# Patient Record
Sex: FEMALE | Race: WHITE | Employment: OTHER | URBAN - METROPOLITAN AREA
[De-identification: names, ages, dates, MRNs, and addresses within clinical notes are randomized per-mention and may not be internally consistent; named-entity substitution may affect disease eponyms.]

---

## 2017-01-03 ENCOUNTER — TELEPHONE (OUTPATIENT)
Dept: INTERNAL MEDICINE CLINIC | Facility: CLINIC | Age: 72
End: 2017-01-03

## 2017-01-03 RX ORDER — FENOFIBRATE 145 MG/1
145 TABLET, COATED ORAL
Qty: 90 TABLET | Refills: 0 | Status: SHIPPED | OUTPATIENT
Start: 2017-01-03 | End: 2017-01-09

## 2017-01-03 NOTE — TELEPHONE ENCOUNTER
Current outpatient prescriptions:   • •  Fenofibrate 145 MG Oral Tab, Take 1 tablet (145 mg total) by mouth once daily. , Disp: 90 tablet, Rfl: 0       Need rx  She never received it from mail order   •

## 2017-01-04 ENCOUNTER — TELEPHONE (OUTPATIENT)
Dept: INTERNAL MEDICINE CLINIC | Facility: CLINIC | Age: 72
End: 2017-01-04

## 2017-01-09 ENCOUNTER — TELEPHONE (OUTPATIENT)
Dept: INTERNAL MEDICINE CLINIC | Facility: CLINIC | Age: 72
End: 2017-01-09

## 2017-01-09 RX ORDER — CELECOXIB 100 MG/1
CAPSULE ORAL
Qty: 180 CAPSULE | Refills: 1 | Status: SHIPPED | OUTPATIENT
Start: 2017-01-09 | End: 2017-06-09

## 2017-01-09 RX ORDER — FENOFIBRATE 145 MG/1
145 TABLET, COATED ORAL
Qty: 90 TABLET | Refills: 0 | Status: SHIPPED | OUTPATIENT
Start: 2017-01-09 | End: 2017-05-01

## 2017-01-09 NOTE — TELEPHONE ENCOUNTER
Current outpatient prescriptions:   •  Fenofibrate 145 MG Oral Tab, Take 1 tablet (145 mg total) by mouth once daily. , Disp: 90 tablet, Rfl: 0   Needs 30 days to local  Mail order has not mailed  To her until Friday   •

## 2017-01-09 NOTE — TELEPHONE ENCOUNTER
Current outpatient prescriptions:   •  NEEDS 90 DAY TO MAIL ORDER    •      celecoxib 100 MG Oral Cap, TAKE 1 CAPSULE TWICE DAILY, Disp: 180 capsule, Rfl: 1  •

## 2017-01-18 ENCOUNTER — TELEPHONE (OUTPATIENT)
Dept: INTERNAL MEDICINE CLINIC | Facility: CLINIC | Age: 72
End: 2017-01-18

## 2017-01-19 ENCOUNTER — TELEPHONE (OUTPATIENT)
Dept: INTERNAL MEDICINE CLINIC | Facility: CLINIC | Age: 72
End: 2017-01-19

## 2017-01-19 ENCOUNTER — OFFICE VISIT (OUTPATIENT)
Dept: INTERNAL MEDICINE CLINIC | Facility: CLINIC | Age: 72
End: 2017-01-19

## 2017-01-19 VITALS
BODY MASS INDEX: 20.45 KG/M2 | OXYGEN SATURATION: 98 % | HEART RATE: 52 BPM | DIASTOLIC BLOOD PRESSURE: 60 MMHG | HEIGHT: 62.5 IN | TEMPERATURE: 100 F | SYSTOLIC BLOOD PRESSURE: 170 MMHG | WEIGHT: 114 LBS

## 2017-01-19 DIAGNOSIS — E03.9 ACQUIRED HYPOTHYROIDISM: Primary | ICD-10-CM

## 2017-01-19 DIAGNOSIS — E53.8 VITAMIN B 12 DEFICIENCY: ICD-10-CM

## 2017-01-19 DIAGNOSIS — IMO0001 UNCONTROLLED TYPE 2 DIABETES MELLITUS WITHOUT COMPLICATION, WITHOUT LONG-TERM CURRENT USE OF INSULIN: ICD-10-CM

## 2017-01-19 DIAGNOSIS — M81.0 OSTEOPOROSIS: ICD-10-CM

## 2017-01-19 DIAGNOSIS — E03.9 ACQUIRED HYPOTHYROIDISM: ICD-10-CM

## 2017-01-19 DIAGNOSIS — E55.9 VITAMIN D DEFICIENCY: ICD-10-CM

## 2017-01-19 DIAGNOSIS — E78.49 OTHER HYPERLIPIDEMIA: ICD-10-CM

## 2017-01-19 DIAGNOSIS — R05.9 COUGH: ICD-10-CM

## 2017-01-19 DIAGNOSIS — I10 ESSENTIAL HYPERTENSION WITH GOAL BLOOD PRESSURE LESS THAN 130/80: Primary | ICD-10-CM

## 2017-01-19 DIAGNOSIS — Z00.00 ADULT GENERAL MEDICAL EXAMINATION: ICD-10-CM

## 2017-01-19 DIAGNOSIS — H00.012 HORDEOLUM EXTERNUM OF RIGHT LOWER EYELID: ICD-10-CM

## 2017-01-19 DIAGNOSIS — F32.A DEPRESSION, UNSPECIFIED DEPRESSION TYPE: ICD-10-CM

## 2017-01-19 PROCEDURE — G0463 HOSPITAL OUTPT CLINIC VISIT: HCPCS | Performed by: INTERNAL MEDICINE

## 2017-01-19 PROCEDURE — 99214 OFFICE O/P EST MOD 30 MIN: CPT | Performed by: INTERNAL MEDICINE

## 2017-01-19 RX ORDER — LISINOPRIL 10 MG/1
10 TABLET ORAL NIGHTLY
Qty: 90 TABLET | Refills: 1 | Status: SHIPPED | OUTPATIENT
Start: 2017-01-19 | End: 2017-06-21

## 2017-01-19 RX ORDER — EPINEPHRINE 0.3 MG/.3ML
INJECTION SUBCUTANEOUS
Qty: 1 EACH | Refills: 1 | Status: SHIPPED | OUTPATIENT
Start: 2017-01-19 | End: 2018-01-30

## 2017-01-19 NOTE — TELEPHONE ENCOUNTER
Patient states if it ok with you can you order labs for her to go to the hospital and when she come back in April she will set up a follow up appointment

## 2017-01-19 NOTE — PATIENT INSTRUCTIONS
ASSESSMENT/PLAN:   Essential hypertension with goal blood pressure less than 130/80  (primary encounter diagnosis)Not well controlled. Add lisnopril 10 mg at night. Call in 2 weeks. Careful with diet and excercise at least 30 minutes 3-4 times a week.  Chec

## 2017-01-19 NOTE — TELEPHONE ENCOUNTER
You requested a appointment to see you in the am before Feb 5 th  She leaves to [place] you want to see her fasting in any location is fine .  Please advice

## 2017-01-19 NOTE — PROGRESS NOTES
HPI:    Patient ID: Epifanio Alas is a 74year old female. Cough  This is a new problem. The current episode started more than 1 month ago. The problem has been gradually improving. The cough is non-productive.  Associated symptoms include postnasal dri ALT 26 08/12/2016 11:09 AM   TSH 0.78 12/26/2013 10:36 AM   T4F 1.29 03/16/2015 02:54 PM          Lab Results  Component Value Date/Time   CHOLEST 152 08/12/2016 11:09 AM   HDL 56 08/12/2016 11:09 AM   TRIG 70 08/12/2016 11:09 AM   LDL 82 08/12/2016 11:09 CO2 27 08/12/2016 11:09 AM   CREATSERUM 0.94 08/12/2016 11:09 AM   CA 10.3 08/12/2016 11:09 AM   ALB 4.5 08/12/2016 11:09 AM   TP 6.7 08/12/2016 11:09 AM   ALKPHO 30 10/25/2010 09:56 AM   AST 30 08/12/2016 11:09 AM   ALT 26 08/12/2016 11:09 AM   BILT 0.7 0 Cholecalciferol (D-2000 MAXIMUM STRENGTH) 2000 UNITS Oral Tab Take  by mouth.  Disp:  Rfl:    VITAMIN E 400 UNIT OR TABS 1 TABLET DAILY Disp:  Rfl:    VITAMIN C 500 MG OR TABS 1 TABLET DAILY Disp:  Rfl:    TYLENOL ARTHRITIS PAIN OR DAILY Disp:  Rfl:    ZINC Smokeless Status: Never Used                        Alcohol Use: Yes           7.0 oz/week       14 Glasses of wine per week       Comment: Wine, 2 drinks daily     Drug Use: No            Other Topics            Concern  Caffeine Concern        Yes    C Psychiatric/Behavioral: Positive for sleep disturbance, dysphoric mood and agitation. Negative for suicidal ideas, hallucinations, behavioral problems, confusion, self-injury and decreased concentration. The patient is nervous/anxious.  The patient is not h VITAMIN B-12 500 MCG OR TABS 1 cap daily Disp:  Rfl:    VITAMIN D3 2000 UNIT OR CAPS 1 CAPSULE DAILY Disp:  Rfl:    CALCIUM 600 OR 1 cap daily Disp:  Rfl:    OMEGA 3 1200 MG OR CAPS 1 cap daily Disp:  Rfl:    COD LIVER OIL OR 1 DAILY Disp:  Rfl:    FLORAST Head: Normocephalic and atraumatic. Right Ear: Tympanic membrane, external ear and ear canal normal. No lacerations. No drainage, swelling or tenderness. No foreign bodies. No mastoid tenderness.  Tympanic membrane is not injected, not scarred, not perfor Eyes: Conjunctivae and EOM are normal. Pupils are equal, round, and reactive to light. Right eye exhibits hordeolum. Right eye exhibits no chemosis, no discharge and no exudate. No foreign body present in the right eye.  Left eye exhibits no chemosis, no di Head (right side): No submental, no submandibular, no tonsillar, no preauricular, no posterior auricular and no occipital adenopathy present.         Head (left side): No submental, no submandibular, no tonsillar, no preauricular, no posterior auricula Uncontrolled type 2 diabetes mellitus without complication, without long-term current use of insulin (hcc) Better. Careful with diet and excercise at least 30 minutes 3-4 times a week. Check sugars at different times on different dates.  Careful with low snow

## 2017-01-20 ENCOUNTER — TELEPHONE (OUTPATIENT)
Dept: INTERNAL MEDICINE CLINIC | Facility: CLINIC | Age: 72
End: 2017-01-20

## 2017-01-20 NOTE — TELEPHONE ENCOUNTER
Pt. Called from. [phone]: Blood pressures are 123/ 40. No Sx. Began lisinopril 10 mg at night. Decreased lisinopril. Check blood pressures and call on Tues.

## 2017-01-24 ENCOUNTER — HOSPITAL ENCOUNTER (OUTPATIENT)
Dept: GENERAL RADIOLOGY | Age: 72
Discharge: HOME | End: 2017-01-24
Attending: INTERNAL MEDICINE
Payer: MEDICARE

## 2017-01-24 ENCOUNTER — APPOINTMENT (OUTPATIENT)
Dept: LAB | Age: 72
End: 2017-01-24
Attending: INTERNAL MEDICINE
Payer: MEDICARE

## 2017-01-24 DIAGNOSIS — R05.9 COUGH: ICD-10-CM

## 2017-01-24 DIAGNOSIS — M81.0 OSTEOPOROSIS: ICD-10-CM

## 2017-01-24 DIAGNOSIS — E78.49 OTHER HYPERLIPIDEMIA: ICD-10-CM

## 2017-01-24 DIAGNOSIS — IMO0001 UNCONTROLLED TYPE 2 DIABETES MELLITUS WITHOUT COMPLICATION, WITHOUT LONG-TERM CURRENT USE OF INSULIN: ICD-10-CM

## 2017-01-24 DIAGNOSIS — E03.9 ACQUIRED HYPOTHYROIDISM: ICD-10-CM

## 2017-01-24 LAB
ALBUMIN SERPL BCP-MCNC: 4.3 G/DL (ref 3.5–4.8)
ALBUMIN/GLOB SERPL: 1.8 {RATIO} (ref 1–2)
ALP SERPL-CCNC: 31 U/L (ref 32–100)
ALT SERPL-CCNC: 24 U/L (ref 14–54)
ANION GAP SERPL CALC-SCNC: 8 MMOL/L (ref 0–18)
AST SERPL-CCNC: 27 U/L (ref 15–41)
BILIRUB SERPL-MCNC: 0.9 MG/DL (ref 0.3–1.2)
BUN SERPL-MCNC: 16 MG/DL (ref 8–20)
BUN/CREAT SERPL: 18.2 (ref 10–20)
CALCIUM SERPL-MCNC: 10.3 MG/DL (ref 8.5–10.5)
CHLORIDE SERPL-SCNC: 104 MMOL/L (ref 95–110)
CHOLEST SERPL-MCNC: 131 MG/DL (ref 110–200)
CO2 SERPL-SCNC: 28 MMOL/L (ref 22–32)
CREAT SERPL-MCNC: 0.88 MG/DL (ref 0.5–1.5)
GLOBULIN PLAS-MCNC: 2.4 G/DL (ref 2.5–3.7)
GLUCOSE SERPL-MCNC: 94 MG/DL (ref 70–99)
HBA1C MFR BLD: 5.5 % (ref 4–6)
HDLC SERPL-MCNC: 54 MG/DL
LDLC SERPL CALC-MCNC: 68 MG/DL (ref 0–99)
NONHDLC SERPL-MCNC: 77 MG/DL
OSMOLALITY UR CALC.SUM OF ELEC: 291 MOSM/KG (ref 275–295)
POTASSIUM SERPL-SCNC: 4.3 MMOL/L (ref 3.3–5.1)
PROT SERPL-MCNC: 6.7 G/DL (ref 5.9–8.4)
SODIUM SERPL-SCNC: 140 MMOL/L (ref 136–144)
TRIGL SERPL-MCNC: 43 MG/DL (ref 1–149)
TSH SERPL-ACNC: 0.65 UIU/ML (ref 0.34–5.6)

## 2017-01-24 PROCEDURE — 36415 COLL VENOUS BLD VENIPUNCTURE: CPT

## 2017-01-24 PROCEDURE — 80061 LIPID PANEL: CPT

## 2017-01-24 PROCEDURE — 84443 ASSAY THYROID STIM HORMONE: CPT

## 2017-01-24 PROCEDURE — 82306 VITAMIN D 25 HYDROXY: CPT

## 2017-01-24 PROCEDURE — 83036 HEMOGLOBIN GLYCOSYLATED A1C: CPT

## 2017-01-24 PROCEDURE — 80053 COMPREHEN METABOLIC PANEL: CPT

## 2017-01-24 PROCEDURE — 71020 XR CHEST PA + LAT CHEST (CPT=71020): CPT

## 2017-01-25 LAB — 25(OH)D3 SERPL-MCNC: 47.5 NG/ML

## 2017-01-26 ENCOUNTER — TELEPHONE (OUTPATIENT)
Dept: INTERNAL MEDICINE CLINIC | Facility: CLINIC | Age: 72
End: 2017-01-26

## 2017-01-26 NOTE — PROGRESS NOTES
Quick Note:    A1c is very good. CMP Normal (electrolytes, sugar, kidney and liver functions),   Lipid (choilesterol) is good,   Thyroid is good.    D level is good  ______

## 2017-02-10 ENCOUNTER — HOSPITAL ENCOUNTER (OUTPATIENT)
Dept: GENERAL RADIOLOGY | Age: 72
Discharge: HOME | End: 2017-02-10
Attending: INTERNAL MEDICINE | Admitting: INTERNAL MEDICINE
Payer: MEDICARE

## 2017-02-10 ENCOUNTER — TELEPHONE (OUTPATIENT)
Dept: INTERNAL MEDICINE CLINIC | Facility: CLINIC | Age: 72
End: 2017-02-10

## 2017-02-10 ENCOUNTER — OFFICE VISIT (OUTPATIENT)
Dept: INTERNAL MEDICINE CLINIC | Facility: CLINIC | Age: 72
End: 2017-02-10

## 2017-02-10 VITALS
OXYGEN SATURATION: 99 % | HEIGHT: 62.5 IN | DIASTOLIC BLOOD PRESSURE: 48 MMHG | WEIGHT: 114 LBS | BODY MASS INDEX: 20.45 KG/M2 | TEMPERATURE: 100 F | SYSTOLIC BLOOD PRESSURE: 116 MMHG | HEART RATE: 68 BPM

## 2017-02-10 DIAGNOSIS — E55.9 VITAMIN D DEFICIENCY: ICD-10-CM

## 2017-02-10 DIAGNOSIS — E53.8 VITAMIN B 12 DEFICIENCY: ICD-10-CM

## 2017-02-10 DIAGNOSIS — IMO0001 UNCONTROLLED TYPE 2 DIABETES MELLITUS WITHOUT COMPLICATION, WITHOUT LONG-TERM CURRENT USE OF INSULIN: ICD-10-CM

## 2017-02-10 DIAGNOSIS — E78.49 OTHER HYPERLIPIDEMIA: ICD-10-CM

## 2017-02-10 DIAGNOSIS — R05.9 COUGH: ICD-10-CM

## 2017-02-10 DIAGNOSIS — E03.9 ACQUIRED HYPOTHYROIDISM: ICD-10-CM

## 2017-02-10 DIAGNOSIS — M81.0 OSTEOPOROSIS: ICD-10-CM

## 2017-02-10 DIAGNOSIS — I10 ESSENTIAL HYPERTENSION WITH GOAL BLOOD PRESSURE LESS THAN 130/80: ICD-10-CM

## 2017-02-10 DIAGNOSIS — R05.9 COUGH: Primary | ICD-10-CM

## 2017-02-10 PROCEDURE — G0463 HOSPITAL OUTPT CLINIC VISIT: HCPCS | Performed by: INTERNAL MEDICINE

## 2017-02-10 PROCEDURE — 71020 XR CHEST PA + LAT CHEST (CPT=71020): CPT

## 2017-02-10 PROCEDURE — 99214 OFFICE O/P EST MOD 30 MIN: CPT | Performed by: INTERNAL MEDICINE

## 2017-02-10 RX ORDER — LEVOFLOXACIN 750 MG/1
750 TABLET ORAL DAILY
Qty: 10 TABLET | Refills: 0 | Status: SHIPPED | OUTPATIENT
Start: 2017-02-10 | End: 2017-02-20

## 2017-02-10 NOTE — PROGRESS NOTES
HPI:    Patient ID: Omar Valentino is a 74year old female. Cough  This is a new problem. The current episode started in the past 7 days. The problem has been gradually worsening. The cough is non-productive.  Associated symptoms include postnasal drip a kg)  01/19/17 : 114 lb (51.71 kg)  08/12/16 : 112 lb (50.803 kg)    BP Readings from Last 3 Encounters:  02/10/17 : 116/48  01/19/17 : 170/60  08/12/16 : 156/62    Labs:     Lab Results  Component Value Date/Time   GLU 94 01/24/2017 09:24 AM    01/24 stiffness. Skin: Negative for rash. Allergic/Immunologic: Negative for environmental allergies. Neurological: Negative for dizziness, tremors, seizures, syncope, weakness, light-headedness, numbness and headaches.    All other systems reviewed and are 500 MCG OR TABS 1 cap daily Disp:  Rfl:    VITAMIN D3 2000 UNIT OR CAPS 1 CAPSULE DAILY Disp:  Rfl:    CALCIUM 600 OR 1 cap daily Disp:  Rfl:    OMEGA 3 1200 MG OR CAPS 1 cap daily Disp:  Rfl:    COD LIVER OIL OR 1 DAILY Disp:  Rfl:    FLORASTOR 250 MG OR dry and not cyanotic. She does not have dentures. No oral lesions. No trismus in the jaw. No dental abscesses, uvula swelling or lacerations. No oropharyngeal exudate, posterior oropharyngeal edema, posterior oropharyngeal erythema or tonsillar abscesses. behavior is normal. Thought content normal.   Nursing note and vitals reviewed.   Bilateral barefoot skin diabetic exam is normal, visualized feet and the appearance is normal.  Bilateral sensation of both feet is normal.  Pulsation pedal pulse exam of both

## 2017-02-10 NOTE — PATIENT INSTRUCTIONS
ASSESSMENT/PLAN:   HTN. Good control. Careful with diet and excercise at least 30 minutes 3-4 times a week. Check blood pressures at different times on different days. Can purchase own blood pressure monitor. If not, check at local pharmacy.  Bake Lumafit m

## 2017-02-10 NOTE — TELEPHONE ENCOUNTER
Called and spoke to patient. Relayed to patient that Dr. Andrea Lynch is unable to see her, but she could come in today to see Dr. Florentin Ramirez.   Per patient, she does not want to come in and see anyone other than Dr. Andrea Lynch, and she wants to see her before she leaves

## 2017-02-10 NOTE — TELEPHONE ENCOUNTER
Patient calling states had flu and bronchitis on December, and now has tightness on chest and little cough with phlegm. Per patient is leaving on Sunday to [place] for two months, and will like you to advise her on what she should do regarding symptoms.  Pa

## 2017-02-11 ENCOUNTER — TELEPHONE (OUTPATIENT)
Dept: INTERNAL MEDICINE CLINIC | Facility: CLINIC | Age: 72
End: 2017-02-11

## 2017-02-12 NOTE — TELEPHONE ENCOUNTER
Spoke with pt. Feels like cat on hot tin roof. Denies palp., CP, SOB.N,V. Relayed CXR report to pt. Try 1/2 in Am when awakens and finish 10 days. Pt. To call if any adverse Sx.  Orno better or new Sx.

## 2017-02-16 ENCOUNTER — TELEPHONE (OUTPATIENT)
Dept: INTERNAL MEDICINE CLINIC | Facility: CLINIC | Age: 72
End: 2017-02-16

## 2017-02-16 NOTE — TELEPHONE ENCOUNTER
Patient is still sick since taking the medication Does not feel any better also now started with diarrhea.  Please advice

## 2017-02-17 NOTE — TELEPHONE ENCOUNTER
Diarrhea every time takes levaquin X 3 days. Watery. No F nor chills. Took levaquin X 6 month. Urinating Nl. ER if worse or not helping. Increase fluids. Tested for C. Diff. Stop. Call.

## 2017-02-20 ENCOUNTER — TELEPHONE (OUTPATIENT)
Dept: INTERNAL MEDICINE CLINIC | Facility: CLINIC | Age: 72
End: 2017-02-20

## 2017-02-20 NOTE — TELEPHONE ENCOUNTER
Patient just wants to make you aware she in now in [place] and feeling better  just has diarrhea but not to bad

## 2017-03-13 ENCOUNTER — TELEPHONE (OUTPATIENT)
Dept: INTERNAL MEDICINE CLINIC | Facility: CLINIC | Age: 72
End: 2017-03-13

## 2017-03-13 RX ORDER — LIOTHYRONINE SODIUM 5 UG/1
5 TABLET ORAL DAILY
Qty: 90 TABLET | Refills: 0 | Status: SHIPPED | OUTPATIENT
Start: 2017-03-13 | End: 2017-06-12

## 2017-05-01 RX ORDER — FENOFIBRATE 145 MG/1
145 TABLET, COATED ORAL
Qty: 90 TABLET | Refills: 0 | Status: SHIPPED | OUTPATIENT
Start: 2017-05-01 | End: 2017-06-09

## 2017-05-08 ENCOUNTER — TELEPHONE (OUTPATIENT)
Dept: INTERNAL MEDICINE CLINIC | Facility: CLINIC | Age: 72
End: 2017-05-08

## 2017-05-08 RX ORDER — LEVOTHYROXINE SODIUM 88 UG/1
88 TABLET ORAL
Qty: 90 TABLET | Refills: 1 | Status: SHIPPED | OUTPATIENT
Start: 2017-05-08 | End: 2017-06-09

## 2017-05-08 RX ORDER — LISINOPRIL AND HYDROCHLOROTHIAZIDE 25; 20 MG/1; MG/1
1 TABLET ORAL
Qty: 90 TABLET | Refills: 1 | Status: SHIPPED | OUTPATIENT
Start: 2017-05-08 | End: 2017-09-29

## 2017-06-06 ENCOUNTER — TELEPHONE (OUTPATIENT)
Dept: INTERNAL MEDICINE CLINIC | Facility: CLINIC | Age: 72
End: 2017-06-06

## 2017-06-06 RX ORDER — ATORVASTATIN CALCIUM 40 MG/1
40 TABLET, FILM COATED ORAL NIGHTLY
Qty: 90 TABLET | Refills: 1 | Status: SHIPPED | OUTPATIENT
Start: 2017-06-06 | End: 2017-07-22

## 2017-06-09 RX ORDER — LEVOTHYROXINE SODIUM 88 UG/1
88 TABLET ORAL
Qty: 90 TABLET | Refills: 1 | Status: SHIPPED | OUTPATIENT
Start: 2017-06-09 | End: 2017-12-19

## 2017-06-09 RX ORDER — CELECOXIB 100 MG/1
CAPSULE ORAL
Qty: 180 CAPSULE | Refills: 1 | Status: SHIPPED | OUTPATIENT
Start: 2017-06-09 | End: 2017-08-10

## 2017-06-09 RX ORDER — CELECOXIB 100 MG/1
CAPSULE ORAL
Qty: 180 CAPSULE | Refills: 1 | Status: SHIPPED | OUTPATIENT
Start: 2017-06-09 | End: 2017-09-22

## 2017-06-09 RX ORDER — FENOFIBRATE 145 MG/1
145 TABLET, COATED ORAL
Qty: 90 TABLET | Refills: 0 | Status: SHIPPED | OUTPATIENT
Start: 2017-06-09 | End: 2017-11-27

## 2017-06-12 RX ORDER — CITALOPRAM 20 MG/1
20 TABLET ORAL
Qty: 90 TABLET | Refills: 1 | Status: SHIPPED | OUTPATIENT
Start: 2017-06-12 | End: 2017-12-07

## 2017-06-12 RX ORDER — LIOTHYRONINE SODIUM 5 UG/1
5 TABLET ORAL DAILY
Qty: 90 TABLET | Refills: 1 | Status: SHIPPED | OUTPATIENT
Start: 2017-06-12 | End: 2018-01-02

## 2017-06-21 ENCOUNTER — TELEPHONE (OUTPATIENT)
Dept: INTERNAL MEDICINE CLINIC | Facility: CLINIC | Age: 72
End: 2017-06-21

## 2017-06-21 RX ORDER — LISINOPRIL 10 MG/1
10 TABLET ORAL NIGHTLY
Qty: 90 TABLET | Refills: 0 | Status: SHIPPED | OUTPATIENT
Start: 2017-06-21 | End: 2017-08-10

## 2017-06-27 ENCOUNTER — TELEPHONE (OUTPATIENT)
Dept: INTERNAL MEDICINE CLINIC | Facility: CLINIC | Age: 72
End: 2017-06-27

## 2017-06-27 DIAGNOSIS — F32.A DEPRESSION, UNSPECIFIED DEPRESSION TYPE: Primary | ICD-10-CM

## 2017-06-27 NOTE — TELEPHONE ENCOUNTER
Note to [facility] they will call and find it was covered with her insurance so she should expect a call from them.

## 2017-06-28 ENCOUNTER — TELEPHONE (OUTPATIENT)
Dept: INTERNAL MEDICINE CLINIC | Facility: CLINIC | Age: 72
End: 2017-06-28

## 2017-06-28 NOTE — TELEPHONE ENCOUNTER
MD Dr. Andrea Taylor,     I have begun following up with Carlos Huber & will let you know when I reach her to help her link with behavioral health services.      Thank you,   [address]

## 2017-07-06 ENCOUNTER — TELEPHONE (OUTPATIENT)
Dept: INTERNAL MEDICINE CLINIC | Facility: CLINIC | Age: 72
End: 2017-07-06

## 2017-07-06 NOTE — TELEPHONE ENCOUNTER
MD Dr. Daryl Mooney,     Unfortunately, [facility] has not returned my phone calls related to the order you placed for [facility] health [facility].  I am going to close the order at this time, but please feel free

## 2017-07-07 NOTE — TELEPHONE ENCOUNTER
Spoke with pt and gave her information for [facility], pt states that she had called there before but did not really get anywhere with them but she is willing to try again.

## 2017-07-22 ENCOUNTER — TELEPHONE (OUTPATIENT)
Dept: INTERNAL MEDICINE CLINIC | Facility: CLINIC | Age: 72
End: 2017-07-22

## 2017-07-22 RX ORDER — ATORVASTATIN CALCIUM 40 MG/1
40 TABLET, FILM COATED ORAL NIGHTLY
Qty: 90 TABLET | Refills: 1 | Status: SHIPPED | OUTPATIENT
Start: 2017-07-22 | End: 2017-12-20

## 2017-08-10 ENCOUNTER — OFFICE VISIT (OUTPATIENT)
Dept: INTERNAL MEDICINE CLINIC | Facility: CLINIC | Age: 72
End: 2017-08-10

## 2017-08-10 VITALS
HEART RATE: 51 BPM | OXYGEN SATURATION: 100 % | SYSTOLIC BLOOD PRESSURE: 138 MMHG | DIASTOLIC BLOOD PRESSURE: 64 MMHG | WEIGHT: 113 LBS | TEMPERATURE: 88 F | BODY MASS INDEX: 20 KG/M2

## 2017-08-10 DIAGNOSIS — E53.8 VITAMIN B 12 DEFICIENCY: ICD-10-CM

## 2017-08-10 DIAGNOSIS — R20.2 NUMBNESS AND TINGLING IN LEFT ARM: ICD-10-CM

## 2017-08-10 DIAGNOSIS — M81.0 AGE RELATED OSTEOPOROSIS, UNSPECIFIED PATHOLOGICAL FRACTURE PRESENCE: ICD-10-CM

## 2017-08-10 DIAGNOSIS — N28.9 RENAL INSUFFICIENCY: ICD-10-CM

## 2017-08-10 DIAGNOSIS — R20.0 NUMBNESS AND TINGLING IN LEFT ARM: ICD-10-CM

## 2017-08-10 DIAGNOSIS — IMO0001 UNCONTROLLED TYPE 2 DIABETES MELLITUS WITHOUT COMPLICATION, WITHOUT LONG-TERM CURRENT USE OF INSULIN: ICD-10-CM

## 2017-08-10 DIAGNOSIS — I10 ESSENTIAL HYPERTENSION WITH GOAL BLOOD PRESSURE LESS THAN 130/80: ICD-10-CM

## 2017-08-10 DIAGNOSIS — E55.9 VITAMIN D DEFICIENCY: ICD-10-CM

## 2017-08-10 DIAGNOSIS — F32.A DEPRESSION, UNSPECIFIED DEPRESSION TYPE: Primary | ICD-10-CM

## 2017-08-10 DIAGNOSIS — E03.9 ACQUIRED HYPOTHYROIDISM: ICD-10-CM

## 2017-08-10 DIAGNOSIS — E78.49 OTHER HYPERLIPIDEMIA: ICD-10-CM

## 2017-08-10 LAB
ALBUMIN SERPL BCP-MCNC: 4.2 G/DL (ref 3.5–4.8)
ALBUMIN/GLOB SERPL: 1.8 {RATIO} (ref 1–2)
ALP SERPL-CCNC: 34 U/L (ref 32–100)
ALT SERPL-CCNC: 28 U/L (ref 14–54)
ANION GAP SERPL CALC-SCNC: 9 MMOL/L (ref 0–18)
AST SERPL-CCNC: 29 U/L (ref 15–41)
BILIRUB SERPL-MCNC: 0.6 MG/DL (ref 0.3–1.2)
BUN SERPL-MCNC: 27 MG/DL (ref 8–20)
BUN/CREAT SERPL: 25.7 (ref 10–20)
CALCIUM SERPL-MCNC: 9.9 MG/DL (ref 8.5–10.5)
CHLORIDE SERPL-SCNC: 102 MMOL/L (ref 95–110)
CHOLEST SERPL-MCNC: 136 MG/DL (ref 110–200)
CO2 SERPL-SCNC: 25 MMOL/L (ref 22–32)
CREAT SERPL-MCNC: 1.05 MG/DL (ref 0.5–1.5)
CREAT UR-MCNC: 61.2 MG/DL
GLOBULIN PLAS-MCNC: 2.4 G/DL (ref 2.5–3.7)
GLUCOSE SERPL-MCNC: 100 MG/DL (ref 70–99)
HBA1C MFR BLD: 5.6 % (ref 4–6)
HDLC SERPL-MCNC: 54 MG/DL
LDLC SERPL CALC-MCNC: 63 MG/DL (ref 0–99)
MICROALBUMIN UR-MCNC: 0.2 MG/DL (ref 0–1.8)
MICROALBUMIN/CREAT UR: 3.3 MG/G{CREAT} (ref 0–20)
NONHDLC SERPL-MCNC: 82 MG/DL
OSMOLALITY UR CALC.SUM OF ELEC: 287 MOSM/KG (ref 275–295)
POTASSIUM SERPL-SCNC: 4 MMOL/L (ref 3.3–5.1)
PROT SERPL-MCNC: 6.6 G/DL (ref 5.9–8.4)
SODIUM SERPL-SCNC: 136 MMOL/L (ref 136–144)
TRIGL SERPL-MCNC: 93 MG/DL (ref 1–149)
TSH SERPL-ACNC: 1.06 UIU/ML (ref 0.45–5.33)
VIT B12 SERPL-MCNC: 629 PG/ML (ref 181–914)

## 2017-08-10 PROCEDURE — 36415 COLL VENOUS BLD VENIPUNCTURE: CPT | Performed by: INTERNAL MEDICINE

## 2017-08-10 PROCEDURE — 99214 OFFICE O/P EST MOD 30 MIN: CPT | Performed by: INTERNAL MEDICINE

## 2017-08-10 PROCEDURE — G0463 HOSPITAL OUTPT CLINIC VISIT: HCPCS | Performed by: INTERNAL MEDICINE

## 2017-08-10 RX ORDER — LISINOPRIL 10 MG/1
10 TABLET ORAL NIGHTLY
Qty: 90 TABLET | Refills: 1 | Status: SHIPPED | OUTPATIENT
Start: 2017-08-10 | End: 2018-01-02

## 2017-08-10 NOTE — PROGRESS NOTES
HPI:    Patient ID: Wil Sanchez is a 74year old female. HPI   Diabetes  Patient here for follow up of Diabetes. Has been taking medications regularly. Checks sugars 1 times daily. Fasting sugars average 100-120's. Higher due to at cousins.    mo CO2 28 01/24/2017 09:24 AM   CREATSERUM 0.88 01/24/2017 09:24 AM   CA 10.3 01/24/2017 09:24 AM   AST 27 01/24/2017 09:24 AM   ALT 24 01/24/2017 09:24 AM   TSH 0.65 01/24/2017 09:24 AM   T4F 1.29 03/16/2015 02:54 PM          Lab Results  Component Value D Oral Tab Take 1 tablet (145 mg total) by mouth once daily. Disp: 90 tablet Rfl: 0   celecoxib 100 MG Oral Cap TAKE 1 CAPSULE TWICE DAILY Disp: 180 capsule Rfl: 1   Lisinopril-Hydrochlorothiazide 20-25 MG Oral Tab Take 1 tablet by mouth once daily.  Disp: 90 Comment: SCANNED TO MEDIA TAB - 02/07/2014   Family History   Problem Relation Age of Onset   • Stroke Father    • Heart Disorder Father    • Cancer Mother      ? source- widespread at diagnosis   • goiter[other] [OTHER] Daughter    • Celiac[other] Tika Claire medial epicondyle, no lateral epicondyle and no olecranon process tenderness noted. Left wrist: Normal. She exhibits normal range of motion, no tenderness, no bony tenderness, no swelling, no effusion, no crepitus, no deformity and no laceration. presence Dexa due 2-2018. Uncontrolled type 2 diabetes mellitus without complication, without long-term current use of insulin (hcc) Generally, good control. Check blood. Careful with diet and excercise at least 30 minutes 3-4 times a week.  Check sugar

## 2017-08-11 LAB — 25(OH)D3 SERPL-MCNC: 54.8 NG/ML

## 2017-08-11 NOTE — PROGRESS NOTES
A1C and b12 is good. CMP Normal (electrolytes, sugar, and liver functions), slight decline in kidney function. Increase fluids. Check BMP in 2 weeks. Orders written. Lipid (choilesterol) is good,   Thyroid is good.

## 2017-09-01 ENCOUNTER — APPOINTMENT (OUTPATIENT)
Dept: LAB | Age: 72
End: 2017-09-01
Attending: INTERNAL MEDICINE
Payer: MEDICARE

## 2017-09-01 DIAGNOSIS — N28.9 RENAL INSUFFICIENCY: ICD-10-CM

## 2017-09-01 LAB
ANION GAP SERPL CALC-SCNC: 9 MMOL/L (ref 0–18)
BUN SERPL-MCNC: 18 MG/DL (ref 8–20)
BUN/CREAT SERPL: 18 (ref 10–20)
CALCIUM SERPL-MCNC: 9.9 MG/DL (ref 8.5–10.5)
CHLORIDE SERPL-SCNC: 96 MMOL/L (ref 95–110)
CO2 SERPL-SCNC: 26 MMOL/L (ref 22–32)
CREAT SERPL-MCNC: 1 MG/DL (ref 0.5–1.5)
GLUCOSE SERPL-MCNC: 103 MG/DL (ref 70–99)
OSMOLALITY UR CALC.SUM OF ELEC: 274 MOSM/KG (ref 275–295)
POTASSIUM SERPL-SCNC: 4.7 MMOL/L (ref 3.3–5.1)
SODIUM SERPL-SCNC: 131 MMOL/L (ref 136–144)

## 2017-09-01 PROCEDURE — 36415 COLL VENOUS BLD VENIPUNCTURE: CPT

## 2017-09-01 PROCEDURE — 80048 BASIC METABOLIC PNL TOTAL CA: CPT

## 2017-09-04 PROBLEM — E87.1 HYPONATREMIA: Status: ACTIVE | Noted: 2017-09-04

## 2017-09-22 ENCOUNTER — TELEPHONE (OUTPATIENT)
Dept: INTERNAL MEDICINE CLINIC | Facility: CLINIC | Age: 72
End: 2017-09-22

## 2017-09-22 RX ORDER — CELECOXIB 100 MG/1
CAPSULE ORAL
Qty: 180 CAPSULE | Refills: 1 | Status: SHIPPED | OUTPATIENT
Start: 2017-09-22 | End: 2017-10-18

## 2017-09-22 NOTE — TELEPHONE ENCOUNTER
Current Outpatient Prescriptions:     •  celecoxib 100 MG Oral Cap, TAKE 1 CAPSULE TWICE DAILY, Disp: 180 capsule, Rfl: 1

## 2017-09-29 ENCOUNTER — TELEPHONE (OUTPATIENT)
Dept: INTERNAL MEDICINE CLINIC | Facility: CLINIC | Age: 72
End: 2017-09-29

## 2017-09-29 RX ORDER — LISINOPRIL AND HYDROCHLOROTHIAZIDE 25; 20 MG/1; MG/1
1 TABLET ORAL
Qty: 90 TABLET | Refills: 1 | Status: SHIPPED | OUTPATIENT
Start: 2017-09-29 | End: 2018-01-30

## 2017-10-18 ENCOUNTER — TELEPHONE (OUTPATIENT)
Dept: INTERNAL MEDICINE CLINIC | Facility: CLINIC | Age: 72
End: 2017-10-18

## 2017-10-18 RX ORDER — CELECOXIB 100 MG/1
CAPSULE ORAL
Qty: 180 CAPSULE | Refills: 0 | Status: SHIPPED | OUTPATIENT
Start: 2017-10-18 | End: 2018-01-29

## 2017-10-18 RX ORDER — CELECOXIB 100 MG/1
CAPSULE ORAL
Qty: 180 CAPSULE | Refills: 0 | OUTPATIENT
Start: 2017-10-18

## 2017-10-18 NOTE — TELEPHONE ENCOUNTER
Follow up in 1 month, refilled x 1 per protocol.     Refill Protocol Appointment Criteria  · Appointment scheduled in the past 6 months or in the next 3 months  Recent Outpatient Visits            2 months ago Depression, unspecified depression type    [facility]

## 2017-10-18 NOTE — TELEPHONE ENCOUNTER
Current Outpatient Prescriptions:     • •  celecoxib 100 MG Oral Cap, TAKE 1 CAPSULE TWICE DAILY, Disp: 180 capsule, Rfl: 1

## 2017-10-23 ENCOUNTER — TELEPHONE (OUTPATIENT)
Dept: INTERNAL MEDICINE CLINIC | Facility: CLINIC | Age: 72
End: 2017-10-23

## 2017-10-24 NOTE — TELEPHONE ENCOUNTER
Informed pt Dr. Montague Gathers message below. Pt verbalized will wait for the PA.  Letter that was dated 10/20/17

## 2017-10-24 NOTE — TELEPHONE ENCOUNTER
Nothing that won't cause any other problems and/or interact with her meds. excpet codeine or those type. Just did PA on celebrex.

## 2017-10-30 ENCOUNTER — TELEPHONE (OUTPATIENT)
Dept: INTERNAL MEDICINE CLINIC | Facility: CLINIC | Age: 72
End: 2017-10-30

## 2017-10-30 NOTE — TELEPHONE ENCOUNTER
Patient is not unable to get authorization  for generic Celebrex does not know what else to take over the counter or what else you recommend for her to do about this situation . she continues to be in a lot of pain

## 2017-10-31 NOTE — TELEPHONE ENCOUNTER
Asking if you can squeeze her in somewhere Thursday patient is leaving for [place] on Friday and only 3 celebrex left.   Is in a lot of pain \"feels like she will loose her mind\"  Please can you see her and squeeze her in?

## 2017-10-31 NOTE — TELEPHONE ENCOUNTER
Not sure what I can give her for pain outside of narcotics. ? Better to see othopedics and maybe injection, etc. For pain. Can try 1145 on Thurs. 11-2-17.  Maybe wait

## 2017-10-31 NOTE — TELEPHONE ENCOUNTER
Per fermín this PA has been denied, form on  [employer]. Pt states that she will be out of pills soon, pt is asking to speak wth Dr. Mendez Valencia.

## 2017-11-01 NOTE — TELEPHONE ENCOUNTER
Chikis can you squeeze patient in for appt 11:45 tomorrow Thursday 11/2 and I will call patient thank you

## 2017-11-01 NOTE — TELEPHONE ENCOUNTER
Left message on voice mail for patient may be seen 11/2/17 11:45 am Dr. Jacques Mccloud will be a wait. Would be better to see orthopedic doctor may be able to do injection. If unable to make appt tomorrow with Dr. Jacques Mccloud please call.

## 2017-11-02 ENCOUNTER — OFFICE VISIT (OUTPATIENT)
Dept: INTERNAL MEDICINE CLINIC | Facility: CLINIC | Age: 72
End: 2017-11-02

## 2017-11-02 VITALS
DIASTOLIC BLOOD PRESSURE: 78 MMHG | BODY MASS INDEX: 21.03 KG/M2 | HEIGHT: 62.5 IN | WEIGHT: 117.19 LBS | SYSTOLIC BLOOD PRESSURE: 188 MMHG | TEMPERATURE: 98 F | HEART RATE: 54 BPM | OXYGEN SATURATION: 99 %

## 2017-11-02 DIAGNOSIS — I10 ESSENTIAL HYPERTENSION WITH GOAL BLOOD PRESSURE LESS THAN 130/80: ICD-10-CM

## 2017-11-02 DIAGNOSIS — E03.9 ACQUIRED HYPOTHYROIDISM: ICD-10-CM

## 2017-11-02 DIAGNOSIS — E55.9 VITAMIN D DEFICIENCY: ICD-10-CM

## 2017-11-02 DIAGNOSIS — E78.49 OTHER HYPERLIPIDEMIA: ICD-10-CM

## 2017-11-02 DIAGNOSIS — E87.1 HYPONATREMIA: ICD-10-CM

## 2017-11-02 DIAGNOSIS — E53.8 VITAMIN B 12 DEFICIENCY: ICD-10-CM

## 2017-11-02 DIAGNOSIS — IMO0001 UNCONTROLLED TYPE 2 DIABETES MELLITUS WITHOUT COMPLICATION, WITHOUT LONG-TERM CURRENT USE OF INSULIN: ICD-10-CM

## 2017-11-02 DIAGNOSIS — M54.32 BILATERAL SCIATICA: ICD-10-CM

## 2017-11-02 DIAGNOSIS — M54.31 BILATERAL SCIATICA: ICD-10-CM

## 2017-11-02 DIAGNOSIS — F32.A DEPRESSION, UNSPECIFIED DEPRESSION TYPE: Primary | ICD-10-CM

## 2017-11-02 DIAGNOSIS — M81.0 AGE RELATED OSTEOPOROSIS, UNSPECIFIED PATHOLOGICAL FRACTURE PRESENCE: ICD-10-CM

## 2017-11-02 DIAGNOSIS — M17.0 PRIMARY OSTEOARTHRITIS OF BOTH KNEES: ICD-10-CM

## 2017-11-02 PROCEDURE — G0463 HOSPITAL OUTPT CLINIC VISIT: HCPCS | Performed by: INTERNAL MEDICINE

## 2017-11-02 PROCEDURE — 99214 OFFICE O/P EST MOD 30 MIN: CPT | Performed by: INTERNAL MEDICINE

## 2017-11-02 PROCEDURE — 36415 COLL VENOUS BLD VENIPUNCTURE: CPT | Performed by: INTERNAL MEDICINE

## 2017-11-02 NOTE — PATIENT INSTRUCTIONS
ASSESSMENT/PLAN:   Depression, unspecified depression type  (primary encounter diagnosis)    Acquired hypothyroidism Stable clinically and biochemically.      Uncontrolled type 2 diabetes mellitus without complication, without long-term current use of insul Referrals:  None

## 2017-11-02 NOTE — PROGRESS NOTES
HPI:    Patient ID: Epifanio Alas is a 74year old female. Trying to cortisone R knee X 3. More weather. Frances Consuelo. Better when in [place]. Leaving to [place] tomorrow. Denies trauma. More pain in last 2-3  Months. Back pain worse LBP.      Back Pain   This i Lab Results  Component Value Date/Time   CHOLEST 136 08/10/2017 10:59 AM   HDL 54 08/10/2017 10:59 AM   TRIG 93 08/10/2017 10:59 AM   LDL 63 08/10/2017 10:59 AM   NONHDLC 82 08/10/2017 10:59 AM         Lab Results  Component Value Date/Time   A1C 5.6 08/ Endocrine: Negative for cold intolerance, heat intolerance, polydipsia, polyphagia and polyuria. Genitourinary: Negative for bladder incontinence, dysuria and pelvic pain. Musculoskeletal: Positive for back pain.    Neurological: Negative for dizziness, VITAMIN D3 2000 UNIT OR CAPS 1 CAPSULE DAILY Disp:  Rfl:    CALCIUM 600 OR 1 cap daily Disp:  Rfl:    OMEGA 3 1200 MG OR CAPS 1 cap daily Disp:  Rfl:    COD LIVER OIL OR 1 DAILY Disp:  Rfl:    FLORASTOR 250 MG OR CAPS 1 CAPSULE TWICE DAILY Disp:  Rfl:    C Neck: Trachea normal and phonation normal. No thyroid mass and no thyromegaly present. Cardiovascular: Normal rate, regular rhythm, S1 normal, S2 normal, normal heart sounds, intact distal pulses and normal pulses.     Pulses:       Carotid pulses are 2+ Right ankle: She exhibits normal range of motion, no swelling, no ecchymosis, no deformity, no laceration and normal pulse. No tenderness. Achilles tendon exhibits no pain, no defect and normal Larkin's test results.         Left ankle: She exhibits Essential hypertension with goal blood pressure less than 130/80 ? Control. Careful with diet and excercise at least 30 minutes 3-4 times a week. Check blood pressures at different times on different days. Can purchase own blood pressure monitor.  If not, c

## 2017-11-07 ENCOUNTER — TELEPHONE (OUTPATIENT)
Dept: INTERNAL MEDICINE CLINIC | Facility: CLINIC | Age: 72
End: 2017-11-07

## 2017-11-20 ENCOUNTER — PATIENT OUTREACH (OUTPATIENT)
Dept: CASE MANAGEMENT | Age: 72
End: 2017-11-20

## 2017-11-20 NOTE — PROGRESS NOTES
Spoke to patient and patient requested if I can call her back tomorrow. I will call her back tomorrow 11/21/17.   Thank you

## 2017-11-21 PROBLEM — M23.91 INTERNAL DERANGEMENT OF BOTH KNEES: Status: ACTIVE | Noted: 2017-11-21

## 2017-11-21 PROBLEM — M23.92 INTERNAL DERANGEMENT OF BOTH KNEES: Status: ACTIVE | Noted: 2017-11-21

## 2017-11-21 PROBLEM — M17.11 PRIMARY OSTEOARTHRITIS OF RIGHT KNEE: Status: ACTIVE | Noted: 2017-11-21

## 2017-11-27 ENCOUNTER — PATIENT OUTREACH (OUTPATIENT)
Dept: CASE MANAGEMENT | Age: 72
End: 2017-11-27

## 2017-11-27 RX ORDER — FENOFIBRATE 145 MG/1
145 TABLET, COATED ORAL
Qty: 90 TABLET | Refills: 1 | Status: SHIPPED | OUTPATIENT
Start: 2017-11-27 | End: 2017-12-20

## 2017-11-27 NOTE — PROGRESS NOTES
Outreached to patient in regards to enrollment to Chronic Care Management program. Left message for patient to return my call at ext. [phone]. Thank you.

## 2017-12-06 PROBLEM — M23.92 INTERNAL DERANGEMENT OF BOTH KNEES: Status: RESOLVED | Noted: 2017-11-21 | Resolved: 2017-12-06

## 2017-12-06 PROBLEM — E87.1 HYPONATREMIA: Status: RESOLVED | Noted: 2017-09-04 | Resolved: 2017-12-06

## 2017-12-06 PROBLEM — M23.91 INTERNAL DERANGEMENT OF BOTH KNEES: Status: RESOLVED | Noted: 2017-11-21 | Resolved: 2017-12-06

## 2017-12-06 NOTE — PROGRESS NOTES
HPI:    Patient ID: Jong Turner is a 74year old female. HPI   Has scheduled  For pap and dexa 1-29-18 with gyne. Eye exam to be done tomorrow at [facility]. Diabetes  Patient here for follow up of Diabetes. Has been taking medications regularly. kg)    BP Readings from Last 3 Encounters:  12/07/17 : 138/58  11/02/17 : (!) 188/78  08/10/17 : 138/64    Labs:     Lab Results  Component Value Date/Time   GLU 81 11/02/2017 01:24 PM    11/02/2017 01:24 PM   K 4.3 11/02/2017 01:24 PM    11/02 Take 1 tablet (145 mg total) by mouth once daily. Disp: 90 tablet Rfl: 1   Lisinopril-Hydrochlorothiazide 20-25 MG Oral Tab Take 1 tablet by mouth once daily.  Disp: 90 tablet Rfl: 1   atorvastatin 40 MG Oral Tab Take 1 tablet (40 mg total) by mouth nightly Sulfa Antibiotics         Sulfanilamide           Unknown    HISTORY:  Past Medical History:   Diagnosis Date   • ANXIETY    • DEPRESSION    • Generalized OA    • HYPERLIPIDEMIA    • HYPERTENSION    • HYPERTRIGLYCERIDEMIA    • HYPOTHYROIDISM    • Multi tachypnea and no bradypnea. No respiratory distress. She has no decreased breath sounds. She has no wheezes. She has no rhonchi. She has no rales. She exhibits no tenderness. Musculoskeletal: She exhibits no edema.    Neurological: She is alert and orient etc. Decrease carbohydrates. But also, careful with fruits and natural sugars. One serving a day and no more than 1 handful every day. Check feet  every AM and careful with sores and ulcers on feet bilaterally.  Check eyes every year with dilated eye exam.

## 2017-12-07 ENCOUNTER — OFFICE VISIT (OUTPATIENT)
Dept: INTERNAL MEDICINE CLINIC | Facility: CLINIC | Age: 72
End: 2017-12-07

## 2017-12-07 VITALS
TEMPERATURE: 98 F | BODY MASS INDEX: 20.28 KG/M2 | HEIGHT: 62.5 IN | SYSTOLIC BLOOD PRESSURE: 138 MMHG | HEART RATE: 51 BPM | OXYGEN SATURATION: 99 % | WEIGHT: 113 LBS | DIASTOLIC BLOOD PRESSURE: 58 MMHG

## 2017-12-07 DIAGNOSIS — I10 ESSENTIAL HYPERTENSION WITH GOAL BLOOD PRESSURE LESS THAN 130/80: ICD-10-CM

## 2017-12-07 DIAGNOSIS — E78.49 OTHER HYPERLIPIDEMIA: ICD-10-CM

## 2017-12-07 DIAGNOSIS — F32.A DEPRESSION, UNSPECIFIED DEPRESSION TYPE: Primary | ICD-10-CM

## 2017-12-07 DIAGNOSIS — M81.0 AGE RELATED OSTEOPOROSIS, UNSPECIFIED PATHOLOGICAL FRACTURE PRESENCE: ICD-10-CM

## 2017-12-07 DIAGNOSIS — E53.8 VITAMIN B 12 DEFICIENCY: ICD-10-CM

## 2017-12-07 DIAGNOSIS — IMO0001 UNCONTROLLED TYPE 2 DIABETES MELLITUS WITHOUT COMPLICATION, WITHOUT LONG-TERM CURRENT USE OF INSULIN: ICD-10-CM

## 2017-12-07 DIAGNOSIS — E83.52 HYPERCALCEMIA: ICD-10-CM

## 2017-12-07 DIAGNOSIS — E03.9 ACQUIRED HYPOTHYROIDISM: ICD-10-CM

## 2017-12-07 DIAGNOSIS — N28.9 RENAL INSUFFICIENCY: ICD-10-CM

## 2017-12-07 DIAGNOSIS — E55.9 VITAMIN D DEFICIENCY: ICD-10-CM

## 2017-12-07 PROCEDURE — 36415 COLL VENOUS BLD VENIPUNCTURE: CPT | Performed by: INTERNAL MEDICINE

## 2017-12-07 PROCEDURE — G0463 HOSPITAL OUTPT CLINIC VISIT: HCPCS | Performed by: INTERNAL MEDICINE

## 2017-12-07 PROCEDURE — 99214 OFFICE O/P EST MOD 30 MIN: CPT | Performed by: INTERNAL MEDICINE

## 2017-12-07 RX ORDER — INFLUENZA A VIRUSA/MICHIGAN/45/2015 X-275 (H1N1) ANTIGEN (FORMALDEHYDE INACTIVATED), INFLUENZA A VIRUS A/HONG KONG/4801/2014 X-263B (H3N2) ANTIGEN (FORMALDEHYDE INACTIVATED), AND INFLUENZA B VIRUS B/BRISBANE/60/2008 ANTIGEN (FORMALDEHYDE INACTIVATED) 60; 60; 60 UG/.5ML; UG/.5ML; UG/.5ML
INJECTION, SUSPENSION INTRAMUSCULAR
COMMUNITY
Start: 2017-10-17 | End: 2018-04-28 | Stop reason: ALTCHOICE

## 2017-12-07 RX ORDER — CITALOPRAM 20 MG/1
30 TABLET ORAL
Qty: 90 TABLET | Refills: 1 | Status: SHIPPED | OUTPATIENT
Start: 2017-12-07 | End: 2018-01-30

## 2017-12-07 NOTE — PATIENT INSTRUCTIONS
ASSESSMENT/PLAN:   Depression, unspecified depression type  (primary encounter diagnosis) Stable. Call in 1-18 for new therapist with insurance. Try celexa 1.5 a day. Essential hypertension with goal blood pressure less than 130/80 ? Control.  Careful wi mouth once daily.            Imaging & Referrals:  None

## 2017-12-20 RX ORDER — ATORVASTATIN CALCIUM 40 MG/1
40 TABLET, FILM COATED ORAL NIGHTLY
Qty: 90 TABLET | Refills: 0 | Status: SHIPPED | OUTPATIENT
Start: 2017-12-20 | End: 2018-04-11

## 2017-12-20 RX ORDER — LEVOTHYROXINE SODIUM 88 UG/1
88 TABLET ORAL
Qty: 90 TABLET | Refills: 0 | Status: SHIPPED | OUTPATIENT
Start: 2017-12-20 | End: 2018-01-30

## 2017-12-20 RX ORDER — FENOFIBRATE 145 MG/1
145 TABLET, COATED ORAL
Qty: 90 TABLET | Refills: 0 | Status: SHIPPED | OUTPATIENT
Start: 2017-12-20 | End: 2018-01-30

## 2017-12-29 ENCOUNTER — APPOINTMENT (OUTPATIENT)
Dept: LAB | Age: 72
End: 2017-12-29
Attending: INTERNAL MEDICINE
Payer: MEDICARE

## 2017-12-29 DIAGNOSIS — N28.9 RENAL INSUFFICIENCY: ICD-10-CM

## 2017-12-29 LAB
ANION GAP SERPL CALC-SCNC: 9 MMOL/L (ref 0–18)
BUN SERPL-MCNC: 25 MG/DL (ref 8–20)
BUN/CREAT SERPL: 24.3 (ref 10–20)
CALCIUM SERPL-MCNC: 9.6 MG/DL (ref 8.5–10.5)
CHLORIDE SERPL-SCNC: 102 MMOL/L (ref 95–110)
CO2 SERPL-SCNC: 24 MMOL/L (ref 22–32)
CREAT SERPL-MCNC: 1.03 MG/DL (ref 0.5–1.5)
GLUCOSE SERPL-MCNC: 107 MG/DL (ref 70–99)
OSMOLALITY UR CALC.SUM OF ELEC: 285 MOSM/KG (ref 275–295)
POTASSIUM SERPL-SCNC: 4 MMOL/L (ref 3.3–5.1)
SODIUM SERPL-SCNC: 135 MMOL/L (ref 136–144)

## 2017-12-29 PROCEDURE — 36415 COLL VENOUS BLD VENIPUNCTURE: CPT

## 2017-12-29 PROCEDURE — 80048 BASIC METABOLIC PNL TOTAL CA: CPT

## 2018-01-02 ENCOUNTER — TELEPHONE (OUTPATIENT)
Dept: INTERNAL MEDICINE CLINIC | Facility: CLINIC | Age: 73
End: 2018-01-02

## 2018-01-02 RX ORDER — LISINOPRIL 10 MG/1
10 TABLET ORAL NIGHTLY
Qty: 90 TABLET | Refills: 0 | Status: SHIPPED | OUTPATIENT
Start: 2018-01-02 | End: 2018-01-09

## 2018-01-02 RX ORDER — LIOTHYRONINE SODIUM 5 UG/1
5 TABLET ORAL DAILY
Qty: 90 TABLET | Refills: 1 | Status: SHIPPED | OUTPATIENT
Start: 2018-01-02 | End: 2018-01-15

## 2018-01-03 NOTE — TELEPHONE ENCOUNTER
Current Outpatient Prescriptions:   • •  Liothyronine Sodium (CYTOMEL) 5 MCG Oral Tab, Take 1 tablet (5 mcg total) by mouth daily. , Disp: 90 tablet, Rfl: 1  •

## 2018-01-14 PROBLEM — G89.29 CHRONIC PAIN OF LEFT KNEE: Status: ACTIVE | Noted: 2018-01-14

## 2018-01-14 PROBLEM — M25.562 CHRONIC PAIN OF LEFT KNEE: Status: ACTIVE | Noted: 2018-01-14

## 2018-01-15 ENCOUNTER — TELEPHONE (OUTPATIENT)
Dept: INTERNAL MEDICINE CLINIC | Facility: CLINIC | Age: 73
End: 2018-01-15

## 2018-01-15 RX ORDER — LIOTHYRONINE SODIUM 5 UG/1
5 TABLET ORAL DAILY
Qty: 30 TABLET | Refills: 0 | Status: SHIPPED | OUTPATIENT
Start: 2018-01-15 | End: 2018-01-19

## 2018-01-15 RX ORDER — LIOTHYRONINE SODIUM 5 UG/1
5 TABLET ORAL DAILY
Qty: 90 TABLET | Refills: 1 | Status: SHIPPED | OUTPATIENT
Start: 2018-01-15 | End: 2018-01-15

## 2018-01-15 NOTE — TELEPHONE ENCOUNTER
Rx sent to local pharmacy. Per pt states was informed from mail order pharamacy rx would be processed in 5-7 days, requesting 30 day supply to be sent to pharmacy while she await mail order to arrive. rx sent per protocol. No further action at this time.

## 2018-01-15 NOTE — TELEPHONE ENCOUNTER
Needs a partial refill until mail order come in to [facility] in [facility]   Current Outpatient Prescriptions:   •  Liothyronine Sodium (CYTOMEL) 5 MCG Oral Tab, Take 1 tablet (5 mcg total) by mouth daily. , Disp: 90 tablet, Rfl: 1  •

## 2018-01-15 NOTE — TELEPHONE ENCOUNTER
Current Outpatient Prescriptions:   •  Liothyronine Sodium (CYTOMEL) 5 MCG Oral Tab, Take 1 tablet (5 mcg total) by mouth daily. , Disp: 90 tablet, Rfl: 1 REFILL

## 2018-01-15 NOTE — TELEPHONE ENCOUNTER
[facility] and wants to know how she should proceed with treatment.

## 2018-01-15 NOTE — TELEPHONE ENCOUNTER
Pt states pharmacy did not receive rx. Rx resent to correct pharmacy as originally sent by Dr. Maria Esther Merino. No further action required at this time.

## 2018-01-16 NOTE — TELEPHONE ENCOUNTER
Pt was referring to cmp-declined in kidney function on lab result note 12/07/17. During phone call she was informed that needs to increase fluids and go to the lab and recheck her bmp levels, order placed on 12/29. Stated that she read the result in [employer] but no one informed her of those instructions, was told that instructions were stated with in the lab result message that was sent via [employer]. Will go to outpatient lab for bmp levels. Verbalized understanding.

## 2018-01-19 ENCOUNTER — TELEPHONE (OUTPATIENT)
Dept: INTERNAL MEDICINE CLINIC | Facility: CLINIC | Age: 73
End: 2018-01-19

## 2018-01-19 RX ORDER — DOXYCYCLINE HYCLATE 100 MG
200 TABLET ORAL ONCE
Qty: 2 TABLET | Refills: 0 | Status: SHIPPED | OUTPATIENT
Start: 2018-01-19 | End: 2018-01-19

## 2018-01-19 RX ORDER — LIOTHYRONINE SODIUM 5 UG/1
5 TABLET ORAL DAILY
Qty: 30 TABLET | Refills: 0 | Status: SHIPPED | OUTPATIENT
Start: 2018-01-19 | End: 2018-01-30

## 2018-01-19 NOTE — TELEPHONE ENCOUNTER
Human infection usually results from exposure to environmental sources, such as animal urine, contaminated water or soil, or infected animal tissue. Portals of entry include cuts or abraded skin, mucous membranes, or conjunctivae.  The infection may rarely

## 2018-01-19 NOTE — TELEPHONE ENCOUNTER
LMTCB. Wear gloves. Bleach everything in contact with dog. Dogs can shed for up to 3 months! 1 week ago, dog threw up. Vet done blood tests. Not eating Very lethargic. Take to new vet and found this. They are treating.  Discussed with patient of side effec

## 2018-01-19 NOTE — TELEPHONE ENCOUNTER
Pt states her dogs vet suggested that she put on antibiotics bc the dog was diagnosed with leptospirosis which is a bacteria that can be caught by humans. States vet informed her to be placed on antibiotics. Dogs vet number is [phone] Dr. Jordyn Mcneill.

## 2018-01-19 NOTE — TELEPHONE ENCOUNTER
NEEDS 30 DAYS UNTIL HER MAIL ORDER COME IN  [facility] DOES NOT HAVE IN STOCK  PLEASE LAURA IN TO [facility] IN [place]       Current Outpatient Prescriptions:   •  Liothyronine Sodium (CYTOMEL) 5 MCG Oral Tab, Take 1 tablet (5 mcg total) by mouth daily. , Disp:

## 2018-01-19 NOTE — TELEPHONE ENCOUNTER
Patient  Dog was diagnosed with letcostrirosis  Virus which is very contagious .  She does not know what precaution she should take it attacks liver and kidneys

## 2018-01-29 PROCEDURE — 88175 CYTOPATH C/V AUTO FLUID REDO: CPT | Performed by: OBSTETRICS & GYNECOLOGY

## 2018-01-29 PROCEDURE — 87624 HPV HI-RISK TYP POOLED RSLT: CPT | Performed by: OBSTETRICS & GYNECOLOGY

## 2018-01-30 ENCOUNTER — TELEPHONE (OUTPATIENT)
Dept: INTERNAL MEDICINE CLINIC | Facility: CLINIC | Age: 73
End: 2018-01-30

## 2018-01-30 RX ORDER — FENOFIBRATE 145 MG/1
145 TABLET, COATED ORAL
Qty: 90 TABLET | Refills: 0 | Status: SHIPPED | OUTPATIENT
Start: 2018-01-30 | End: 2018-12-17

## 2018-01-30 RX ORDER — CITALOPRAM 20 MG/1
30 TABLET ORAL
Qty: 135 TABLET | Refills: 0 | Status: ON HOLD | OUTPATIENT
Start: 2018-01-30 | End: 2018-09-04

## 2018-01-30 RX ORDER — LEVOTHYROXINE SODIUM 88 UG/1
88 TABLET ORAL
Qty: 90 TABLET | Refills: 0 | Status: SHIPPED | OUTPATIENT
Start: 2018-01-30 | End: 2018-08-16

## 2018-01-30 RX ORDER — LISINOPRIL AND HYDROCHLOROTHIAZIDE 25; 20 MG/1; MG/1
1 TABLET ORAL
Qty: 90 TABLET | Refills: 0 | Status: SHIPPED | OUTPATIENT
Start: 2018-01-30 | End: 2018-03-30

## 2018-01-30 RX ORDER — LIOTHYRONINE SODIUM 5 UG/1
5 TABLET ORAL DAILY
Qty: 90 TABLET | Refills: 0 | Status: SHIPPED | OUTPATIENT
Start: 2018-01-30 | End: 2018-04-12

## 2018-01-30 RX ORDER — EPINEPHRINE 0.3 MG/.3ML
INJECTION SUBCUTANEOUS
Qty: 1 EACH | Refills: 1 | Status: SHIPPED | OUTPATIENT
Start: 2018-01-30 | End: 2021-03-18

## 2018-01-30 NOTE — TELEPHONE ENCOUNTER
Current Outpatient Prescriptions:   •  Liothyronine Sodium (CYTOMEL) 5 MCG Oral Tab, Take 1 tablet (5 mcg total) by mouth daily. , Disp: 30 tablet, Rfl: 0  •  Levothyroxine Sodium (SYNTHROID) 88 MCG Oral Tab, Take 1 tablet (88 mcg total) by mouth once [facility] called requesting all medication be called into [facility] #[phone], I cannot find the pharmacy to enter this in her chart.  Patient will be leaving for [place] soon

## 2018-02-13 ENCOUNTER — OFFICE VISIT (OUTPATIENT)
Dept: INTERNAL MEDICINE CLINIC | Facility: CLINIC | Age: 73
End: 2018-02-13

## 2018-02-13 VITALS
HEIGHT: 63 IN | OXYGEN SATURATION: 97 % | SYSTOLIC BLOOD PRESSURE: 132 MMHG | WEIGHT: 117 LBS | TEMPERATURE: 99 F | DIASTOLIC BLOOD PRESSURE: 42 MMHG | BODY MASS INDEX: 20.73 KG/M2 | HEART RATE: 68 BPM

## 2018-02-13 DIAGNOSIS — F32.A DEPRESSION, UNSPECIFIED DEPRESSION TYPE: ICD-10-CM

## 2018-02-13 DIAGNOSIS — M81.0 AGE RELATED OSTEOPOROSIS, UNSPECIFIED PATHOLOGICAL FRACTURE PRESENCE: ICD-10-CM

## 2018-02-13 DIAGNOSIS — Z23 NEED FOR VACCINATION: Primary | ICD-10-CM

## 2018-02-13 DIAGNOSIS — I10 ESSENTIAL HYPERTENSION WITH GOAL BLOOD PRESSURE LESS THAN 130/80: ICD-10-CM

## 2018-02-13 DIAGNOSIS — E03.9 ACQUIRED HYPOTHYROIDISM: ICD-10-CM

## 2018-02-13 DIAGNOSIS — E55.9 VITAMIN D DEFICIENCY: ICD-10-CM

## 2018-02-13 DIAGNOSIS — E53.8 VITAMIN B 12 DEFICIENCY: ICD-10-CM

## 2018-02-13 DIAGNOSIS — E78.49 OTHER HYPERLIPIDEMIA: ICD-10-CM

## 2018-02-13 DIAGNOSIS — IMO0001 UNCONTROLLED TYPE 2 DIABETES MELLITUS WITHOUT COMPLICATION, WITHOUT LONG-TERM CURRENT USE OF INSULIN: ICD-10-CM

## 2018-02-13 LAB
ANION GAP SERPL CALC-SCNC: 9 MMOL/L (ref 0–18)
BUN SERPL-MCNC: 22 MG/DL (ref 8–20)
BUN/CREAT SERPL: 23.2 (ref 10–20)
CALCIUM SERPL-MCNC: 10.3 MG/DL (ref 8.5–10.5)
CHLORIDE SERPL-SCNC: 93 MMOL/L (ref 95–110)
CO2 SERPL-SCNC: 28 MMOL/L (ref 22–32)
CREAT SERPL-MCNC: 0.95 MG/DL (ref 0.5–1.5)
GLUCOSE SERPL-MCNC: 84 MG/DL (ref 70–99)
OSMOLALITY UR CALC.SUM OF ELEC: 273 MOSM/KG (ref 275–295)
POTASSIUM SERPL-SCNC: 4.2 MMOL/L (ref 3.3–5.1)
SODIUM SERPL-SCNC: 130 MMOL/L (ref 136–144)

## 2018-02-13 PROCEDURE — 90732 PPSV23 VACC 2 YRS+ SUBQ/IM: CPT | Performed by: INTERNAL MEDICINE

## 2018-02-13 PROCEDURE — G0463 HOSPITAL OUTPT CLINIC VISIT: HCPCS | Performed by: INTERNAL MEDICINE

## 2018-02-13 PROCEDURE — G0009 ADMIN PNEUMOCOCCAL VACCINE: HCPCS | Performed by: INTERNAL MEDICINE

## 2018-02-13 PROCEDURE — 99214 OFFICE O/P EST MOD 30 MIN: CPT | Performed by: INTERNAL MEDICINE

## 2018-02-13 RX ORDER — PREDNISOLONE ACETATE 10 MG/ML
1 SUSPENSION/ DROPS OPHTHALMIC
COMMUNITY
Start: 2017-12-08 | End: 2019-06-24 | Stop reason: ALTCHOICE

## 2018-02-13 RX ORDER — LIOTHYRONINE SODIUM 5 UG/1
5 TABLET ORAL DAILY
Qty: 90 TABLET | Refills: 0 | Status: CANCELLED | OUTPATIENT
Start: 2018-02-13

## 2018-02-13 NOTE — PROGRESS NOTES
HPI:    Patient ID: Elisabeth Barragan is a 74year old female. Dog with leptospirosis. Passed. Crying in room. Will get thru it. Sleeping a lot. Diabetes  Patient here for follow up of Diabetes. Has been taking medications regularly.     Checks анна 12/29/2017 11:07 AM    12/29/2017 11:07 AM   CO2 24 12/29/2017 11:07 AM   CREATSERUM 1.03 12/29/2017 11:07 AM   CA 9.6 12/29/2017 11:07 AM   AST 31 12/07/2017 11:00 AM   ALT 30 12/07/2017 11:00 AM   TSH 0.96 12/07/2017 11:00 AM   T4F 1.11 12/07/2017 Lisinopril-Hydrochlorothiazide 20-25 MG Oral Tab Take 1 tablet by mouth once daily. Disp: 90 tablet Rfl: 0   Citalopram Hydrobromide 20 MG Oral Tab Take 1.5 tablets (30 mg total) by mouth once daily.  Disp: 135 tablet Rfl: 0   Glucose Blood In Vitro Strip HYPOTHYROIDISM    • Multiple allergies    • OSTEOPENIA    • Other and unspecified hyperlipidemia    • Ovarian cyst    • Type II or unspecified type diabetes mellitus without mention of complication, not stated as uncontrolled    • Unspecified essential hyp She is alert and oriented to person, place, and time. Skin: Skin is warm and dry. She is not diaphoretic. Psychiatric: Her speech is normal and behavior is normal. Thought content normal. She exhibits a depressed mood.    Nursing note and vitals reviewe in this encounter       Imaging & Referrals:  PNEUMOCOCCAL IMM (PNEUMOVAX)        MR#[id number]

## 2018-02-13 NOTE — PATIENT INSTRUCTIONS
ASSESSMENT/PLAN:   Need for vaccination  (primary encounter diagnosis) pCV 23. Depression, unspecified depression type Grieving. Hold counseling. Vitamin d deficiency Stable Check blood 6-18.      Essential hypertension with goal blood pressure less

## 2018-02-15 ENCOUNTER — TELEPHONE (OUTPATIENT)
Dept: INTERNAL MEDICINE CLINIC | Facility: CLINIC | Age: 73
End: 2018-02-15

## 2018-02-15 NOTE — TELEPHONE ENCOUNTER
Pt states that she had a pneumonia vaccine on 2/13 and is now having sx, injection site is red and swollen, redness is about 2 1/2 inches long, warm to the touch and painful, please advise.

## 2018-02-16 NOTE — TELEPHONE ENCOUNTER
Pt informed of Dr. Radha Mcnally orders. Verbalized understanding. No questions or concerns at the time of call.

## 2018-02-20 ENCOUNTER — TELEPHONE (OUTPATIENT)
Dept: INTERNAL MEDICINE CLINIC | Facility: CLINIC | Age: 73
End: 2018-02-20

## 2018-02-21 NOTE — TELEPHONE ENCOUNTER
Pt states she is feeling better, swelling wnt down just a little sore. Pt wanted you to know that they had to put her cat to sleep, and last week it was the dog.  TRACEY

## 2018-03-07 ENCOUNTER — PATIENT OUTREACH (OUTPATIENT)
Dept: INTERNAL MEDICINE CLINIC | Facility: CLINIC | Age: 73
End: 2018-03-07

## 2018-03-07 NOTE — PROGRESS NOTES
Patient is eligible for a 2018 Annual Medicare Health Assessment. Discussed in detail w/patient. AHA scheduled for 6/14/18.

## 2018-03-30 ENCOUNTER — TELEPHONE (OUTPATIENT)
Dept: INTERNAL MEDICINE CLINIC | Facility: CLINIC | Age: 73
End: 2018-03-30

## 2018-03-30 RX ORDER — LISINOPRIL AND HYDROCHLOROTHIAZIDE 25; 20 MG/1; MG/1
1 TABLET ORAL
Qty: 90 TABLET | Refills: 0 | Status: ON HOLD | OUTPATIENT
Start: 2018-03-30 | End: 2018-06-02

## 2018-04-11 RX ORDER — ATORVASTATIN CALCIUM 40 MG/1
40 TABLET, FILM COATED ORAL NIGHTLY
Qty: 90 TABLET | Refills: 1 | Status: SHIPPED | OUTPATIENT
Start: 2018-04-11 | End: 2018-09-27

## 2018-04-12 ENCOUNTER — TELEPHONE (OUTPATIENT)
Dept: INTERNAL MEDICINE CLINIC | Facility: CLINIC | Age: 73
End: 2018-04-12

## 2018-04-12 RX ORDER — LIOTHYRONINE SODIUM 5 UG/1
5 TABLET ORAL DAILY
Qty: 30 TABLET | Refills: 0 | Status: SHIPPED | OUTPATIENT
Start: 2018-04-12 | End: 2018-05-30

## 2018-04-12 RX ORDER — MONTELUKAST SODIUM 10 MG/1
10 TABLET ORAL NIGHTLY
Qty: 30 TABLET | Refills: 3 | Status: SHIPPED | OUTPATIENT
Start: 2018-04-12 | End: 2018-07-20

## 2018-04-12 NOTE — TELEPHONE ENCOUNTER
Patient called c/o weakness and fatigue, she doesn't feel good, head congestion and is taking Flonase x 1 month, laying on the couch all the time everything makes her tired when she tries to do anything.   Patient is in [place] and wants to speak to you, sh

## 2018-04-12 NOTE — TELEPHONE ENCOUNTER
Spoke with pt. Congestion in lungs. No temp. B/P's are higher at 150/80's. No wheezing nor SOB. Cough mild phlegm. Does not flee like typical sinus infection. No abd. Pain. No urinary Sx. Low energy. Nerves. Sleep is OK. Sugars are Ok.    Try singuilar 10 m

## 2018-04-16 ENCOUNTER — TELEPHONE (OUTPATIENT)
Dept: INTERNAL MEDICINE CLINIC | Facility: CLINIC | Age: 73
End: 2018-04-16

## 2018-04-24 ENCOUNTER — TELEPHONE (OUTPATIENT)
Dept: INTERNAL MEDICINE CLINIC | Facility: CLINIC | Age: 73
End: 2018-04-24

## 2018-04-24 NOTE — TELEPHONE ENCOUNTER
Patient is coming back from [facility] on Thursday and is not feeling well can you see her on Friday ?

## 2018-04-25 NOTE — TELEPHONE ENCOUNTER
Left detailed message relaying Dr. Randall Galindo message below. Please help set up appt with any provider if pt agrees. Thank you.

## 2018-04-28 ENCOUNTER — APPOINTMENT (OUTPATIENT)
Dept: ULTRASOUND IMAGING | Facility: HOSPITAL | Age: 73
End: 2018-04-28
Attending: INTERNAL MEDICINE
Payer: MEDICARE

## 2018-04-28 ENCOUNTER — HOSPITAL ENCOUNTER (OUTPATIENT)
Dept: ULTRASOUND IMAGING | Facility: HOSPITAL | Age: 73
Discharge: HOME | End: 2018-04-28
Attending: INTERNAL MEDICINE
Payer: MEDICARE

## 2018-04-28 ENCOUNTER — OFFICE VISIT (OUTPATIENT)
Dept: INTERNAL MEDICINE CLINIC | Facility: CLINIC | Age: 73
End: 2018-04-28

## 2018-04-28 ENCOUNTER — HOSPITAL ENCOUNTER (OUTPATIENT)
Dept: CT IMAGING | Facility: HOSPITAL | Age: 73
Discharge: HOME | End: 2018-04-28
Attending: INTERNAL MEDICINE
Payer: MEDICARE

## 2018-04-28 VITALS
OXYGEN SATURATION: 98 % | BODY MASS INDEX: 20.2 KG/M2 | TEMPERATURE: 98 F | DIASTOLIC BLOOD PRESSURE: 66 MMHG | HEIGHT: 63 IN | WEIGHT: 114 LBS | HEART RATE: 50 BPM | SYSTOLIC BLOOD PRESSURE: 115 MMHG

## 2018-04-28 DIAGNOSIS — M79.604 PAIN IN BOTH LOWER EXTREMITIES: ICD-10-CM

## 2018-04-28 DIAGNOSIS — IMO0001 UNCONTROLLED TYPE 2 DIABETES MELLITUS WITHOUT COMPLICATION, WITHOUT LONG-TERM CURRENT USE OF INSULIN: ICD-10-CM

## 2018-04-28 DIAGNOSIS — I10 ESSENTIAL HYPERTENSION WITH GOAL BLOOD PRESSURE LESS THAN 130/80: Primary | ICD-10-CM

## 2018-04-28 DIAGNOSIS — E78.49 OTHER HYPERLIPIDEMIA: ICD-10-CM

## 2018-04-28 DIAGNOSIS — R60.0 EDEMA LEG: ICD-10-CM

## 2018-04-28 DIAGNOSIS — E53.8 VITAMIN B 12 DEFICIENCY: ICD-10-CM

## 2018-04-28 DIAGNOSIS — M79.605 PAIN IN BOTH LOWER EXTREMITIES: ICD-10-CM

## 2018-04-28 DIAGNOSIS — E03.9 ACQUIRED HYPOTHYROIDISM: ICD-10-CM

## 2018-04-28 DIAGNOSIS — R07.89 CHEST TIGHTNESS OR PRESSURE: ICD-10-CM

## 2018-04-28 DIAGNOSIS — E55.9 VITAMIN D DEFICIENCY: ICD-10-CM

## 2018-04-28 PROCEDURE — 82565 ASSAY OF CREATININE: CPT

## 2018-04-28 PROCEDURE — 99215 OFFICE O/P EST HI 40 MIN: CPT | Performed by: INTERNAL MEDICINE

## 2018-04-28 PROCEDURE — 93970 EXTREMITY STUDY: CPT | Performed by: INTERNAL MEDICINE

## 2018-04-28 PROCEDURE — 71260 CT THORAX DX C+: CPT | Performed by: INTERNAL MEDICINE

## 2018-04-28 PROCEDURE — 93010 ELECTROCARDIOGRAM REPORT: CPT | Performed by: INTERNAL MEDICINE

## 2018-04-28 PROCEDURE — G0463 HOSPITAL OUTPT CLINIC VISIT: HCPCS | Performed by: INTERNAL MEDICINE

## 2018-04-28 PROCEDURE — 93005 ELECTROCARDIOGRAM TRACING: CPT | Performed by: INTERNAL MEDICINE

## 2018-04-28 NOTE — PROGRESS NOTES
HPI:    Patient ID: Jerald Arriaga is a 74year old female. V after EtOH. Lost dog and cat 1 month apart. Was In [place]. Nothing right in [place]. [place] like could not breathe. Back pressure. Decrease with siting against hard chair.  Decrease with yen no open sores. Hypertension  Patient is here for follow up of hypertension. BP at home: same. Has been compliant with medications. Exercise level: moderately active and has been following low salt diet. Weight has been down.   Wt Readings from Last 3 Endocrine: Negative for cold intolerance, heat intolerance, polydipsia, polyphagia and polyuria. Genitourinary: Negative for decreased urine volume, difficulty urinating, dysuria, flank pain, frequency, hematuria and urgency.    Musculoskeletal: Wayne Olmos TWICE A DAY  WITH MORNING AND EVENING   MEALS Disp: 180 tablet Rfl: 0   Citalopram Hydrobromide 20 MG Oral Tab Take 1.5 tablets (30 mg total) by mouth once daily.  Disp: 135 tablet Rfl: 0   EPINEPHrine 0.3 MG/0.3ML Injection Solution Auto-injector Use as di • goiter [OTHER] Daughter    • Celiac [OTHER] Daughter    • Breast Cancer Paternal Aunt      age at dx 66-71      Social History: Smoking status: Never Smoker                                                              Smokeless tobacco: Never Used posterior oropharyngeal erythema or tonsillar abscesses. Eyes: Conjunctivae are normal. No scleral icterus. Neck: Trachea normal and phonation normal. Neck supple. No thyroid mass and no thyromegaly present.    Cardiovascular: Normal rate, regular rhyth adenopathy present. Left: No supraclavicular adenopathy present. Neurological: She is alert and oriented to person, place, and time. Skin: Skin is warm and dry. She is not diaphoretic. Psychiatric: She has a normal mood and affect.  Her behavio were placed in this encounter.       Meds This Visit:  No prescriptions requested or ordered in this encounter    Imaging & Referrals:  ELECTROCARDIOGRAM, COMPLETE  CT CHEST (W+WO) (CPT=71270)  US VENOUS DOPPLER LEG LEFT - DIAG IMG (CPT=93971)  US VENOUS DO

## 2018-04-28 NOTE — PATIENT INSTRUCTIONS
ASSESSMENT/PLAN:   Essential hypertension with goal blood pressure less than 130/80  (primary encounter diagnosis) Good control. Careful with diet and excercise at least 30 minutes 3-4 times a week.  Check blood pressures at different times on different day ECHO/REST AND STRESS(CPT=93350/43023 [facility] [address])

## 2018-04-29 PROBLEM — R91.1 PULMONARY NODULE: Status: ACTIVE | Noted: 2018-04-29

## 2018-04-30 ENCOUNTER — TELEPHONE (OUTPATIENT)
Dept: INTERNAL MEDICINE CLINIC | Facility: CLINIC | Age: 73
End: 2018-04-30

## 2018-05-01 ENCOUNTER — TELEPHONE (OUTPATIENT)
Dept: INTERNAL MEDICINE CLINIC | Facility: CLINIC | Age: 73
End: 2018-05-01

## 2018-05-01 NOTE — TELEPHONE ENCOUNTER
Pt calling for the second time wondering if she can have a minute to speak to Dr Ashkan Barrera regarding her tests results.

## 2018-05-02 ENCOUNTER — TELEPHONE (OUTPATIENT)
Dept: INTERNAL MEDICINE CLINIC | Facility: CLINIC | Age: 73
End: 2018-05-02

## 2018-05-02 DIAGNOSIS — R07.9 CHEST PAIN, UNSPECIFIED TYPE: Primary | ICD-10-CM

## 2018-05-02 NOTE — TELEPHONE ENCOUNTER
[address] [facility] scheduling called requesting a new order for a Nuclear Medicine Stress Echo, patient states that she cannot walk on a treadmill due to having bad knees.   Scheduling will contact patient to schedule test when the new order has been placed

## 2018-05-03 ENCOUNTER — TELEPHONE (OUTPATIENT)
Dept: INTERNAL MEDICINE CLINIC | Facility: CLINIC | Age: 73
End: 2018-05-03

## 2018-05-03 NOTE — TELEPHONE ENCOUNTER
Pt stated that ECHO had to be authorize in order first. [facility] scheduling and spoke to Will, stated that procedure was authorized 20 mins ago. Patient was informed. Verbalized understanding.

## 2018-05-03 NOTE — TELEPHONE ENCOUNTER
Pt is having problems scheduling Nuclear Medicine Stress Echo  She is requesting to the clinical staff to help her

## 2018-05-04 ENCOUNTER — HOSPITAL ENCOUNTER (OUTPATIENT)
Dept: CV DIAGNOSTICS | Facility: HOSPITAL | Age: 73
Discharge: HOME | End: 2018-05-04
Attending: INTERNAL MEDICINE
Payer: MEDICARE

## 2018-05-04 ENCOUNTER — TELEPHONE (OUTPATIENT)
Dept: INTERNAL MEDICINE CLINIC | Facility: CLINIC | Age: 73
End: 2018-05-04

## 2018-05-04 DIAGNOSIS — R94.39 EQUIVOCAL STRESS TEST: ICD-10-CM

## 2018-05-04 DIAGNOSIS — R94.39 ABNORMAL STRESS TEST: Primary | ICD-10-CM

## 2018-05-04 DIAGNOSIS — R07.9 CHEST PAIN, UNSPECIFIED TYPE: ICD-10-CM

## 2018-05-04 PROCEDURE — 93350 STRESS TTE ONLY: CPT | Performed by: INTERNAL MEDICINE

## 2018-05-04 PROCEDURE — 93017 CV STRESS TEST TRACING ONLY: CPT | Performed by: INTERNAL MEDICINE

## 2018-05-04 PROCEDURE — 93018 CV STRESS TEST I&R ONLY: CPT | Performed by: INTERNAL MEDICINE

## 2018-05-04 PROCEDURE — 93016 CV STRESS TEST SUPVJ ONLY: CPT | Performed by: INTERNAL MEDICINE

## 2018-05-04 RX ORDER — SODIUM CHLORIDE 9 MG/ML
INJECTION, SOLUTION INTRAVENOUS
Status: COMPLETED
Start: 2018-05-04 | End: 2018-05-04

## 2018-05-04 RX ORDER — DOBUTAMINE HYDROCHLORIDE 100 MG/100ML
INJECTION INTRAVENOUS
Status: COMPLETED
Start: 2018-05-04 | End: 2018-05-04

## 2018-05-04 RX ADMIN — SODIUM CHLORIDE: 9 INJECTION, SOLUTION INTRAVENOUS at 09:10:00

## 2018-05-04 RX ADMIN — DOBUTAMINE HYDROCHLORIDE: 100 INJECTION INTRAVENOUS at 09:15:00

## 2018-05-04 NOTE — TELEPHONE ENCOUNTER
S/w Dr Rigoberto Bernardo,    Pt should proceed with CTA, should check if insurance will cover for it. If not, call Dr. Rigoberto Bernardo. Per MB, to take 1 tablet of metoprolol tartrate 25 mg the night before CTA and also the morning of CTA.  If pulse < 50, do not take metop

## 2018-05-04 NOTE — TELEPHONE ENCOUNTER
S/w pt- notified her with below msg. Pt v/u and will call to schedule. Instructions of CTA went over with pt. V/u. [facility] contacted and receive auto voice msg that no prior Jarrod Boles is needed.

## 2018-05-04 NOTE — CONSULTS
Norbert Ortez is a 74year old female. HPI:   This is a pleasant 49-year-old diabetic with hypertension elevated cholesterol who presented to cardiac  diagnostics today to assess vague chest and back pain and fatigue with risk factors.   We are asked by JUDIE Ophthalmic Suspension Apply 1 drop to eye. Disp:  Rfl:    Levothyroxine Sodium (SYNTHROID) 88 MCG Oral Tab Take 1 tablet (88 mcg total) by mouth once daily.  Disp: 90 tablet Rfl: 0   Fenofibrate 145 MG Oral Tab Take 1 tablet (145 mg total) by mouth once jose miguel complication, not stated as uncontrolled    • Unspecified essential hypertension       Social History:  Smoking status: Never Smoker                                                              Smokeless tobacco: Never Used                      Alcohol use Electrolytes were okay in February    #4 hyperlipidemia  Last LDL was controlled at 65 on meds which she will continue     The patient indicates understanding of these issues and agrees to the plan.   The patient is asked to return as needed after stress te

## 2018-05-04 NOTE — TELEPHONE ENCOUNTER
Dr. Star Conklin,  Received call from Stress lab, regarding pt. States pt had stress test today and Dr. Saran Hernandez reviewed Echo pictures and stated \"not normal\" and is requesting pt to have a cardiac consult.  Dr. Saran Hernandez offered appt today but pt unable to

## 2018-05-07 ENCOUNTER — TELEPHONE (OUTPATIENT)
Dept: INTERNAL MEDICINE CLINIC | Facility: CLINIC | Age: 73
End: 2018-05-07

## 2018-05-07 RX ORDER — GLIMEPIRIDE 2 MG/1
2 TABLET ORAL 2 TIMES DAILY
Qty: 60 TABLET | Refills: 0 | Status: SHIPPED | OUTPATIENT
Start: 2018-05-07 | End: 2018-05-09

## 2018-05-07 NOTE — TELEPHONE ENCOUNTER
Patient calling requesting to speak to Dr Manjeet Alcaraz directly regarding Stress test that was done Saturday at [address] [place] [address].

## 2018-05-07 NOTE — TELEPHONE ENCOUNTER
Spoke with pt. Set up Friday for cath. 8 AM. Hold metformin on AM prior and day of and day after procedure. Take amaryl day before and day afterwards in place of metfmormin. BUT not on AM of procedure. No lisinopril on AM of procedure. Can we mail pt.  Co

## 2018-05-08 NOTE — TELEPHONE ENCOUNTER
MD spoke directly with pt as shown below, Copy of stress test mailed to pt as MD requested. No further action required at this time.

## 2018-05-09 ENCOUNTER — TELEPHONE (OUTPATIENT)
Dept: INTERNAL MEDICINE CLINIC | Facility: CLINIC | Age: 73
End: 2018-05-09

## 2018-05-09 RX ORDER — GLIMEPIRIDE 2 MG/1
2 TABLET ORAL 2 TIMES DAILY
Qty: 60 TABLET | Refills: 1 | Status: ON HOLD | OUTPATIENT
Start: 2018-05-09 | End: 2018-05-17

## 2018-05-10 ENCOUNTER — TELEPHONE (OUTPATIENT)
Dept: CARDIOLOGY CLINIC | Facility: CLINIC | Age: 73
End: 2018-05-10

## 2018-05-10 NOTE — TELEPHONE ENCOUNTER
Pt is calling because she is scheduled for a CT angiogram and misplaced her instructions.  Please call thank you

## 2018-05-10 NOTE — IMAGING NOTE
Phoned pt pre CTA Exam scheduled for 05/11/18. Reviewed pre CTA instructions, allergies and meds. Pt has rx from ordering MD for Metoprolol 25 mg pm before and morning of CTA.  Pt verbalized understanding of instructions and all questions answered to her sa

## 2018-05-11 ENCOUNTER — TELEPHONE (OUTPATIENT)
Dept: INTERNAL MEDICINE CLINIC | Facility: CLINIC | Age: 73
End: 2018-05-11

## 2018-05-11 ENCOUNTER — TELEPHONE (OUTPATIENT)
Dept: CARDIOLOGY CLINIC | Facility: CLINIC | Age: 73
End: 2018-05-11

## 2018-05-11 ENCOUNTER — HOSPITAL ENCOUNTER (OUTPATIENT)
Dept: CT IMAGING | Facility: HOSPITAL | Age: 73
Discharge: HOME | End: 2018-05-11
Attending: INTERNAL MEDICINE
Payer: MEDICARE

## 2018-05-11 ENCOUNTER — APPOINTMENT (OUTPATIENT)
Dept: LAB | Facility: HOSPITAL | Age: 73
End: 2018-05-11
Attending: INTERNAL MEDICINE
Payer: MEDICARE

## 2018-05-11 VITALS
DIASTOLIC BLOOD PRESSURE: 50 MMHG | OXYGEN SATURATION: 100 % | WEIGHT: 113 LBS | RESPIRATION RATE: 16 BRPM | HEIGHT: 62 IN | SYSTOLIC BLOOD PRESSURE: 176 MMHG | HEART RATE: 49 BPM | BODY MASS INDEX: 20.8 KG/M2

## 2018-05-11 DIAGNOSIS — R94.39 EQUIVOCAL STRESS TEST: ICD-10-CM

## 2018-05-11 DIAGNOSIS — R07.9 CHEST PAIN, UNSPECIFIED TYPE: ICD-10-CM

## 2018-05-11 DIAGNOSIS — R07.9 CHEST PAIN, UNSPECIFIED TYPE: Primary | ICD-10-CM

## 2018-05-11 DIAGNOSIS — R94.39 ABNORMAL STRESS TEST: ICD-10-CM

## 2018-05-11 PROCEDURE — 83735 ASSAY OF MAGNESIUM: CPT

## 2018-05-11 PROCEDURE — 85027 COMPLETE CBC AUTOMATED: CPT

## 2018-05-11 PROCEDURE — 93010 ELECTROCARDIOGRAM REPORT: CPT | Performed by: NURSE PRACTITIONER

## 2018-05-11 PROCEDURE — 36415 COLL VENOUS BLD VENIPUNCTURE: CPT

## 2018-05-11 PROCEDURE — 82565 ASSAY OF CREATININE: CPT

## 2018-05-11 PROCEDURE — 80048 BASIC METABOLIC PNL TOTAL CA: CPT

## 2018-05-11 PROCEDURE — 93005 ELECTROCARDIOGRAM TRACING: CPT

## 2018-05-11 RX ORDER — NITROGLYCERIN 0.4 MG/1
TABLET SUBLINGUAL
Status: DISPENSED
Start: 2018-05-11 | End: 2018-05-11

## 2018-05-11 RX ORDER — METOPROLOL TARTRATE 5 MG/5ML
5 INJECTION INTRAVENOUS SEE ADMIN INSTRUCTIONS
Status: ACTIVE | OUTPATIENT
Start: 2018-05-11 | End: 2018-05-11

## 2018-05-11 RX ORDER — DILTIAZEM HYDROCHLORIDE 5 MG/ML
10 INJECTION INTRAVENOUS SEE ADMIN INSTRUCTIONS
Status: ACTIVE | OUTPATIENT
Start: 2018-05-11 | End: 2018-05-11

## 2018-05-11 RX ORDER — NITROGLYCERIN 0.4 MG/1
0.4 TABLET SUBLINGUAL ONCE
Status: DISCONTINUED | OUTPATIENT
Start: 2018-05-11 | End: 2018-05-13

## 2018-05-11 NOTE — IMAGING NOTE
940 bp up 180/56  Pt requesting Dr Mona Yan be informed of procedure cancelled . Contacted Dr Mona Yan office talked with Dr Carmelo Jara to send message to Dr Mona Yan regarding cta being cancelled due to high calcium score and bp being up.  Pt took bp  He

## 2018-05-11 NOTE — TELEPHONE ENCOUNTER
Received call from [address], Radiology RN, regarding patient's CT angiogram. Stated it was cancelled due to calcium score of 1500. BP was 180/56 but patient did not take BP medication today.

## 2018-05-11 NOTE — IMAGING NOTE
TO RAD HOLDING AT 0800  HX TAKEN PT CONSENTED AT 0806 VS /50, HR 49, RR 16, O2 %    18 GAUGE IV STARTED AT 0810 POC TESTING COMPLETED CREAT 0.9    HELD AM DOSE METOPROLOL,  HX ABNORMAL STRESS TEST, BBB.  DIABETIC, WHILE IN [place] FELT VERY CON

## 2018-05-11 NOTE — TELEPHONE ENCOUNTER
LMTCB Calling to inform patient that she is scheduled for left heart cath Dr Bubba Vincent 5-18-18 at 8:45am at [facility] Patient should hold her metformin 24hrs prior to procedure, use betasept 2 days prior & day of procedure.  PAT done today

## 2018-05-11 NOTE — TELEPHONE ENCOUNTER
Patient called, she was not able to have the angiogram today, because she wasn't supposed to take the Metformin the day of the procedure and two days after procedure.   Patient is now rescheduled for Tuesday, and she wants to wants to review which medicatio

## 2018-05-11 NOTE — TELEPHONE ENCOUNTER
Spoke to Wolfgang at [facility] and she informed me that no Prior Auth is required for left heart cath ref# [id number]

## 2018-05-11 NOTE — TELEPHONE ENCOUNTER
She was told not to take the metformin the day of the procedure and 2 days after the procedure that is why we gave her the Amaryl. She was also supposed to take all of her regular medicines like she normally takes on the day of the test itself.   If she to

## 2018-05-11 NOTE — TELEPHONE ENCOUNTER
Spoke with patient, informed that she is not to take metformin the day of procedure and 2 days after the procedure. Patient to take regular medication as prescribed. Patient verbalized understanding, denies questions.

## 2018-05-14 ENCOUNTER — TELEPHONE (OUTPATIENT)
Dept: INTERNAL MEDICINE CLINIC | Facility: CLINIC | Age: 73
End: 2018-05-14

## 2018-05-14 RX ORDER — LISINOPRIL 20 MG/1
20 TABLET ORAL DAILY
Qty: 3 TABLET | Refills: 0 | Status: ON HOLD | OUTPATIENT
Start: 2018-05-14 | End: 2018-05-14

## 2018-05-14 NOTE — TELEPHONE ENCOUNTER
Patient calling her diabetic medication Glimpiride 2 mg patient states her blood sugar is dropping patient is cutting pill in half. Please advise. Patient is having surgery tomorrow.

## 2018-05-14 NOTE — TELEPHONE ENCOUNTER
Patient calling not happy with no one has called back since this morning very un happy with the whole [facility] system.

## 2018-05-15 ENCOUNTER — HOSPITAL ENCOUNTER (INPATIENT)
Dept: INTERVENTIONAL RADIOLOGY/VASCULAR | Facility: HOSPITAL | Age: 73
LOS: 2 days | Discharge: HOME | DRG: 247 | End: 2018-05-17
Attending: INTERNAL MEDICINE | Admitting: INTERNAL MEDICINE
Payer: MEDICARE

## 2018-05-15 DIAGNOSIS — R94.30 ABNORMAL CARDIOVASCULAR FUNCTION STUDY: ICD-10-CM

## 2018-05-15 DIAGNOSIS — E78.00 PURE HYPERCHOLESTEROLEMIA: ICD-10-CM

## 2018-05-15 DIAGNOSIS — IMO0001 UNCONTROLLED TYPE 2 DIABETES MELLITUS WITHOUT COMPLICATION, WITHOUT LONG-TERM CURRENT USE OF INSULIN: Primary | ICD-10-CM

## 2018-05-15 DIAGNOSIS — I10 ESSENTIAL HYPERTENSION: ICD-10-CM

## 2018-05-15 PROCEDURE — 027135Z DILATION OF CORONARY ARTERY, TWO ARTERIES WITH TWO DRUG-ELUTING INTRALUMINAL DEVICES, PERCUTANEOUS APPROACH: ICD-10-PCS | Performed by: INTERNAL MEDICINE

## 2018-05-15 PROCEDURE — B41F1ZZ FLUOROSCOPY OF RIGHT LOWER EXTREMITY ARTERIES USING LOW OSMOLAR CONTRAST: ICD-10-PCS | Performed by: INTERNAL MEDICINE

## 2018-05-15 PROCEDURE — B2111ZZ FLUOROSCOPY OF MULTIPLE CORONARY ARTERIES USING LOW OSMOLAR CONTRAST: ICD-10-PCS | Performed by: INTERNAL MEDICINE

## 2018-05-15 PROCEDURE — 4A023N7 MEASUREMENT OF CARDIAC SAMPLING AND PRESSURE, LEFT HEART, PERCUTANEOUS APPROACH: ICD-10-PCS | Performed by: INTERNAL MEDICINE

## 2018-05-15 PROCEDURE — 99223 1ST HOSP IP/OBS HIGH 75: CPT | Performed by: INTERNAL MEDICINE

## 2018-05-15 RX ORDER — MONTELUKAST SODIUM 10 MG/1
10 TABLET ORAL NIGHTLY
Status: DISCONTINUED | OUTPATIENT
Start: 2018-05-15 | End: 2018-05-17

## 2018-05-15 RX ORDER — NITROGLYCERIN 0.4 MG/1
0.4 TABLET SUBLINGUAL EVERY 5 MIN PRN
Status: DISCONTINUED | OUTPATIENT
Start: 2018-05-15 | End: 2018-05-17

## 2018-05-15 RX ORDER — LORAZEPAM 2 MG/ML
INJECTION INTRAMUSCULAR
Status: DISPENSED
Start: 2018-05-15 | End: 2018-05-16

## 2018-05-15 RX ORDER — SODIUM CHLORIDE 0.9 % (FLUSH) 0.9 %
3 SYRINGE (ML) INJECTION AS NEEDED
Status: DISCONTINUED | OUTPATIENT
Start: 2018-05-15 | End: 2018-05-17

## 2018-05-15 RX ORDER — SODIUM CHLORIDE 9 MG/ML
INJECTION, SOLUTION INTRAVENOUS
Status: COMPLETED
Start: 2018-05-15 | End: 2018-05-15

## 2018-05-15 RX ORDER — POLYETHYLENE GLYCOL 3350 17 G/17G
17 POWDER, FOR SOLUTION ORAL DAILY PRN
Status: DISCONTINUED | OUTPATIENT
Start: 2018-05-15 | End: 2018-05-17

## 2018-05-15 RX ORDER — LIDOCAINE HYDROCHLORIDE 20 MG/ML
INJECTION, SOLUTION EPIDURAL; INFILTRATION; INTRACAUDAL; PERINEURAL
Status: COMPLETED
Start: 2018-05-15 | End: 2018-05-15

## 2018-05-15 RX ORDER — LEVOTHYROXINE SODIUM 88 UG/1
88 TABLET ORAL
Status: DISCONTINUED | OUTPATIENT
Start: 2018-05-16 | End: 2018-05-17

## 2018-05-15 RX ORDER — SODIUM CHLORIDE 9 MG/ML
INJECTION, SOLUTION INTRAVENOUS
Status: DISPENSED
Start: 2018-05-15 | End: 2018-05-16

## 2018-05-15 RX ORDER — METOPROLOL TARTRATE 5 MG/5ML
5 INJECTION INTRAVENOUS
Status: DISCONTINUED | OUTPATIENT
Start: 2018-05-15 | End: 2018-05-16

## 2018-05-15 RX ORDER — HYDROMORPHONE HYDROCHLORIDE 1 MG/ML
0.4 INJECTION, SOLUTION INTRAMUSCULAR; INTRAVENOUS; SUBCUTANEOUS EVERY 2 HOUR PRN
Status: DISCONTINUED | OUTPATIENT
Start: 2018-05-15 | End: 2018-05-17

## 2018-05-15 RX ORDER — EPTIFIBATIDE 0.75 MG/ML
INJECTION, SOLUTION INTRAVENOUS
Status: COMPLETED
Start: 2018-05-15 | End: 2018-05-15

## 2018-05-15 RX ORDER — ONDANSETRON 2 MG/ML
4 INJECTION INTRAMUSCULAR; INTRAVENOUS EVERY 6 HOURS PRN
Status: DISCONTINUED | OUTPATIENT
Start: 2018-05-15 | End: 2018-05-17

## 2018-05-15 RX ORDER — SODIUM CHLORIDE 9 MG/ML
INJECTION, SOLUTION INTRAVENOUS ONCE
Status: COMPLETED | OUTPATIENT
Start: 2018-05-15 | End: 2018-05-17

## 2018-05-15 RX ORDER — METOPROLOL TARTRATE 5 MG/5ML
5 INJECTION INTRAVENOUS AS NEEDED
Status: DISCONTINUED | OUTPATIENT
Start: 2018-05-15 | End: 2018-05-16

## 2018-05-15 RX ORDER — HYDRALAZINE HYDROCHLORIDE 20 MG/ML
INJECTION INTRAMUSCULAR; INTRAVENOUS
Status: COMPLETED
Start: 2018-05-15 | End: 2018-05-15

## 2018-05-15 RX ORDER — ADENOSINE 3 MG/ML
INJECTION, SOLUTION INTRAVENOUS
Status: COMPLETED
Start: 2018-05-15 | End: 2018-05-15

## 2018-05-15 RX ORDER — CLOPIDOGREL BISULFATE 75 MG/1
TABLET ORAL
Status: COMPLETED
Start: 2018-05-15 | End: 2018-05-15

## 2018-05-15 RX ORDER — CITALOPRAM 20 MG/1
20 TABLET ORAL DAILY
Status: DISCONTINUED | OUTPATIENT
Start: 2018-05-15 | End: 2018-05-17

## 2018-05-15 RX ORDER — METOCLOPRAMIDE HYDROCHLORIDE 5 MG/ML
10 INJECTION INTRAMUSCULAR; INTRAVENOUS EVERY 8 HOURS PRN
Status: DISCONTINUED | OUTPATIENT
Start: 2018-05-15 | End: 2018-05-17

## 2018-05-15 RX ORDER — DEXTROSE MONOHYDRATE 25 G/50ML
50 INJECTION, SOLUTION INTRAVENOUS AS NEEDED
Status: DISCONTINUED | OUTPATIENT
Start: 2018-05-15 | End: 2018-05-17

## 2018-05-15 RX ORDER — DIPHENHYDRAMINE HYDROCHLORIDE 50 MG/ML
INJECTION INTRAMUSCULAR; INTRAVENOUS
Status: COMPLETED
Start: 2018-05-15 | End: 2018-05-15

## 2018-05-15 RX ORDER — NITROGLYCERIN 20 MG/100ML
INJECTION INTRAVENOUS
Status: COMPLETED
Start: 2018-05-15 | End: 2018-05-15

## 2018-05-15 RX ORDER — METOPROLOL TARTRATE 50 MG/1
50 TABLET, FILM COATED ORAL
Status: DISCONTINUED | OUTPATIENT
Start: 2018-05-15 | End: 2018-05-16

## 2018-05-15 RX ORDER — METOPROLOL TARTRATE 5 MG/5ML
2.5 INJECTION INTRAVENOUS AS NEEDED
Status: DISCONTINUED | OUTPATIENT
Start: 2018-05-15 | End: 2018-05-16

## 2018-05-15 RX ORDER — FENOFIBRATE 134 MG/1
134 CAPSULE ORAL
Status: DISCONTINUED | OUTPATIENT
Start: 2018-05-15 | End: 2018-05-17

## 2018-05-15 RX ORDER — NALOXONE HYDROCHLORIDE 0.4 MG/ML
0.08 INJECTION, SOLUTION INTRAMUSCULAR; INTRAVENOUS; SUBCUTANEOUS
Status: DISCONTINUED | OUTPATIENT
Start: 2018-05-15 | End: 2018-05-17

## 2018-05-15 RX ORDER — EPTIFIBATIDE 0.75 MG/ML
1 INJECTION, SOLUTION INTRAVENOUS CONTINUOUS
Status: ACTIVE | OUTPATIENT
Start: 2018-05-15 | End: 2018-05-15

## 2018-05-15 RX ORDER — HEPARIN SODIUM 1000 [USP'U]/ML
INJECTION, SOLUTION INTRAVENOUS; SUBCUTANEOUS
Status: COMPLETED
Start: 2018-05-15 | End: 2018-05-15

## 2018-05-15 RX ORDER — LISINOPRIL 20 MG/1
TABLET ORAL
Qty: 90 TABLET | Refills: 0 | Status: SHIPPED | OUTPATIENT
Start: 2018-05-15 | End: 2018-05-17

## 2018-05-15 RX ORDER — LIOTHYRONINE SODIUM 5 UG/1
5 TABLET ORAL NIGHTLY
Status: DISCONTINUED | OUTPATIENT
Start: 2018-05-15 | End: 2018-05-17

## 2018-05-15 RX ORDER — CHLORHEXIDINE GLUCONATE 4 G/100ML
30 SOLUTION TOPICAL
Status: ACTIVE | OUTPATIENT
Start: 2018-05-15 | End: 2018-05-15

## 2018-05-15 RX ORDER — DOPAMINE HYDROCHLORIDE 320 MG/100ML
INJECTION, SOLUTION INTRAVENOUS
Status: DISCONTINUED
Start: 2018-05-15 | End: 2018-05-15 | Stop reason: WASHOUT

## 2018-05-15 RX ORDER — SODIUM PHOSPHATE, DIBASIC AND SODIUM PHOSPHATE, MONOBASIC 7; 19 G/133ML; G/133ML
1 ENEMA RECTAL ONCE AS NEEDED
Status: DISCONTINUED | OUTPATIENT
Start: 2018-05-15 | End: 2018-05-17

## 2018-05-15 RX ORDER — ACETAMINOPHEN 325 MG/1
650 TABLET ORAL EVERY 6 HOURS PRN
Status: DISCONTINUED | OUTPATIENT
Start: 2018-05-15 | End: 2018-05-17

## 2018-05-15 RX ORDER — MIDAZOLAM HYDROCHLORIDE 1 MG/ML
INJECTION INTRAMUSCULAR; INTRAVENOUS
Status: COMPLETED
Start: 2018-05-15 | End: 2018-05-15

## 2018-05-15 RX ORDER — HYDROMORPHONE HYDROCHLORIDE 1 MG/ML
0.2 INJECTION, SOLUTION INTRAMUSCULAR; INTRAVENOUS; SUBCUTANEOUS EVERY 2 HOUR PRN
Status: DISCONTINUED | OUTPATIENT
Start: 2018-05-15 | End: 2018-05-17

## 2018-05-15 RX ORDER — ATORVASTATIN CALCIUM 40 MG/1
40 TABLET, FILM COATED ORAL NIGHTLY
Status: DISCONTINUED | OUTPATIENT
Start: 2018-05-15 | End: 2018-05-17

## 2018-05-15 RX ORDER — SODIUM CHLORIDE 9 MG/ML
INJECTION, SOLUTION INTRAVENOUS
Status: DISPENSED
Start: 2018-05-15 | End: 2018-05-15

## 2018-05-15 RX ORDER — METOPROLOL TARTRATE 5 MG/5ML
2.5 INJECTION INTRAVENOUS
Status: DISCONTINUED | OUTPATIENT
Start: 2018-05-15 | End: 2018-05-16

## 2018-05-15 RX ORDER — SODIUM CHLORIDE 9 MG/ML
INJECTION, SOLUTION INTRAVENOUS CONTINUOUS
Status: DISPENSED | OUTPATIENT
Start: 2018-05-15 | End: 2018-05-15

## 2018-05-15 RX ORDER — HYDROMORPHONE HYDROCHLORIDE 1 MG/ML
0.8 INJECTION, SOLUTION INTRAMUSCULAR; INTRAVENOUS; SUBCUTANEOUS EVERY 2 HOUR PRN
Status: DISCONTINUED | OUTPATIENT
Start: 2018-05-15 | End: 2018-05-17

## 2018-05-15 RX ORDER — BISACODYL 10 MG
10 SUPPOSITORY, RECTAL RECTAL
Status: DISCONTINUED | OUTPATIENT
Start: 2018-05-15 | End: 2018-05-17

## 2018-05-15 RX ORDER — METOPROLOL TARTRATE 5 MG/5ML
INJECTION INTRAVENOUS
Status: DISPENSED
Start: 2018-05-15 | End: 2018-05-16

## 2018-05-15 RX ORDER — ASPIRIN 81 MG/1
81 TABLET ORAL DAILY
Status: DISCONTINUED | OUTPATIENT
Start: 2018-05-16 | End: 2018-05-17

## 2018-05-15 RX ORDER — LORAZEPAM 2 MG/ML
0.5 INJECTION INTRAMUSCULAR EVERY 8 HOURS PRN
Status: DISCONTINUED | OUTPATIENT
Start: 2018-05-15 | End: 2018-05-17

## 2018-05-15 RX ADMIN — METOPROLOL TARTRATE 2.5 MG: 5 INJECTION INTRAVENOUS at 14:06:00

## 2018-05-15 RX ADMIN — EPTIFIBATIDE 1 MCG/KG/MIN: 0.75 INJECTION, SOLUTION INTRAVENOUS at 12:20:00

## 2018-05-15 RX ADMIN — LORAZEPAM 0.5 MG: 2 INJECTION INTRAMUSCULAR at 12:30:00

## 2018-05-15 RX ADMIN — ATORVASTATIN CALCIUM 40 MG: 40 TABLET, FILM COATED ORAL at 22:36:00

## 2018-05-15 RX ADMIN — LIOTHYRONINE SODIUM 5 MCG: 5 TABLET ORAL at 22:36:00

## 2018-05-15 RX ADMIN — MONTELUKAST SODIUM 10 MG: 10 TABLET ORAL at 22:36:00

## 2018-05-15 RX ADMIN — SODIUM CHLORIDE 0.9 % (FLUSH) 3 ML: 0.9 % SYRINGE (ML) INJECTION at 21:54:00

## 2018-05-15 RX ADMIN — HYDROMORPHONE HYDROCHLORIDE 0.4 MG: 1 INJECTION, SOLUTION INTRAMUSCULAR; INTRAVENOUS; SUBCUTANEOUS at 17:42:00

## 2018-05-15 RX ADMIN — HYDROMORPHONE HYDROCHLORIDE 0.4 MG: 1 INJECTION, SOLUTION INTRAMUSCULAR; INTRAVENOUS; SUBCUTANEOUS at 14:00:00

## 2018-05-15 NOTE — PROGRESS NOTES
Yonis Lindsey was previously taking fenofibrate (Tricor) 145 mg PO q24h at home prior to admission. Per therapeutic interchange, the patient will be switched to fenofibrate Ronnald [place]) 134 mg PO q24h during hospitalization.     The patient will be able t

## 2018-05-15 NOTE — PROCEDURES
LM 0%  LAD mid 60% short lesion  D1 and D2 patent  Lcx patent  OM1 and OM2 patent  RCA mid 80% calcified      IVUS of RCA   Angioscult mid RCA  Stent PCI mid RCA 3.0x32 mm FRANK  Post dilated the stent with 3.5x12 mm NC balloon    Patient developed acute thr

## 2018-05-15 NOTE — PROGRESS NOTES
Called Pat to make sure Integrilin orders and sheath removal were ordered in the computer. She stated she is aware of Integrilin 6 hour order and to pull sheath when Act <180 after 1 hour.

## 2018-05-15 NOTE — TELEPHONE ENCOUNTER
Spoke with Dr. Rafy Catalan stated Doctor wants to call patient  Patient notified by phone Dr. Rafy Catalan will call her back tonight.

## 2018-05-15 NOTE — TELEPHONE ENCOUNTER
Spoke to pt. About meds. Hold  Metformin and lisinopril-HCTZ on AM of procedure. Metformin 48 hrs prior and 48 hrs. After. No vitamins and omega 3, etc. Check B/P on Am procedure and >  140/85, take plain lisinopril. If < 130/80, not to take.  Take all meds

## 2018-05-16 PROCEDURE — 99232 SBSQ HOSP IP/OBS MODERATE 35: CPT | Performed by: INTERNAL MEDICINE

## 2018-05-16 RX ORDER — METOPROLOL SUCCINATE 25 MG/1
12.5 TABLET, EXTENDED RELEASE ORAL
Status: DISCONTINUED | OUTPATIENT
Start: 2018-05-16 | End: 2018-05-17

## 2018-05-16 RX ORDER — LISINOPRIL 20 MG/1
20 TABLET ORAL DAILY
Status: DISCONTINUED | OUTPATIENT
Start: 2018-05-16 | End: 2018-05-17

## 2018-05-16 RX ORDER — CLOPIDOGREL BISULFATE 75 MG/1
75 TABLET ORAL DAILY
Status: DISCONTINUED | OUTPATIENT
Start: 2018-05-16 | End: 2018-05-17

## 2018-05-16 RX ORDER — MAGNESIUM OXIDE 400 MG (241.3 MG MAGNESIUM) TABLET
400 TABLET ONCE
Status: COMPLETED | OUTPATIENT
Start: 2018-05-16 | End: 2018-05-16

## 2018-05-16 RX ORDER — HYDRALAZINE HYDROCHLORIDE 20 MG/ML
20 INJECTION INTRAMUSCULAR; INTRAVENOUS EVERY 4 HOURS PRN
Status: DISCONTINUED | OUTPATIENT
Start: 2018-05-16 | End: 2018-05-16

## 2018-05-16 RX ORDER — POTASSIUM CHLORIDE 20 MEQ/1
40 TABLET, EXTENDED RELEASE ORAL ONCE
Status: COMPLETED | OUTPATIENT
Start: 2018-05-16 | End: 2018-05-16

## 2018-05-16 RX ADMIN — ACETAMINOPHEN 650 MG: 325 TABLET ORAL at 18:21:00

## 2018-05-16 RX ADMIN — MONTELUKAST SODIUM 10 MG: 10 TABLET ORAL at 21:15:00

## 2018-05-16 RX ADMIN — SODIUM CHLORIDE 0.9 % (FLUSH) 3 ML: 0.9 % SYRINGE (ML) INJECTION at 09:38:00

## 2018-05-16 RX ADMIN — LEVOTHYROXINE SODIUM 88 MCG: 88 TABLET ORAL at 06:32:00

## 2018-05-16 RX ADMIN — LISINOPRIL 20 MG: 20 TABLET ORAL at 09:36:00

## 2018-05-16 RX ADMIN — HYDROMORPHONE HYDROCHLORIDE 0.4 MG: 1 INJECTION, SOLUTION INTRAMUSCULAR; INTRAVENOUS; SUBCUTANEOUS at 01:35:00

## 2018-05-16 RX ADMIN — CITALOPRAM 20 MG: 20 TABLET ORAL at 09:37:00

## 2018-05-16 RX ADMIN — POTASSIUM CHLORIDE 40 MEQ: 20 TABLET, EXTENDED RELEASE ORAL at 09:42:00

## 2018-05-16 RX ADMIN — MAGNESIUM OXIDE 400 MG (241.3 MG MAGNESIUM) TABLET 400 MG: TABLET at 04:13:00

## 2018-05-16 RX ADMIN — LIOTHYRONINE SODIUM 5 MCG: 5 TABLET ORAL at 21:53:00

## 2018-05-16 RX ADMIN — CLOPIDOGREL BISULFATE 75 MG: 75 TABLET ORAL at 09:36:00

## 2018-05-16 RX ADMIN — SODIUM CHLORIDE: 9 INJECTION, SOLUTION INTRAVENOUS at 01:36:00

## 2018-05-16 RX ADMIN — ASPIRIN 81 MG: 81 TABLET ORAL at 09:36:00

## 2018-05-16 RX ADMIN — MAGNESIUM OXIDE 400 MG (241.3 MG MAGNESIUM) TABLET 400 MG: TABLET at 09:41:00

## 2018-05-16 RX ADMIN — FENOFIBRATE 134 MG: 134 CAPSULE ORAL at 09:36:00

## 2018-05-16 RX ADMIN — Medication 25 MG: at 06:33:00

## 2018-05-16 RX ADMIN — ATORVASTATIN CALCIUM 40 MG: 40 TABLET, FILM COATED ORAL at 21:15:00

## 2018-05-16 NOTE — OCCUPATIONAL THERAPY NOTE
OCCUPATIONAL THERAPY EVALUATION - INPATIENT     Room Number: 895/367-Q  Evaluation Date: 5/16/2018  Type of Evaluation: Initial  Presenting Problem:  (s/p cardiac cath with stent)    Physician Order: IP Consult to Occupational Therapy  Reason for Therapy: ELECTROCARDIOGRAM, COMPLETE      Comment: SCANNED TO MEDIA TAB - 02/07/2014    HOME SITUATION  Type of Home: House  Home Layout: Two level  Lives With: Significant other            Drives: Yes       Stairs in Home: 4STE, 10 stairs to bedroom  Use of Assist Standing: good  Dynamic Standing: fair    FUNCTIONAL ADL ASSESSMENT  Grooming: indep  Feeding: indep  Bathing: indep  Toileting: indep  Upper Extremity Dressing: indep  Lower Extremity Dressing: indep    Education Provided: Educated patient in role of OT a

## 2018-05-16 NOTE — PROGRESS NOTES
[facility]    Progress Note    [facility] Patient Status:  Inpatient    1945 MRN [id number]   Location [facility] 2W/SW Attending Wil Beckford,  [facility] Day # 1 PCP Abhay Tabor.  Jacques Mccloud MD     Subjective:she is doing There is no tenderness. Musculoskeletal: Normal range of motion. She exhibits no tenderness or effusion. Neurological: She is alert and oriented to person, place, and time. No cranial nerve deficit, sensory deficit or motor deficit.  Coordination normal

## 2018-05-16 NOTE — PROGRESS NOTES
[facility]    Progress Note    Nell Jesusmati Patient Status:  Inpatient    1945 MRN [id number]   Location [facility] 2W/SW Attending Emma Hernandez, [facility] Day # 1 PCP Clau Vega.  Ilan Simmons MD         Assessment and Plan: Oral Before breakfast   • Liothyronine Sodium  5 mcg Oral Nightly   • Montelukast Sodium  10 mg Oral Nightly   • insulin detemir  6 Units Subcutaneous Nightly   • Insulin Aspart Pen  1-5 Units Subcutaneous TID CC             Results:     Lab Results  [place]

## 2018-05-16 NOTE — PHYSICAL THERAPY NOTE
PHYSICAL THERAPY QUICK EVALUATION - INPATIENT    Room Number: 680/309-P  Evaluation Date: 5/16/2018  Presenting Problem: s/p angiography, stent placement  Physician Order: PT Eval and Treat    Problem List  Active Problems:    Anxiety state    Type II di (e.g., wheelchair, bedside commode, etc.): A Little   -   Moving from lying on back to sitting on the side of the bed?: None   How much help from another person does the patient currently need. ..   -   Moving to and from a bed to a chair (including a wheel achieve the following goals . ..     Patient was able to transfer w/ supervision   Patient able to ambulate on level surfaces w/ supervision

## 2018-05-16 NOTE — PAYOR COMM NOTE
--------------  ADMISSION REVIEW     Payor: [address] [place], [place] [address] #:  [id number]  Authorization Number: [id number]    Admit date: 5/15/18  Admit time: 1       Admitting Physician: Benson Balbuena MD  Attending Physician:  Roshan Myles Location [facility] 2W/SW Attending Frankie Mars MD   [facility] Day # 0 PCP Lolis Berumen. Jeancarlos Lew MD     Date:  5/15/2018  Date of Admission:  5/15/2018    History provided by:patient  HPI:   No chief complaint on file.     Patient developed shortness o Smoking status: Never Smoker                                                              Smokeless tobacco: Never Used                      Alcohol use: Yes              Comment: 4-5 per week    Allergies/Medications:    Allergies:   Gluten Meal COD LIVER OIL OR 1 DAILY   COQ10 200 MG OR CAPS 1 cap daily   Glucose Blood In Vitro Strip Checks qam. Dx. Diabetes type 2 not controlled not on insulin E11.65   prednisoLONE acetate 1 % Ophthalmic Suspension Apply 1 drop to eye.    Nepafenac 0.3 % Ophthalm Blood pressure 131/46, pulse 70, resp. rate 15, weight 112 lb (50.8 kg), SpO2 99 %, not currently breastfeeding. Nursing note and vitals reviewed. Constitutional: She is oriented to person, place, and time.  She appears well-developed and well-nourishe Head (right side): No submental, no submandibular, no preauricular, no posterior auricular and no occipital adenopathy present.         Head (left side): No submental, no submandibular, no preauricular, no posterior auricular and no occipital adenopath Generally well controlled. Off metformin due to angiogram.  Off Amaryl due to hypoglycemic episodes with Amaryl. Will try little bit of Lantus and sliding scale insulin, hypoglycemia protocol, Accu-Cheks. Anxiety. Restart medications.   Ativan as n Date Action Dose Route User    5/16/2018 3278 Given 88 mcg Oral Tomer Guillermo RN      Liothyronine Sodium (CYTOMEL) tab 5 mcg     Date Action Dose Route User    5/15/2018 2236 Given 5 mcg Oral Tomer Guillermo RN      lisinopril (PRINIVIL,ZESTRIL) tab 20 1. Abnormal stress test [R94.39]   Post-procedure Diagnoses:   1. Coronary artery disease involving native coronary artery of native heart with unstable angina pectoris ([facility] [address] [I25.110]   Procedures:   1.  ANGIOPLASTY (CORONARY) []   []Manual[]Template Blood pressure (!) 162/79, pulse 68, temperature 98.7 °F (37.1 °C), temperature source Temporal, resp. rate 13, weight 112 lb (50.8 kg), SpO2 95 %, not currently breastfeeding.   Intake/Output:                     Last 24 hours:   Intake/Output Summary [facility] ALT 30 12/07/2017   T4F 1.11 12/07/2017   TSH 0.96 12/07/2017   MG 1.8 05/16/2018   B12 866 12/07/2017                       Feliciano Nash MD  5/16/2018           Progress Notes signed by Claudene Saa, MD at 5/16/2018 10:48 AM     Author: Steven Flaherty Eyes: EOM are normal. Pupils are equal, round, and reactive to light. Neck: Normal range of motion. Neck supple. Cardiovascular: Normal rate and regular rhythm. Exam reveals no gallop and no friction rub. No murmur heard.   Pulmonary/Chest: Effort n

## 2018-05-16 NOTE — PLAN OF CARE
Problem: Patient/Family Goals  Goal: Patient/Family Long Term Goal  Patient's Long Term Goal: Be able to attend wedding on May 25th of a family friend.     Interventions:  - Plans for outpatient procedure with MD, following up outpatient, managing blood pre attendance has been paid for.     Problem: PAIN - ADULT  Goal: Verbalizes/displays adequate comfort level or patient's stated pain goal  INTERVENTIONS:  - Encourage pt to monitor pain and request assistance  - Assess pain using appropriate pain scale  - Adm transportation as appropriate  - Identify discharge learning needs (meds, wound care, etc)  - Arrange for interpreters to assist at discharge as needed  - Consider post-discharge preferences of patient/family/discharge partner  - Complete POLST form as blayne non-sustained vtach (5-7 beats). BMP was drawn and MD notified of results and of these episodes. Patient is asymptomatic.      Problem: GASTROINTESTINAL - ADULT  Goal: Minimal or absence of nausea and vomiting  INTERVENTIONS:  - Maintain adequate hydration labs and rhythm and assess patient for signs and symptoms of electrolyte imbalances  - Administer electrolyte replacement as ordered  - Monitor response to electrolyte replacements, including rhythm and repeat lab results as appropriate  - Fluid restrictio

## 2018-05-16 NOTE — PLAN OF CARE
Patient was a little hypertensive this morning and  made aware and resumed home cardiac meds.  He also informed patient that she can transfer out of the PCCU to telemetry and possibly home in the am. Patient had ambulated with PT and OT and she d

## 2018-05-17 ENCOUNTER — PRIOR ORIGINAL RECORDS (OUTPATIENT)
Dept: OTHER | Age: 73
End: 2018-05-17

## 2018-05-17 VITALS
DIASTOLIC BLOOD PRESSURE: 48 MMHG | WEIGHT: 116.31 LBS | TEMPERATURE: 99 F | OXYGEN SATURATION: 98 % | RESPIRATION RATE: 18 BRPM | SYSTOLIC BLOOD PRESSURE: 118 MMHG | BODY MASS INDEX: 21 KG/M2 | HEART RATE: 71 BPM

## 2018-05-17 PROCEDURE — 99238 HOSP IP/OBS DSCHRG MGMT 30/<: CPT | Performed by: INTERNAL MEDICINE

## 2018-05-17 RX ORDER — MAGNESIUM OXIDE 400 MG (241.3 MG MAGNESIUM) TABLET
400 TABLET ONCE
Status: COMPLETED | OUTPATIENT
Start: 2018-05-17 | End: 2018-05-17

## 2018-05-17 RX ORDER — CLOPIDOGREL BISULFATE 75 MG/1
75 TABLET ORAL DAILY
Qty: 30 TABLET | Refills: 0 | Status: SHIPPED | OUTPATIENT
Start: 2018-05-17 | End: 2018-05-17

## 2018-05-17 RX ADMIN — LISINOPRIL 20 MG: 20 TABLET ORAL at 10:05:00

## 2018-05-17 RX ADMIN — LEVOTHYROXINE SODIUM 88 MCG: 88 TABLET ORAL at 04:53:00

## 2018-05-17 RX ADMIN — ACETAMINOPHEN 650 MG: 325 TABLET ORAL at 04:55:00

## 2018-05-17 RX ADMIN — CITALOPRAM 20 MG: 20 TABLET ORAL at 10:05:00

## 2018-05-17 RX ADMIN — METOPROLOL SUCCINATE 12.5 MG: 25 TABLET, EXTENDED RELEASE ORAL at 04:53:00

## 2018-05-17 RX ADMIN — MAGNESIUM OXIDE 400 MG (241.3 MG MAGNESIUM) TABLET 400 MG: TABLET at 10:05:00

## 2018-05-17 RX ADMIN — FENOFIBRATE 134 MG: 134 CAPSULE ORAL at 10:05:00

## 2018-05-17 RX ADMIN — CLOPIDOGREL BISULFATE 75 MG: 75 TABLET ORAL at 10:05:00

## 2018-05-17 RX ADMIN — ASPIRIN 81 MG: 81 TABLET ORAL at 10:05:00

## 2018-05-17 NOTE — DISCHARGE SUMMARY
[address] [place] Patient Status:  Inpatient    1945 MRN [id number]   Location [facility] 3W/SW Attending Carmencita Sepulveda MD   [facility] Day [facility] 2 PCP Zeus Marshall.  Mendez Valencia MD     Date of Admission: 5/15/2018  Date of Discharge: symmetric    Procedures during hospitalization:   •     Incidental or significant findings and recommendations (brief descriptions): •     Lab/Test results pending at Discharge:   ·     Consultants:  • Cardiology.     Discharge Medication List:     Mary Kay Barros tablet by mouth once daily.    Quantity:  90 tablet  Refills:  0     metoprolol Tartrate 25 MG Tabs  Commonly known as:  LOPRESSOR      Take 1 tablet the night before CTA, Take 1 tablet the morning of CTA   Quantity:  2 tablet  Refills:  0     Montelukast S (SINGULAIR) 10 MG Oral Tab  Take 1 tablet (10 mg total) by mouth nightly. Liothyronine Sodium (CYTOMEL) 5 MCG Oral Tab  Take 1 tablet (5 mcg total) by mouth daily. atorvastatin 40 MG Oral Tab  Take 1 tablet (40 mg total) by mouth nightly.  at bedtime 144/61    -----------------------------------------------------------------------------------------------  PATIENT DISCHARGE INSTRUCTIONS: See electronic chart    Mindy Mckinney MD 5/17/2018    Time spent:  30 to 74 minutes

## 2018-05-17 NOTE — PROGRESS NOTES
[facility]    Progress Note    Lititzoliverio Castillo Patient Status:  Inpatient    1945 MRN [id number]   Location [facility] 3W/SW Attending Kris Vela, [facility] Day # 2 PCP Shelby Peralta.  Jamey Seymour MD         Assessment and Plan: Hydrobromide  20 mg Oral Daily   • fenofibrate micronized  134 mg Oral Daily   • Levothyroxine Sodium  88 mcg Oral Before breakfast   • Liothyronine Sodium  5 mcg Oral Nightly   • Montelukast Sodium  10 mg Oral Nightly   • insulin detemir  6 Units Subcutan

## 2018-05-17 NOTE — TRANSITION NOTE
CAD - OUT PT SCHEDULED CATH   s/p PCI with stent to RCA PDA  Some disease in LAD treat medically  Now doing well home if okay with PCP on present medications  Follow-up APN within a week and then me thereafter.   Patient is interested in rehab.        Type not on insulin E11.65   Quantity:  100 strip  Refills:  6     Levothyroxine Sodium 88 MCG Tabs  Commonly known as:  SYNTHROID      Take 1 tablet (88 mcg total) by mouth once daily.    Quantity:  90 tablet  Refills:  0     Lisinopril-Hydrochlorothiazide 20-2 [phone]   · Clopidogrel Bisulfate 75 MG Tabs

## 2018-05-17 NOTE — PAYOR COMM NOTE
--------------  DISCHARGE REVIEW    Payor: [facility]te[facility] Aranda Parsons #:  [id number]  Authorization Number: [id number]    Admit date: 5/15/18  Admit time:  6226  Discharge Date: 5/17/2018 11:35 AM     Admitting Physician: Scar Caro MD This shift: No intake/output data recorded.                 Exam  Gen: No acute distress, alert and oriented x3,   Neck:supple,no JVD  Pulm: Lungs clear, normal respiratory effort  CV: Heart with regular rate and rhythm, Nl S1,S2 ,no S3 or mu ECG Report  Interpretation  -------------------------- Sinus Rhythm - frequent PAC s . -Right bundle branch block with left axis -bifascicular block. - Inferior -lateral infarct (age undetermined).  ABNORMAL When compared with ECG of 05/11/2018 11:03:18 No

## 2018-05-17 NOTE — PLAN OF CARE
Problem: Patient/Family Goals  Goal: Patient/Family Long Term Goal  Patient's Long Term Goal: Be able to attend wedding on May 25th of a family friend.     Interventions:  - Plans for outpatient procedure with MD, following up outpatient, managing blood pre Anticipate increased pain with activity and pre-medicate as appropriate   Outcome: Progressing      Problem: SAFETY ADULT - FALL  Goal: Free from fall injury  INTERVENTIONS:  - Assess pt frequently for physical needs  - Identify cognitive and physical defi venous puncture sites for bleeding and/or hematoma  - Assess quality of pulses, skin color and temperature  - Assess for signs of decreased coronary artery perfusion - ex.  Angina  - Evaluate fluid balance, assess for edema, trend weights   Outcome: Raffy nutrition consult as needed  - Instruct patient on self management of diabetes   Outcome: Progressing    Goal: Electrolytes maintained within normal limits  INTERVENTIONS:  - Monitor labs and rhythm and assess patient for signs and symptoms of electrolyte

## 2018-05-18 ENCOUNTER — PATIENT OUTREACH (OUTPATIENT)
Dept: CASE MANAGEMENT | Age: 73
End: 2018-05-18

## 2018-05-18 RX ORDER — CLOPIDOGREL BISULFATE 75 MG/1
TABLET ORAL
Qty: 90 TABLET | Refills: 0 | Status: SHIPPED | OUTPATIENT
Start: 2018-05-18 | End: 2018-08-06

## 2018-05-18 NOTE — PROGRESS NOTES
TCM OUTREACH    Book By Date 5/31/18    My chart message sent to patient requesting call back to [phone] review discharge instructions, medications and schedule TCM apt .

## 2018-05-20 ENCOUNTER — TELEPHONE (OUTPATIENT)
Dept: INTERNAL MEDICINE CLINIC | Facility: CLINIC | Age: 73
End: 2018-05-20

## 2018-05-21 ENCOUNTER — TELEPHONE (OUTPATIENT)
Dept: INTERNAL MEDICINE CLINIC | Facility: CLINIC | Age: 73
End: 2018-05-21

## 2018-05-21 NOTE — PROGRESS NOTES
Initial Post Discharge Follow Up   Discharge Date: 5/17/18  Contact Date: 5/18/2018    Consent Verification:  Assessment Completed With: Patient  HIPAA Verified?   Yes    Discharge Dx:   CAD    General:   • How have you been since your discharge from the 100 strip Rfl: 6   prednisoLONE acetate 1 % Ophthalmic Suspension Apply 1 drop to eye. Disp:  Rfl:    Nepafenac 0.3 % Ophthalmic Suspension Apply 1 drop to eye. Disp:  Rfl:    besifloxacin HCl 0.6 % Ophthalmic Suspension Apply 1 drop to eye.  Disp:  Rfl: there any reasons that keep you from taking your medication as prescribed? No    Referrals/orders at D/C:  Home Health ordered at D/C? No     DME ordered at D/C? No    Services ordered at D/C?   No   What services:   Cardiac rehab   Have you scheduled these

## 2018-05-21 NOTE — TELEPHONE ENCOUNTER
Pt dc'd 5/17 from [facility]. States that Dr. Colton Tobin wanted to see her ASAP when she got back. She declines to see any other doctor. Needs TCM appt by 5/31. She has no restrictions on when or where. Please advise.

## 2018-05-22 ENCOUNTER — TELEPHONE (OUTPATIENT)
Dept: INTERNAL MEDICINE CLINIC | Facility: CLINIC | Age: 73
End: 2018-05-22

## 2018-05-24 ENCOUNTER — MYAURORA ACCOUNT LINK (OUTPATIENT)
Dept: OTHER | Age: 73
End: 2018-05-24

## 2018-05-24 ENCOUNTER — PRIOR ORIGINAL RECORDS (OUTPATIENT)
Dept: OTHER | Age: 73
End: 2018-05-24

## 2018-05-24 LAB
BUN: 20 MG/DL
CALCIUM: 8.8 MG/DL
CHLORIDE: 107 MEQ/L
CREATININE, SERUM: 0.76 MG/DL
CREATININE, SERUM: 0.89 MG/DL
GLUCOSE: 145 MG/DL
MAGNESIUM: 1.7 MG/DL
MAGNESIUM: 1.8 MG/DL
POTASSIUM, SERUM: 3.7 MEQ/L
POTASSIUM, SERUM: 4 MEQ/L
SODIUM: 137 MEQ/L

## 2018-05-25 ENCOUNTER — TELEPHONE (OUTPATIENT)
Dept: INTERNAL MEDICINE CLINIC | Facility: CLINIC | Age: 73
End: 2018-05-25

## 2018-05-25 NOTE — TELEPHONE ENCOUNTER
Patient called just had heart surgery 2 stent placed and concerned as BP very low 98/62 this am now 90/44 P-49. Thinks has too much medication. c/o extremely fatigued. Denies, chest pain, shortness of breath or dizziness.   Taking Linsinopril/hctz 20/25

## 2018-05-25 NOTE — TELEPHONE ENCOUNTER
Should hold her lisinopril until BP over 100, then resume 1/2 pill daily.  Make f/u appt with either Dr Kriss Fong or cardiologist next week

## 2018-05-25 NOTE — TELEPHONE ENCOUNTER
Patient just had 2 stent put in her blood pressure  90/40 pulse 49 she ffesl is to low or she is taking to much medication please advice

## 2018-05-26 ENCOUNTER — TELEPHONE (OUTPATIENT)
Dept: INTERNAL MEDICINE CLINIC | Facility: CLINIC | Age: 73
End: 2018-05-26

## 2018-05-26 NOTE — TELEPHONE ENCOUNTER
Patient had paged last night at 10pm with low BP and heart rates. Recommended holding her metoprolol. This am BP is 110/54 with HR 48-62. Overall feeling better today. Recommend she hold the lisinopril and OK to take the metoprolol at night.  Has f/u appt n

## 2018-05-30 ENCOUNTER — TELEPHONE (OUTPATIENT)
Dept: INTERNAL MEDICINE CLINIC | Facility: CLINIC | Age: 73
End: 2018-05-30

## 2018-05-30 ENCOUNTER — CARDPULM VISIT (OUTPATIENT)
Dept: CARDIAC REHAB | Facility: HOSPITAL | Age: 73
End: 2018-05-30
Attending: INTERNAL MEDICINE
Payer: MEDICARE

## 2018-05-30 DIAGNOSIS — Z98.61 S/P PTCA (PERCUTANEOUS TRANSLUMINAL CORONARY ANGIOPLASTY): ICD-10-CM

## 2018-05-30 PROCEDURE — 93798 PHYS/QHP OP CAR RHAB W/ECG: CPT

## 2018-05-30 RX ORDER — LIOTHYRONINE SODIUM 5 UG/1
5 TABLET ORAL NIGHTLY
Qty: 90 TABLET | Refills: 0 | Status: SHIPPED | OUTPATIENT
Start: 2018-05-30 | End: 2018-09-06

## 2018-05-30 NOTE — TELEPHONE ENCOUNTER
Needs 90 days    •  Liothyronine Sodium (CYTOMEL) 5 MCG Oral Tab, Take 1 tablet (5 mcg total) by mouth daily.  (Patient taking differently: Take 5 mcg by mouth nightly.  ), Disp: 30 tablet, Rfl: 0

## 2018-05-31 ENCOUNTER — TELEPHONE (OUTPATIENT)
Dept: INTERNAL MEDICINE CLINIC | Facility: CLINIC | Age: 73
End: 2018-05-31

## 2018-05-31 ENCOUNTER — OFFICE VISIT (OUTPATIENT)
Dept: INTERNAL MEDICINE CLINIC | Facility: CLINIC | Age: 73
End: 2018-05-31

## 2018-05-31 ENCOUNTER — APPOINTMENT (OUTPATIENT)
Dept: GENERAL RADIOLOGY | Facility: HOSPITAL | Age: 73
DRG: 282 | End: 2018-05-31
Attending: EMERGENCY MEDICINE
Payer: MEDICARE

## 2018-05-31 ENCOUNTER — HOSPITAL ENCOUNTER (INPATIENT)
Facility: HOSPITAL | Age: 73
LOS: 1 days | Discharge: HOME | DRG: 282 | End: 2018-06-02
Attending: EMERGENCY MEDICINE | Admitting: INTERNAL MEDICINE
Payer: MEDICARE

## 2018-05-31 VITALS
OXYGEN SATURATION: 98 % | WEIGHT: 112 LBS | HEART RATE: 54 BPM | SYSTOLIC BLOOD PRESSURE: 109 MMHG | BODY MASS INDEX: 20 KG/M2 | TEMPERATURE: 98 F | DIASTOLIC BLOOD PRESSURE: 56 MMHG

## 2018-05-31 DIAGNOSIS — IMO0001 UNCONTROLLED TYPE 2 DIABETES MELLITUS WITHOUT COMPLICATION, WITHOUT LONG-TERM CURRENT USE OF INSULIN: ICD-10-CM

## 2018-05-31 DIAGNOSIS — R94.31 ABNORMAL EKG: Primary | ICD-10-CM

## 2018-05-31 DIAGNOSIS — E55.9 VITAMIN D DEFICIENCY: ICD-10-CM

## 2018-05-31 DIAGNOSIS — E53.8 VITAMIN B 12 DEFICIENCY: ICD-10-CM

## 2018-05-31 DIAGNOSIS — M25.512 ACUTE PAIN OF LEFT SHOULDER: Primary | ICD-10-CM

## 2018-05-31 DIAGNOSIS — R77.8 ELEVATED TROPONIN: ICD-10-CM

## 2018-05-31 DIAGNOSIS — E03.9 ACQUIRED HYPOTHYROIDISM: ICD-10-CM

## 2018-05-31 DIAGNOSIS — E78.49 OTHER HYPERLIPIDEMIA: ICD-10-CM

## 2018-05-31 DIAGNOSIS — I10 ESSENTIAL HYPERTENSION WITH GOAL BLOOD PRESSURE LESS THAN 130/80: ICD-10-CM

## 2018-05-31 PROCEDURE — 93010 ELECTROCARDIOGRAM REPORT: CPT | Performed by: INTERNAL MEDICINE

## 2018-05-31 PROCEDURE — 99215 OFFICE O/P EST HI 40 MIN: CPT | Performed by: INTERNAL MEDICINE

## 2018-05-31 PROCEDURE — 71045 X-RAY EXAM CHEST 1 VIEW: CPT | Performed by: EMERGENCY MEDICINE

## 2018-05-31 PROCEDURE — 93005 ELECTROCARDIOGRAM TRACING: CPT | Performed by: INTERNAL MEDICINE

## 2018-05-31 PROCEDURE — 1111F DSCHRG MED/CURRENT MED MERGE: CPT | Performed by: INTERNAL MEDICINE

## 2018-05-31 PROCEDURE — G0463 HOSPITAL OUTPT CLINIC VISIT: HCPCS | Performed by: INTERNAL MEDICINE

## 2018-05-31 RX ORDER — GLIMEPIRIDE 2 MG/1
TABLET ORAL
COMMUNITY
Start: 2018-05-09 | End: 2018-06-05 | Stop reason: ALTCHOICE

## 2018-05-31 RX ORDER — CLOPIDOGREL BISULFATE 75 MG/1
300 TABLET ORAL ONCE
Status: COMPLETED | OUTPATIENT
Start: 2018-05-31 | End: 2018-05-31

## 2018-05-31 RX ORDER — LORAZEPAM 2 MG/ML
0.5 INJECTION INTRAMUSCULAR ONCE
Status: COMPLETED | OUTPATIENT
Start: 2018-05-31 | End: 2018-05-31

## 2018-05-31 RX ORDER — HEPARIN SODIUM 1000 [USP'U]/ML
60 INJECTION, SOLUTION INTRAVENOUS; SUBCUTANEOUS ONCE
Status: COMPLETED | OUTPATIENT
Start: 2018-05-31 | End: 2018-05-31

## 2018-05-31 RX ORDER — HEPARIN SODIUM AND DEXTROSE 10000; 5 [USP'U]/100ML; G/100ML
12 INJECTION INTRAVENOUS ONCE
Status: COMPLETED | OUTPATIENT
Start: 2018-05-31 | End: 2018-06-01

## 2018-05-31 RX ORDER — LISINOPRIL 20 MG/1
TABLET ORAL
Refills: 0 | Status: ON HOLD | COMMUNITY
Start: 2018-05-14 | End: 2018-06-02

## 2018-05-31 NOTE — TELEPHONE ENCOUNTER
Carolyn Tate From [facility] would like to speak to Dr Antoinette Page Her self regarding this pt (Pt scheduled to see Dr Antoinette Page today) Carolyn Tate will be at this number from now thru 3pm

## 2018-05-31 NOTE — PROGRESS NOTES
HPI:    Patient ID: Emeli Armendariz is a 74year old female. Drove X 2 hospital. L shoulder blade. Got better with inhaler. Hurts when breathe. Denies radiation nor assoc. Sx. 2nd time drove. Has not been as active due to angio.  Sugars were well controll Readings from Last 3 Encounters:  05/31/18 : 109/56  05/17/18 : 118/48  05/11/18 : (!) 176/50    Labs:     Lab Results  Component Value Date/Time    (H) 05/16/2018 04:58 AM    05/16/2018 04:58 AM   K 4.0 05/17/2018 05:53 AM    05/16/2018 Disp: 90 tablet Rfl: 1   Glucose Blood In Vitro Strip Checks qam. Dx. Diabetes type 2 not controlled not on insulin E11.65 Disp: 100 strip Rfl: 6   Levothyroxine Sodium (SYNTHROID) 88 MCG Oral Tab Take 1 tablet (88 mcg total) by mouth once daily.  Disp: 90 baby aspirin at home with no             adverse effects noted  Sulfa Antibiotics         Sulfanilamide           UNKNOWN    HISTORY:  Past Medical History:   Diagnosis Date   • ANXIETY    • Appendicitis    • DEPRESSION    • Generalized OA    • HYPERLIPIDE Effort normal and breath sounds normal. No accessory muscle usage. No apnea, no tachypnea and no bradypnea. No respiratory distress. She has no decreased breath sounds. She has no wheezes. She has no rhonchi. She has no rales. She exhibits no tenderness. sugars. Will get back into regular activity. Hold off on adjusting medications. Careful with diet and excercise at least 30 minutes 3-4 times a week. Check sugars at different times on different dates. Careful with low sugars.  Carry something with you and

## 2018-05-31 NOTE — PATIENT INSTRUCTIONS
ASSESSMENT/PLAN:   Acute pain of left shoulder  (primary encounter diagnosis). Exam most consistent with musculoskeletal.  Probably sprain. Exercises. Hold imaging. Try Biofreeze or topical cream.  Hold medications otherwise per patient.   But will chec

## 2018-06-01 ENCOUNTER — APPOINTMENT (OUTPATIENT)
Dept: CV DIAGNOSTICS | Facility: HOSPITAL | Age: 73
DRG: 282 | End: 2018-06-01
Attending: INTERNAL MEDICINE
Payer: MEDICARE

## 2018-06-01 ENCOUNTER — PRIOR ORIGINAL RECORDS (OUTPATIENT)
Dept: OTHER | Age: 73
End: 2018-06-01

## 2018-06-01 ENCOUNTER — APPOINTMENT (OUTPATIENT)
Dept: INTERVENTIONAL RADIOLOGY/VASCULAR | Facility: HOSPITAL | Age: 73
DRG: 282 | End: 2018-06-01
Attending: INTERNAL MEDICINE
Payer: MEDICARE

## 2018-06-01 PROBLEM — R79.89 ELEVATED TROPONIN: Status: ACTIVE | Noted: 2018-06-01

## 2018-06-01 PROBLEM — R77.8 ELEVATED TROPONIN: Status: ACTIVE | Noted: 2018-06-01

## 2018-06-01 PROCEDURE — 99223 1ST HOSP IP/OBS HIGH 75: CPT | Performed by: INTERNAL MEDICINE

## 2018-06-01 PROCEDURE — 4A023N7 MEASUREMENT OF CARDIAC SAMPLING AND PRESSURE, LEFT HEART, PERCUTANEOUS APPROACH: ICD-10-PCS | Performed by: INTERNAL MEDICINE

## 2018-06-01 PROCEDURE — 4A033BC MEASUREMENT OF ARTERIAL PRESSURE, CORONARY, PERCUTANEOUS APPROACH: ICD-10-PCS | Performed by: INTERNAL MEDICINE

## 2018-06-01 PROCEDURE — B2151ZZ FLUOROSCOPY OF LEFT HEART USING LOW OSMOLAR CONTRAST: ICD-10-PCS | Performed by: INTERNAL MEDICINE

## 2018-06-01 PROCEDURE — B41F1ZZ FLUOROSCOPY OF RIGHT LOWER EXTREMITY ARTERIES USING LOW OSMOLAR CONTRAST: ICD-10-PCS | Performed by: INTERNAL MEDICINE

## 2018-06-01 PROCEDURE — B2111ZZ FLUOROSCOPY OF MULTIPLE CORONARY ARTERIES USING LOW OSMOLAR CONTRAST: ICD-10-PCS | Performed by: INTERNAL MEDICINE

## 2018-06-01 RX ORDER — BISACODYL 10 MG
10 SUPPOSITORY, RECTAL RECTAL
Status: DISCONTINUED | OUTPATIENT
Start: 2018-06-01 | End: 2018-06-02

## 2018-06-01 RX ORDER — LEVOTHYROXINE SODIUM 88 UG/1
88 TABLET ORAL
Status: DISCONTINUED | OUTPATIENT
Start: 2018-06-01 | End: 2018-06-02

## 2018-06-01 RX ORDER — ASPIRIN 81 MG/1
81 TABLET ORAL DAILY
Status: DISCONTINUED | OUTPATIENT
Start: 2018-06-02 | End: 2018-06-02

## 2018-06-01 RX ORDER — ASPIRIN 81 MG/1
324 TABLET, CHEWABLE ORAL ONCE
Status: COMPLETED | OUTPATIENT
Start: 2018-06-01 | End: 2018-06-01

## 2018-06-01 RX ORDER — SODIUM CHLORIDE 9 MG/ML
INJECTION, SOLUTION INTRAVENOUS
Status: DISPENSED
Start: 2018-06-01 | End: 2018-06-01

## 2018-06-01 RX ORDER — ACETAMINOPHEN 325 MG/1
650 TABLET ORAL EVERY 6 HOURS PRN
Status: DISCONTINUED | OUTPATIENT
Start: 2018-06-01 | End: 2018-06-02

## 2018-06-01 RX ORDER — MONTELUKAST SODIUM 10 MG/1
10 TABLET ORAL NIGHTLY
Status: DISCONTINUED | OUTPATIENT
Start: 2018-06-01 | End: 2018-06-02

## 2018-06-01 RX ORDER — SODIUM CHLORIDE 9 MG/ML
INJECTION, SOLUTION INTRAVENOUS CONTINUOUS
Status: DISCONTINUED | OUTPATIENT
Start: 2018-06-01 | End: 2018-06-02

## 2018-06-01 RX ORDER — MIDAZOLAM HYDROCHLORIDE 1 MG/ML
INJECTION INTRAMUSCULAR; INTRAVENOUS
Status: COMPLETED
Start: 2018-06-01 | End: 2018-06-01

## 2018-06-01 RX ORDER — ONDANSETRON 2 MG/ML
4 INJECTION INTRAMUSCULAR; INTRAVENOUS EVERY 6 HOURS PRN
Status: DISCONTINUED | OUTPATIENT
Start: 2018-06-01 | End: 2018-06-02

## 2018-06-01 RX ORDER — HEPARIN SODIUM 1000 [USP'U]/ML
INJECTION, SOLUTION INTRAVENOUS; SUBCUTANEOUS
Status: COMPLETED
Start: 2018-06-01 | End: 2018-06-01

## 2018-06-01 RX ORDER — LISINOPRIL 20 MG/1
20 TABLET ORAL DAILY
Status: DISCONTINUED | OUTPATIENT
Start: 2018-06-01 | End: 2018-06-02

## 2018-06-01 RX ORDER — CHLORHEXIDINE GLUCONATE 4 G/100ML
30 SOLUTION TOPICAL
Status: ACTIVE | OUTPATIENT
Start: 2018-06-02 | End: 2018-06-02

## 2018-06-01 RX ORDER — SODIUM CHLORIDE 9 MG/ML
INJECTION, SOLUTION INTRAVENOUS CONTINUOUS
Status: ACTIVE | OUTPATIENT
Start: 2018-06-01 | End: 2018-06-01

## 2018-06-01 RX ORDER — CITALOPRAM 20 MG/1
20 TABLET ORAL DAILY
Status: DISCONTINUED | OUTPATIENT
Start: 2018-06-01 | End: 2018-06-02

## 2018-06-01 RX ORDER — DEXTROSE MONOHYDRATE 25 G/50ML
50 INJECTION, SOLUTION INTRAVENOUS AS NEEDED
Status: DISCONTINUED | OUTPATIENT
Start: 2018-06-01 | End: 2018-06-02

## 2018-06-01 RX ORDER — HEPARIN SODIUM 1000 [USP'U]/ML
60 INJECTION, SOLUTION INTRAVENOUS; SUBCUTANEOUS DAILY
Status: DISCONTINUED | OUTPATIENT
Start: 2018-06-01 | End: 2018-06-01

## 2018-06-01 RX ORDER — METOCLOPRAMIDE HYDROCHLORIDE 5 MG/ML
10 INJECTION INTRAMUSCULAR; INTRAVENOUS EVERY 8 HOURS PRN
Status: DISCONTINUED | OUTPATIENT
Start: 2018-06-01 | End: 2018-06-02

## 2018-06-01 RX ORDER — HEPARIN SODIUM AND DEXTROSE 10000; 5 [USP'U]/100ML; G/100ML
INJECTION INTRAVENOUS CONTINUOUS
Status: DISCONTINUED | OUTPATIENT
Start: 2018-06-02 | End: 2018-06-01

## 2018-06-01 RX ORDER — CLOPIDOGREL BISULFATE 75 MG/1
75 TABLET ORAL DAILY
Status: DISCONTINUED | OUTPATIENT
Start: 2018-06-01 | End: 2018-06-02

## 2018-06-01 RX ORDER — MIDAZOLAM HYDROCHLORIDE 1 MG/ML
1 INJECTION INTRAMUSCULAR; INTRAVENOUS ONCE
Status: COMPLETED | OUTPATIENT
Start: 2018-06-01 | End: 2018-06-01

## 2018-06-01 RX ORDER — FENOFIBRATE 134 MG/1
134 CAPSULE ORAL
Status: DISCONTINUED | OUTPATIENT
Start: 2018-06-01 | End: 2018-06-02

## 2018-06-01 RX ORDER — NITROGLYCERIN 0.4 MG/1
0.4 TABLET SUBLINGUAL EVERY 5 MIN PRN
Status: DISCONTINUED | OUTPATIENT
Start: 2018-06-01 | End: 2018-06-01

## 2018-06-01 RX ORDER — POLYETHYLENE GLYCOL 3350 17 G/17G
17 POWDER, FOR SOLUTION ORAL DAILY PRN
Status: DISCONTINUED | OUTPATIENT
Start: 2018-06-01 | End: 2018-06-02

## 2018-06-01 RX ORDER — FENOFIBRATE 145 MG/1
145 TABLET, COATED ORAL
Status: DISCONTINUED | OUTPATIENT
Start: 2018-06-01 | End: 2018-06-01 | Stop reason: RX

## 2018-06-01 RX ORDER — NITROGLYCERIN 20 MG/100ML
INJECTION INTRAVENOUS
Status: COMPLETED
Start: 2018-06-01 | End: 2018-06-01

## 2018-06-01 RX ORDER — LIDOCAINE HYDROCHLORIDE 20 MG/ML
INJECTION, SOLUTION EPIDURAL; INFILTRATION; INTRACAUDAL; PERINEURAL
Status: COMPLETED
Start: 2018-06-01 | End: 2018-06-01

## 2018-06-01 RX ORDER — NITROGLYCERIN 0.4 MG/1
0.4 TABLET SUBLINGUAL EVERY 5 MIN PRN
Status: DISCONTINUED | OUTPATIENT
Start: 2018-06-01 | End: 2018-06-02

## 2018-06-01 RX ORDER — LIOTHYRONINE SODIUM 5 UG/1
5 TABLET ORAL NIGHTLY
Status: DISCONTINUED | OUTPATIENT
Start: 2018-06-01 | End: 2018-06-02

## 2018-06-01 RX ORDER — ATORVASTATIN CALCIUM 40 MG/1
40 TABLET, FILM COATED ORAL NIGHTLY
Status: DISCONTINUED | OUTPATIENT
Start: 2018-06-01 | End: 2018-06-02

## 2018-06-01 RX ORDER — HEPARIN SODIUM AND DEXTROSE 10000; 5 [USP'U]/100ML; G/100ML
INJECTION INTRAVENOUS CONTINUOUS
Status: DISCONTINUED | OUTPATIENT
Start: 2018-06-01 | End: 2018-06-02

## 2018-06-01 RX ORDER — SODIUM PHOSPHATE, DIBASIC AND SODIUM PHOSPHATE, MONOBASIC 7; 19 G/133ML; G/133ML
1 ENEMA RECTAL ONCE AS NEEDED
Status: DISCONTINUED | OUTPATIENT
Start: 2018-06-01 | End: 2018-06-02

## 2018-06-01 RX ORDER — SODIUM CHLORIDE 0.9 % (FLUSH) 0.9 %
3 SYRINGE (ML) INJECTION AS NEEDED
Status: DISCONTINUED | OUTPATIENT
Start: 2018-06-01 | End: 2018-06-02

## 2018-06-01 RX ORDER — ADENOSINE 3 MG/ML
INJECTION, SOLUTION INTRAVENOUS
Status: DISCONTINUED
Start: 2018-06-01 | End: 2018-06-01 | Stop reason: WASHOUT

## 2018-06-01 NOTE — PROGRESS NOTES
Cardiology:  Patient presently pain-free with no scapular pain. Troponin has plateaued but EKG remains abnormal with significant new anterior lateral T-wave changes.   With history of recent PCI to the right coronary artery and borderline LAD disease patie

## 2018-06-01 NOTE — PLAN OF CARE
Problem: Patient/Family Goals  Goal: Patient/Family Long Term Goal  Patient's Long Term Goal: To go home    Interventions:  - Follow plan of care  - Take medications as prescribed  - See additional Care Plan goals for specific interventions   Outcome: Prog hematoma  - Assess quality of pulses, skin color and temperature  - Assess for signs of decreased coronary artery perfusion - ex.  Angina  - Evaluate fluid balance, assess for edema, trend weights   Outcome: Progressing    Goal: Absence of cardiac arrhythmi toileting schedule   Outcome: Progressing      Problem: DISCHARGE PLANNING  Goal: Discharge to home or other facility with appropriate resources  INTERVENTIONS:  - Identify barriers to discharge w/pt and caregiver  - Include patient/family/discharge partne

## 2018-06-01 NOTE — ED NOTES
Pt was at PMD today s/p stent placement x2 on 5/15. Pt complaint of L shoulder pain, EKG showed st depression and flipped t-wave.  Pt sent to ED for further eval.

## 2018-06-01 NOTE — CONSULTS
[facility]  Report of Consultation    Liisa Romberg Patient Status:  Emergency    1945 MRN [id number]   Location [address] Attending Gen Felipe MD   Hosp Day # 0 PCP Coleen Espinoza.  Frank Mckinney MD     Reason for [facility] never used smokeless tobacco. She reports that she drinks alcohol. She reports that she does not use drugs.     Allergies:    Gluten Meal             OTHER (SEE COMMENTS)  Bees                      Ibuprofen               UNKNOWN  Nsaids                  UN uncontrolled ([address]     Essential hypertension     Hyperlipidemia     Hypercalcemia     Osteoporosis     Disorder of bone and cartilage     Hypothyroidism     Primary osteoarthritis of right knee     Internal derangement of both knees     Renal insufficienc

## 2018-06-01 NOTE — ED PROVIDER NOTES
Patient Seen in: [facility] AND [facility] Emergency Department    History   Patient presents with:  Abnormal Result (metabolic, cardiac)    Stated Complaint: Abnormal EKG- No Complaints     HPI    75 yo F with PMH HTN, HL, NIDDM, CAD s/p PCI 5/15/2018 to RCA pres Apply 1 drop to eye. Nepafenac 0.3 % Ophthalmic Suspension,  Apply 1 drop to eye. besifloxacin HCl 0.6 % Ophthalmic Suspension,  Apply 1 drop to eye.    Levothyroxine Sodium (SYNTHROID) 88 MCG Oral Tab,  Take 1 tablet (88 mcg total) by mouth once daily Genitourinary: Negative for dysuria and hematuria. Musculoskeletal: Negative for joint swelling and arthralgias. (+) left upper back pain. Skin: Negative for rash. No itching.     Positive for stated complaint: Abnormal EKG- No Complaints  Other syste following orders were created for panel order CBC WITH DIFFERENTIAL WITH PLATELET.   Procedure                               Abnormality         Status                     ---------                               -----------         ------ myself    Cardiac Monitor Interpretation: Pulse Readings from Last 1 Encounters:  05/31/18 : 74  , sinus,      Evaluation for intermittent left upper scapular pain worse with movement though with pleuritic component to same in low risk [place] patient with n

## 2018-06-01 NOTE — PROGRESS NOTES
[address] Patient Status:  Inpatient    1945 MRN [id number]   Location [facility] 3W/SW Attending Mar Lockwood MD   Hosp Day # 0 PCP Jeannette Abdi.  Gisela Yuen MD     Subjective:     Constitutional: Psychiatric/Behavioral: Positive for agitation. Negative for suicidal ideas, hallucinations, behavioral problems, confusion, sleep disturbance, self-injury, decreased concentration and depressed mood. The patient is nervous/anxious.  The patient is not hy sounds, intact distal pulses and normal pulses. Exam reveals no decreased pulses. Edema not present. Pulmonary/Chest: Effort normal and breath sounds normal. No accessory muscle usage or stridor. No apnea, no tachypnea and no bradypnea.  She is not in without complication, without long-term current use of insulin (hcc). Better. Hold OHG's while in hospital. Add levemir 5 at night. SSI, hypoglycemia protocol, accuchecks.      Vitamin b 12 deficiency. Stable 12-17.     Vitamin d deficiency. Stable.      expected to span two midnight's based on the clinical documentation in H+P. Based on patients current state of illness, I anticipate that, after discharge, patient will require TBD. Enrique Luo.  Herminio Longoria MD  6/1/2018

## 2018-06-01 NOTE — CM/SW NOTE
06/01/18 1200   CM/SW Screening   Patient's Mental Status Alert;Oriented   Patient's [address]   Patient lives with Spouse   Patient Status Prior to Admission   Independent with ADLs and Mobility Yes   Discharge Needs   Anticipated D/C need

## 2018-06-01 NOTE — HISTORICAL OFFICE NOTE
Silvana Acuna  : 1945  ACCOUNT:  [id number]  [phone]  PCP: Dr. Jon Rollins     TODAY'S DATE: 2018  DICTATED BY:  DIOGO Burroughs]      CHIEF COMPLAINT: [hospital follow up.]    HPI:    [On 2018, Tova Parker, a 74year old femal Antibiotics - CLASS    MEDICATIONS: Selected prescriptions see below    VITAL SIGNS: [B/P - 106/40 , Pulse - 55, Respiration - 18, Weight -  112, Height -   63 , BMI - 19.8 ]    CONS: well developed, well nourished.  WEIGHT: BMI parameters reviewed and disc 05/24/18 Citalopram [id number]      1 and 1/2 tabs daily                     05/24/18 Clopidogrel Bisulfate 75MG      1 daily                                  05/24/18 Co-Enzyme Q10         200MG     1 daily

## 2018-06-01 NOTE — PAYOR COMM NOTE
--------------  ADMISSION REVIEW     Payor: [facility] [place]teo Aranda [place] #:  [id number]  Authorization Number: [id number]    Admit date: 6/1/18  Admit time: 0006       Admitting Physician: Gene Thakkar MD  Attending Physician:  Gene Thakkar Department    History   Patient presents with:  Abnormal Result (metabolic, cardiac)    Stated Complaint: Abnormal EKG- No Complaints     HPI    75 yo F with PMH HTN, HL, NIDDM, CAD s/p PCI 5/15/2018 to RCA presenting from PCP office for evaluation of one Ophthalmic Suspension,  Apply 1 drop to eye. besifloxacin HCl 0.6 % Ophthalmic Suspension,  Apply 1 drop to eye. Levothyroxine Sodium (SYNTHROID) 88 MCG Oral Tab,  Take 1 tablet (88 mcg total) by mouth once daily.    Fenofibrate 145 MG Oral Tab,  Take 1 Musculoskeletal: Negative for joint swelling and arthralgias. (+) left upper back pain. Skin: Negative for rash. No itching. Positive for stated complaint: Abnormal EKG- No Complaints  Other systems are as noted in HPI.   Constitutional and vital sig WITH DIFFERENTIAL WITH PLATELET.   Procedure                               Abnormality         Status                     ---------                               -----------         ------                     CBC W/ DIFFERENTIAL[[id number]]          Abnormal from Last 1 Encounters:  05/31/18 : 74  , sinus,      Evaluation for intermittent left upper scapular pain worse with movement though with pleuritic component to same in low risk [place] patient with new lateral EKG changes.  [facility] Dr. Shania Villegas graciously consulte User    6/1/2018 0624 Given 88 mcg Oral Elizabeth Evans RN      LORazepam (ATIVAN) injection 0.5 mg     Date Action Dose Route User    5/31/2018 2121 Given 0.5 mg Intravenous Abida Manning RN      metoprolol Tartrate (LOPRESSOR) tab 12.5 mg     Patel

## 2018-06-01 NOTE — PROCEDURES
[facility]    [facility] Cardiac Cath Procedure Note  Anjelica Bojorquez Patient Status:  Inpatient    1945 MRN [id number]   Location [facility] Attending Aditya Jimenez MD   Hosp Day # 0 PCP Christina Evans.  Mercedes Brush,

## 2018-06-02 ENCOUNTER — APPOINTMENT (OUTPATIENT)
Dept: CV DIAGNOSTICS | Facility: HOSPITAL | Age: 73
DRG: 282 | End: 2018-06-02
Attending: INTERNAL MEDICINE
Payer: MEDICARE

## 2018-06-02 VITALS
OXYGEN SATURATION: 99 % | DIASTOLIC BLOOD PRESSURE: 50 MMHG | HEART RATE: 47 BPM | HEIGHT: 62 IN | WEIGHT: 111 LBS | SYSTOLIC BLOOD PRESSURE: 126 MMHG | RESPIRATION RATE: 16 BRPM | BODY MASS INDEX: 20.43 KG/M2 | TEMPERATURE: 99 F

## 2018-06-02 PROCEDURE — 93306 TTE W/DOPPLER COMPLETE: CPT | Performed by: INTERNAL MEDICINE

## 2018-06-02 PROCEDURE — 99239 HOSP IP/OBS DSCHRG MGMT >30: CPT | Performed by: INTERNAL MEDICINE

## 2018-06-02 RX ORDER — ASPIRIN 81 MG/1
81 TABLET ORAL DAILY
Qty: 30 TABLET | Refills: 1 | Status: SHIPPED | OUTPATIENT
Start: 2018-06-02 | End: 2021-12-10

## 2018-06-02 NOTE — PLAN OF CARE
Problem: Patient/Family Goals  Goal: Patient/Family Long Term Goal  Patient's Long Term Goal: To go home    Interventions:  - Follow plan of care  - Take medications as prescribed  - See additional Care Plan goals for specific interventions    Outcome: Pro and/or hematoma  - Assess quality of pulses, skin color and temperature  - Assess for signs of decreased coronary artery perfusion - ex.  Angina  - Evaluate fluid balance, assess for edema, trend weights   Outcome: Progressing    Goal: Absence of cardiac ar toileting schedule   Outcome: Progressing      Problem: DISCHARGE PLANNING  Goal: Discharge to home or other facility with appropriate resources  INTERVENTIONS:  - Identify barriers to discharge w/pt and caregiver  - Include patient/family/discharge partne

## 2018-06-02 NOTE — PROGRESS NOTES
[facility]    Cardiology Progress Note    Jennifer Carrera Patient Status:  Inpatient    1945 MRN [id number]   Location [facility] 3W/SW Attending Marvell Soulier, MD   Hosp Day # 1 PCP Bascom Litten.  Krsis Fong MD     Primary Cardiolog Edema  Psych: Cooperative  R groin site dry, no e/o bleed or hematoma, dressing removed    Objective:    Assessment and Plan:         CAD / NSTEMI - presents with scapular pain and elevated troponin and abnormal EKG with TWI  - s/p PCI 5/15/2018 to RCA PDA -------------------------- Sinus Rhythm -Right bundle branch block with left axis -bifascicular block. - (age undetermined) Anterior -lateral and (age undetermined) Inferior infarct.  ABNORMAL When compared with ECG of 05/31/2018 19:03:12No significant julián

## 2018-06-02 NOTE — PLAN OF CARE
Patient discharged on 6/2 at 6:00 pm. Patient alert and oriented. Belongings gathered and sent with patient. Discharge instructions given. Prescriptions sent with patient. Tele monitor and IV removed. Patient discharged home.

## 2018-06-02 NOTE — DISCHARGE SUMMARY
[address] [place] Patient Status:  Inpatient    1945 MRN [id number]   Location [facility] 3W/SW Attending Richard Coon MD   Hosp Day # 1 PCP Nikky Jeffries.  Leah Carmichael MD     Date of Admission: 2018  Date of Discharge:  mouth daily. Quantity:  30 tablet  Refills:  1        CONTINUE taking these medications      Instructions Prescription details   atorvastatin 40 MG Tabs  Commonly known as:  LIPITOR      Take 1 tablet (40 mg total) by mouth nightly.  at bedtime   Quantity Quantity:  90 tablet  Refills:  0     Montelukast Sodium 10 MG Tabs  Commonly known as:  SINGULAIR      Take 1 tablet (10 mg total) by mouth nightly. Quantity:  30 tablet  Refills:  3     Nepafenac 0.3 % Susp      Apply 1 drop to eye.    Refills:  0     O bedtime    Glucose Blood In Vitro Strip  Checks qam. Dx. Diabetes type 2 not controlled not on insulin E11.65    prednisoLONE acetate 1 % Ophthalmic Suspension  Apply 1 drop to eye. Nepafenac 0.3 % Ophthalmic Suspension  Apply 1 drop to eye.     besiflox

## 2018-06-02 NOTE — TRANSITION NOTE
Primary Cardiologist: Dr. Deandre Gipson  Subjective:   Subjective:  74year old female who ruled in for NSTEMI however atypical pain,  she is now post cath and being treated medically.     Note she had recent PCI 5/2018 to distal RCA.     Up in the chair this AM, troponin and abnormal EKG with TWI  - s/p PCI 5/15/2018 to RCA PDA  - Cath 6/1/18 with mod disease throughout, 70% Mid LAD with Diag/septal involvement - insignificant FFR at 0.88  - continue asa, statin, plavix, tricor , BB  - bradycardia is stable and ol MG Oral Tab  Take 1 tablet (40 mg total) by mouth nightly. at bedtime             besifloxacin HCl 0.6 % Ophthalmic Suspension  Apply 1 drop to eye.              CALCIUM 600 OR  1 cap daily             Cholecalciferol (D-2000 MAXIMUM STRENGTH) 2000 UNITS Or

## 2018-06-03 PROBLEM — R77.8 ELEVATED TROPONIN: Status: RESOLVED | Noted: 2018-06-01 | Resolved: 2018-06-03

## 2018-06-03 PROBLEM — R79.89 ELEVATED TROPONIN: Status: RESOLVED | Noted: 2018-06-01 | Resolved: 2018-06-03

## 2018-06-03 PROBLEM — R94.30 ABNORMAL CARDIOVASCULAR FUNCTION STUDY: Status: RESOLVED | Noted: 2018-05-15 | Resolved: 2018-06-03

## 2018-06-03 PROBLEM — I10 ESSENTIAL HYPERTENSION: Status: ACTIVE | Noted: 2018-06-03

## 2018-06-03 PROBLEM — E03.9 ACQUIRED HYPOTHYROIDISM: Status: ACTIVE | Noted: 2018-06-03

## 2018-06-04 ENCOUNTER — APPOINTMENT (OUTPATIENT)
Dept: CARDIAC REHAB | Facility: HOSPITAL | Age: 73
End: 2018-06-04
Attending: INTERNAL MEDICINE
Payer: MEDICARE

## 2018-06-04 ENCOUNTER — PATIENT OUTREACH (OUTPATIENT)
Dept: CASE MANAGEMENT | Age: 73
End: 2018-06-04

## 2018-06-04 ENCOUNTER — TELEPHONE (OUTPATIENT)
Dept: CARDIOLOGY UNIT | Facility: HOSPITAL | Age: 73
End: 2018-06-04

## 2018-06-05 NOTE — PROGRESS NOTES
Initial Post Discharge Follow Up   Discharge Date: 6/2/18  Contact Date: 6/4/2018    Consent Verification:  Assessment Completed With: Patient  HIPAA Verified?   Yes    Discharge Dx:   Chest pain    General:   • How have you been since your discharge from Levothyroxine Sodium (SYNTHROID) 88 MCG Oral Tab Take 1 tablet (88 mcg total) by mouth once daily. Disp: 90 tablet Rfl: 0   Fenofibrate 145 MG Oral Tab Take 1 tablet (145 mg total) by mouth once daily.  Disp: 90 tablet Rfl: 0   Citalopram Hydrobromide [facility])    Jun 08, 2018  3:15 PM CDT Exam - Established with Dada Willard MD [address] [place] [address])    Jun 11, 2018  9:30 AM CDT [place] Phase 2 Cardiac Rehab with [facility] OF THE [place] [facility] PHASE 2 [address] [facility] [address] Cardiopulmonary Rehab ([address] [facility] [address])    Jul 06, 2018  9:30 AM CDT [facility] Phase 2 Cardiac Rehab with [facility] OF THE [facility] PHASE 2 [address] Cardiopulmonary Rehab ([address] [facility]) Rehab ([facility])    Aug 01, 2018  9:30 AM CDT [place] Phase 2 Cardiac Rehab with [facility] OF THE [facility] PHASE 2 [address] Cardiopulmonary Rehab ([facility])    Aug 03, 2018  9:30 AM CDT [facility])    Aug 27, 2018  9:30 AM CDT [place] Phase 2 Cardiac Rehab with [facility] OF THE [facility] PHASE 2 [address] Cardiopulmonary Rehab ([facility])    Aug 29, 2018  9:30 AM CDT [place] Phase 2 Cardiac Rehab with [facility] OF THE [facility]

## 2018-06-06 ENCOUNTER — APPOINTMENT (OUTPATIENT)
Dept: CARDIAC REHAB | Facility: HOSPITAL | Age: 73
End: 2018-06-06
Attending: INTERNAL MEDICINE
Payer: MEDICARE

## 2018-06-06 LAB
BUN: 14 MG/DL
CALCIUM: 9.4 MG/DL
CHLORIDE: 105 MEQ/L
CHOLESTEROL, TOTAL: 116 MG/DL
CREATININE, SERUM: 0.87 MG/DL
GLUCOSE: 88 MG/DL
HDL CHOLESTEROL: 45 MG/DL
HEMATOCRIT: 30.9 %
HEMOGLOBIN: 10.4 G/DL
LDL CHOLESTEROL: 54 MG/DL
NON-HDL CHOLESTEROL: 71 MG/DL
PLATELETS: 274 K/UL
POTASSIUM, SERUM: 4 MEQ/L
RED BLOOD COUNT: 3.23 X 10-6/U
SODIUM: 138 MEQ/L
TRIGLYCERIDES: 86 MG/DL
WHITE BLOOD COUNT: 6.2 X 10-3/U

## 2018-06-07 ENCOUNTER — PRIOR ORIGINAL RECORDS (OUTPATIENT)
Dept: OTHER | Age: 73
End: 2018-06-07

## 2018-06-07 NOTE — PAYOR COMM NOTE
--------------  DISCHARGE REVIEW    Payor: [facility] Aranda [place] #:  [id number]  Authorization Number: [id number]    Admit date: 6/1/18  Admit time:  0006  Discharge Date: 6/2/2018  6:10 PM     Admitting Physician: Cristina Garcia MD  Atten continue     Essential hypertension, controlled  - Continue current antihypertensives as noted above     Hyperlipidemia, stable  - continue statin     Hypothyroid  Continue with current meds    Discharge Physical Exam: Gen: No acute distress, alert and karla COD LIVER OIL OR      1 DAILY   Refills:  0     CoQ10 200 MG Caps      1 cap daily   Refills:  0     EPINEPHrine 0.3 MG/0.3ML Soaj      Use as directed.    Quantity:  1 each  Refills:  1     glimepiride 2 MG Tabs  Commonly known as:  AMARYL       Refills: Plan:  Discharge Condition: Stable    Current Discharge Medication List    New Orders    aspirin 81 MG Oral Tab EC  Take 1 tablet (81 mg total) by mouth daily.       Home Meds - Unchanged    glimepiride 2 MG Oral Tab      lisinopril 20 MG Oral Tab  TK 1 T P sodium    Discharge Activity: As tolerated    Follow-up appointment:   No follow-up provider specified.     Vital signs:  Temp:  [98.1 °F (36.7 °C)-98.3 °F (36.8 °C)] 98.1 °F (36.7 °C)  Pulse:  [44-50] 49  Resp:  [16] 16  BP: ()/(44-64) 98/47    -----

## 2018-06-08 ENCOUNTER — OFFICE VISIT (OUTPATIENT)
Dept: INTERNAL MEDICINE CLINIC | Facility: CLINIC | Age: 73
End: 2018-06-08

## 2018-06-08 ENCOUNTER — APPOINTMENT (OUTPATIENT)
Dept: CARDIAC REHAB | Facility: HOSPITAL | Age: 73
End: 2018-06-08
Attending: INTERNAL MEDICINE
Payer: MEDICARE

## 2018-06-08 VITALS
DIASTOLIC BLOOD PRESSURE: 62 MMHG | OXYGEN SATURATION: 99 % | HEART RATE: 56 BPM | SYSTOLIC BLOOD PRESSURE: 142 MMHG | BODY MASS INDEX: 19.92 KG/M2 | HEIGHT: 62.5 IN | WEIGHT: 111 LBS | TEMPERATURE: 98 F

## 2018-06-08 DIAGNOSIS — IMO0001 UNCONTROLLED TYPE 2 DIABETES MELLITUS WITHOUT COMPLICATION, WITHOUT LONG-TERM CURRENT USE OF INSULIN: ICD-10-CM

## 2018-06-08 DIAGNOSIS — I10 ESSENTIAL HYPERTENSION WITH GOAL BLOOD PRESSURE LESS THAN 130/80: ICD-10-CM

## 2018-06-08 DIAGNOSIS — E03.9 ACQUIRED HYPOTHYROIDISM: Primary | ICD-10-CM

## 2018-06-08 DIAGNOSIS — Z00.00 ADULT GENERAL MEDICAL EXAMINATION: ICD-10-CM

## 2018-06-08 DIAGNOSIS — E55.9 VITAMIN D DEFICIENCY: ICD-10-CM

## 2018-06-08 DIAGNOSIS — E53.8 VITAMIN B 12 DEFICIENCY: ICD-10-CM

## 2018-06-08 DIAGNOSIS — T14.8XXA HEMATOMA: ICD-10-CM

## 2018-06-08 DIAGNOSIS — I25.10 CAD IN NATIVE ARTERY: ICD-10-CM

## 2018-06-08 DIAGNOSIS — E78.49 OTHER HYPERLIPIDEMIA: ICD-10-CM

## 2018-06-08 PROCEDURE — 99495 TRANSJ CARE MGMT MOD F2F 14D: CPT | Performed by: INTERNAL MEDICINE

## 2018-06-08 PROCEDURE — 1111F DSCHRG MED/CURRENT MED MERGE: CPT | Performed by: INTERNAL MEDICINE

## 2018-06-08 NOTE — PATIENT INSTRUCTIONS
ASSESSMENT/ PLAN:   Diagnoses and all orders for this visit:    Acquired hypothyroidism Clinically and biochemically euthyroid. Uncontrolled type 2 diabetes mellitus without complication, without long-term current use of insulin (HCC) Better.  Careful w

## 2018-06-08 NOTE — PROGRESS NOTES
HPI:    Epifanio Alas is a 74year old female here today for hospital follow up.    She was discharged from Inpatient hospital[facility]  to [address] [place] [address] Date:   Discharge Date: 6-8-18  Hospital Discharge Diagnosis:       Interactive contact wit HDL 45 05/31/2018 07:38 PM   TRIG 86 05/31/2018 07:38 PM   LDL 54 05/31/2018 07:38 PM   [place] 71 05/31/2018 07:38 PM         Lab Results  Component Value Date/Time   A1C 5.6 05/16/2018 04:59 AM      No results found for: VITD        Checks feet nightly, CALCIUM 600 OR 1 cap daily   COD LIVER OIL OR 1 DAILY   COQ10 200 MG OR CAPS 1 cap daily   prednisoLONE acetate 1 % Ophthalmic Suspension Apply 1 drop to eye. Nepafenac 0.3 % Ophthalmic Suspension Apply 1 drop to eye.    besifloxacin HCl 0.6 % Ophthalmic Endocrine: Negative for cold intolerance, heat intolerance, polydipsia, polyphagia and polyuria. Genitourinary: Negative for hematuria and difficulty urinating.    Neurological: Negative for dizziness, tremors, syncope, facial asymmetry, speech difficulty Legs:  Neurological: She is alert and oriented to person, place, and time. Skin: Skin is warm and dry. She is not diaphoretic. Psychiatric: She has a normal mood and affect.  Her behavior is normal. Judgment and thought content normal.     ASSESSME Communication with the patient was made within 2 business days of discharge on date above   Medical Decision Making- Based on service period of discharge to 30 days:   · Number of Possible Diagnoses and/or Management Options: moderate  · Amount and/or Comp

## 2018-06-10 ENCOUNTER — TELEPHONE (OUTPATIENT)
Dept: INTERNAL MEDICINE CLINIC | Facility: CLINIC | Age: 73
End: 2018-06-10

## 2018-06-11 ENCOUNTER — TELEPHONE (OUTPATIENT)
Dept: INTERNAL MEDICINE CLINIC | Facility: CLINIC | Age: 73
End: 2018-06-11

## 2018-06-11 ENCOUNTER — MYAURORA ACCOUNT LINK (OUTPATIENT)
Dept: OTHER | Age: 73
End: 2018-06-11

## 2018-06-11 ENCOUNTER — PRIOR ORIGINAL RECORDS (OUTPATIENT)
Dept: OTHER | Age: 73
End: 2018-06-11

## 2018-06-11 ENCOUNTER — APPOINTMENT (OUTPATIENT)
Dept: CARDIAC REHAB | Facility: HOSPITAL | Age: 73
End: 2018-06-11
Attending: INTERNAL MEDICINE
Payer: MEDICARE

## 2018-06-11 RX ORDER — CLOPIDOGREL BISULFATE 75 MG/1
TABLET ORAL
Qty: 90 TABLET | Refills: 1 | Status: SHIPPED | OUTPATIENT
Start: 2018-06-11 | End: 2018-09-07

## 2018-06-11 NOTE — TELEPHONE ENCOUNTER
Patient wanted to informed you she has been placed on a heart monitor  From cardiology PA her heart rate was  45

## 2018-06-11 NOTE — TELEPHONE ENCOUNTER
Andrew Caputo,     The recent angiogram report from 6/1/18 (from Dr. Miranda Congress) is in [address] under Cardiology. It looks like they are only treating with medical intervention. I have copied and pasted the finding from the report into this encounter:     Ind

## 2018-06-11 NOTE — TELEPHONE ENCOUNTER
Need cath report for recent angiogram.  Not sure whether he did any intervention with the last angiogram.

## 2018-06-12 ENCOUNTER — TELEPHONE (OUTPATIENT)
Dept: CARDIOLOGY UNIT | Facility: HOSPITAL | Age: 73
End: 2018-06-12

## 2018-06-13 ENCOUNTER — CARDPULM VISIT (OUTPATIENT)
Dept: CARDIAC REHAB | Facility: HOSPITAL | Age: 73
End: 2018-06-13
Attending: INTERNAL MEDICINE
Payer: MEDICARE

## 2018-06-13 PROBLEM — R94.31 ABNORMAL EKG: Status: RESOLVED | Noted: 2018-05-31 | Resolved: 2018-06-13

## 2018-06-13 PROCEDURE — 93798 PHYS/QHP OP CAR RHAB W/ECG: CPT

## 2018-06-14 ENCOUNTER — OFFICE VISIT (OUTPATIENT)
Dept: INTERNAL MEDICINE CLINIC | Facility: CLINIC | Age: 73
End: 2018-06-14

## 2018-06-14 VITALS
BODY MASS INDEX: 19.92 KG/M2 | WEIGHT: 111 LBS | HEART RATE: 59 BPM | DIASTOLIC BLOOD PRESSURE: 62 MMHG | SYSTOLIC BLOOD PRESSURE: 138 MMHG | TEMPERATURE: 98 F | HEIGHT: 62.5 IN | OXYGEN SATURATION: 100 %

## 2018-06-14 DIAGNOSIS — E78.49 OTHER HYPERLIPIDEMIA: ICD-10-CM

## 2018-06-14 DIAGNOSIS — I10 ESSENTIAL HYPERTENSION WITH GOAL BLOOD PRESSURE LESS THAN 130/80: ICD-10-CM

## 2018-06-14 DIAGNOSIS — Z00.00 ENCOUNTER FOR ANNUAL HEALTH EXAMINATION: ICD-10-CM

## 2018-06-14 DIAGNOSIS — I25.10 CAD IN NATIVE ARTERY: ICD-10-CM

## 2018-06-14 DIAGNOSIS — E55.9 VITAMIN D DEFICIENCY: ICD-10-CM

## 2018-06-14 DIAGNOSIS — E53.8 VITAMIN B 12 DEFICIENCY: ICD-10-CM

## 2018-06-14 DIAGNOSIS — M81.0 AGE-RELATED OSTEOPOROSIS WITHOUT CURRENT PATHOLOGICAL FRACTURE: ICD-10-CM

## 2018-06-14 DIAGNOSIS — E03.9 ACQUIRED HYPOTHYROIDISM: Primary | ICD-10-CM

## 2018-06-14 DIAGNOSIS — R07.9 CHEST PAIN, UNSPECIFIED TYPE: ICD-10-CM

## 2018-06-14 DIAGNOSIS — IMO0001 UNCONTROLLED TYPE 2 DIABETES MELLITUS WITHOUT COMPLICATION, WITHOUT LONG-TERM CURRENT USE OF INSULIN: ICD-10-CM

## 2018-06-14 DIAGNOSIS — E83.52 HYPERCALCEMIA: ICD-10-CM

## 2018-06-14 DIAGNOSIS — K59.09 OTHER CONSTIPATION: ICD-10-CM

## 2018-06-14 DIAGNOSIS — N28.9 RENAL INSUFFICIENCY: ICD-10-CM

## 2018-06-14 DIAGNOSIS — Z00.00 ADULT GENERAL MEDICAL EXAMINATION: ICD-10-CM

## 2018-06-14 PROCEDURE — 96160 PT-FOCUSED HLTH RISK ASSMT: CPT | Performed by: INTERNAL MEDICINE

## 2018-06-14 PROCEDURE — 93010 ELECTROCARDIOGRAM REPORT: CPT | Performed by: INTERNAL MEDICINE

## 2018-06-14 PROCEDURE — 93005 ELECTROCARDIOGRAM TRACING: CPT | Performed by: INTERNAL MEDICINE

## 2018-06-14 PROCEDURE — G0439 PPPS, SUBSEQ VISIT: HCPCS | Performed by: INTERNAL MEDICINE

## 2018-06-14 PROCEDURE — 99397 PER PM REEVAL EST PAT 65+ YR: CPT | Performed by: INTERNAL MEDICINE

## 2018-06-14 PROCEDURE — 81003 URINALYSIS AUTO W/O SCOPE: CPT | Performed by: INTERNAL MEDICINE

## 2018-06-14 NOTE — PROGRESS NOTES
HPI:    Patient ID: Steven Calderon is a 74year old female. Shoulder Pain    The pain is present in the right shoulder and left shoulder. This is a chronic problem. The current episode started more than 1 year ago. There has been a history of trauma.  Kierra Lott 12/07/2017 11:00 AM   ALT 30 12/07/2017 11:00 AM   TSH 1.10 06/01/2018 05:48 AM   T4F 1.20 06/01/2018 05:48 AM          Lab Results  Component Value Date/Time   CHOLEST 116 05/31/2018 07:38 PM   HDL 45 05/31/2018 07:38 PM   TRIG 86 05/31/2018 07:38 PM   LD AM    06/01/2018 05:48 AM        Lab Results  Component Value Date/Time   GLU 88 06/01/2018 05:48 AM    06/01/2018 05:48 AM   K 4.0 06/01/2018 05:48 AM    06/01/2018 05:48 AM   CO2 25 06/01/2018 05:48 AM   CREATSERUM 0.87 06/01/2018 05:4 atorvastatin 40 MG Oral Tab Take 1 tablet (40 mg total) by mouth nightly. at bedtime Disp: 90 tablet Rfl: 1   besifloxacin HCl 0.6 % Ophthalmic Suspension Apply 1 drop to eye.  Disp:  Rfl:    Levothyroxine Sodium (SYNTHROID) 88 MCG Oral Tab Take 1 tablet 02/07/2014   Family History   Problem Relation Age of Onset   • Stroke Father    • Heart Disorder Father    • Cancer Mother      ? source- widespread at diagnosis   • goiter [OTHER] Daughter    • Celiac [OTHER] Daughter    • Breast Cancer Paternal Aunt nausea, rectal pain and vomiting. Endocrine: Negative for cold intolerance, heat intolerance, polydipsia, polyphagia and polyuria.    Genitourinary: Negative for decreased urine volume, difficulty urinating, dysuria, flank pain, frequency, hematuria, mens Rfl: 0   Montelukast Sodium (SINGULAIR) 10 MG Oral Tab Take 1 tablet (10 mg total) by mouth nightly. Disp: 30 tablet Rfl: 3   atorvastatin 40 MG Oral Tab Take 1 tablet (40 mg total) by mouth nightly.  at bedtime Disp: 90 tablet Rfl: 1   besifloxacin HCl 0.6 Appendicitis    • DEPRESSION    • Generalized OA    • HYPERLIPIDEMIA    • HYPERTENSION    • HYPERTRIGLYCERIDEMIA    • HYPOTHYROIDISM    • Multiple allergies    • OSTEOPENIA    • Other and unspecified hyperlipidemia    • Ovarian cyst    • Type II or unspeci bulging. Tympanic membrane mobility is normal.  No middle ear effusion. No hemotympanum. No decreased hearing is noted. Nose: Nose normal. No mucosal edema, rhinorrhea, nose lacerations, sinus tenderness, nasal deformity or nasal septal hematoma.  No epis are 2+ on the right side, and 2+ on the left side. Dorsalis pedis pulses are 2+ on the right side, and 2+ on the left side. Posterior tibial pulses are 2+ on the right side, and 2+ on the left side.    Pulmonary/Chest: Effort normal and breath decreased range of motion, tenderness, bony tenderness, pain and spasm. She exhibits no swelling, no edema, no deformity, no laceration and normal pulse.    Lymphadenopathy:        Head (right side): No submental, no submandibular, no tonsillar, no preauric do NOT have it on file in [address] [place] [address] Rd. The patient has this document but we do not have it in [facility], and patient is instructed to get our office a copy of it for scanning into Epic. She does have a POA but we do NOT have it on file in [address] [place] [address] Rd.    The patient EC Take 1 tablet (81 mg total) by mouth daily. Liothyronine Sodium (CYTOMEL) 5 MCG Oral Tab Take 1 tablet (5 mcg total) by mouth nightly. metoprolol Tartrate 25 MG Oral Tab 1/2 tab qhs.    CLOPIDOGREL BISULFATE 75 MG Oral Tab TAKE 1 TABLET(75 MG) BY REJI daughter. SOCIAL HISTORY:   She  reports that she has never smoked. She has never used smokeless tobacco. She reports that she drinks about 0.6 oz of alcohol per week . She reports that she does not use drugs.      EXAM:   /62 (BP Location: Right ar osteoporosis without current pathological fracture    Renal insufficiency  -     URINALYSIS, AUTO, W/O SCOPE    Adult general medical examination    Encounter for annual health examination    Chest pain, unspecified type  -     ELECTROCARDIOGRAM, COMPLETE previous visit. No flowsheet data found. Fecal Occult Blood Annually No results found for: FOB No flowsheet data found.     Glaucoma Screening      Ophthalmology Visit Annually: Diabetics, FHx Glaucoma, AA>50, > 65 Data entered on: 2/26/2018   L prescription benefits      SPECIFIC DISEASE MONITORING Internal Lab or Procedure External Lab or Procedure         Diabetes      HgbA1C  Annually Hemoglobin A1c (%)   Date Value   10/25/2010 5.9 (H)     GLYCOHEMOGLOBIN (HgA1c) (L) (%)   Date Value   08/12/ office a copy of it for scanning into Epic.            She has never smoked tobacco.  Ms. Pattie Heller already takes aspirin and has it on her medication list.   CAGE Alcohol screening   Hola Beverly was screened for Alcohol abuse and had a score of 0 so is at mouth nightly. atorvastatin 40 MG Oral Tab Take 1 tablet (40 mg total) by mouth nightly. at bedtime   besifloxacin HCl 0.6 % Ophthalmic Suspension Apply 1 drop to eye.    Levothyroxine Sodium (SYNTHROID) 88 MCG Oral Tab Take 1 tablet (88 mcg total) by michelle diaphoresis, fatigue, fever and unexpected weight change.    HENT: Negative for congestion, dental problem, drooling, ear discharge, ear pain, facial swelling, hearing loss, mouth sores, nosebleeds, postnasal drip, rhinorrhea, sinus pain, sinus pressure, sn Pulse 59   Temp 98 °F (36.7 °C) (Oral)   Ht 5' 2.5\" (1.588 m)   Wt 111 lb (50.3 kg)   SpO2 100%   BMI 19.98 kg/m²  Estimated body mass index is 19.98 kg/m² as calculated from the following:    Height as of this encounter: 5' 2.5\" (1.588 m).     Weight as no maxillary sinus tenderness, no frontal sinus tenderness and no tenderness. Mouth/Throat: Uvula is midline, oropharynx is clear and moist and mucous membranes are normal. Mucous membranes are not pale, not dry and not cyanotic.  She does not have dentur not intubated. No respiratory distress. She has no decreased breath sounds. She has no wheezes. She has no rhonchi. She has no rales. She exhibits no tenderness. Abdominal: Soft.  Bowel sounds are normal. She exhibits no distension, no fluid wave, no asci no preauricular, no posterior auricular and no occipital adenopathy present. She has no cervical adenopathy. Right cervical: No superficial cervical, no deep cervical and no posterior cervical adenopathy present.        Left cervical: No superfic serving a day and no more than 1 handful every day. Check feet  every AM and careful with sores and ulcers on feet bilaterally. Check eyes every year with dilated eye exam.     Vitamin D deficiency Nl level 12-17.      Essential hypertension with goal blood well-being?: Social Interaction; Visiting Friends; Visiting Family      This section provided for quick review of chart, separate sheet to patient  [address] [place] [address],[address] Internal Lab or Procedure External Lab or Procedure   Diabetes data found. Screening Mammogram      Mammogram  Annually to 74, then as discussed There are no preventive care reminders to display for this patient.  Update Health Maintenance if applicable     Immunizations (Update Immunization Activity if applicable) 2/26/2018   Last Dilated Eye Exam 12/8/2017     No flowsheet data found. HPI:    Sang Khan is a 74year old female here today for hospital follow up.    She was discharged from Inpatient hospital[facility]  to [address] [place] [address] Date: 5-31-1 Tartrate 25 MG Oral Tab 1/2 tab qhs. CLOPIDOGREL BISULFATE 75 MG Oral Tab TAKE 1 TABLET(75 MG) BY MOUTH DAILY   Montelukast Sodium (SINGULAIR) 10 MG Oral Tab Take 1 tablet (10 mg total) by mouth nightly.    atorvastatin 40 MG Oral Tab Take 1 tablet (40 mg Cancer in her paternal aunt; Cancer in her mother; Celiac in her daughter; Heart Disorder in her father; Stroke in her father; goiter in her daughter. She  reports that she has never smoked.  She has never used smokeless tobacco. She reports that she drin period of discharge to 30 days:   · Number of Possible Diagnoses and/or Management Options: moderate  · Amount and/or Complexity of Data to Be Reviewed: moderate  · Risk of Significant Complications, Morbidity, and/or Mortality: moderate    Overall Risk:

## 2018-06-14 NOTE — PATIENT INSTRUCTIONS
[facility] SCREENING SCHEDULE   Tests on this list are recommended by your physician but may not be covered, or covered at this frequency, by your insurer. Please check with your insurance carrier before scheduling to verify coverage.    PREVENTATIV Medicare Visit    Abdominal aortic aneurysm screening (once between ages 73-68) IPPE only No results found for this or any previous visit.  Limited to patients who meet one of the following criteria:   • Men who are 73-68 years old and have smoked more than Screening Mammogram      Mammogram    Recommend Annually to at least age 76, and as needed after 76 There are no preventive care reminders to display for this patient.  Please get this Mammogram regularly   Immunizations      Influenza  Covered Annually forms available on it's website for anyone to review and print using their home computer and printer. (the forms are also available in [address])  [email or web address]  This link also has information from the [address] regarding Advance Increase benefiber 1 tsp. In AM. Increase activity will help. Shoulder pain. From OA shoulder and old rotator cuff injury and Fx. Excercises. L abdominal pain ? GERD or constipation. Increase fluids and fiber.

## 2018-06-15 ENCOUNTER — CARDPULM VISIT (OUTPATIENT)
Dept: CARDIAC REHAB | Facility: HOSPITAL | Age: 73
End: 2018-06-15
Attending: INTERNAL MEDICINE
Payer: MEDICARE

## 2018-06-15 PROCEDURE — 93798 PHYS/QHP OP CAR RHAB W/ECG: CPT

## 2018-06-16 PROBLEM — N28.9 RENAL INSUFFICIENCY: Status: RESOLVED | Noted: 2017-12-07 | Resolved: 2018-06-16

## 2018-06-18 ENCOUNTER — CARDPULM VISIT (OUTPATIENT)
Dept: CARDIAC REHAB | Facility: HOSPITAL | Age: 73
End: 2018-06-18
Attending: INTERNAL MEDICINE
Payer: MEDICARE

## 2018-06-18 PROCEDURE — 93798 PHYS/QHP OP CAR RHAB W/ECG: CPT

## 2018-06-19 ENCOUNTER — PRIOR ORIGINAL RECORDS (OUTPATIENT)
Dept: OTHER | Age: 73
End: 2018-06-19

## 2018-06-20 ENCOUNTER — CARDPULM VISIT (OUTPATIENT)
Dept: CARDIAC REHAB | Facility: HOSPITAL | Age: 73
End: 2018-06-20
Attending: INTERNAL MEDICINE
Payer: MEDICARE

## 2018-06-20 PROCEDURE — 93798 PHYS/QHP OP CAR RHAB W/ECG: CPT

## 2018-06-22 ENCOUNTER — CARDPULM VISIT (OUTPATIENT)
Dept: CARDIAC REHAB | Facility: HOSPITAL | Age: 73
End: 2018-06-22
Attending: INTERNAL MEDICINE
Payer: MEDICARE

## 2018-06-22 PROCEDURE — 93798 PHYS/QHP OP CAR RHAB W/ECG: CPT

## 2018-06-25 ENCOUNTER — PRIOR ORIGINAL RECORDS (OUTPATIENT)
Dept: OTHER | Age: 73
End: 2018-06-25

## 2018-06-25 ENCOUNTER — APPOINTMENT (OUTPATIENT)
Dept: CARDIAC REHAB | Facility: HOSPITAL | Age: 73
End: 2018-06-25
Attending: INTERNAL MEDICINE
Payer: MEDICARE

## 2018-06-25 ENCOUNTER — MYAURORA ACCOUNT LINK (OUTPATIENT)
Dept: OTHER | Age: 73
End: 2018-06-25

## 2018-06-27 ENCOUNTER — CARDPULM VISIT (OUTPATIENT)
Dept: CARDIAC REHAB | Facility: HOSPITAL | Age: 73
End: 2018-06-27
Attending: INTERNAL MEDICINE
Payer: MEDICARE

## 2018-06-27 PROCEDURE — 93798 PHYS/QHP OP CAR RHAB W/ECG: CPT

## 2018-06-29 ENCOUNTER — CARDPULM VISIT (OUTPATIENT)
Dept: CARDIAC REHAB | Facility: HOSPITAL | Age: 73
End: 2018-06-29
Attending: INTERNAL MEDICINE
Payer: MEDICARE

## 2018-06-29 PROCEDURE — 93798 PHYS/QHP OP CAR RHAB W/ECG: CPT

## 2018-07-02 ENCOUNTER — CARDPULM VISIT (OUTPATIENT)
Dept: CARDIAC REHAB | Facility: HOSPITAL | Age: 73
End: 2018-07-02
Attending: INTERNAL MEDICINE
Payer: MEDICARE

## 2018-07-02 PROCEDURE — 93798 PHYS/QHP OP CAR RHAB W/ECG: CPT

## 2018-07-06 ENCOUNTER — CARDPULM VISIT (OUTPATIENT)
Dept: CARDIAC REHAB | Facility: HOSPITAL | Age: 73
End: 2018-07-06
Attending: INTERNAL MEDICINE
Payer: MEDICARE

## 2018-07-06 PROCEDURE — 93798 PHYS/QHP OP CAR RHAB W/ECG: CPT

## 2018-07-09 ENCOUNTER — CARDPULM VISIT (OUTPATIENT)
Dept: CARDIAC REHAB | Facility: HOSPITAL | Age: 73
End: 2018-07-09
Attending: INTERNAL MEDICINE
Payer: MEDICARE

## 2018-07-09 PROCEDURE — 93798 PHYS/QHP OP CAR RHAB W/ECG: CPT

## 2018-07-11 ENCOUNTER — CARDPULM VISIT (OUTPATIENT)
Dept: CARDIAC REHAB | Facility: HOSPITAL | Age: 73
End: 2018-07-11
Attending: INTERNAL MEDICINE
Payer: MEDICARE

## 2018-07-11 PROCEDURE — 93798 PHYS/QHP OP CAR RHAB W/ECG: CPT

## 2018-07-13 ENCOUNTER — CARDPULM VISIT (OUTPATIENT)
Dept: CARDIAC REHAB | Facility: HOSPITAL | Age: 73
End: 2018-07-13
Attending: INTERNAL MEDICINE
Payer: MEDICARE

## 2018-07-13 PROCEDURE — 93798 PHYS/QHP OP CAR RHAB W/ECG: CPT

## 2018-07-16 ENCOUNTER — CARDPULM VISIT (OUTPATIENT)
Dept: CARDIAC REHAB | Facility: HOSPITAL | Age: 73
End: 2018-07-16
Attending: INTERNAL MEDICINE
Payer: MEDICARE

## 2018-07-16 ENCOUNTER — TELEPHONE (OUTPATIENT)
Dept: INTERNAL MEDICINE CLINIC | Facility: CLINIC | Age: 73
End: 2018-07-16

## 2018-07-16 PROCEDURE — 93798 PHYS/QHP OP CAR RHAB W/ECG: CPT

## 2018-07-16 NOTE — TELEPHONE ENCOUNTER
1.No does not need pre med. 2. Yes to shingles vaccine. At pharmacy, we don't have. 3. Follow up Ct in 3-6 months, Orders written. Due now from 4-28-18.

## 2018-07-16 NOTE — TELEPHONE ENCOUNTER
Patient calling has dentist appointment in the near future with her stents does she need to take a antibiotic ? Patient wants to know if she should get shingles vaccine ? Patient wants to know what is the next step with diagnosis with spot on lung ?

## 2018-07-18 ENCOUNTER — CARDPULM VISIT (OUTPATIENT)
Dept: CARDIAC REHAB | Facility: HOSPITAL | Age: 73
End: 2018-07-18
Attending: INTERNAL MEDICINE
Payer: MEDICARE

## 2018-07-18 PROCEDURE — 93798 PHYS/QHP OP CAR RHAB W/ECG: CPT

## 2018-07-20 ENCOUNTER — CARDPULM VISIT (OUTPATIENT)
Dept: CARDIAC REHAB | Facility: HOSPITAL | Age: 73
End: 2018-07-20
Attending: INTERNAL MEDICINE
Payer: MEDICARE

## 2018-07-20 PROCEDURE — 93798 PHYS/QHP OP CAR RHAB W/ECG: CPT

## 2018-07-20 RX ORDER — MONTELUKAST SODIUM 10 MG/1
10 TABLET ORAL NIGHTLY
Qty: 30 TABLET | Refills: 3 | Status: SHIPPED | OUTPATIENT
Start: 2018-07-20 | End: 2018-11-16

## 2018-07-20 NOTE — TELEPHONE ENCOUNTER
Pt was informed of Dr Edson Reed message, verbalized understanding.  Attempted to provide phone # to schedule CT but state that she had it

## 2018-07-20 NOTE — TELEPHONE ENCOUNTER
Need refill   Current Outpatient Prescriptions:   •  •  Montelukast Sodium (SINGULAIR) 10 MG Oral Tab, Take 1 tablet (10 mg total) by mouth nightly., Disp: 30 tablet, Rfl: 3  •

## 2018-07-23 ENCOUNTER — CARDPULM VISIT (OUTPATIENT)
Dept: CARDIAC REHAB | Facility: HOSPITAL | Age: 73
End: 2018-07-23
Attending: INTERNAL MEDICINE
Payer: MEDICARE

## 2018-07-23 PROCEDURE — 93798 PHYS/QHP OP CAR RHAB W/ECG: CPT

## 2018-07-25 ENCOUNTER — CARDPULM VISIT (OUTPATIENT)
Dept: CARDIAC REHAB | Facility: HOSPITAL | Age: 73
End: 2018-07-25
Attending: INTERNAL MEDICINE
Payer: MEDICARE

## 2018-07-25 PROCEDURE — 93798 PHYS/QHP OP CAR RHAB W/ECG: CPT

## 2018-07-26 ENCOUNTER — HOSPITAL ENCOUNTER (OUTPATIENT)
Dept: CT IMAGING | Age: 73
Discharge: HOME | End: 2018-07-26
Attending: INTERNAL MEDICINE
Payer: MEDICARE

## 2018-07-26 ENCOUNTER — TELEPHONE (OUTPATIENT)
Dept: INTERNAL MEDICINE CLINIC | Facility: CLINIC | Age: 73
End: 2018-07-26

## 2018-07-26 DIAGNOSIS — R91.1 PULMONARY NODULE: ICD-10-CM

## 2018-07-26 LAB — CREAT BLD-MCNC: 1 MG/DL (ref 0.5–1.5)

## 2018-07-26 PROCEDURE — 71260 CT THORAX DX C+: CPT | Performed by: INTERNAL MEDICINE

## 2018-07-26 PROCEDURE — 82565 ASSAY OF CREATININE: CPT

## 2018-07-27 ENCOUNTER — APPOINTMENT (OUTPATIENT)
Dept: CARDIAC REHAB | Facility: HOSPITAL | Age: 73
End: 2018-07-27
Attending: INTERNAL MEDICINE
Payer: MEDICARE

## 2018-07-27 NOTE — TELEPHONE ENCOUNTER
Result Notes     Notes recorded by Destiny Harden on 7/27/2018 at 10:23 AM CDT  Pt informed of CT scan results, pt states she had a mammogram in January and is concerned about the nodule on the right breast, does she need f/u mammogram or ultrasound?  Please advise.  ------

## 2018-07-30 ENCOUNTER — CARDPULM VISIT (OUTPATIENT)
Dept: CARDIAC REHAB | Facility: HOSPITAL | Age: 73
End: 2018-07-30
Attending: INTERNAL MEDICINE
Payer: MEDICARE

## 2018-07-30 PROCEDURE — 93798 PHYS/QHP OP CAR RHAB W/ECG: CPT

## 2018-08-01 ENCOUNTER — CARDPULM VISIT (OUTPATIENT)
Dept: CARDIAC REHAB | Facility: HOSPITAL | Age: 73
End: 2018-08-01
Attending: INTERNAL MEDICINE
Payer: MEDICARE

## 2018-08-01 PROCEDURE — 93798 PHYS/QHP OP CAR RHAB W/ECG: CPT

## 2018-08-03 ENCOUNTER — CARDPULM VISIT (OUTPATIENT)
Dept: CARDIAC REHAB | Facility: HOSPITAL | Age: 73
End: 2018-08-03
Attending: INTERNAL MEDICINE
Payer: MEDICARE

## 2018-08-03 PROCEDURE — 93798 PHYS/QHP OP CAR RHAB W/ECG: CPT

## 2018-08-06 ENCOUNTER — TELEPHONE (OUTPATIENT)
Dept: INTERNAL MEDICINE CLINIC | Facility: CLINIC | Age: 73
End: 2018-08-06

## 2018-08-06 ENCOUNTER — OFFICE VISIT (OUTPATIENT)
Dept: INTERNAL MEDICINE CLINIC | Facility: CLINIC | Age: 73
End: 2018-08-06
Payer: COMMERCIAL

## 2018-08-06 ENCOUNTER — APPOINTMENT (OUTPATIENT)
Dept: CARDIAC REHAB | Facility: HOSPITAL | Age: 73
End: 2018-08-06
Attending: INTERNAL MEDICINE
Payer: MEDICARE

## 2018-08-06 VITALS
OXYGEN SATURATION: 98 % | WEIGHT: 112 LBS | TEMPERATURE: 98 F | SYSTOLIC BLOOD PRESSURE: 136 MMHG | HEART RATE: 43 BPM | DIASTOLIC BLOOD PRESSURE: 53 MMHG | BODY MASS INDEX: 20.09 KG/M2 | HEIGHT: 62.5 IN

## 2018-08-06 DIAGNOSIS — E53.8 VITAMIN B 12 DEFICIENCY: Primary | ICD-10-CM

## 2018-08-06 DIAGNOSIS — R91.8 PULMONARY NODULES: ICD-10-CM

## 2018-08-06 PROBLEM — Z88.9 MULTIPLE ALLERGIES: Status: ACTIVE | Noted: 2018-08-06

## 2018-08-06 PROBLEM — R94.31 ST SEGMENT CHANGES ON ELECTROCARDIOGRAM: Status: ACTIVE | Noted: 2018-05-31

## 2018-08-06 LAB — VIT B12 SERPL-MCNC: 227 PG/ML (ref 181–914)

## 2018-08-06 PROCEDURE — 99214 OFFICE O/P EST MOD 30 MIN: CPT | Performed by: INTERNAL MEDICINE

## 2018-08-06 PROCEDURE — 36415 COLL VENOUS BLD VENIPUNCTURE: CPT | Performed by: INTERNAL MEDICINE

## 2018-08-06 NOTE — ASSESSMENT & PLAN NOTE
Careful with diet and excercise at least 30 minutes 3-4 times a week. Check sugars at different times on different dates. Careful with low sugars. Carry something with you and check sugar if can. Can carry adams cracker, etc. Decrease carbohydrates.  But a

## 2018-08-06 NOTE — PROGRESS NOTES
HPI:    Patient ID: Liisa Romberg is a 74year old female. HPI   Follow-up of a CT of the chest that was done. Coronary calcium score was done due to upper back left-sided pain and EKG changes.   Since coronary calcium score was abnormal had stress meera lb (50.8 kg)  06/14/18 : 111 lb (50.3 kg)  06/08/18 : 111 lb (50.3 kg)    BP Readings from Last 3 Encounters:  08/06/18 : 136/53  06/14/18 : 138/62  06/08/18 : 142/62    Labs:     Lab Results  Component Value Date/Time   GLU 88 06/01/2018 05:48 AM    DAILY Disp: 90 tablet Rfl: 1   MetFORMIN HCl 1000 MG Oral Tab Take 1,000 mg by mouth 2 (two) times daily with meals. Takes 1/2 tablet four times a day. Disp:  Rfl:    DHA-EPA-Vitamin E (OMEGA-3 COMPLEX OR) Take by mouth.  Disp:  Rfl:    Carbonyl Iron 25 MG Suspension Apply 1 drop to eye. Disp:  Rfl:    besifloxacin HCl 0.6 % Ophthalmic Suspension Apply 1 drop to eye.  Disp:  Rfl:      Allergies:  Glimepiride             HYPOTENSION    Comment:Hypoglycemia, bradycardia  Gluten Meal             OTHER (SEE COMME normal heart sounds, intact distal pulses and normal pulses. Pulses:       Carotid pulses are 2+ on the right side, and 2+ on the left side. Radial pulses are 2+ on the right side, and 2+ on the left side.         Dorsalis pedis pulses are 2+ on th Skin is warm and dry. She is not diaphoretic. Psychiatric: She has a normal mood and affect. Her behavior is normal.   Nursing note and vitals reviewed.   Bilateral barefoot skin diabetic exam is normal, visualized feet and the appearance is normal. Mole

## 2018-08-06 NOTE — PATIENT INSTRUCTIONS
ASSESSMENT/PLAN:   Controlled type 2 diabetes mellitus without complication, without long-term current use of insulin (HCC) Slightly higher than normal. Decrease fruit serving to once a day. Hold medication changes.    Careful with diet and excercise at gurwinder

## 2018-08-07 ENCOUNTER — TELEPHONE (OUTPATIENT)
Dept: INTERNAL MEDICINE CLINIC | Facility: CLINIC | Age: 73
End: 2018-08-07

## 2018-08-07 NOTE — TELEPHONE ENCOUNTER
#1 Not sure if the B12 injections can be done by the nursing staff or the lumbar facility? If not if she is okay to come here be just a nurse only visit. #2  can we call Dr. Karla Lopez and see if we can get her in September?

## 2018-08-07 NOTE — TELEPHONE ENCOUNTER
Jesse Lee wanted to let you know that Dr Arlen Nissen is booked until October.     ALSO  Was told that her Vitamin B was low and she need it to have injections every week  She is asking you if is okay to get her Vitamin  injections at the [place] facility

## 2018-08-08 ENCOUNTER — TELEPHONE (OUTPATIENT)
Dept: INTERNAL MEDICINE CLINIC | Facility: CLINIC | Age: 73
End: 2018-08-08

## 2018-08-08 ENCOUNTER — CARDPULM VISIT (OUTPATIENT)
Dept: CARDIAC REHAB | Facility: HOSPITAL | Age: 73
End: 2018-08-08
Attending: INTERNAL MEDICINE
Payer: MEDICARE

## 2018-08-08 ENCOUNTER — PRIOR ORIGINAL RECORDS (OUTPATIENT)
Dept: OTHER | Age: 73
End: 2018-08-08

## 2018-08-08 PROCEDURE — 93798 PHYS/QHP OP CAR RHAB W/ECG: CPT

## 2018-08-08 RX ORDER — CYANOCOBALAMIN 1000 UG/ML
1000 INJECTION INTRAMUSCULAR; SUBCUTANEOUS
Qty: 4 ML | Refills: 3 | Status: ON HOLD | OUTPATIENT
Start: 2018-08-08 | End: 2018-09-04

## 2018-08-08 NOTE — TELEPHONE ENCOUNTER
Patient called, can you please write the order so patient can have her b12 injections at the hospital since she is there every week.   Also, pulmonary cannot see patient until October, but referred patient to Dr. Merly Mejia who can see her next week, is this

## 2018-08-09 NOTE — TELEPHONE ENCOUNTER
Not sure who pulmohawk COMER is. It is okay to wait then to see a pulmonary doctor I had rather have her see Dr. Chris Thomas or Dr. Branden Sharpe. See written prescription for B12 injections.   Not sure if patient needs to bring that file with her and set up a nurse only

## 2018-08-09 NOTE — TELEPHONE ENCOUNTER
Pt said she will come in our office to start B12 injections, appt scheduled for tomorrow.  Attempted to call Dr. Bret Spurling office to get her in sooner but no available appts, she was placed in the waiting list. Wants to know if any chances of the nodules being

## 2018-08-09 NOTE — TELEPHONE ENCOUNTER
Spoke with patient and instructed her to hold off on oncologist consult for now per MD recommendation.

## 2018-08-10 ENCOUNTER — MED REC SCAN ONLY (OUTPATIENT)
Dept: CASE MANAGEMENT | Age: 73
End: 2018-08-10

## 2018-08-10 ENCOUNTER — CARDPULM VISIT (OUTPATIENT)
Dept: CARDIAC REHAB | Facility: HOSPITAL | Age: 73
End: 2018-08-10
Attending: INTERNAL MEDICINE
Payer: MEDICARE

## 2018-08-10 ENCOUNTER — NURSE ONLY (OUTPATIENT)
Dept: INTERNAL MEDICINE CLINIC | Facility: CLINIC | Age: 73
End: 2018-08-10
Payer: COMMERCIAL

## 2018-08-10 DIAGNOSIS — E53.8 B12 DEFICIENCY: Primary | ICD-10-CM

## 2018-08-10 PROCEDURE — 96372 THER/PROPH/DIAG INJ SC/IM: CPT | Performed by: INTERNAL MEDICINE

## 2018-08-10 PROCEDURE — 93798 PHYS/QHP OP CAR RHAB W/ECG: CPT

## 2018-08-10 RX ORDER — CYANOCOBALAMIN 1000 UG/ML
1000 INJECTION INTRAMUSCULAR; SUBCUTANEOUS ONCE
Status: COMPLETED | OUTPATIENT
Start: 2018-08-10 | End: 2018-08-10

## 2018-08-10 RX ADMIN — CYANOCOBALAMIN 1000 MCG: 1000 INJECTION INTRAMUSCULAR; SUBCUTANEOUS at 12:15:00

## 2018-08-13 ENCOUNTER — TELEPHONE (OUTPATIENT)
Dept: INTERNAL MEDICINE CLINIC | Facility: CLINIC | Age: 73
End: 2018-08-13

## 2018-08-13 ENCOUNTER — CARDPULM VISIT (OUTPATIENT)
Dept: CARDIAC REHAB | Facility: HOSPITAL | Age: 73
End: 2018-08-13
Attending: INTERNAL MEDICINE
Payer: MEDICARE

## 2018-08-13 PROCEDURE — 93798 PHYS/QHP OP CAR RHAB W/ECG: CPT

## 2018-08-13 NOTE — TELEPHONE ENCOUNTER
Pharmacy has a question regarding her B12, they just want to confirm the dosage and info as they got two differ orders pls advise

## 2018-08-14 ENCOUNTER — TELEPHONE (OUTPATIENT)
Dept: INTERNAL MEDICINE CLINIC | Facility: CLINIC | Age: 73
End: 2018-08-14

## 2018-08-14 NOTE — TELEPHONE ENCOUNTER
Current Outpatient Prescriptions:   •  Cyanocobalamin (B-12) 1000 MCG/ML Injection Kit, Inject 1,000 mcg as directed every 7 days. Then qmonth after 4 doses. , Disp: 4 kit, Rfl: 4  •   PLEASE CALL  PHARMACY FOR CLARIFICATION

## 2018-08-14 NOTE — TELEPHONE ENCOUNTER
[facility] requesting refill for lisinopril hctz 20-25 mg--this medication is not on pt med list, please advise.

## 2018-08-15 ENCOUNTER — TELEPHONE (OUTPATIENT)
Dept: INTERNAL MEDICINE CLINIC | Facility: CLINIC | Age: 73
End: 2018-08-15

## 2018-08-15 ENCOUNTER — CARDPULM VISIT (OUTPATIENT)
Dept: CARDIAC REHAB | Facility: HOSPITAL | Age: 73
End: 2018-08-15
Attending: INTERNAL MEDICINE
Payer: MEDICARE

## 2018-08-15 PROCEDURE — 93798 PHYS/QHP OP CAR RHAB W/ECG: CPT

## 2018-08-15 NOTE — TELEPHONE ENCOUNTER
[facility] fax request for refill on lisinopril/hctz 20-25 mg  90 day refill  Not on medication list   Please advise.

## 2018-08-16 ENCOUNTER — TELEPHONE (OUTPATIENT)
Dept: INTERNAL MEDICINE CLINIC | Facility: CLINIC | Age: 73
End: 2018-08-16

## 2018-08-16 RX ORDER — LEVOTHYROXINE SODIUM 88 UG/1
88 TABLET ORAL
Qty: 90 TABLET | Refills: 0 | Status: SHIPPED | OUTPATIENT
Start: 2018-08-16 | End: 2018-11-12

## 2018-08-17 ENCOUNTER — CARDPULM VISIT (OUTPATIENT)
Dept: CARDIAC REHAB | Facility: HOSPITAL | Age: 73
End: 2018-08-17
Attending: INTERNAL MEDICINE
Payer: MEDICARE

## 2018-08-17 ENCOUNTER — NURSE ONLY (OUTPATIENT)
Dept: INTERNAL MEDICINE CLINIC | Facility: CLINIC | Age: 73
End: 2018-08-17
Payer: COMMERCIAL

## 2018-08-17 ENCOUNTER — TELEPHONE (OUTPATIENT)
Dept: INTERNAL MEDICINE CLINIC | Facility: CLINIC | Age: 73
End: 2018-08-17

## 2018-08-17 DIAGNOSIS — E53.8 B12 DEFICIENCY: Primary | ICD-10-CM

## 2018-08-17 PROCEDURE — 96372 THER/PROPH/DIAG INJ SC/IM: CPT | Performed by: INTERNAL MEDICINE

## 2018-08-17 PROCEDURE — 93798 PHYS/QHP OP CAR RHAB W/ECG: CPT

## 2018-08-17 RX ORDER — CYANOCOBALAMIN 1000 UG/ML
1000 INJECTION INTRAMUSCULAR; SUBCUTANEOUS ONCE
Status: COMPLETED | OUTPATIENT
Start: 2018-08-17 | End: 2018-08-17

## 2018-08-17 RX ADMIN — CYANOCOBALAMIN 1000 MCG: 1000 INJECTION INTRAMUSCULAR; SUBCUTANEOUS at 11:58:00

## 2018-08-17 NOTE — TELEPHONE ENCOUNTER
Spoke with patient, informed to monitor temperature and if have fever go to urgent care or immediate care. Instruction given for warm compress 2 times a day. Offered appointment, per patient she will monitor for now.

## 2018-08-17 NOTE — TELEPHONE ENCOUNTER
Different type of vaccine. Watch for temperatures. Warm compresses twice a day for 2 days over the next 2 to days should go down and drink. But she has temperature she needs to go to urgent care or immediate care. Maybe she can come in and see anyone?

## 2018-08-17 NOTE — TELEPHONE ENCOUNTER
Patient walked in the office for her B12 injection. She showed nurse her left deltoid and noticed redness. Per patient she had shingrix vaccine Wednesday, noticed pain Thursday, and noticed redness today.  Redness is 11 cm x 7.5 cm, tender to touch, airam

## 2018-08-20 ENCOUNTER — CARDPULM VISIT (OUTPATIENT)
Dept: CARDIAC REHAB | Facility: HOSPITAL | Age: 73
End: 2018-08-20
Attending: INTERNAL MEDICINE
Payer: MEDICARE

## 2018-08-20 PROCEDURE — 93798 PHYS/QHP OP CAR RHAB W/ECG: CPT

## 2018-08-22 ENCOUNTER — CARDPULM VISIT (OUTPATIENT)
Dept: CARDIAC REHAB | Facility: HOSPITAL | Age: 73
End: 2018-08-22
Attending: INTERNAL MEDICINE
Payer: MEDICARE

## 2018-08-22 PROCEDURE — 93798 PHYS/QHP OP CAR RHAB W/ECG: CPT

## 2018-08-24 ENCOUNTER — CARDPULM VISIT (OUTPATIENT)
Dept: CARDIAC REHAB | Facility: HOSPITAL | Age: 73
End: 2018-08-24
Attending: INTERNAL MEDICINE
Payer: MEDICARE

## 2018-08-24 ENCOUNTER — NURSE ONLY (OUTPATIENT)
Dept: INTERNAL MEDICINE CLINIC | Facility: CLINIC | Age: 73
End: 2018-08-24
Payer: COMMERCIAL

## 2018-08-24 DIAGNOSIS — E53.8 B12 DEFICIENCY: Primary | ICD-10-CM

## 2018-08-24 PROCEDURE — 96372 THER/PROPH/DIAG INJ SC/IM: CPT | Performed by: INTERNAL MEDICINE

## 2018-08-24 PROCEDURE — 93798 PHYS/QHP OP CAR RHAB W/ECG: CPT

## 2018-08-24 RX ORDER — CYANOCOBALAMIN 1000 UG/ML
1000 INJECTION INTRAMUSCULAR; SUBCUTANEOUS ONCE
Status: COMPLETED | OUTPATIENT
Start: 2018-08-24 | End: 2018-08-24

## 2018-08-24 RX ADMIN — CYANOCOBALAMIN 1000 MCG: 1000 INJECTION INTRAMUSCULAR; SUBCUTANEOUS at 11:43:00

## 2018-08-27 ENCOUNTER — CARDPULM VISIT (OUTPATIENT)
Dept: CARDIAC REHAB | Facility: HOSPITAL | Age: 73
End: 2018-08-27
Attending: INTERNAL MEDICINE
Payer: MEDICARE

## 2018-08-27 PROCEDURE — 93798 PHYS/QHP OP CAR RHAB W/ECG: CPT

## 2018-08-29 ENCOUNTER — CARDPULM VISIT (OUTPATIENT)
Dept: CARDIAC REHAB | Facility: HOSPITAL | Age: 73
End: 2018-08-29
Attending: INTERNAL MEDICINE
Payer: MEDICARE

## 2018-08-29 PROCEDURE — 93798 PHYS/QHP OP CAR RHAB W/ECG: CPT

## 2018-08-31 ENCOUNTER — CARDPULM VISIT (OUTPATIENT)
Dept: CARDIAC REHAB | Facility: HOSPITAL | Age: 73
End: 2018-08-31
Attending: INTERNAL MEDICINE
Payer: MEDICARE

## 2018-08-31 ENCOUNTER — NURSE ONLY (OUTPATIENT)
Dept: INTERNAL MEDICINE CLINIC | Facility: CLINIC | Age: 73
End: 2018-08-31
Payer: COMMERCIAL

## 2018-08-31 DIAGNOSIS — E53.8 B12 DEFICIENCY: Primary | ICD-10-CM

## 2018-08-31 PROCEDURE — 93798 PHYS/QHP OP CAR RHAB W/ECG: CPT

## 2018-08-31 PROCEDURE — 96372 THER/PROPH/DIAG INJ SC/IM: CPT | Performed by: INTERNAL MEDICINE

## 2018-08-31 RX ORDER — CYANOCOBALAMIN 1000 UG/ML
1000 INJECTION INTRAMUSCULAR; SUBCUTANEOUS ONCE
Status: COMPLETED | OUTPATIENT
Start: 2018-08-31 | End: 2018-08-31

## 2018-08-31 RX ADMIN — CYANOCOBALAMIN 1000 MCG: 1000 INJECTION INTRAMUSCULAR; SUBCUTANEOUS at 11:39:00

## 2018-08-31 NOTE — PROGRESS NOTES
Patient for nurse visit. Verified name and date of birth. Administered shot to right deltoid, patient tolerated shot, no reactions noted. Patient aware to come back in one month.

## 2018-09-02 ENCOUNTER — APPOINTMENT (OUTPATIENT)
Dept: GENERAL RADIOLOGY | Facility: HOSPITAL | Age: 73
End: 2018-09-02
Attending: EMERGENCY MEDICINE
Payer: MEDICARE

## 2018-09-02 ENCOUNTER — HOSPITAL ENCOUNTER (OUTPATIENT)
Facility: HOSPITAL | Age: 73
Setting detail: OBSERVATION
Discharge: HOME | End: 2018-09-04
Attending: EMERGENCY MEDICINE | Admitting: INTERNAL MEDICINE
Payer: MEDICARE

## 2018-09-02 DIAGNOSIS — R07.9 CHEST PAIN OF UNCERTAIN ETIOLOGY: Primary | ICD-10-CM

## 2018-09-02 LAB
ANION GAP SERPL CALC-SCNC: 12 MMOL/L (ref 0–18)
BASOPHILS # BLD: 0.1 K/UL (ref 0–0.2)
BASOPHILS NFR BLD: 1 %
BUN SERPL-MCNC: 23 MG/DL (ref 8–20)
BUN/CREAT SERPL: 22.1 (ref 10–20)
CALCIUM SERPL-MCNC: 9.7 MG/DL (ref 8.5–10.5)
CHLORIDE SERPL-SCNC: 102 MMOL/L (ref 95–110)
CO2 SERPL-SCNC: 21 MMOL/L (ref 22–32)
CREAT SERPL-MCNC: 1.04 MG/DL (ref 0.5–1.5)
EOSINOPHIL # BLD: 0.2 K/UL (ref 0–0.7)
EOSINOPHIL NFR BLD: 2 %
ERYTHROCYTE [DISTWIDTH] IN BLOOD BY AUTOMATED COUNT: 14.1 % (ref 11–15)
GLUCOSE SERPL-MCNC: 108 MG/DL (ref 70–99)
HCT VFR BLD AUTO: 38 % (ref 35–48)
HGB BLD-MCNC: 12.2 G/DL (ref 12–16)
LYMPHOCYTES # BLD: 2.1 K/UL (ref 1–4)
LYMPHOCYTES NFR BLD: 31 %
MCH RBC QN AUTO: 30 PG (ref 27–32)
MCHC RBC AUTO-ENTMCNC: 32.3 G/DL (ref 32–37)
MCV RBC AUTO: 93 FL (ref 80–100)
MONOCYTES # BLD: 0.8 K/UL (ref 0–1)
MONOCYTES NFR BLD: 12 %
NEUTROPHILS # BLD AUTO: 3.7 K/UL (ref 1.8–7.7)
NEUTROPHILS NFR BLD: 54 %
OSMOLALITY UR CALC.SUM OF ELEC: 284 MOSM/KG (ref 275–295)
PLATELET # BLD AUTO: 236 K/UL (ref 140–400)
PMV BLD AUTO: 7.5 FL (ref 7.4–10.3)
POTASSIUM SERPL-SCNC: 4 MMOL/L (ref 3.3–5.1)
RBC # BLD AUTO: 4.08 M/UL (ref 3.7–5.4)
SODIUM SERPL-SCNC: 135 MMOL/L (ref 136–144)
TROPONIN I SERPL-MCNC: 0.03 NG/ML (ref ?–0.03)
WBC # BLD AUTO: 6.8 K/UL (ref 4–11)

## 2018-09-02 PROCEDURE — 71045 X-RAY EXAM CHEST 1 VIEW: CPT | Performed by: EMERGENCY MEDICINE

## 2018-09-02 RX ORDER — ASPIRIN 81 MG/1
324 TABLET, CHEWABLE ORAL ONCE
Status: COMPLETED | OUTPATIENT
Start: 2018-09-02 | End: 2018-09-02

## 2018-09-02 RX ORDER — ENALAPRILAT 2.5 MG/2ML
1.25 INJECTION INTRAVENOUS ONCE
Status: COMPLETED | OUTPATIENT
Start: 2018-09-02 | End: 2018-09-02

## 2018-09-02 RX ORDER — CITALOPRAM 20 MG/1
20 TABLET ORAL DAILY
COMMUNITY
End: 2018-09-07

## 2018-09-03 PROBLEM — R94.31 ABNORMAL EKG: Status: RESOLVED | Noted: 2018-05-31 | Resolved: 2018-09-03

## 2018-09-03 PROBLEM — F41.9 ANXIETY: Status: ACTIVE | Noted: 2018-09-03

## 2018-09-03 LAB
ANION GAP SERPL CALC-SCNC: 6 MMOL/L (ref 0–18)
BASOPHILS # BLD: 0 K/UL (ref 0–0.2)
BASOPHILS NFR BLD: 1 %
BUN SERPL-MCNC: 20 MG/DL (ref 8–20)
BUN/CREAT SERPL: 19.4 (ref 10–20)
CALCIUM SERPL-MCNC: 9.3 MG/DL (ref 8.5–10.5)
CHLORIDE SERPL-SCNC: 105 MMOL/L (ref 95–110)
CHOLEST SERPL-MCNC: 110 MG/DL (ref 110–200)
CO2 SERPL-SCNC: 28 MMOL/L (ref 22–32)
CREAT SERPL-MCNC: 1.03 MG/DL (ref 0.5–1.5)
EOSINOPHIL # BLD: 0.1 K/UL (ref 0–0.7)
EOSINOPHIL NFR BLD: 2 %
ERYTHROCYTE [DISTWIDTH] IN BLOOD BY AUTOMATED COUNT: 14.1 % (ref 11–15)
GLUCOSE BLDC GLUCOMTR-MCNC: 112 MG/DL (ref 70–99)
GLUCOSE BLDC GLUCOMTR-MCNC: 112 MG/DL (ref 70–99)
GLUCOSE BLDC GLUCOMTR-MCNC: 119 MG/DL (ref 70–99)
GLUCOSE BLDC GLUCOMTR-MCNC: 139 MG/DL (ref 70–99)
GLUCOSE BLDC GLUCOMTR-MCNC: 94 MG/DL (ref 70–99)
GLUCOSE SERPL-MCNC: 100 MG/DL (ref 70–99)
HBA1C MFR BLD: 5.5 % (ref 4–6)
HCT VFR BLD AUTO: 35.4 % (ref 35–48)
HDLC SERPL-MCNC: 41 MG/DL
HGB BLD-MCNC: 11.5 G/DL (ref 12–16)
LDLC SERPL CALC-MCNC: 54 MG/DL (ref 0–99)
LYMPHOCYTES # BLD: 2.4 K/UL (ref 1–4)
LYMPHOCYTES NFR BLD: 36 %
MCH RBC QN AUTO: 30.4 PG (ref 27–32)
MCHC RBC AUTO-ENTMCNC: 32.5 G/DL (ref 32–37)
MCV RBC AUTO: 93.6 FL (ref 80–100)
MONOCYTES # BLD: 0.7 K/UL (ref 0–1)
MONOCYTES NFR BLD: 11 %
NEUTROPHILS # BLD AUTO: 3.4 K/UL (ref 1.8–7.7)
NEUTROPHILS NFR BLD: 51 %
NONHDLC SERPL-MCNC: 69 MG/DL
OSMOLALITY UR CALC.SUM OF ELEC: 291 MOSM/KG (ref 275–295)
PLATELET # BLD AUTO: 246 K/UL (ref 140–400)
PMV BLD AUTO: 7.6 FL (ref 7.4–10.3)
POTASSIUM SERPL-SCNC: 4.1 MMOL/L (ref 3.3–5.1)
RBC # BLD AUTO: 3.78 M/UL (ref 3.7–5.4)
SODIUM SERPL-SCNC: 139 MMOL/L (ref 136–144)
TRIGL SERPL-MCNC: 77 MG/DL (ref 1–149)
TROPONIN I SERPL-MCNC: 0.04 NG/ML (ref ?–0.03)
TROPONIN I SERPL-MCNC: 0.04 NG/ML (ref ?–0.03)
WBC # BLD AUTO: 6.6 K/UL (ref 4–11)

## 2018-09-03 PROCEDURE — 99219 INITIAL OBSERVATION CARE,LEVL II: CPT | Performed by: INTERNAL MEDICINE

## 2018-09-03 RX ORDER — MONTELUKAST SODIUM 10 MG/1
10 TABLET ORAL NIGHTLY
Status: DISCONTINUED | OUTPATIENT
Start: 2018-09-03 | End: 2018-09-04

## 2018-09-03 RX ORDER — ACETAMINOPHEN 500 MG
1000 TABLET ORAL EVERY 6 HOURS PRN
Status: DISCONTINUED | OUTPATIENT
Start: 2018-09-03 | End: 2018-09-04

## 2018-09-03 RX ORDER — LISINOPRIL 5 MG/1
5 TABLET ORAL DAILY
Status: DISCONTINUED | OUTPATIENT
Start: 2018-09-03 | End: 2018-09-04

## 2018-09-03 RX ORDER — HYDRALAZINE HYDROCHLORIDE 20 MG/ML
10 INJECTION INTRAMUSCULAR; INTRAVENOUS EVERY 6 HOURS PRN
Status: DISCONTINUED | OUTPATIENT
Start: 2018-09-03 | End: 2018-09-04

## 2018-09-03 RX ORDER — ATORVASTATIN CALCIUM 40 MG/1
40 TABLET, FILM COATED ORAL NIGHTLY
Status: DISCONTINUED | OUTPATIENT
Start: 2018-09-03 | End: 2018-09-04

## 2018-09-03 RX ORDER — CITALOPRAM 20 MG/1
20 TABLET ORAL DAILY
Status: DISCONTINUED | OUTPATIENT
Start: 2018-09-03 | End: 2018-09-04

## 2018-09-03 RX ORDER — LIOTHYRONINE SODIUM 5 UG/1
5 TABLET ORAL NIGHTLY
Status: DISCONTINUED | OUTPATIENT
Start: 2018-09-03 | End: 2018-09-04

## 2018-09-03 RX ORDER — LEVOTHYROXINE SODIUM 88 UG/1
88 TABLET ORAL
Status: DISCONTINUED | OUTPATIENT
Start: 2018-09-03 | End: 2018-09-04

## 2018-09-03 RX ORDER — DEXTROSE MONOHYDRATE 25 G/50ML
50 INJECTION, SOLUTION INTRAVENOUS AS NEEDED
Status: DISCONTINUED | OUTPATIENT
Start: 2018-09-03 | End: 2018-09-04

## 2018-09-03 RX ORDER — ASPIRIN 81 MG/1
81 TABLET ORAL DAILY
Status: DISCONTINUED | OUTPATIENT
Start: 2018-09-03 | End: 2018-09-04

## 2018-09-03 RX ORDER — 0.9 % SODIUM CHLORIDE 0.9 %
3 VIAL (ML) INJECTION AS NEEDED
Status: DISCONTINUED | OUTPATIENT
Start: 2018-09-03 | End: 2018-09-04

## 2018-09-03 RX ORDER — FENOFIBRATE 134 MG/1
134 CAPSULE ORAL
Status: DISCONTINUED | OUTPATIENT
Start: 2018-09-03 | End: 2018-09-04

## 2018-09-03 RX ORDER — CLOPIDOGREL BISULFATE 75 MG/1
75 TABLET ORAL DAILY
Status: DISCONTINUED | OUTPATIENT
Start: 2018-09-03 | End: 2018-09-04

## 2018-09-03 NOTE — ED PROVIDER NOTES
Patient Seen in: [facility] Department    History   Patient presents with:  Chest Pain Angina (cardiovascular)    Stated Complaint:     HPI    20-year-old female with history of anxiety, depression, hyperlipidemia, CAD status post multip pain and frequency. Musculoskeletal: Negative for back pain. Skin: Negative for rash. Neurological: Negative for weakness, light-headedness and headaches. All other systems reviewed and are negative.       Positive for stated complaint:   Other syst reviewed. ED Course     EKG    Rate, intervals and axes as noted on EKG Report.   Rate: 70  Rhythm: Sinus Rhythm  Reading: normal for rate, normal for rhythm, elevation in III, unchanged from previous ekg      Cardiac Monitor:    Patient placed on Reviewed by me while in ED:          EMERGENCY DEPARTMENT COURSE AND TREATMENT:  Patient's condition was stable during Emergency Department evaluation.      73yoF with chest pain  - I personally reviewed and interpreted all the ED vitals  - afebrile, hemody the presenting problem including chest pain, ACS, anxiety, pneumonia.           Disposition and Plan     Clinical Impression:  Chest pain of uncertain etiology  (primary encounter diagnosis)    Disposition:  Admit  9/2/2018 11:31 pm    Follow-up:  No follow

## 2018-09-03 NOTE — PROGRESS NOTES
[facility]  Progress Note    Akshat Alexis Patient Status:  Observation    1945 MRN [id number]   Location [facility] 3W/SW Attending Bridget Rivera MD   Hosp Day # 0 PCP Jon Mustafa. Edson Reed MD     Subjective:  Feels well.   No furt lesion    Essential hypertension    Acquired hypothyroidism    Anxiety    Hx coronary artery stent 5/2018. Continue medical therapy.   Nuclear TMT in AM.  D/W pt.    Joby Campbell  9/3/2018  11:44 AM

## 2018-09-03 NOTE — H&P
[facility]    History and Physical    Jong Turner Patient Status:  Observation    1945 MRN [id number]   Location [facility] 3W/SW Attending Leatha Torre MD   Hosp Day # 0 PCP Nora Pina.  Young Pate MD     Date:  9/3/2018 Social History:  Smoking status: Never Smoker                                                              Smokeless tobacco: Never Used                      Alcohol use: Yes           0.6 oz/week     Glasses of wine: 1 per week     Comment: 4-5 per we Vitamins-Iron (ONCE DAILY/IRON) Oral Tab Take by mouth. Cholecalciferol (D-2000 MAXIMUM STRENGTH) 2000 UNITS Oral Tab Take  by mouth.    VITAMIN E 400 UNIT OR TABS 1 TABLET DAILY   VITAMIN C 500 MG OR TABS 1 TABLET DAILY   TYLENOL ARTHRITIS PAIN OR Two 65 contraindicated    Results:     Lab Results  Component Value Date   WBC 6.6 09/03/2018   HGB 11.5 (L) 09/03/2018   HCT 35.4 09/03/2018    09/03/2018   CREATSERUM 1.03 09/03/2018   BUN 20 09/03/2018    09/03/2018   K 4.1 09/03/2018    09/ Based on patients current state of illness, I anticipate that, after discharge, patient will require TBD.         Arlin Colon MD  9/3/2018

## 2018-09-03 NOTE — CONSULTS
[facility]  Cardiology Consultation    Briana Durham Patient Status:  Emergency    1945 MRN [id number]   Location [address] Attending Ilene Cedeño MD   [facility] Day # 0 PCP Tata Hai.  Desmond Ferreira MD     Reason f never smoked. She has never used smokeless tobacco. She reports that she drinks about 0.6 oz of alcohol per week . She reports that she does not use drugs.     Allergies:    Glimepiride             HYPOTENSION    Comment:Hypoglycemia, bradycardia  Gluten Me 09/02/2018   K 4.0 09/02/2018    09/02/2018   CO2 21 09/02/2018    09/02/2018   CA 9.7 09/02/2018   TROP 0.03 09/02/2018       Imaging:  EKG 9/2/2018: sinus with non-specific ST/T waves but no subtle inferior ST elevations and TWI in anterolat

## 2018-09-03 NOTE — HISTORICAL OFFICE NOTE
Herbert Mckeon  : 1945  ACCOUNT:  [id number]  [phone]  PCP: Dr. Selam Casper     TODAY'S DATE: 2018  DICTATED BY:  [Dr. Mojgan Abreu: [Followup of CAD, of native vessels.]    HPI:    [On 2018, serafin Kwon ALLERGIES: Sulfa Antibiotics - CLASS    MEDICATIONS: Selected prescriptions see below    VITAL SIGNS: [B/P - 136/70 , Pulse - 51, Weight -  111, Height -   63 , BMI - 19.7 ]    CONS: well developed, well nourished and thin.  WEIGHT: BMI parameters revie History of drug eluting stent, x2, 5/2018  4. Hypercholesterolemia, pure  5. Hypertension  6. Hypothyroidism  7. Non-st Elevation (nstemi) Myocardial Infarction      PLAN:  [continue current medications as prescribed return to follow-up.   1 year or sooner 05/24/18 Vitamin E             400UNIT   1 daily                                  05/24/18 Zinc                  50MG      1 daily

## 2018-09-04 ENCOUNTER — APPOINTMENT (OUTPATIENT)
Dept: NUCLEAR MEDICINE | Facility: HOSPITAL | Age: 73
End: 2018-09-04
Attending: INTERNAL MEDICINE
Payer: MEDICARE

## 2018-09-04 ENCOUNTER — APPOINTMENT (OUTPATIENT)
Dept: CV DIAGNOSTICS | Facility: HOSPITAL | Age: 73
End: 2018-09-04
Attending: INTERNAL MEDICINE
Payer: MEDICARE

## 2018-09-04 VITALS
HEIGHT: 62 IN | TEMPERATURE: 98 F | SYSTOLIC BLOOD PRESSURE: 143 MMHG | HEART RATE: 51 BPM | RESPIRATION RATE: 18 BRPM | BODY MASS INDEX: 19.38 KG/M2 | DIASTOLIC BLOOD PRESSURE: 67 MMHG | OXYGEN SATURATION: 97 % | WEIGHT: 105.31 LBS

## 2018-09-04 LAB
GLUCOSE BLDC GLUCOMTR-MCNC: 104 MG/DL (ref 70–99)
GLUCOSE BLDC GLUCOMTR-MCNC: 106 MG/DL (ref 70–99)
GLUCOSE BLDC GLUCOMTR-MCNC: 115 MG/DL (ref 70–99)

## 2018-09-04 PROCEDURE — 99217 OBSERVATION CARE DISCHARGE: CPT | Performed by: INTERNAL MEDICINE

## 2018-09-04 PROCEDURE — 93017 CV STRESS TEST TRACING ONLY: CPT | Performed by: INTERNAL MEDICINE

## 2018-09-04 PROCEDURE — 78452 HT MUSCLE IMAGE SPECT MULT: CPT | Performed by: INTERNAL MEDICINE

## 2018-09-04 RX ORDER — LISINOPRIL 5 MG/1
5 TABLET ORAL DAILY
Qty: 30 TABLET | Refills: 6 | Status: SHIPPED | OUTPATIENT
Start: 2018-09-05 | End: 2018-10-15

## 2018-09-04 RX ORDER — SODIUM CHLORIDE 9 MG/ML
INJECTION, SOLUTION INTRAVENOUS
Status: COMPLETED
Start: 2018-09-04 | End: 2018-09-04

## 2018-09-04 NOTE — TRANSITION NOTE
Primary Cardiologist: Dr. Quentin Handy  Subjective:   Subjective:  74year old female who presented with CP.       Constitutional: Negative. Respiratory: Negative.     Cardiovascular: Negative.          Objective:   Blood pressure (!) 177/56, pulse 59, temperatu per primary     Hyperlipidemia, stable  - continue statin     Plan  - DC home  - testing reviewed with dr Angelina Wilson wishes to change cardiologist to Dr Monika Bruce - f/bree in 6 mo     Results:      Lab Results  Component Value Date   WBC 6.6 09/03/2018   HGB 1

## 2018-09-04 NOTE — DISCHARGE SUMMARY
[address] [place] Patient Status:  Observation    1945 MRN [id number]   Location [facility] 3W/SW Attending Wong Bautista., MD   Hosp Day # 0 PCP Halie Espinoza.  Lawrence Alford MD     Date of Admission: 2018  Date of Discharge or edema    Procedures during hospitalization:   • Stress test    Incidental or significant findings and recommendations (brief descriptions):   •     Lab/Test results pending at Discharge:   ·     Consultants:  •     Discharge Medication List:     Carmelina Tinsley daily.   Quantity:  90 tablet  Refills:  0     Liothyronine Sodium 5 MCG Tabs  Commonly known as:  CYTOMEL      Take 1 tablet (5 mcg total) by mouth nightly.    Quantity:  90 tablet  Refills:  0     MetFORMIN HCl 1000 MG Tabs  Commonly known as:  GLUCOPHAGE daily. Levothyroxine Sodium (SYNTHROID) 88 MCG Oral Tab  Take 1 tablet (88 mcg total) by mouth once daily. Montelukast Sodium (SINGULAIR) 10 MG Oral Tab  Take 1 tablet (10 mg total) by mouth nightly.     CLOPIDOGREL BISULFATE 75 MG Oral Tab  TAKE 1 TA [48-59] 51  Resp:  [16-18] 18  BP: (342-083)/(35-29) 143/67    -----------------------------------------------------------------------------------------------  PATIENT DISCHARGE INSTRUCTIONS: See electronic chart    Stephanie Tomlinson MD 9/4/2018    Time spent:

## 2018-09-04 NOTE — PROGRESS NOTES
[facility] - [facility]    Cardiology Progress Note    Katie Goff Patient Status:  Observation    1945 MRN [id number]   Location [facility] 3W/SW Attending Carmencita Sepulveda MD   Hosp Day # 0 PCP Zeus Marshall.  Mendez Valencia MD       Subjective controlled  - Continue current antihypertensives as ordered  -Started on lisinopril this admission - will need to monitor BMP outpatient    DM  - continue antihyperglycemics per primary    Hyperlipidemia, stable  - continue statin    Plan  - DC home  - meera

## 2018-09-04 NOTE — CM/SW NOTE
SW received an order for POLST form. SW met with pt at bedside to discuss POLST form completion. Pt states that she will completed the form with her daughter and Janis Estrella [phone]. SW informed RN that all signatures are required.  SW/PATRICK/RN can

## 2018-09-04 NOTE — DISCHARGE PLANNING
Patient medically cleared for discharge per MDs. IV removed. Tele box returned. Discharge paperwork in hand and reviewed with patient and grand daughter. Pt ambulating freely. Vitals stable.

## 2018-09-04 NOTE — HISTORICAL OFFICE NOTE
JORGE  : 1945  ACCOUNT:  [id number]  [phone]  PCP: Dr. Sally Garcia     TODAY'S DATE: 2018  DICTATED BY:  [Dr. Baker Primserafin: [Followup of CAD, of native vessels.]    HPI:    [On 2018, Carlos Expose, a 74year old ALLERGIES: Sulfa Antibiotics - CLASS    MEDICATIONS: Selected prescriptions see below    VITAL SIGNS: [B/P - 136/70 , Pulse - 51, Weight -  111, Height -   63 , BMI - 19.7 ]    CONS: well developed, well nourished and thin.  WEIGHT: BMI parameters revie History of drug eluting stent, x2, 5/2018  4. Hypercholesterolemia, pure  5. Hypertension  6. Hypothyroidism  7. Non-st Elevation (nstemi) Myocardial Infarction      PLAN:  [continue current medications as prescribed return to follow-up.   1 year or sooner 05/24/18 Vitamin E             400UNIT   1 daily                                  05/24/18 Zinc                  50MG      1 daily

## 2018-09-04 NOTE — IMAGING NOTE
1125am - Patient here for inpatient nuclear exercise stress test. Patient exercised 6:13 seconds, achieving , which is only 70% of MTHR. Denies chest pain. Patient states she could no longer keep pace with treadmill.  I called Maricruz LIND and discusse

## 2018-09-05 ENCOUNTER — TELEPHONE (OUTPATIENT)
Dept: INTERNAL MEDICINE CLINIC | Facility: CLINIC | Age: 73
End: 2018-09-05

## 2018-09-05 NOTE — TELEPHONE ENCOUNTER
Patient called requesting a hospital follow up. She wants appt at the [facility] office. Unable to schedule her. Please advise.

## 2018-09-05 NOTE — TELEPHONE ENCOUNTER
3 PM Friday September 7, 2018 at [facility]. May be a wait. Problem: Communication  Goal: The ability to communicate needs accurately and effectively will improve  Outcome: PROGRESSING AS EXPECTED  Plan of care discussed. Expectations and limitations set during initial encounter.  Allowed pt to ask  Questions/ clarifications during discussion of POC.  Patient verbalized understanding of POC.  Will continue to monitor.

## 2018-09-06 RX ORDER — LIOTHYRONINE SODIUM 5 UG/1
5 TABLET ORAL NIGHTLY
Qty: 90 TABLET | Refills: 0 | Status: SHIPPED | OUTPATIENT
Start: 2018-09-06 | End: 2018-09-07

## 2018-09-06 RX ORDER — CYANOCOBALAMIN 1000 UG/ML
INJECTION INTRAMUSCULAR; SUBCUTANEOUS
Refills: 3 | COMMUNITY
Start: 2018-08-08 | End: 2018-12-28

## 2018-09-06 NOTE — TELEPHONE ENCOUNTER
Not sure Why miscommunication. Should restart meds. Tomorrow 9-6-18 AM. Maybe concerns with dye load if do imaging while in hospital generally stop all oral hypoglycemics but hen to restart.

## 2018-09-06 NOTE — TELEPHONE ENCOUNTER
Spoke with patient regarding Dr. Lynn Slider response   1) blood sugar in hospital 106-103 was taken off metformin      This morning blood sugar 138 had no metformin yesterday. Question is do you want her to only take 500 mg once or          twice a day?

## 2018-09-06 NOTE — TELEPHONE ENCOUNTER
May be recommend just 500 twice a day pre-meals. Careful with low sugars. Carry something with in case of low sugars may be checked twice a day and call us back in a week. Will follow up with her on Friday.

## 2018-09-06 NOTE — TELEPHONE ENCOUNTER
Patient informed of Dr Mendez Valencia response  Needs note for Cardiac rehab has appt at 9:30 am tomorrow  9/7/18

## 2018-09-06 NOTE — TELEPHONE ENCOUNTER
Patient calling states her BP was 163 this morning after breakfast and this afternoon was 150 patient is not taking any medication since leaving the hospital should she not take any thing until her office visit on Friday ?

## 2018-09-07 ENCOUNTER — OFFICE VISIT (OUTPATIENT)
Dept: INTERNAL MEDICINE CLINIC | Facility: CLINIC | Age: 73
End: 2018-09-07
Payer: COMMERCIAL

## 2018-09-07 ENCOUNTER — CARDPULM VISIT (OUTPATIENT)
Dept: CARDIAC REHAB | Facility: HOSPITAL | Age: 73
End: 2018-09-07
Attending: INTERNAL MEDICINE
Payer: MEDICARE

## 2018-09-07 VITALS
HEIGHT: 62.5 IN | BODY MASS INDEX: 19.34 KG/M2 | TEMPERATURE: 98 F | DIASTOLIC BLOOD PRESSURE: 63 MMHG | SYSTOLIC BLOOD PRESSURE: 145 MMHG | WEIGHT: 107.81 LBS | HEART RATE: 49 BPM | RESPIRATION RATE: 12 BRPM | OXYGEN SATURATION: 98 %

## 2018-09-07 DIAGNOSIS — E03.9 ACQUIRED HYPOTHYROIDISM: Primary | ICD-10-CM

## 2018-09-07 PROCEDURE — 93798 PHYS/QHP OP CAR RHAB W/ECG: CPT

## 2018-09-07 PROCEDURE — 99496 TRANSJ CARE MGMT HIGH F2F 7D: CPT | Performed by: INTERNAL MEDICINE

## 2018-09-07 RX ORDER — LIOTHYRONINE SODIUM 5 UG/1
5 TABLET ORAL NIGHTLY
Qty: 30 TABLET | Refills: 1 | Status: SHIPPED | OUTPATIENT
Start: 2018-09-07 | End: 2018-09-16 | Stop reason: DRUGHIGH

## 2018-09-07 RX ORDER — CITALOPRAM 20 MG/1
TABLET ORAL
Qty: 180 TABLET | Refills: 0 | Status: SHIPPED | OUTPATIENT
Start: 2018-09-07 | End: 2018-12-03

## 2018-09-07 RX ORDER — CITALOPRAM 20 MG/1
40 TABLET ORAL DAILY
Qty: 60 TABLET | Refills: 1 | Status: SHIPPED | OUTPATIENT
Start: 2018-09-07 | End: 2018-09-07

## 2018-09-07 RX ORDER — CLOPIDOGREL BISULFATE 75 MG/1
TABLET ORAL
Qty: 90 TABLET | Refills: 1 | Status: SHIPPED | OUTPATIENT
Start: 2018-09-07 | End: 2018-12-28

## 2018-09-07 NOTE — ASSESSMENT & PLAN NOTE
CAD / NSTEMI - presents with scapular pain and elevated troponin and abnormal EKG with TWI  - s/p PCI 5/15/2018 to RCA PDA, FRANK X 2.   - Cath 6/1/18 with mod disease throughout, 70% Mid LAD with Diag/septal involvement - insignificant FFR at 0.88, moderate

## 2018-09-07 NOTE — PATIENT INSTRUCTIONS
ASSESSMENT/PLAN:   Controlled type 2 diabetes mellitus without complication, without long-term current use of insulin (HCC) Not good control. Increase metformin to 500 mg every 8 hrs. Pre meals. Call with sugars in 10 days.    Careful with diet and excercis normal reactions to stress. 3) Do not fight your feelings or try to wish them away. The more willing you are to face them, the less intense they will become. 4) Don't add to your panic by thinking about what might happen.  If you finding yourself asking,

## 2018-09-07 NOTE — PROGRESS NOTES
HPI:    Patient ID: Jackelyn Morrell is a 74year old female. HPI   Diabetes  Patient here for follow up of Diabetes. Has been taking medications regularly. Checks sugars 1 times daily. Fasting sugars average 100's at hospital while not on metformin. CREATSERUM 1.03 09/03/2018 06:21 AM   CA 9.3 09/03/2018 06:21 AM   AST 31 12/07/2017 11:00 AM   ALT 30 12/07/2017 11:00 AM   TSH 1.10 06/01/2018 05:48 AM   T4F 1.20 06/01/2018 05:48 AM          Lab Results  Component Value Date/Time   CHOLEST 110 09/03/2 (88 mcg total) by mouth once daily. Disp: 90 tablet Rfl: 0   Montelukast Sodium (SINGULAIR) 10 MG Oral Tab Take 1 tablet (10 mg total) by mouth nightly. Disp: 30 tablet Rfl: 3   MetFORMIN HCl 1000 MG Oral Tab Take 500 mg by mouth 4 (four) times daily.  Take effects noted  Sulfa Antibiotics         Sulfanilamide           UNKNOWN    HISTORY:  Past Medical History:   Diagnosis Date   • ANXIETY    • Appendicitis    • DEPRESSION    • Generalized OA    • HYPERLIPIDEMIA    • Multiple allergies    • Ovarian cyst wheezes. She has no rhonchi. She has no rales. She exhibits no tenderness. Musculoskeletal: She exhibits no edema. Neurological: She is alert and oriented to person, place, and time. Skin: Skin is warm and dry. She is not diaphoretic.    Psychiatric: monitor. If not, check at local pharmacy. Bake foods more and grill occasionally. Avoid fried foods. No salt. Use other seasonings. Thyroid dis. Check blood. Anxiety.  Try increase celexa to 1 tab in Am and 1/2 tab at PM. [employer] for Coping wit Tab 30 tablet 1      Sig: Take 1 tablet (5 mcg total) by mouth nightly.            Imaging & Referrals:  None        #[id number]

## 2018-09-10 ENCOUNTER — LAB ENCOUNTER (OUTPATIENT)
Dept: LAB | Facility: HOSPITAL | Age: 73
End: 2018-09-10
Attending: INTERNAL MEDICINE
Payer: MEDICARE

## 2018-09-10 ENCOUNTER — CARDPULM VISIT (OUTPATIENT)
Dept: CARDIAC REHAB | Facility: HOSPITAL | Age: 73
End: 2018-09-10
Attending: INTERNAL MEDICINE
Payer: MEDICARE

## 2018-09-10 ENCOUNTER — TELEPHONE (OUTPATIENT)
Dept: INTERNAL MEDICINE CLINIC | Facility: CLINIC | Age: 73
End: 2018-09-10

## 2018-09-10 DIAGNOSIS — E03.9 ACQUIRED HYPOTHYROIDISM: ICD-10-CM

## 2018-09-10 LAB
T3 SERPL-MCNC: 0.67 NG/ML (ref 0.87–1.78)
T4 FREE SERPL-MCNC: 1.05 NG/DL (ref 0.58–1.64)
TSH SERPL-ACNC: 0.85 UIU/ML (ref 0.45–5.33)

## 2018-09-10 PROCEDURE — 93798 PHYS/QHP OP CAR RHAB W/ECG: CPT

## 2018-09-10 PROCEDURE — 84439 ASSAY OF FREE THYROXINE: CPT

## 2018-09-10 PROCEDURE — 84443 ASSAY THYROID STIM HORMONE: CPT

## 2018-09-10 PROCEDURE — 84480 ASSAY TRIIODOTHYRONINE (T3): CPT | Performed by: INTERNAL MEDICINE

## 2018-09-10 PROCEDURE — 36415 COLL VENOUS BLD VENIPUNCTURE: CPT

## 2018-09-10 NOTE — TELEPHONE ENCOUNTER
Per [facility] pharmacy, please clarify instructions for metformin, there are two sets of instructions.

## 2018-09-12 ENCOUNTER — CARDPULM VISIT (OUTPATIENT)
Dept: CARDIAC REHAB | Facility: HOSPITAL | Age: 73
End: 2018-09-12

## 2018-09-12 ENCOUNTER — CARDPULM VISIT (OUTPATIENT)
Dept: CARDIAC REHAB | Facility: HOSPITAL | Age: 73
End: 2018-09-12
Attending: INTERNAL MEDICINE
Payer: MEDICARE

## 2018-09-12 PROCEDURE — 93798 PHYS/QHP OP CAR RHAB W/ECG: CPT

## 2018-09-14 ENCOUNTER — CARDPULM VISIT (OUTPATIENT)
Dept: CARDIAC REHAB | Facility: HOSPITAL | Age: 73
End: 2018-09-14
Attending: INTERNAL MEDICINE
Payer: MEDICARE

## 2018-09-14 PROCEDURE — 93798 PHYS/QHP OP CAR RHAB W/ECG: CPT

## 2018-09-15 NOTE — PROGRESS NOTES
HPI:    Katie Goff is a 74year old female here today for hospital follow up. She was discharged from  to  Home. Admit Date: 9-2-18  Discharge Date: 9-4-18  Hospital Discharge Diagnosis:    CP.      Interactive contact within 2 business days post Auto-injector Use as directed. Multiple Vitamins-Iron (ONCE DAILY/IRON) Oral Tab Take by mouth. Cholecalciferol (D-2000 MAXIMUM STRENGTH) 2000 UNITS Oral Tab Take  by mouth.    VITAMIN E 400 UNIT OR TABS 1 TABLET DAILY   VITAMIN C 500 MG OR TABS 1 TABLE Future  -     TRIIODOTHYRONINE (T3) TOTAL    Other orders  -     Discontinue: Citalopram Hydrobromide 20 MG Oral Tab; Take 2 tablets (40 mg total) by mouth daily.   -     Clopidogrel Bisulfate 75 MG Oral Tab; TAKE 1 TABLET(75 MG) BY MOUTH DAILY  -     Tommy

## 2018-09-17 ENCOUNTER — APPOINTMENT (OUTPATIENT)
Dept: CARDIAC REHAB | Facility: HOSPITAL | Age: 73
End: 2018-09-17
Attending: INTERNAL MEDICINE
Payer: MEDICARE

## 2018-09-19 ENCOUNTER — APPOINTMENT (OUTPATIENT)
Dept: CARDIAC REHAB | Facility: HOSPITAL | Age: 73
End: 2018-09-19
Attending: INTERNAL MEDICINE
Payer: MEDICARE

## 2018-09-20 ENCOUNTER — TELEPHONE (OUTPATIENT)
Dept: INTERNAL MEDICINE CLINIC | Facility: CLINIC | Age: 73
End: 2018-09-20

## 2018-09-20 NOTE — TELEPHONE ENCOUNTER
Pt came in to the office today asking if she should continue taking a baby aspirin daily and also if Dr. Raman Montano recommends that she take [facility] for her arthritis.   Dr. Raman Montano stated that patient needs to continue the baby aspirin along with the plavix, b

## 2018-09-21 ENCOUNTER — TELEPHONE (OUTPATIENT)
Dept: INTERNAL MEDICINE CLINIC | Facility: CLINIC | Age: 73
End: 2018-09-21

## 2018-09-27 RX ORDER — ATORVASTATIN CALCIUM 40 MG/1
40 TABLET, FILM COATED ORAL NIGHTLY
Qty: 90 TABLET | Refills: 0 | Status: SHIPPED | OUTPATIENT
Start: 2018-09-27 | End: 2018-12-26

## 2018-10-08 ENCOUNTER — TELEPHONE (OUTPATIENT)
Dept: INTERNAL MEDICINE CLINIC | Facility: CLINIC | Age: 73
End: 2018-10-08

## 2018-10-08 ENCOUNTER — MED REC SCAN ONLY (OUTPATIENT)
Dept: INTERNAL MEDICINE CLINIC | Facility: CLINIC | Age: 73
End: 2018-10-08

## 2018-10-08 ENCOUNTER — NURSE ONLY (OUTPATIENT)
Dept: INTERNAL MEDICINE CLINIC | Facility: CLINIC | Age: 73
End: 2018-10-08
Payer: COMMERCIAL

## 2018-10-08 DIAGNOSIS — E53.8 B12 DEFICIENCY: Primary | ICD-10-CM

## 2018-10-08 PROCEDURE — 96372 THER/PROPH/DIAG INJ SC/IM: CPT | Performed by: INTERNAL MEDICINE

## 2018-10-08 PROCEDURE — G0008 ADMIN INFLUENZA VIRUS VAC: HCPCS | Performed by: INTERNAL MEDICINE

## 2018-10-08 PROCEDURE — 90653 IIV ADJUVANT VACCINE IM: CPT | Performed by: INTERNAL MEDICINE

## 2018-10-08 RX ORDER — CYANOCOBALAMIN 1000 UG/ML
1000 INJECTION INTRAMUSCULAR; SUBCUTANEOUS ONCE
Status: COMPLETED | OUTPATIENT
Start: 2018-10-08 | End: 2018-10-08

## 2018-10-08 RX ADMIN — CYANOCOBALAMIN 1000 MCG: 1000 INJECTION INTRAMUSCULAR; SUBCUTANEOUS at 13:46:00

## 2018-10-08 NOTE — TELEPHONE ENCOUNTER
Sugars look good. Not sure what is going on with blood pressures a little bit more higher recently within the past month. Sure if related to stress? Any change in diet eating out more more salt? .  If not is it related to the thyroid?   Checked recently S

## 2018-10-09 NOTE — TELEPHONE ENCOUNTER
Patient has an appointment with you next Monday 10/15. She will keep a log until then and discuss any medication changes with you on Monday.

## 2018-10-15 ENCOUNTER — TELEPHONE (OUTPATIENT)
Dept: INTERNAL MEDICINE CLINIC | Facility: CLINIC | Age: 73
End: 2018-10-15

## 2018-10-15 ENCOUNTER — OFFICE VISIT (OUTPATIENT)
Dept: INTERNAL MEDICINE CLINIC | Facility: CLINIC | Age: 73
End: 2018-10-15
Payer: COMMERCIAL

## 2018-10-15 VITALS
HEART RATE: 46 BPM | HEIGHT: 62.25 IN | OXYGEN SATURATION: 99 % | WEIGHT: 111 LBS | TEMPERATURE: 98 F | SYSTOLIC BLOOD PRESSURE: 157 MMHG | BODY MASS INDEX: 20.17 KG/M2 | DIASTOLIC BLOOD PRESSURE: 60 MMHG

## 2018-10-15 DIAGNOSIS — E53.8 B12 DEFICIENCY: ICD-10-CM

## 2018-10-15 DIAGNOSIS — E11.9 CONTROLLED TYPE 2 DIABETES MELLITUS WITHOUT COMPLICATION, WITHOUT LONG-TERM CURRENT USE OF INSULIN (HCC): Primary | ICD-10-CM

## 2018-10-15 DIAGNOSIS — D50.0 IRON DEFICIENCY ANEMIA DUE TO CHRONIC BLOOD LOSS: ICD-10-CM

## 2018-10-15 DIAGNOSIS — M17.0 PRIMARY OSTEOARTHRITIS OF BOTH KNEES: ICD-10-CM

## 2018-10-15 DIAGNOSIS — E55.9 VITAMIN D DEFICIENCY: ICD-10-CM

## 2018-10-15 DIAGNOSIS — E03.9 ACQUIRED HYPOTHYROIDISM: ICD-10-CM

## 2018-10-15 DIAGNOSIS — I10 ESSENTIAL HYPERTENSION: ICD-10-CM

## 2018-10-15 PROCEDURE — 36415 COLL VENOUS BLD VENIPUNCTURE: CPT | Performed by: INTERNAL MEDICINE

## 2018-10-15 PROCEDURE — 99214 OFFICE O/P EST MOD 30 MIN: CPT | Performed by: INTERNAL MEDICINE

## 2018-10-15 RX ORDER — MELATONIN
1000 DAILY
COMMUNITY

## 2018-10-15 RX ORDER — LISINOPRIL 5 MG/1
5 TABLET ORAL DAILY
Qty: 30 TABLET | Refills: 6 | Status: SHIPPED | OUTPATIENT
Start: 2018-10-15 | End: 2018-11-01

## 2018-10-15 NOTE — PROGRESS NOTES
HPI:    Patient ID: Josep Mares is a 74year old female. HPI   Hypertension  Patient is here for follow up of hypertension. BP at home: see readings. Has been compliant with medications.   Exercise level: exercises 3 times a  week and has been follo ALT 30 12/07/2017 11:00 AM    TSH 0.85 09/10/2018 11:17 AM    T4F 1.05 09/10/2018 11:17 AM        Lab Results   Component Value Date/Time    CHOLEST 110 09/03/2018 12:57 AM    HDL 41 09/03/2018 12:57 AM    TRIG 77 09/03/2018 12:57 AM    LDL 54 09/03/2018 MetFORMIN HCl 1000 MG Oral Tab Takes 1/2 tablet 3 times a day.  Disp: 270 tablet Rfl: 1   Clopidogrel Bisulfate 75 MG Oral Tab TAKE 1 TABLET(75 MG) BY MOUTH DAILY Disp: 90 tablet Rfl: 1   CITALOPRAM HYDROBROMIDE 20 MG Oral Tab TAKE 2 TABLETS(40 MG) BY MOUTH Ibuprofen               UNKNOWN  Nsaids                  UNKNOWN    Comment:Per patient, takes baby aspirin at home with no             adverse effects noted  Sulfa Antibiotics         Sulfanilamide           UNKNOWN    HISTORY:  Past Medical History:   [facility] Pulmonary/Chest: Effort normal and breath sounds normal. No accessory muscle usage. No apnea, no tachypnea and no bradypnea. No respiratory distress. She has no decreased breath sounds. She has no wheezes. She has no rhonchi. She has no rales.  She exhibits Controlled type 2 diabetes mellitus without complication, without long-term current use of insulin (hcc)  (primary encounter diagnosis) Good control. Careful with diet and excercise at least 30 minutes 3-4 times a week.  Check sugars at different times on d

## 2018-10-15 NOTE — PATIENT INSTRUCTIONS
ASSESSMENT/PLAN:   Controlled type 2 diabetes mellitus without complication, without long-term current use of insulin (hcc)  (primary encounter diagnosis) Good control. Careful with diet and excercise at least 30 minutes 3-4 times a week.  Check sugars at d

## 2018-10-16 PROBLEM — R07.9 CHEST PAIN OF UNCERTAIN ETIOLOGY: Status: RESOLVED | Noted: 2018-09-02 | Resolved: 2018-10-16

## 2018-10-22 ENCOUNTER — OFFICE VISIT (OUTPATIENT)
Dept: PULMONOLOGY | Facility: CLINIC | Age: 73
End: 2018-10-22
Payer: COMMERCIAL

## 2018-10-22 VITALS
OXYGEN SATURATION: 100 % | HEART RATE: 48 BPM | DIASTOLIC BLOOD PRESSURE: 81 MMHG | SYSTOLIC BLOOD PRESSURE: 163 MMHG | BODY MASS INDEX: 20.8 KG/M2 | WEIGHT: 113 LBS | RESPIRATION RATE: 18 BRPM | HEIGHT: 62 IN

## 2018-10-22 DIAGNOSIS — R91.1 PULMONARY NODULE: Primary | ICD-10-CM

## 2018-10-22 PROCEDURE — G0463 HOSPITAL OUTPT CLINIC VISIT: HCPCS | Performed by: INTERNAL MEDICINE

## 2018-10-22 PROCEDURE — 99203 OFFICE O/P NEW LOW 30 MIN: CPT | Performed by: INTERNAL MEDICINE

## 2018-10-22 NOTE — PROGRESS NOTES
Dear Fercho Whitley:           As you know, Cinthia Mcarthur is a 57-year-old female who I am now evaluating for nodules.        HISTORY OF PRESENT ILLNESS: The patient has a history of back discomfort which prompted cardiac evaluation disclosing small groundglass opacities i with symmetric tone and strength and reflex. LABORATORY: CT scan of the chest–0.4 cm groundglass opacity at right lung base, 0.8 cm round glass opacity at right lung base, 1.1 cm groundglass opacity at the left lung base.     ASSESSMENT AND PLAN:  PROBLE

## 2018-10-30 ENCOUNTER — TELEPHONE (OUTPATIENT)
Dept: INTERNAL MEDICINE CLINIC | Facility: CLINIC | Age: 73
End: 2018-10-30

## 2018-10-30 NOTE — TELEPHONE ENCOUNTER
Patient faxed over blood pressure reading(on your desk ).  She would like to speak to you about her blood pressure

## 2018-11-01 RX ORDER — LISINOPRIL 5 MG/1
5 TABLET ORAL 2 TIMES DAILY
Qty: 60 TABLET | Refills: 1 | Status: SHIPPED | OUTPATIENT
Start: 2018-11-01 | End: 2018-11-15 | Stop reason: DRUGHIGH

## 2018-11-01 NOTE — TELEPHONE ENCOUNTER
Patient calling again to make sure about her blood pressure, she is leaving today for [place] today at 1:00 pm.  Please call patient on her cell [phone]

## 2018-11-02 ENCOUNTER — TELEPHONE (OUTPATIENT)
Dept: INTERNAL MEDICINE CLINIC | Facility: CLINIC | Age: 73
End: 2018-11-02

## 2018-11-02 RX ORDER — LISINOPRIL 5 MG/1
TABLET ORAL
Qty: 180 TABLET | Refills: 0 | OUTPATIENT
Start: 2018-11-02

## 2018-11-02 NOTE — TELEPHONE ENCOUNTER
Patient calling states she is in [place] please send refill for Lisinpril to [facility] in [place] ph# [phone]

## 2018-11-10 NOTE — PROGRESS NOTES
Patient transported to room. Plavix given prior to transport and patient had large amount of emesis upon arrival to room. Report given and handoff given to Seun Fragoso at bedside. Integrilin to run for 6 hours per Dr. Chandni Jimenez.

## 2018-11-12 RX ORDER — LEVOTHYROXINE SODIUM 88 MCG
TABLET ORAL
Qty: 90 TABLET | Refills: 0 | Status: SHIPPED | OUTPATIENT
Start: 2018-11-12 | End: 2019-02-10

## 2018-11-15 ENCOUNTER — TELEPHONE (OUTPATIENT)
Dept: INTERNAL MEDICINE CLINIC | Facility: CLINIC | Age: 73
End: 2018-11-15

## 2018-11-15 ENCOUNTER — OFFICE VISIT (OUTPATIENT)
Dept: INTERNAL MEDICINE CLINIC | Facility: CLINIC | Age: 73
End: 2018-11-15
Payer: COMMERCIAL

## 2018-11-15 VITALS
HEIGHT: 62 IN | SYSTOLIC BLOOD PRESSURE: 158 MMHG | WEIGHT: 114 LBS | BODY MASS INDEX: 20.98 KG/M2 | DIASTOLIC BLOOD PRESSURE: 72 MMHG | OXYGEN SATURATION: 99 % | TEMPERATURE: 97 F | HEART RATE: 49 BPM

## 2018-11-15 DIAGNOSIS — E11.9 CONTROLLED TYPE 2 DIABETES MELLITUS WITHOUT COMPLICATION, WITHOUT LONG-TERM CURRENT USE OF INSULIN (HCC): Primary | ICD-10-CM

## 2018-11-15 DIAGNOSIS — I10 ESSENTIAL HYPERTENSION: ICD-10-CM

## 2018-11-15 PROCEDURE — 99214 OFFICE O/P EST MOD 30 MIN: CPT | Performed by: INTERNAL MEDICINE

## 2018-11-15 RX ORDER — LISINOPRIL 10 MG/1
10 TABLET ORAL 2 TIMES DAILY
Qty: 180 TABLET | Refills: 1 | Status: SHIPPED | OUTPATIENT
Start: 2018-11-15 | End: 2018-11-19

## 2018-11-15 NOTE — PROGRESS NOTES
HPI:    Patient ID: Steven Calderon is a 74year old female.     HPI  Steven Calderon is a 74year old female who presents for a pre-operative physical exam. Patient is to have L catarcat and then R side later, to be done by Dr. Adriane Turner at 10-29-18 and 12-13-18 Disp: 90 tablet Rfl: 0   Vitamin B-12 1000 MCG Oral Tab Take 1,000 mcg by mouth daily. Disp:  Rfl:    metoprolol Tartrate 25 MG Oral Tab 1/2 tab qhs. Disp: 45 tablet Rfl: 2   atorvastatin 40 MG Oral Tab Take 1 tablet (40 mg total) by mouth nightly.  at bedt besifloxacin HCl 0.6 % Ophthalmic Suspension Apply 1 drop to eye.  Disp:  Rfl:       Allergies:   Glimepiride             HYPOTENSION    Comment:Hypoglycemia, bradycardia  Gluten Meal             OTHER (SEE COMMENTS)    Comment:xx  Bees Drug use: No      Sexual activity: Not on file    Other Topics      Concerns:         Service: Not Asked        Blood Transfusions: Not Asked        Caffeine Concern: Yes          Coffee, 2 cups per day         Occupational Exposure: Not Asked this patient may proceed to surgery without further risk stratification. 2. Pulmonary risk    3. Heme  No history of bleeding disorder, no evidence of significant anemia      4.  Renal  No hx of renal disease      Pt may proceed to surgery without recomm Genitourinary: Negative for decreased urine volume, difficulty urinating, dysuria, flank pain, frequency, hematuria, menstrual problem, pelvic pain, urgency, vaginal bleeding, vaginal discharge and vaginal pain. Skin: Negative for rash.    Allergic/Immu tablet Rfl: 3   DHA-EPA-Vitamin E (OMEGA-3 COMPLEX OR) Take by mouth. Disp:  Rfl:    aspirin 81 MG Oral Tab EC Take 1 tablet (81 mg total) by mouth daily.  Disp: 30 tablet Rfl: 1   Fenofibrate 145 MG Oral Tab Take 1 tablet (145 mg total) by mouth once daily Problem Relation Age of Onset   • Stroke Father    • Heart Disorder Father    • Cancer Mother         ? source- widespread at diagnosis   • Other (goiter) Daughter    • Other (Celiac) Daughter    • Breast Cancer Paternal Aunt         age at dx 66-71 not pale, not dry and not cyanotic. She does not have dentures. No oral lesions. No trismus in the jaw. No dental abscesses, uvula swelling or lacerations.  No oropharyngeal exudate, posterior oropharyngeal edema, posterior oropharyngeal erythema or tonsill no fluid wave, no ascites and no mass. There is no hepatosplenomegaly, splenomegaly or hepatomegaly. There is no tenderness. There is no rigidity, no rebound, no guarding and no CVA tenderness. Musculoskeletal: She exhibits no edema.    Lymphadenopathy: sugars. One serving a day and no more than 1 handful every day. Check feet  every AM and careful with sores and ulcers on feet bilaterally. Check eyes every year with dilated eye exam.     Essential hypertension Not good control.  Increase lisinopril 10 in A

## 2018-11-15 NOTE — PATIENT INSTRUCTIONS
ASSESSMENT/PLAN:   Controlled type 2 diabetes mellitus without complication, without long-term current use of insulin (hcc)  (primary encounter diagnosis) Doing good. Monitor with hyalgen injections.  Careful with diet and excercise at least 30 minutes 3

## 2018-11-16 RX ORDER — MONTELUKAST SODIUM 10 MG/1
TABLET ORAL
Qty: 30 TABLET | Refills: 1 | Status: SHIPPED | OUTPATIENT
Start: 2018-11-16 | End: 2019-01-07

## 2018-11-18 ENCOUNTER — TELEPHONE (OUTPATIENT)
Dept: INTERNAL MEDICINE CLINIC | Facility: CLINIC | Age: 73
End: 2018-11-18

## 2018-11-19 ENCOUNTER — TELEPHONE (OUTPATIENT)
Dept: INTERNAL MEDICINE CLINIC | Facility: CLINIC | Age: 73
End: 2018-11-19

## 2018-11-19 RX ORDER — LISINOPRIL 10 MG/1
20 TABLET ORAL 2 TIMES DAILY
Qty: 180 TABLET | Refills: 1 | Status: SHIPPED | OUTPATIENT
Start: 2018-11-19 | End: 2018-12-28 | Stop reason: ALTCHOICE

## 2018-11-19 NOTE — TELEPHONE ENCOUNTER
What surgeon? Also, spoke with patient about increasing lisinopril dose to 20 mg bid. She states she takes 10 in the morning and 5 in the evening currently. She is concerned she will drop too fast and is asking if she should taper at all.  Informed her to w

## 2018-11-19 NOTE — TELEPHONE ENCOUNTER
Yes can exercise. Then increase lisinopril to 10 mg every 12 hrs. Surgery papers in front at [facility] office.

## 2018-11-19 NOTE — TELEPHONE ENCOUNTER
Spoke with patient. She will increase her lisinopril dose, keep daily logs and keep us advised of readings.

## 2018-11-19 NOTE — TELEPHONE ENCOUNTER
Patient calling because of blood pressure. She states she had a cortisone injection on Thursday, on Friday she started the increase in lisinopril. But her blood pressures are still running between 442-813 systolic over 93-31 diastolic.

## 2018-11-20 ENCOUNTER — PRIOR ORIGINAL RECORDS (OUTPATIENT)
Dept: OTHER | Age: 73
End: 2018-11-20

## 2018-11-26 ENCOUNTER — TELEPHONE (OUTPATIENT)
Dept: INTERNAL MEDICINE CLINIC | Facility: CLINIC | Age: 73
End: 2018-11-26

## 2018-11-26 NOTE — TELEPHONE ENCOUNTER
[facility] requesting   Medical clearance fax to  [phone]  Phone [phone] ext [phone]

## 2018-11-28 RX ORDER — LISINOPRIL 5 MG/1
TABLET ORAL
Qty: 60 TABLET | Refills: 0 | OUTPATIENT
Start: 2018-11-28

## 2018-12-03 RX ORDER — CITALOPRAM 20 MG/1
TABLET ORAL
Qty: 180 TABLET | Refills: 0 | Status: SHIPPED | OUTPATIENT
Start: 2018-12-03 | End: 2019-03-04

## 2018-12-06 RX ORDER — CLOPIDOGREL BISULFATE 75 MG/1
TABLET ORAL
Qty: 90 TABLET | Refills: 0 | Status: SHIPPED | OUTPATIENT
Start: 2018-12-06 | End: 2019-08-22

## 2018-12-11 RX ORDER — LIOTHYRONINE SODIUM 5 UG/1
TABLET ORAL
Qty: 90 TABLET | Refills: 0 | Status: SHIPPED | OUTPATIENT
Start: 2018-12-11 | End: 2019-05-10

## 2018-12-17 ENCOUNTER — TELEPHONE (OUTPATIENT)
Dept: INTERNAL MEDICINE CLINIC | Facility: CLINIC | Age: 73
End: 2018-12-17

## 2018-12-17 RX ORDER — FENOFIBRATE 145 MG/1
145 TABLET, COATED ORAL
Qty: 90 TABLET | Refills: 0 | Status: SHIPPED | OUTPATIENT
Start: 2018-12-17 | End: 2019-03-12

## 2018-12-26 RX ORDER — ATORVASTATIN CALCIUM 40 MG/1
TABLET, FILM COATED ORAL
Qty: 90 TABLET | Refills: 0 | Status: SHIPPED | OUTPATIENT
Start: 2018-12-26 | End: 2019-03-26

## 2018-12-28 ENCOUNTER — OFFICE VISIT (OUTPATIENT)
Dept: INTERNAL MEDICINE CLINIC | Facility: CLINIC | Age: 73
End: 2018-12-28
Payer: COMMERCIAL

## 2018-12-28 VITALS
OXYGEN SATURATION: 98 % | BODY MASS INDEX: 21.16 KG/M2 | TEMPERATURE: 98 F | DIASTOLIC BLOOD PRESSURE: 52 MMHG | SYSTOLIC BLOOD PRESSURE: 165 MMHG | WEIGHT: 115 LBS | HEART RATE: 45 BPM | HEIGHT: 62 IN

## 2018-12-28 DIAGNOSIS — I25.84 CORONARY ARTERY DISEASE DUE TO CALCIFIED CORONARY LESION: ICD-10-CM

## 2018-12-28 DIAGNOSIS — M19.90 ARTHRITIS: ICD-10-CM

## 2018-12-28 DIAGNOSIS — M79.602 PAIN OF LEFT UPPER EXTREMITY: ICD-10-CM

## 2018-12-28 DIAGNOSIS — E03.9 ACQUIRED HYPOTHYROIDISM: ICD-10-CM

## 2018-12-28 DIAGNOSIS — I25.10 CORONARY ARTERY DISEASE DUE TO CALCIFIED CORONARY LESION: ICD-10-CM

## 2018-12-28 DIAGNOSIS — E11.9 CONTROLLED TYPE 2 DIABETES MELLITUS WITHOUT COMPLICATION, WITHOUT LONG-TERM CURRENT USE OF INSULIN (HCC): Primary | ICD-10-CM

## 2018-12-28 DIAGNOSIS — I10 ESSENTIAL HYPERTENSION: ICD-10-CM

## 2018-12-28 DIAGNOSIS — F41.9 ANXIETY: ICD-10-CM

## 2018-12-28 LAB
ERYTHROCYTE [SEDIMENTATION RATE] IN BLOOD: 9 MM/HR (ref 0–30)
RHEUMATOID FACT SER QL: <5 IU/ML

## 2018-12-28 PROCEDURE — 36415 COLL VENOUS BLD VENIPUNCTURE: CPT | Performed by: INTERNAL MEDICINE

## 2018-12-28 PROCEDURE — 93000 ELECTROCARDIOGRAM COMPLETE: CPT | Performed by: INTERNAL MEDICINE

## 2018-12-28 PROCEDURE — 99214 OFFICE O/P EST MOD 30 MIN: CPT | Performed by: INTERNAL MEDICINE

## 2018-12-28 RX ORDER — LISINOPRIL AND HYDROCHLOROTHIAZIDE 25; 20 MG/1; MG/1
1 TABLET ORAL DAILY
Qty: 30 TABLET | Refills: 1 | Status: SHIPPED | OUTPATIENT
Start: 2018-12-28 | End: 2018-12-28

## 2018-12-28 RX ORDER — LISINOPRIL AND HYDROCHLOROTHIAZIDE 25; 20 MG/1; MG/1
1 TABLET ORAL DAILY
Qty: 90 TABLET | Refills: 1 | Status: SHIPPED | OUTPATIENT
Start: 2018-12-28 | End: 2019-06-21

## 2018-12-28 NOTE — PROGRESS NOTES
HPI:    Patient ID: Katie Goff is a 74year old female. HPI   Diabetes  Patient here for follow up of Diabetes. Has been taking medications regularly. Checks sugars 1 times daily. Fasting sugars average: 110's. No lows.    moderately active   W 09/03/2018 06:21 AM    CREATSERUM 1.03 09/03/2018 06:21 AM    CA 9.3 09/03/2018 06:21 AM    AST 31 12/07/2017 11:00 AM    ALT 30 12/07/2017 11:00 AM    TSH 1.70 10/15/2018 04:05 PM    T4F 0.80 10/15/2018 04:05 PM        Lab Results   Component Value Date/T 90 tablet Rfl: 0   CLOPIDOGREL BISULFATE 75 MG Oral Tab TAKE 1 TABLET(75 MG) BY MOUTH DAILY Disp: 90 tablet Rfl: 0   CITALOPRAM HYDROBROMIDE 20 MG Oral Tab TAKE 2 TABLETS(40 MG) BY MOUTH DAILY Disp: 180 tablet Rfl: 0   MONTELUKAST SODIUM 10 MG Oral Tab SAGE aspirin at home with no             adverse effects noted  Sulfa Antibiotics         Sulfanilamide           UNKNOWN    HISTORY:  Past Medical History:   Diagnosis Date   • ANXIETY    • Appendicitis    • DEPRESSION    • Generalized OA    • HYPERLIPIDEMIA has no decreased breath sounds. She has no wheezes. She has no rhonchi. She has no rales. She exhibits no tenderness. Musculoskeletal: She exhibits no edema.         Right shoulder: She exhibits normal range of motion, no tenderness, no bony tenderness, n year with dilated eye exam.     Coronary artery disease due to calcified coronary lesion    Essential hypertension Not good control. Going back to zestoretic 1 tab in AM. STOP lisinorpil.  Careful with diet and excercise at least 30 minutes 3-4 times a week Placed This Encounter      TAN, Direct Screen [E]      Sed Cheryl Eason (Automated) [E]      Rheumatoid Arthritis Factor [E]      Meds This Visit:  Requested Prescriptions     Signed Prescriptions Disp Refills   • Lisinopril-Hydrochlorothiazide (ZESTOR

## 2018-12-28 NOTE — PATIENT INSTRUCTIONS
ASSESSMENT/PLAN:   Controlled type 2 diabetes mellitus without complication, without long-term current use of insulin (hcc)  (primary encounter diagnosis) Doing good. Careful with diet and excercise at least 30 minutes 3-4 times a week.  Check sugars at d yourself thinking about fear, change your what if thinking. Focus on and perform some simple, manageable task. 8) Notice that when you stop thinking frightening thoughts, your anxiety fades. 9) When fear comes, accept it, don't fight it.   Wait and give i

## 2018-12-31 LAB — NUCLEAR IGG TITR SER IF: POSITIVE {TITER}

## 2019-01-02 LAB — ANA NUCLEOLAR TITR SER IF: 80 {TITER}

## 2019-01-07 RX ORDER — MONTELUKAST SODIUM 10 MG/1
TABLET ORAL
Qty: 30 TABLET | Refills: 0 | Status: SHIPPED | OUTPATIENT
Start: 2019-01-07 | End: 2019-02-04

## 2019-01-09 PROBLEM — R76.8 ANA POSITIVE: Status: ACTIVE | Noted: 2019-01-09

## 2019-01-11 ENCOUNTER — TELEPHONE (OUTPATIENT)
Dept: INTERNAL MEDICINE CLINIC | Facility: CLINIC | Age: 74
End: 2019-01-11

## 2019-01-12 ENCOUNTER — TELEPHONE (OUTPATIENT)
Dept: INTERNAL MEDICINE CLINIC | Facility: CLINIC | Age: 74
End: 2019-01-12

## 2019-01-12 NOTE — TELEPHONE ENCOUNTER
Pt called back explained her Lupus results and aware that there are more pending orders in the system.  Said she will go to [address] [place] [address]

## 2019-01-13 ENCOUNTER — APPOINTMENT (OUTPATIENT)
Dept: LAB | Facility: HOSPITAL | Age: 74
End: 2019-01-13
Attending: INTERNAL MEDICINE
Payer: MEDICARE

## 2019-01-13 DIAGNOSIS — R76.8 ANA POSITIVE: ICD-10-CM

## 2019-01-13 PROCEDURE — 83516 IMMUNOASSAY NONANTIBODY: CPT | Performed by: INTERNAL MEDICINE

## 2019-01-13 PROCEDURE — 86235 NUCLEAR ANTIGEN ANTIBODY: CPT

## 2019-01-13 PROCEDURE — 86200 CCP ANTIBODY: CPT

## 2019-01-13 PROCEDURE — 36415 COLL VENOUS BLD VENIPUNCTURE: CPT | Performed by: INTERNAL MEDICINE

## 2019-01-13 PROCEDURE — 83516 IMMUNOASSAY NONANTIBODY: CPT

## 2019-01-14 RX ORDER — MONTELUKAST SODIUM 10 MG/1
TABLET ORAL
Qty: 30 TABLET | Refills: 1 | Status: SHIPPED | OUTPATIENT
Start: 2019-01-14 | End: 2019-05-10

## 2019-01-15 LAB
ENA SM+RNP AB SER QL: NEGATIVE
ENA SS-A AB SER QL IA: NEGATIVE
ENA SS-B AB SER QL IA: NEGATIVE

## 2019-01-16 LAB
CCP IGG SERPL-ACNC: 0.6 U/ML (ref 0–6.9)
RIBOSOMAL P ANTIBODY: 2 AU/ML

## 2019-01-17 ENCOUNTER — TELEPHONE (OUTPATIENT)
Dept: INTERNAL MEDICINE CLINIC | Facility: CLINIC | Age: 74
End: 2019-01-17

## 2019-01-17 NOTE — TELEPHONE ENCOUNTER
Patient called to review lab results  All additional autoimmune testing negative. Asking what to do now?   Asking if she needs to see a specialist ?

## 2019-01-17 NOTE — TELEPHONE ENCOUNTER
Can see rheum. Dr. Madison Garcia. But maybe false + in people who have dm and thyroid dis. We see +TAN.

## 2019-01-17 NOTE — TELEPHONE ENCOUNTER
Patient notified by phone should follow up with Rheumatology Dr. Mtz Stands phone number given to patient  [phone]  Patient will call to schedule an appointment.

## 2019-01-23 ENCOUNTER — HOSPITAL ENCOUNTER (OUTPATIENT)
Dept: GENERAL RADIOLOGY | Facility: HOSPITAL | Age: 74
Discharge: HOME | End: 2019-01-23
Attending: INTERNAL MEDICINE
Payer: MEDICARE

## 2019-01-23 ENCOUNTER — OFFICE VISIT (OUTPATIENT)
Dept: RHEUMATOLOGY | Facility: CLINIC | Age: 74
End: 2019-01-23
Payer: COMMERCIAL

## 2019-01-23 VITALS
BODY MASS INDEX: 21.9 KG/M2 | SYSTOLIC BLOOD PRESSURE: 131 MMHG | HEIGHT: 62 IN | HEART RATE: 62 BPM | WEIGHT: 119 LBS | DIASTOLIC BLOOD PRESSURE: 68 MMHG

## 2019-01-23 DIAGNOSIS — M25.50 POLYARTHRALGIA: ICD-10-CM

## 2019-01-23 DIAGNOSIS — R76.8 POSITIVE ANA (ANTINUCLEAR ANTIBODY): ICD-10-CM

## 2019-01-23 DIAGNOSIS — M25.50 POLYARTHRALGIA: Primary | ICD-10-CM

## 2019-01-23 PROBLEM — N18.30 CKD (CHRONIC KIDNEY DISEASE) STAGE 3, GFR 30-59 ML/MIN (HCC): Chronic | Status: ACTIVE | Noted: 2019-01-23

## 2019-01-23 PROCEDURE — 73130 X-RAY EXAM OF HAND: CPT | Performed by: INTERNAL MEDICINE

## 2019-01-23 PROCEDURE — G0463 HOSPITAL OUTPT CLINIC VISIT: HCPCS | Performed by: INTERNAL MEDICINE

## 2019-01-23 PROCEDURE — 99204 OFFICE O/P NEW MOD 45 MIN: CPT | Performed by: INTERNAL MEDICINE

## 2019-01-23 NOTE — PATIENT INSTRUCTIONS
- you were seen today for joint pain  - want to get xrays of the hands   - symptoms are not consistent with lupus or rheumatoid arthritis

## 2019-01-23 NOTE — PROGRESS NOTES
Sang Khan is a 66year old female who presents for Patient presents with:  Joint Pain: knees, hands, wrists, fingers, feet bilateral  .   HPI:     I had the pleasure of seeing Sang Khan on 1/23/2019 for evaluation of joint pain and +TAN.   Patient Disp: 90 tablet Rfl: 0   CLOPIDOGREL BISULFATE 75 MG Oral Tab TAKE 1 TABLET(75 MG) BY MOUTH DAILY Disp: 90 tablet Rfl: 0   CITALOPRAM HYDROBROMIDE 20 MG Oral Tab TAKE 2 TABLETS(40 MG) BY MOUTH DAILY Disp: 180 tablet Rfl: 0   SYNTHROID 88 MCG Oral Tab TAKE • ELECTROCARDIOGRAM, COMPLETE  02/07/2014    SCANNED TO MEDIA TAB - 02/07/2014      Family History   Problem Relation Age of Onset   • Stroke Father    • Heart Disorder Father    • Cancer Mother         ? source- widespread at diagnosis   • Other (goiter swelling  Wrist: FROM, no pain or swelling or warmth on palpation  Elbow: FROM, no pain or swelling or warmth on palpation  Shoulders: FROM, no pain or swelling or warmth on palpation  Hip: normal log roll, no lateral hip pain, ELENA test negative b/l  Kne SSA, SSB, Gonzalez and RNP. Negative rheumatoid factor and CCP. Symptoms are not consistent with connective tissue disease. To report during [place] she developed left wrist and MCP swelling that went away on its own.   X-rays in 2011 of her knees shows e

## 2019-01-30 ENCOUNTER — TELEPHONE (OUTPATIENT)
Dept: CASE MANAGEMENT | Age: 74
End: 2019-01-30

## 2019-01-30 NOTE — TELEPHONE ENCOUNTER
Patient is eligible for a 2019 Annual Medicare Health Assessment. Left message to call back [phone].

## 2019-02-04 PROCEDURE — 88175 CYTOPATH C/V AUTO FLUID REDO: CPT | Performed by: OBSTETRICS & GYNECOLOGY

## 2019-02-04 RX ORDER — MONTELUKAST SODIUM 10 MG/1
TABLET ORAL
Qty: 30 TABLET | Refills: 0 | Status: SHIPPED | OUTPATIENT
Start: 2019-02-04 | End: 2019-03-08

## 2019-02-11 RX ORDER — LEVOTHYROXINE SODIUM 88 MCG
TABLET ORAL
Qty: 90 TABLET | Refills: 0 | Status: SHIPPED | OUTPATIENT
Start: 2019-02-11 | End: 2019-05-13

## 2019-02-28 VITALS
DIASTOLIC BLOOD PRESSURE: 44 MMHG | WEIGHT: 112 LBS | HEIGHT: 63 IN | HEART RATE: 51 BPM | SYSTOLIC BLOOD PRESSURE: 134 MMHG | BODY MASS INDEX: 19.84 KG/M2 | RESPIRATION RATE: 18 BRPM

## 2019-02-28 VITALS
HEART RATE: 55 BPM | BODY MASS INDEX: 19.84 KG/M2 | WEIGHT: 112 LBS | RESPIRATION RATE: 18 BRPM | SYSTOLIC BLOOD PRESSURE: 106 MMHG | HEIGHT: 63 IN | DIASTOLIC BLOOD PRESSURE: 40 MMHG

## 2019-02-28 VITALS
HEART RATE: 51 BPM | SYSTOLIC BLOOD PRESSURE: 136 MMHG | BODY MASS INDEX: 19.67 KG/M2 | HEIGHT: 63 IN | DIASTOLIC BLOOD PRESSURE: 70 MMHG | WEIGHT: 111 LBS | OXYGEN SATURATION: 100 %

## 2019-03-04 ENCOUNTER — TELEPHONE (OUTPATIENT)
Dept: INTERNAL MEDICINE CLINIC | Facility: CLINIC | Age: 74
End: 2019-03-04

## 2019-03-04 RX ORDER — AMOXICILLIN AND CLAVULANATE POTASSIUM 875; 125 MG/1; MG/1
1 TABLET, FILM COATED ORAL 2 TIMES DAILY
Qty: 20 TABLET | Refills: 0 | Status: SHIPPED | OUTPATIENT
Start: 2019-03-04 | End: 2019-03-14

## 2019-03-04 RX ORDER — CITALOPRAM 20 MG/1
TABLET ORAL
Qty: 180 TABLET | Refills: 0 | Status: SHIPPED | OUTPATIENT
Start: 2019-03-04 | End: 2019-05-10 | Stop reason: ALTCHOICE

## 2019-03-04 NOTE — TELEPHONE ENCOUNTER
Spoke with patient thinks it is sinus infection head congestion, yellow nasal discharge no fever  Feeling tired miserable   Can you Rx prescription.

## 2019-03-04 NOTE — TELEPHONE ENCOUNTER
Patient called c/o productive cough with yellow mucous, no fever, since February, taking cough syrup, possible allergies, she is in [place] until Spring.   Please advise

## 2019-03-05 NOTE — TELEPHONE ENCOUNTER
Patient notified by phone of Dr. Young Pate response Rx sent to [address]  Will take probiotics while on antibiotic  Will call if she needs anything.

## 2019-03-08 RX ORDER — MONTELUKAST SODIUM 10 MG/1
TABLET ORAL
Qty: 30 TABLET | Refills: 0 | Status: SHIPPED | OUTPATIENT
Start: 2019-03-08 | End: 2019-04-28

## 2019-03-12 RX ORDER — FENOFIBRATE 145 MG/1
TABLET, COATED ORAL
Qty: 90 TABLET | Refills: 0 | Status: SHIPPED | OUTPATIENT
Start: 2019-03-12 | End: 2019-05-13

## 2019-03-26 RX ORDER — ATORVASTATIN CALCIUM 40 MG/1
TABLET, FILM COATED ORAL
Qty: 90 TABLET | Refills: 0 | Status: SHIPPED | OUTPATIENT
Start: 2019-03-26 | End: 2019-06-24

## 2019-04-08 RX ORDER — ZINC GLUCONATE 50 MG
TABLET ORAL
COMMUNITY

## 2019-04-08 RX ORDER — IBUPROFEN 200 MG
CAPSULE ORAL
COMMUNITY

## 2019-04-08 RX ORDER — PREDNISOLONE ACETATE 10 MG/ML
SUSPENSION/ DROPS OPHTHALMIC
COMMUNITY
End: 2019-07-01

## 2019-04-08 RX ORDER — ATORVASTATIN CALCIUM 40 MG/1
TABLET, FILM COATED ORAL
COMMUNITY

## 2019-04-08 RX ORDER — CLOPIDOGREL BISULFATE 75 MG/1
TABLET ORAL
COMMUNITY

## 2019-04-08 RX ORDER — SENNOSIDES 8.6 MG
CAPSULE ORAL
COMMUNITY

## 2019-04-08 RX ORDER — LIOTHYRONINE SODIUM 5 UG/1
TABLET ORAL
COMMUNITY

## 2019-04-08 RX ORDER — MONTELUKAST SODIUM 10 MG/1
TABLET ORAL
COMMUNITY
End: 2020-01-23

## 2019-04-08 RX ORDER — CALCIUM CARBONATE/VITAMIN D3 600 MG-10
TABLET ORAL
COMMUNITY

## 2019-04-08 RX ORDER — UBIDECARENONE 200 MG
CAPSULE ORAL
COMMUNITY

## 2019-04-08 RX ORDER — CYANOCOBALAMIN (VITAMIN B-12) 500 MCG
LOZENGE ORAL
COMMUNITY

## 2019-04-08 RX ORDER — CITALOPRAM 20 MG/1
TABLET ORAL
COMMUNITY
Start: 2018-05-24 | End: 2019-07-01 | Stop reason: ALTCHOICE

## 2019-04-08 RX ORDER — FENOFIBRATE 145 MG/1
TABLET, COATED ORAL
COMMUNITY

## 2019-04-08 RX ORDER — ASPIRIN 81 MG/1
TABLET ORAL
COMMUNITY
End: 2019-04-08 | Stop reason: CLARIF

## 2019-04-16 ENCOUNTER — TELEPHONE (OUTPATIENT)
Dept: INTERNAL MEDICINE CLINIC | Facility: CLINIC | Age: 74
End: 2019-04-16

## 2019-04-16 NOTE — TELEPHONE ENCOUNTER
Patient notified by phone of Dr. Jose D Landers recommendations. Will try this and if not improving will call.

## 2019-04-16 NOTE — TELEPHONE ENCOUNTER
Tried salon pas last night some relief    Patient c/o of allergy symptoms, sneezing, runny nose, cough some yellowish phlegm,  No chest pain, no shortness of breath, no wheezing,  No fever.   Using tylenol, and robitussin OTC for diabetics  Taking singulair

## 2019-04-16 NOTE — TELEPHONE ENCOUNTER
Patient calling is in [place] c/o terrible allergy attack is taking otc Robitussin.     Also painful knee wants to know ok to use otc salon pas

## 2019-04-22 ENCOUNTER — TELEPHONE (OUTPATIENT)
Dept: INTERNAL MEDICINE CLINIC | Facility: CLINIC | Age: 74
End: 2019-04-22

## 2019-04-22 RX ORDER — AZITHROMYCIN 250 MG/1
TABLET, FILM COATED ORAL
Qty: 6 TABLET | Refills: 0 | Status: SHIPPED | OUTPATIENT
Start: 2019-04-22 | End: 2019-05-10 | Stop reason: ALTCHOICE

## 2019-04-22 RX ORDER — AZITHROMYCIN 250 MG/1
TABLET, FILM COATED ORAL
Qty: 6 TABLET | Refills: 0 | Status: SHIPPED | OUTPATIENT
Start: 2019-04-22 | End: 2019-04-22

## 2019-04-22 NOTE — TELEPHONE ENCOUNTER
Start antibiotics ASAP and take as directed with food til done. Take probiotics while on antibiotics if can to prevent yeast infections. Stop cholesterol medicines while on antibiotics. Increase fluids.   Not sure which pharmacy patient wants to send us to

## 2019-04-22 NOTE — TELEPHONE ENCOUNTER
Patient was informed of Dr Isreal Godoy recommendations. Verbalized understanding. Denied any questions or concerns at the time of call. Please send rx to [facility] in [place].

## 2019-04-22 NOTE — TELEPHONE ENCOUNTER
Pt still in [place], she is sick- congested face and chest, coughing mucus yellow and green color,no other symptoms, would like to ask if she can order antibiotics to her amox clav 875 mg , you prescribed before

## 2019-04-23 NOTE — TELEPHONE ENCOUNTER
Patient notified by phone stop fenofibrate and atorvastatin while on antibiotic. States all congested and green nasal discharge,   Will use steam, rest and fluids.    Will make appointment when she returns

## 2019-04-23 NOTE — TELEPHONE ENCOUNTER
Better to be on the safe side and hold for those 5 days while on the Zithromax and a 6 okay to start.

## 2019-04-28 ENCOUNTER — TELEPHONE (OUTPATIENT)
Dept: INTERNAL MEDICINE CLINIC | Facility: CLINIC | Age: 74
End: 2019-04-28

## 2019-04-28 DIAGNOSIS — I25.84 CORONARY ARTERY DISEASE DUE TO CALCIFIED CORONARY LESION: Primary | ICD-10-CM

## 2019-04-28 DIAGNOSIS — E11.9 CONTROLLED TYPE 2 DIABETES MELLITUS WITHOUT COMPLICATION, WITHOUT LONG-TERM CURRENT USE OF INSULIN (HCC): ICD-10-CM

## 2019-04-28 DIAGNOSIS — I10 ESSENTIAL HYPERTENSION: ICD-10-CM

## 2019-04-28 DIAGNOSIS — I25.10 CORONARY ARTERY DISEASE DUE TO CALCIFIED CORONARY LESION: Primary | ICD-10-CM

## 2019-04-28 DIAGNOSIS — E03.9 ACQUIRED HYPOTHYROIDISM: ICD-10-CM

## 2019-04-28 DIAGNOSIS — N18.30 CKD (CHRONIC KIDNEY DISEASE) STAGE 3, GFR 30-59 ML/MIN (HCC): Chronic | ICD-10-CM

## 2019-04-28 RX ORDER — MONTELUKAST SODIUM 10 MG/1
TABLET ORAL
Qty: 90 TABLET | Refills: 2 | Status: SHIPPED | OUTPATIENT
Start: 2019-04-28 | End: 2020-01-20

## 2019-04-29 ENCOUNTER — LAB ENCOUNTER (OUTPATIENT)
Dept: LAB | Age: 74
End: 2019-04-29
Attending: INTERNAL MEDICINE
Payer: MEDICARE

## 2019-04-29 DIAGNOSIS — N18.30 CKD (CHRONIC KIDNEY DISEASE) STAGE 3, GFR 30-59 ML/MIN (HCC): Chronic | ICD-10-CM

## 2019-04-29 DIAGNOSIS — E11.9 CONTROLLED TYPE 2 DIABETES MELLITUS WITHOUT COMPLICATION, WITHOUT LONG-TERM CURRENT USE OF INSULIN (HCC): ICD-10-CM

## 2019-04-29 DIAGNOSIS — E03.9 ACQUIRED HYPOTHYROIDISM: ICD-10-CM

## 2019-04-29 DIAGNOSIS — R76.8 ANA POSITIVE: ICD-10-CM

## 2019-04-29 DIAGNOSIS — I10 ESSENTIAL HYPERTENSION: ICD-10-CM

## 2019-04-29 PROCEDURE — 84443 ASSAY THYROID STIM HORMONE: CPT

## 2019-04-29 PROCEDURE — 83036 HEMOGLOBIN GLYCOSYLATED A1C: CPT

## 2019-04-29 PROCEDURE — 83835 ASSAY OF METANEPHRINES: CPT

## 2019-04-29 PROCEDURE — 80061 LIPID PANEL: CPT

## 2019-04-29 PROCEDURE — 36415 COLL VENOUS BLD VENIPUNCTURE: CPT

## 2019-04-29 PROCEDURE — 86225 DNA ANTIBODY NATIVE: CPT

## 2019-04-29 PROCEDURE — 84439 ASSAY OF FREE THYROXINE: CPT

## 2019-04-29 PROCEDURE — 86431 RHEUMATOID FACTOR QUANT: CPT

## 2019-04-29 PROCEDURE — 80053 COMPREHEN METABOLIC PANEL: CPT

## 2019-04-29 RX ORDER — MONTELUKAST SODIUM 10 MG/1
TABLET ORAL
Qty: 30 TABLET | Refills: 0 | OUTPATIENT
Start: 2019-04-29

## 2019-04-29 NOTE — TELEPHONE ENCOUNTER
Pt. Called from [phone]. B/P high. Got home Thurs. Sinusitis infection and finished Abx. 180/70's. Patient says under stress. Just arrived back from [place].   Denies chest pain shortness of breath nausea vomiting fevers chills cough or respiratory s

## 2019-04-30 ENCOUNTER — TELEPHONE (OUTPATIENT)
Dept: INTERNAL MEDICINE CLINIC | Facility: CLINIC | Age: 74
End: 2019-04-30

## 2019-04-30 NOTE — TELEPHONE ENCOUNTER
Spoke with pt. Doing better. Scheduled follow up with me 5-10-19 at [facility]. ? Change BB due to bradycardia. ? Message to Dr. Frida Pereira.

## 2019-04-30 NOTE — TELEPHONE ENCOUNTER
Patient received  Your [employer] message on test results. Since sugar is higher do you want her to   Take metformin  3 tablets a day, currently on 2 tablets a day? Has questions regarding :  Sodium low 131  Asking should she eat more salt?   Alkaline Phos

## 2019-05-10 ENCOUNTER — OFFICE VISIT (OUTPATIENT)
Dept: INTERNAL MEDICINE CLINIC | Facility: CLINIC | Age: 74
End: 2019-05-10
Payer: COMMERCIAL

## 2019-05-10 VITALS
DIASTOLIC BLOOD PRESSURE: 67 MMHG | HEART RATE: 48 BPM | BODY MASS INDEX: 21.35 KG/M2 | WEIGHT: 116 LBS | HEIGHT: 62 IN | TEMPERATURE: 98 F | SYSTOLIC BLOOD PRESSURE: 115 MMHG

## 2019-05-10 DIAGNOSIS — R35.0 URINARY FREQUENCY: ICD-10-CM

## 2019-05-10 DIAGNOSIS — I10 ESSENTIAL HYPERTENSION: Primary | ICD-10-CM

## 2019-05-10 DIAGNOSIS — M25.551 PAIN OF RIGHT HIP JOINT: ICD-10-CM

## 2019-05-10 DIAGNOSIS — M17.0 PRIMARY OSTEOARTHRITIS OF BOTH KNEES: ICD-10-CM

## 2019-05-10 PROCEDURE — 81003 URINALYSIS AUTO W/O SCOPE: CPT | Performed by: INTERNAL MEDICINE

## 2019-05-10 PROCEDURE — 99214 OFFICE O/P EST MOD 30 MIN: CPT | Performed by: INTERNAL MEDICINE

## 2019-05-10 RX ORDER — LISINOPRIL 10 MG/1
10 TABLET ORAL 2 TIMES DAILY
COMMUNITY
End: 2019-05-10

## 2019-05-10 RX ORDER — LIOTHYRONINE SODIUM 5 UG/1
TABLET ORAL
Qty: 90 TABLET | Refills: 1 | Status: SHIPPED | OUTPATIENT
Start: 2019-05-10 | End: 2019-10-21

## 2019-05-10 RX ORDER — VENLAFAXINE HYDROCHLORIDE 37.5 MG/1
37.5 CAPSULE, EXTENDED RELEASE ORAL NIGHTLY
Qty: 30 CAPSULE | Refills: 2 | Status: SHIPPED | OUTPATIENT
Start: 2019-05-10 | End: 2019-07-23

## 2019-05-10 RX ORDER — AMPICILLIN TRIHYDRATE 250 MG
200 CAPSULE ORAL DAILY
COMMUNITY

## 2019-05-10 RX ORDER — LISINOPRIL 10 MG/1
10 TABLET ORAL 3 TIMES DAILY
Qty: 90 TABLET | Refills: 1 | Status: SHIPPED | OUTPATIENT
Start: 2019-05-10 | End: 2019-06-24 | Stop reason: DRUGHIGH

## 2019-05-10 NOTE — PROGRESS NOTES
HPI:    Patient ID: Wil Sanchez is a 66year old female. HPI   [facility] on bottom when pulled by dog. Denies loss of consciousness, chest pain, chest tightness, palpitations, dizziness, nor vision changes.     Diabetes  Patient here for follow 04/29/2019 10:05 AM    K 4.5 04/29/2019 10:05 AM    CL 98 04/29/2019 10:05 AM    CO2 25.0 04/29/2019 10:05 AM    CREATSERUM 0.95 04/29/2019 10:05 AM    CA 9.9 04/29/2019 10:05 AM    AST 24 04/29/2019 10:05 AM    ALT 28 04/29/2019 10:05 AM    TSH 1.420 04/2 mouth daily. Disp:  Rfl:    lisinopril 10 MG Oral Tab Take 1 tablet (10 mg total) by mouth 3 (three) times daily. Disp: 90 tablet Rfl: 1   Venlafaxine HCl ER 37.5 MG Oral Capsule SR 24 Hr Take 1 capsule (37.5 mg total) by mouth nightly.  Disp: 30 capsule Rf 1 % Ophthalmic Suspension Apply 1 drop to eye. Disp:  Rfl:    Nepafenac 0.3 % Ophthalmic Suspension Apply 1 drop to eye. Disp:  Rfl:    besifloxacin HCl 0.6 % Ophthalmic Suspension Apply 1 drop to eye.  Disp:  Rfl:      Allergies:  Glimepiride             H intubated. Neck: Trachea normal and phonation normal. No thyroid mass and no thyromegaly present. Cardiovascular: Normal rate, regular rhythm, S1 normal, S2 normal, normal heart sounds, intact distal pulses and normal pulses.    Pulses:       Carotid pu normal mood and affect. Her behavior is normal. Thought content normal.   Nursing note and vitals reviewed. ASSESSMENT/PLAN:   Essential hypertension  (primary encounter diagnosis) Not good control.  Careful with diet and excercise at least 30 min task. 8) Notice that when you stop thinking frightening thoughts, your anxiety fades. 9) When fear comes, accept it, don't fight it. Wait and give it some time to pass. Don't try to escape from it.  10)  Be proud of the progress you've made.  Think about

## 2019-05-10 NOTE — TELEPHONE ENCOUNTER
Refill passed per [facility] protocol.   Hypothyroid Medications  Protocol Criteria:  Appointment scheduled in the past 12 months or the next 3 months  TSH resulted in the past 12 months that is normal  Recent Outpatient Visits            3 months ago Encounter for well woman exam    [facility] - Oleg Pollack MD    Office Visit    3 months ago [address] [place] [address] for Anthony Lucas MD    Office Visit    4 months ago Controlled type 2 diabetes mellitus without complication, without long-term current use of insulin [facility])    [facility], Darrick Millan MD    Office Visit    5 months ago Controlled type 2 diabetes mellitus without complication, without long-term current use of insulin [facility])    [facility], [address], Venkatesh Jaimes MD    Office Visit    6 months ago Pulmonary nodule    Tran Loyola MD    Office Visit        Future Appointments       Provider Department Appt Notes    Today Roise Meigs., MD [facility], [place] [address] okay per dr Mary Haas    In 1 month Roise Meigs., MD [facility], [address], [facility] wellness         Lab Results   Component Value Date    TSH 1.420 04/29/2019    THYROIDFUNC 1.20 08/12/2016

## 2019-05-10 NOTE — PATIENT INSTRUCTIONS
ASSESSMENT/PLAN:   Essential hypertension  (primary encounter diagnosis) Not good control. Careful with diet and excercise at least 30 minutes 3-4 times a week. Check blood pressures at different times on different days.  Can purchase own blood pressure it, don't fight it. Wait and give it some time to pass. Don't try to escape from it.  10)  Be proud of the progress you've made. Think about how good you will feel when the anxiety has passed and you are in total control and at peace.         No orders of

## 2019-05-13 ENCOUNTER — HOSPITAL ENCOUNTER (OUTPATIENT)
Dept: GENERAL RADIOLOGY | Age: 74
Discharge: HOME | End: 2019-05-13
Attending: INTERNAL MEDICINE
Payer: MEDICARE

## 2019-05-13 ENCOUNTER — TELEPHONE (OUTPATIENT)
Dept: INTERNAL MEDICINE CLINIC | Facility: CLINIC | Age: 74
End: 2019-05-13

## 2019-05-13 DIAGNOSIS — M25.551 PAIN OF RIGHT HIP JOINT: ICD-10-CM

## 2019-05-13 DIAGNOSIS — M17.0 PRIMARY OSTEOARTHRITIS OF BOTH KNEES: ICD-10-CM

## 2019-05-13 PROCEDURE — 73502 X-RAY EXAM HIP UNI 2-3 VIEWS: CPT | Performed by: INTERNAL MEDICINE

## 2019-05-13 PROCEDURE — 72110 X-RAY EXAM L-2 SPINE 4/>VWS: CPT | Performed by: INTERNAL MEDICINE

## 2019-05-13 PROCEDURE — 73560 X-RAY EXAM OF KNEE 1 OR 2: CPT | Performed by: INTERNAL MEDICINE

## 2019-05-13 RX ORDER — LEVOTHYROXINE SODIUM 88 MCG
TABLET ORAL
Qty: 90 TABLET | Refills: 1 | Status: SHIPPED | OUTPATIENT
Start: 2019-05-13 | End: 2019-12-21

## 2019-05-13 RX ORDER — AMLODIPINE BESYLATE 2.5 MG/1
2.5 TABLET ORAL 2 TIMES DAILY
Qty: 60 TABLET | Refills: 1 | Status: SHIPPED | OUTPATIENT
Start: 2019-05-13 | End: 2019-05-13

## 2019-05-13 RX ORDER — FENOFIBRATE 145 MG/1
TABLET, COATED ORAL
Qty: 90 TABLET | Refills: 1 | Status: SHIPPED | OUTPATIENT
Start: 2019-05-13 | End: 2019-11-09

## 2019-05-13 NOTE — TELEPHONE ENCOUNTER
Refill passed per [facility], [facility] protocol.   Cholesterol Medications  Protocol Criteria:  · Appointment scheduled in the past 12 months or in the next 3 months  · ALT & LDL on file in the past 12 months  · ALT result < 80  · LDL result <130   Recent Outpat mellitus without complication, without long-term current use of insulin [facility])    Noah Castaneda., MD    Office Visit    5 months ago Controlled type 2 diabetes mellitus without complication, without long-term curre

## 2019-05-13 NOTE — TELEPHONE ENCOUNTER
1) Patient wanted to inform you that for PT she would like to go somewhere else. Physical Therapist name is Lopez hCoudhury [phone]. Wondering if she can still use the same referral you gave her or a new one specifying the therapist name? Please advice.

## 2019-05-13 NOTE — TELEPHONE ENCOUNTER
Spoke with patient will try amlodipine only enough for 30 days to see if it agrees with her. Continue lisinopril/hctz?

## 2019-05-13 NOTE — TELEPHONE ENCOUNTER
If she is still take the lisinopril hydrochlorothiazide then maximal lisinopril is 40 mg a day. She should not be taking more than 40. Not dangerous but really no benefit.   Can then may be add amlodipine 2-1/2 mg twice a day which will not affect the hea

## 2019-05-14 RX ORDER — AMLODIPINE BESYLATE 2.5 MG/1
TABLET ORAL
Qty: 180 TABLET | Refills: 1 | Status: SHIPPED | OUTPATIENT
Start: 2019-05-14 | End: 2019-08-22

## 2019-05-14 NOTE — TELEPHONE ENCOUNTER
Refill passed per [facility], [facility] protocol.   Hypertensive Medications  Protocol Criteria:  · Appointment scheduled in the past 6 months or in the next 3 months  · BMP or CMP in the past 12 months  · Creatinine result < 2  Recent Outpatient Visits

## 2019-05-14 NOTE — TELEPHONE ENCOUNTER
Take amlodipine 2-1/2 mg twice a day with lisinopril 20/25 in the morning. Hold lisinopril in the afternoon if doing amlodipine. Monitor. Call in 1 week with blood pressure readings.

## 2019-05-14 NOTE — TELEPHONE ENCOUNTER
Pt informed of instruction for b/p medications in detail, and to check b/p readings for one week and call us with readings.

## 2019-05-20 ENCOUNTER — TELEPHONE (OUTPATIENT)
Dept: INTERNAL MEDICINE CLINIC | Facility: CLINIC | Age: 74
End: 2019-05-20

## 2019-05-20 NOTE — TELEPHONE ENCOUNTER
Patient calling confused about her new anxiety medication regimen and new blood pressure regimen. Provided Dr. Jeremiah Lind instructions to her. Had patient write down new instructions and read them back to me. She expressed understanding.

## 2019-05-28 ENCOUNTER — TELEPHONE (OUTPATIENT)
Dept: INTERNAL MEDICINE CLINIC | Facility: CLINIC | Age: 74
End: 2019-05-28

## 2019-05-28 NOTE — TELEPHONE ENCOUNTER
Patient notified of Dr. Andrea Lynch response  Will continue to monitor BP and call in 1 week with readings.

## 2019-05-28 NOTE — TELEPHONE ENCOUNTER
They are much better. No change in medications give us a call again in 1 week. See message from cardiologist regarding stopping metoprolol.

## 2019-05-28 NOTE — TELEPHONE ENCOUNTER
Patient called to report b/p reading, stated that b/p is much better.        05/28/19 136/68 HR 50   05/26/19 129/55 HR 48                143/62 10 PM   05/25/19  125/51 HR 42 8AM  05/24/19  149/61 HR 36 9 PM  05/23/19  143/60 HR 42 8:45 AM

## 2019-05-30 ENCOUNTER — TELEPHONE (OUTPATIENT)
Dept: INTERNAL MEDICINE CLINIC | Facility: CLINIC | Age: 74
End: 2019-05-30

## 2019-06-17 ENCOUNTER — TELEPHONE (OUTPATIENT)
Dept: INTERNAL MEDICINE CLINIC | Facility: CLINIC | Age: 74
End: 2019-06-17

## 2019-06-17 RX ORDER — LISINOPRIL AND HYDROCHLOROTHIAZIDE 25; 20 MG/1; MG/1
1 TABLET ORAL DAILY
Qty: 90 TABLET | Refills: 1 | OUTPATIENT
Start: 2019-06-17

## 2019-06-17 NOTE — TELEPHONE ENCOUNTER
Patient calling concerned that she is feeling more anxious than usual since weaning off the citalopram. Last dose was 6/11/19. Feels the anxiety and feels it is raising her blood pressure readings as a result. Latest readings were 183/85, 137/57, 166/72.

## 2019-06-17 NOTE — TELEPHONE ENCOUNTER
.  She can try to increase Effexor dose to 2 tablets and see if that will help. Give it a week if she still having more problems and give us a call back.

## 2019-06-17 NOTE — TELEPHONE ENCOUNTER
Refill passed per [facility], [facility] protocol.   Severe drug interaction with sulfanilamide

## 2019-06-20 ENCOUNTER — MED REC SCAN ONLY (OUTPATIENT)
Dept: INTERNAL MEDICINE CLINIC | Facility: CLINIC | Age: 74
End: 2019-06-20

## 2019-06-20 RX ORDER — LISINOPRIL AND HYDROCHLOROTHIAZIDE 25; 20 MG/1; MG/1
1 TABLET ORAL DAILY
Qty: 90 TABLET | Refills: 1 | OUTPATIENT
Start: 2019-06-20

## 2019-06-20 NOTE — TELEPHONE ENCOUNTER
Refill passed per [address] [facility] protocol but high interaction requiring MD review.

## 2019-06-21 ENCOUNTER — TELEPHONE (OUTPATIENT)
Dept: INTERNAL MEDICINE CLINIC | Facility: CLINIC | Age: 74
End: 2019-06-21

## 2019-06-21 RX ORDER — LISINOPRIL AND HYDROCHLOROTHIAZIDE 25; 20 MG/1; MG/1
1 TABLET ORAL DAILY
Qty: 90 TABLET | Refills: 1 | Status: SHIPPED | OUTPATIENT
Start: 2019-06-21 | End: 2019-11-28

## 2019-06-21 NOTE — TELEPHONE ENCOUNTER
Patient calling confused why her lisinopril/hctz has been refused. Explained she should be taking lisinopril 10 mg tid. Patient states she has never started that regimen and is still taking the combination lisinopril. She will run out Sunday.  She has an ap

## 2019-06-23 ENCOUNTER — MA CHART PREP (OUTPATIENT)
Dept: FAMILY MEDICINE CLINIC | Facility: CLINIC | Age: 74
End: 2019-06-23

## 2019-06-24 ENCOUNTER — TELEPHONE (OUTPATIENT)
Dept: INTERNAL MEDICINE CLINIC | Facility: CLINIC | Age: 74
End: 2019-06-24

## 2019-06-24 ENCOUNTER — OFFICE VISIT (OUTPATIENT)
Dept: INTERNAL MEDICINE CLINIC | Facility: CLINIC | Age: 74
End: 2019-06-24
Payer: COMMERCIAL

## 2019-06-24 ENCOUNTER — TELEPHONE (OUTPATIENT)
Dept: PULMONOLOGY | Facility: CLINIC | Age: 74
End: 2019-06-24

## 2019-06-24 VITALS
OXYGEN SATURATION: 98 % | DIASTOLIC BLOOD PRESSURE: 54 MMHG | HEART RATE: 58 BPM | BODY MASS INDEX: 21 KG/M2 | SYSTOLIC BLOOD PRESSURE: 132 MMHG | WEIGHT: 114 LBS | TEMPERATURE: 98 F

## 2019-06-24 DIAGNOSIS — E11.9 CONTROLLED TYPE 2 DIABETES MELLITUS WITHOUT COMPLICATION, WITHOUT LONG-TERM CURRENT USE OF INSULIN (HCC): ICD-10-CM

## 2019-06-24 DIAGNOSIS — I25.10 CORONARY ARTERY DISEASE DUE TO CALCIFIED CORONARY LESION: ICD-10-CM

## 2019-06-24 DIAGNOSIS — M17.11 PRIMARY OSTEOARTHRITIS OF RIGHT KNEE: ICD-10-CM

## 2019-06-24 DIAGNOSIS — I10 ESSENTIAL HYPERTENSION: ICD-10-CM

## 2019-06-24 DIAGNOSIS — Z00.00 ENCOUNTER FOR ANNUAL HEALTH EXAMINATION: ICD-10-CM

## 2019-06-24 DIAGNOSIS — N18.30 CKD (CHRONIC KIDNEY DISEASE) STAGE 3, GFR 30-59 ML/MIN (HCC): Primary | Chronic | ICD-10-CM

## 2019-06-24 DIAGNOSIS — I25.84 CORONARY ARTERY DISEASE DUE TO CALCIFIED CORONARY LESION: ICD-10-CM

## 2019-06-24 DIAGNOSIS — E03.9 ACQUIRED HYPOTHYROIDISM: ICD-10-CM

## 2019-06-24 DIAGNOSIS — Z12.83 SKIN EXAM, SCREENING FOR CANCER: ICD-10-CM

## 2019-06-24 PROCEDURE — 81003 URINALYSIS AUTO W/O SCOPE: CPT | Performed by: INTERNAL MEDICINE

## 2019-06-24 PROCEDURE — 99397 PER PM REEVAL EST PAT 65+ YR: CPT | Performed by: INTERNAL MEDICINE

## 2019-06-24 PROCEDURE — 96160 PT-FOCUSED HLTH RISK ASSMT: CPT | Performed by: INTERNAL MEDICINE

## 2019-06-24 PROCEDURE — G0439 PPPS, SUBSEQ VISIT: HCPCS | Performed by: INTERNAL MEDICINE

## 2019-06-24 RX ORDER — ATORVASTATIN CALCIUM 40 MG/1
TABLET, FILM COATED ORAL
Qty: 90 TABLET | Refills: 1 | Status: SHIPPED | OUTPATIENT
Start: 2019-06-24 | End: 2020-06-10

## 2019-06-24 NOTE — PATIENT INSTRUCTIONS
PLAN SUMMARY:   Diagnoses and all orders for this visit:    CKD (chronic kidney disease) stage 3, GFR 30-59 ml/min ([facility])No motrin, ibuprofen, advil, alleve, naprosyn  with these medications.      Controlled type 2 diabetes mellitus without complication, wit No flowsheet data found.     Fasting Blood Sugar (FSB)   Patient must be diagnosed with one of the following:   • Hypertension   • Dyslipidemia   • Obesity (BMI ³30 kg/m2)   • Previous elevated impaired FBS or GTT   … or any two of the following:   • Overwe flowsheet data found.      Barium Enema-   uncomfortable but covered  Covered but uncomfortable   Glaucoma Screening      Ophthalmology Visit   Covered annually for Diabetics, people with Glaucoma family history,   Americans over age 48   Christy B for Moderate/High Risk       No orders found for this or any previous visit.  Medium/high risk factors:   End-stage renal disease   Hemophiliacs who received Factor VIII or IX concentrates   Clients of institutions for the mentally retarded   Persons who

## 2019-06-24 NOTE — TELEPHONE ENCOUNTER
Spoke with [address]  at Dr. Aclantara Percy office  Patient has appt with Beba Goetz 7/1/19 at 8:30 am   Usually sees Dr. Beba Santos

## 2019-06-24 NOTE — TELEPHONE ENCOUNTER
Refill passed per [facility], [facility] protocol.   Cholesterol Medications  Protocol Criteria:  · Appointment scheduled in the past 12 months or in the next 3 months  · ALT & LDL on file in the past 12 months  · ALT result < 80  · LDL result <130   Recent Outpat

## 2019-06-24 NOTE — PROGRESS NOTES
HPI:    Patient ID: Navneet Cross is a 66year old female. HPI   Navneet Cross is a 66year old female who presents for a complete physical exam.   HPI:   Patient presents with: Well Adult: medicare annual       No LMP recorded.  Patient is postmenopa 1   LIOTHYRONINE SODIUM 5 MCG Oral Tab TAKE 1 TABLET(5 MCG) BY MOUTH EVERY NIGHT Disp: 90 tablet Rfl: 1   Coenzyme Q10 (COQ10) 200 MG Oral Cap Take 200 mg by mouth daily.  Disp:  Rfl:    Venlafaxine HCl ER 37.5 MG Oral Capsule SR 24 Hr Take 1 capsule (37.5 drop to eye. Disp:  Rfl:    besifloxacin HCl 0.6 % Ophthalmic Suspension Apply 1 drop to eye.  Disp:  Rfl:       Past Medical History:   Diagnosis Date   • ANXIETY    • Appendicitis    • DEPRESSION    • Generalized OA    • HYPERLIPIDEMIA    • Multiple aller kg)  05/10/19 : 116 lb (52.6 kg)  02/04/19 : 115 lb (52.2 kg)    BP Readings from Last 3 Encounters:  06/24/19 : 132/54  05/10/19 : 115/67  02/04/19 : 134/66    Labs:   Lab Results   Component Value Date/Time     (H) 04/29/2019 10:05 AM     (L 114 04/29/2019 10:05 AM    LDL 73 04/29/2019 10:05 AM    NONHDLC 96 04/29/2019 10:05 AM         Review of Systems   Constitutional: Negative. Negative for activity change, appetite change, chills, diaphoresis, fatigue, fever and unexpected weight change. Medications:  Lisinopril-hydroCHLOROthiazide 20-25 MG Oral Tab Take 1 tablet by mouth daily.  Disp: 90 tablet Rfl: 1   AMLODIPINE BESYLATE 2.5 MG Oral Tab TAKE 1 TABLET(2.5 MG) BY MOUTH TWICE DAILY Disp: 180 tablet Rfl: 1   FENOFIBRATE 145 MG Oral Tab TAKE OR 1 DAILY Disp:  Rfl:    lisinopril 10 MG Oral Tab Take 1 tablet (10 mg total) by mouth 3 (three) times daily.  Disp: 90 tablet Rfl: 1   metoprolol Tartrate 25 MG Oral Tab TAKE 1/2 TABLET BY MOUTH EVERY NIGHT AT BEDTIME Disp: 45 tablet Rfl: 1   prednisoLON Exam   Constitutional: She is oriented to person, place, and time. Vital signs are normal. She appears well-developed and well-nourished. She is active. Non-toxic appearance. She does not have a sickly appearance. She does not appear ill. No distress.  She chemosis, no discharge, no exudate and no hordeolum. No foreign body present in the right eye. Left eye exhibits no chemosis, no discharge, no exudate and no hordeolum. No foreign body present in the left eye. Right conjunctiva is not injected.  Right conju splenomegaly or hepatomegaly. There is no tenderness. There is no rigidity, no rebound, no guarding and no CVA tenderness. Genitourinary: Pelvic exam was performed with patient supine. Musculoskeletal: She exhibits no edema.    Lymphadenopathy:        H No prescriptions requested or ordered in this encounter       Imaging & Referrals:  [facility] - INTERNAL        #[id number]    HPI:   Sang Khan is a 66year old female who presents for a Medicare Subsequent Annual Wellness visit (Pt already had Initial An Planning elements today (up to 30 minutes for CPT [id number])         She does have a POA but we do NOT have it on file in [facility].    Advance care planning including the explanation and discussion of advance directives standard forms performed Face to Face with pa gluten meal; bees; ibuprofen; nsaids; sulfa antibiotics; and sulfanilamide.     CURRENT MEDICATIONS:     Outpatient Medications Marked as Taking for the 6/24/19 encounter (Office Visit) with John Yanes MD:  Lisinopril-hydroCHLOROthiazide 20-25 MG Or electrocardiogram (05/31/2018). She  has a past surgical history that includes colonoscopy (2009); appendectomy; electrocardiogram, complete (02/07/2014); and cataract (Bilateral, 01/28/2019).     Her family history includes Breast Cancer (age of onset: responses:  No I avoid social activities because I cannot hear well and fear I will reply improperly:  No   Family members and friends have told me they think I may have hearing loss:   No              Visual Acuity  Right Eye Visual Acuity: Corrected Right bilaterally. Check eyes every year with dilated eye exam. Check blood next month. Coronary artery disease due to calcified coronary lesion Stable and follow with cardiology. Essential hypertension Goo control. Careful with diet and excercise at leas Electrocardiogram date12/28/2018       Colorectal Cancer Screening      Colonoscopy Screen every 10 years There are no preventive care reminders to display for this patient.  Update Health Maintenance if applicable    Flex Sigmoidoscopy Screen every 10 year TD and TDaP Not covered by Medicare Part B No vaccine history found This may be covered with your prescription benefits, but Medicare does not cover unless Medically needed    Zoster  Not covered by Medicare Part B No vaccine history found This may be cove

## 2019-06-26 NOTE — TELEPHONE ENCOUNTER
Spoke with Dr. Sharon Modi nurse  Although hospital discharge states follow up with Dr. Sharon Modi, she has to check with Dr. Colton Muñoz who has seen patient for a year, and has history.   Will check with Dr. Colton Muñoz if ok with him will call patient for appointment with

## 2019-06-27 PROBLEM — R76.8 POSITIVE ANA (ANTINUCLEAR ANTIBODY): Status: ACTIVE | Noted: 2019-06-27

## 2019-06-27 PROBLEM — F41.9 ANXIETY: Status: ACTIVE | Noted: 2019-06-27

## 2019-06-27 PROBLEM — M17.11 PRIMARY OSTEOARTHRITIS OF RIGHT KNEE: Status: ACTIVE | Noted: 2019-06-27

## 2019-06-27 PROBLEM — I10 ESSENTIAL HYPERTENSION: Status: ACTIVE | Noted: 2019-06-27

## 2019-06-27 PROBLEM — I25.10 CORONARY ARTERY DISEASE DUE TO CALCIFIED CORONARY LESION: Status: ACTIVE | Noted: 2019-06-27

## 2019-06-27 PROBLEM — R91.1 PULMONARY NODULE: Status: ACTIVE | Noted: 2019-06-27

## 2019-06-27 PROBLEM — E03.9 ACQUIRED HYPOTHYROIDISM: Status: ACTIVE | Noted: 2019-06-27

## 2019-06-27 PROBLEM — Z88.9 MULTIPLE ALLERGIES: Status: ACTIVE | Noted: 2019-06-27

## 2019-06-27 PROBLEM — E78.5 HYPERLIPIDEMIA: Status: ACTIVE | Noted: 2019-06-27

## 2019-06-27 PROBLEM — E11.9 CONTROLLED TYPE 2 DIABETES MELLITUS WITHOUT COMPLICATION (CMD): Status: ACTIVE | Noted: 2019-06-27

## 2019-06-27 PROBLEM — R94.31 ST SEGMENT CHANGES ON ELECTROCARDIOGRAM: Status: ACTIVE | Noted: 2019-06-27

## 2019-06-27 PROBLEM — M81.0 OSTEOPOROSIS: Status: ACTIVE | Noted: 2019-06-27

## 2019-06-27 PROBLEM — N18.30 CKD (CHRONIC KIDNEY DISEASE) STAGE 3, GFR 30-59 ML/MIN (CMD): Status: ACTIVE | Noted: 2019-06-27

## 2019-06-27 PROBLEM — I25.84 CORONARY ARTERY DISEASE DUE TO CALCIFIED CORONARY LESION: Status: ACTIVE | Noted: 2019-06-27

## 2019-06-27 RX ORDER — MULTIVIT-MIN/IRON/FOLIC ACID/K 18-600-40
2000 CAPSULE ORAL DAILY
COMMUNITY
Start: 2018-05-24

## 2019-06-27 RX ORDER — CYANOCOBALAMIN (VITAMIN B-12) 1000 MCG
500 TABLET, SUBLINGUAL SUBLINGUAL DAILY
COMMUNITY
Start: 2018-05-24

## 2019-06-27 RX ORDER — MULTIVIT-MIN/IRON/FOLIC ACID/K 18-600-40
500 CAPSULE ORAL DAILY
COMMUNITY
Start: 2018-05-24

## 2019-06-27 RX ORDER — LEVOTHYROXINE SODIUM 75 UG/1
75 CAPSULE ORAL DAILY
COMMUNITY
Start: 2018-05-24

## 2019-06-28 ENCOUNTER — TELEPHONE (OUTPATIENT)
Dept: INTERNAL MEDICINE CLINIC | Facility: CLINIC | Age: 74
End: 2019-06-28

## 2019-06-28 ENCOUNTER — TELEPHONE (OUTPATIENT)
Dept: CARDIOLOGY | Age: 74
End: 2019-06-28

## 2019-06-28 NOTE — TELEPHONE ENCOUNTER
Spoke with Cirilo Garcia will ask Randall Palacios if able to get   Patient appt with Dr. Carie Vieyra ?

## 2019-06-28 NOTE — TELEPHONE ENCOUNTER
[facility]  Patient is due for CT Chest in July 2019 Dx pulmonary Nodules  Can you please tell me if Referral [id number]   Will be authorized soon, so patient may schedule CT Scan  Thank you  Alisha Jacoboe   Dr. Jacques Mccloud Office.

## 2019-07-01 ENCOUNTER — TELEPHONE (OUTPATIENT)
Dept: INTERNAL MEDICINE CLINIC | Facility: CLINIC | Age: 74
End: 2019-07-01

## 2019-07-01 ENCOUNTER — TELEPHONE (OUTPATIENT)
Dept: PULMONOLOGY | Facility: CLINIC | Age: 74
End: 2019-07-01

## 2019-07-01 ENCOUNTER — OFFICE VISIT (OUTPATIENT)
Dept: CARDIOLOGY | Age: 74
End: 2019-07-01

## 2019-07-01 VITALS
HEIGHT: 62 IN | BODY MASS INDEX: 21.35 KG/M2 | OXYGEN SATURATION: 97 % | WEIGHT: 116 LBS | DIASTOLIC BLOOD PRESSURE: 60 MMHG | HEART RATE: 69 BPM | SYSTOLIC BLOOD PRESSURE: 132 MMHG

## 2019-07-01 DIAGNOSIS — E03.9 ACQUIRED HYPOTHYROIDISM: ICD-10-CM

## 2019-07-01 DIAGNOSIS — E78.2 MIXED HYPERLIPIDEMIA: ICD-10-CM

## 2019-07-01 DIAGNOSIS — I10 ESSENTIAL HYPERTENSION: Primary | ICD-10-CM

## 2019-07-01 DIAGNOSIS — N18.30 CKD (CHRONIC KIDNEY DISEASE) STAGE 3, GFR 30-59 ML/MIN (CMD): ICD-10-CM

## 2019-07-01 DIAGNOSIS — I25.10 CORONARY ARTERY DISEASE DUE TO CALCIFIED CORONARY LESION: ICD-10-CM

## 2019-07-01 DIAGNOSIS — I25.84 CORONARY ARTERY DISEASE DUE TO CALCIFIED CORONARY LESION: ICD-10-CM

## 2019-07-01 PROCEDURE — 3078F DIAST BP <80 MM HG: CPT | Performed by: INTERNAL MEDICINE

## 2019-07-01 PROCEDURE — 3075F SYST BP GE 130 - 139MM HG: CPT | Performed by: INTERNAL MEDICINE

## 2019-07-01 PROCEDURE — 99214 OFFICE O/P EST MOD 30 MIN: CPT | Performed by: INTERNAL MEDICINE

## 2019-07-01 RX ORDER — AMLODIPINE BESYLATE 2.5 MG/1
2.5 TABLET ORAL 2 TIMES DAILY
COMMUNITY

## 2019-07-01 RX ORDER — VENLAFAXINE HYDROCHLORIDE 37.5 MG/1
37.5 CAPSULE, EXTENDED RELEASE ORAL AT BEDTIME
COMMUNITY

## 2019-07-01 NOTE — TELEPHONE ENCOUNTER
Spoke with Ana both Dr. Farrah Belcher and Dr. Elizabeth Savage agreed in change of cardiologist.  First appt 7/8/19  1:30 pm

## 2019-07-01 NOTE — TELEPHONE ENCOUNTER
Referral [id number] is pending,- need cpt from pcp office    Please advise cpt code so we can submit authorization    Thanks,    adama

## 2019-07-02 ENCOUNTER — MED REC SCAN ONLY (OUTPATIENT)
Dept: INTERNAL MEDICINE CLINIC | Facility: CLINIC | Age: 74
End: 2019-07-02

## 2019-07-08 ENCOUNTER — APPOINTMENT (OUTPATIENT)
Dept: CARDIOLOGY | Age: 74
End: 2019-07-08

## 2019-07-08 ENCOUNTER — TELEPHONE (OUTPATIENT)
Dept: INTERNAL MEDICINE CLINIC | Facility: CLINIC | Age: 74
End: 2019-07-08

## 2019-07-08 NOTE — TELEPHONE ENCOUNTER
Patient called asking to have xr of knee and hips faxed over to her ortho dr. Lis Huddleston over to his office results that we have on her. Received success confirmation.

## 2019-07-11 NOTE — TELEPHONE ENCOUNTER
Patient has text to verify cardiology appt thought it was with Dr. Elizbeth Gosselin,  Was with Dr. Katya Cornejo   Patient will schedule next appt with Dr. Elizbeth Gosselin

## 2019-07-12 ENCOUNTER — HOSPITAL ENCOUNTER (OUTPATIENT)
Dept: CT IMAGING | Age: 74
Discharge: HOME | End: 2019-07-12
Attending: INTERNAL MEDICINE
Payer: MEDICARE

## 2019-07-12 DIAGNOSIS — R91.1 PULMONARY NODULE: ICD-10-CM

## 2019-07-12 PROCEDURE — 71250 CT THORAX DX C-: CPT | Performed by: INTERNAL MEDICINE

## 2019-07-16 ENCOUNTER — TELEPHONE (OUTPATIENT)
Dept: INTERNAL MEDICINE CLINIC | Facility: CLINIC | Age: 74
End: 2019-07-16

## 2019-07-17 ENCOUNTER — TELEPHONE (OUTPATIENT)
Dept: PULMONOLOGY | Facility: CLINIC | Age: 74
End: 2019-07-17

## 2019-07-17 ENCOUNTER — TELEPHONE (OUTPATIENT)
Dept: INTERNAL MEDICINE CLINIC | Facility: CLINIC | Age: 74
End: 2019-07-17

## 2019-07-17 DIAGNOSIS — R91.8 PULMONARY NODULES: Primary | ICD-10-CM

## 2019-07-17 PROCEDURE — 87086 URINE CULTURE/COLONY COUNT: CPT | Performed by: OBSTETRICS & GYNECOLOGY

## 2019-07-17 NOTE — TELEPHONE ENCOUNTER
Pt informed of Dr. Oc Ramos message/results/orders below. Pt verbalized understanding. Order added to chronic calendar.

## 2019-07-17 NOTE — TELEPHONE ENCOUNTER
Patient calling and looking for results of CT scan chest.  Dr. Hesham Ward is out of office on vacation  Asked patient to call Dr. Madi Thorne

## 2019-07-17 NOTE — TELEPHONE ENCOUNTER
----- Message from Jeanette Phoenix, MD sent at 7/17/2019  1:20 PM CDT -----  RN, please call the patient to let her know that the CT scan the chest is stable. The small nodules have not grown.   Please add to the calendar a repeat CT scan of the chest at t

## 2019-07-17 NOTE — TELEPHONE ENCOUNTER
RN, please call the patient to let her know her CT scan the chest is stable. Please add to the calendar a repeat CT scan the chest at the one year interval.  The small pulmonary nodules have not grown.

## 2019-07-17 NOTE — TELEPHONE ENCOUNTER
Pt requesting to speak with RN re: CT Scan Results. PLs call - aware [facility] is not in office. Thank you.

## 2019-07-23 RX ORDER — VENLAFAXINE HYDROCHLORIDE 37.5 MG/1
CAPSULE, EXTENDED RELEASE ORAL
Qty: 90 CAPSULE | Refills: 1 | Status: SHIPPED | OUTPATIENT
Start: 2019-07-23 | End: 2019-12-17

## 2019-07-23 NOTE — TELEPHONE ENCOUNTER
Refill passed per [facility], [facility] protocol.   Hypertensive Medications  Protocol Criteria:  · Appointment scheduled in the past 6 months or in the next 3 months  · BMP or CMP in the past 12 months  · Creatinine result < 2  Recent Outpatient Visits

## 2019-07-23 NOTE — TELEPHONE ENCOUNTER
Refill passed per [facility], [facility] protocol.   Refill Protocol Appointment Criteria  · Appointment scheduled in the past 6 months or in the next 3 months  Recent Outpatient Visits            6 days ago Urinary tract infection without hematuria, site unspecif Provider Department Appt Notes    In 2 months Omaira Singleton., MD [facility], [facility], [address], [place] [facility]         Lab Results   Component Value Date     (H) 04/29/2019    BUN 17 04/29/2019    CREATSERUM 0.95 04/29/2019    BUNCREA 17.9 04

## 2019-08-06 ENCOUNTER — MED REC SCAN ONLY (OUTPATIENT)
Dept: INTERNAL MEDICINE CLINIC | Facility: CLINIC | Age: 74
End: 2019-08-06

## 2019-08-06 ENCOUNTER — NURSE TRIAGE (OUTPATIENT)
Dept: OTHER | Age: 74
End: 2019-08-06

## 2019-08-06 ENCOUNTER — TELEPHONE (OUTPATIENT)
Dept: INTERNAL MEDICINE CLINIC | Facility: CLINIC | Age: 74
End: 2019-08-06

## 2019-08-06 ENCOUNTER — OFFICE VISIT (OUTPATIENT)
Dept: INTERNAL MEDICINE CLINIC | Facility: CLINIC | Age: 74
End: 2019-08-06
Payer: COMMERCIAL

## 2019-08-06 VITALS
WEIGHT: 120 LBS | TEMPERATURE: 98 F | HEART RATE: 68 BPM | DIASTOLIC BLOOD PRESSURE: 60 MMHG | SYSTOLIC BLOOD PRESSURE: 147 MMHG | BODY MASS INDEX: 22 KG/M2 | OXYGEN SATURATION: 95 %

## 2019-08-06 DIAGNOSIS — E03.9 ACQUIRED HYPOTHYROIDISM: ICD-10-CM

## 2019-08-06 DIAGNOSIS — R21 RASH: ICD-10-CM

## 2019-08-06 DIAGNOSIS — E11.9 CONTROLLED TYPE 2 DIABETES MELLITUS WITHOUT COMPLICATION, WITHOUT LONG-TERM CURRENT USE OF INSULIN (HCC): Primary | ICD-10-CM

## 2019-08-06 PROCEDURE — 99214 OFFICE O/P EST MOD 30 MIN: CPT | Performed by: INTERNAL MEDICINE

## 2019-08-06 NOTE — PROGRESS NOTES
HPI:    Patient ID: Michelle Espinosa is a 66year old female. HPI   Diabetes  Patient here for follow up of Diabetes. Has been taking medications regularly. Checks sugars 1 times daily. Fasting sugars average 110's. Once at 145 after dessert.    jody CO2 25.0 04/29/2019 10:05 AM    CREATSERUM 0.95 04/29/2019 10:05 AM    CA 9.9 04/29/2019 10:05 AM    AST 24 04/29/2019 10:05 AM    ALT 28 04/29/2019 10:05 AM    TSH 1.420 04/29/2019 10:05 AM    T4F 1.3 04/29/2019 10:05 AM        Lab Results   Component Va AMLODIPINE BESYLATE 2.5 MG Oral Tab TAKE 1 TABLET(2.5 MG) BY MOUTH TWICE DAILY Disp: 180 tablet Rfl: 1   FENOFIBRATE 145 MG Oral Tab TAKE 1 TABLET ONCE DAILY Disp: 90 tablet Rfl: 1   SYNTHROID 88 MCG Oral Tab TAKE 1 TABLET ONCE DAILY.  Disp: 90 tablet Rfl: adverse effects noted  Sulfa Antibiotics         Sulfanilamide           UNKNOWN    HISTORY:  Past Medical History:   Diagnosis Date   • ANXIETY    • Appendicitis    • DEPRESSION    • Generalized OA    • HYPERLIPIDEMIA    • Multiple allergies Left Ear: Tympanic membrane, external ear and ear canal normal. No lacerations. No drainage, swelling or tenderness. No foreign bodies. No mastoid tenderness.  Tympanic membrane is not injected, not scarred, not perforated, not erythematous, not retracted a Abdominal: Soft. There is no tenderness. Musculoskeletal: She exhibits no edema. Arms:  Lymphadenopathy:        Head (right side): No submental, no submandibular, no preauricular, no posterior auricular and no occipital adenopathy present. No prescriptions requested or ordered in this encounter       Imaging & Referrals:  None        TF#[id number]

## 2019-08-06 NOTE — PATIENT INSTRUCTIONS
ASSESSMENT/PLAN:   Controlled type 2 diabetes mellitus without complication, without long-term current use of insulin ([facility])  (primary encounter diagnosis) Slightly elevated. S/P cortisone injection 1 week ago.  Careful with diet and excercise at least

## 2019-08-06 NOTE — TELEPHONE ENCOUNTER
Action Requested: Summary for Provider     []  Critical Lab, Recommendations Needed  [x] Need Additional Advice  []   FYI    []   Need Orders  [x] Need Medications Sent to Pharmacy  []  Other     SUMMARY: Pt exposed to poison ivy on Sat 3rd.  Itchy, blister

## 2019-08-06 NOTE — TELEPHONE ENCOUNTER
Reviewed Dr Maria Esther Merino orders with pt who verbalized understanding. Dr Maria Esther Merino pt wants to see you only. Next availability 8/12/19 res 24 @ 1pm, 1:15pm and 1:30p and requesting sooner appt. I did offer appts with alternative pcps, but declined.  Please advise

## 2019-08-06 NOTE — TELEPHONE ENCOUNTER
Needs eval. Make sure not secondary cellulitis. Can try bendryl 25 mg po q8 hrs. For 1-2 days. SOB? If yes>>> ER ASAP.

## 2019-08-20 NOTE — TELEPHONE ENCOUNTER
Pt was seen by Dr Jeancarlos Lew on 8/6/19 @ [address] [place] [address] office. No further action.

## 2019-08-22 RX ORDER — CLOPIDOGREL BISULFATE 75 MG/1
TABLET ORAL
Qty: 90 TABLET | Refills: 1 | Status: SHIPPED | OUTPATIENT
Start: 2019-08-22 | End: 2020-02-20

## 2019-08-22 RX ORDER — AMLODIPINE BESYLATE 2.5 MG/1
TABLET ORAL
Qty: 180 TABLET | Refills: 1 | Status: SHIPPED | OUTPATIENT
Start: 2019-08-22 | End: 2019-12-17

## 2019-08-22 NOTE — TELEPHONE ENCOUNTER
Review pended refill request as it does not fall under a protocol. Last Rx: 12/06/18.   LOV: 08/06/19  Hypertensive Medications  Protocol Criteria:  · Appointment scheduled in the past 6 months or in the next 3 months  · BMP or CMP in the past 12 months

## 2019-08-24 ENCOUNTER — HOSPITAL ENCOUNTER (OUTPATIENT)
Age: 74
Discharge: HOME | End: 2019-08-24
Attending: EMERGENCY MEDICINE
Payer: MEDICARE

## 2019-08-24 VITALS
TEMPERATURE: 98 F | WEIGHT: 116 LBS | DIASTOLIC BLOOD PRESSURE: 52 MMHG | RESPIRATION RATE: 20 BRPM | HEIGHT: 62 IN | SYSTOLIC BLOOD PRESSURE: 147 MMHG | HEART RATE: 63 BPM | BODY MASS INDEX: 21.35 KG/M2 | OXYGEN SATURATION: 100 %

## 2019-08-24 DIAGNOSIS — L25.9 CONTACT DERMATITIS, UNSPECIFIED CONTACT DERMATITIS TYPE, UNSPECIFIED TRIGGER: Primary | ICD-10-CM

## 2019-08-24 PROCEDURE — 99214 OFFICE O/P EST MOD 30 MIN: CPT

## 2019-08-24 PROCEDURE — 99213 OFFICE O/P EST LOW 20 MIN: CPT

## 2019-08-24 RX ORDER — METHYLPREDNISOLONE 4 MG/1
TABLET ORAL
Qty: 1 PACKAGE | Refills: 0 | Status: SHIPPED | OUTPATIENT
Start: 2019-08-24 | End: 2019-12-17 | Stop reason: ALTCHOICE

## 2019-08-24 NOTE — ED PROVIDER NOTES
Patient Seen in: [facility]    History   Patient presents with:  Rash Skin Problem (integumentary)    Stated Complaint: rash eye/neck/arms    HPI    The patient is a 57-year-old female with past history of non-insulin-dep as noted above.     Physical Exam     ED Triage Vitals [08/24/19 0944]   /52   Pulse 63   Resp 20   Temp 97.7 °F (36.5 °C)   Temp src Oral   SpO2 100 %   O2 Device None (Room air)       Current:/52   Pulse 63   Temp 97.7 °F (36.5 °C) (Oral)   Re Medication List    START taking these medications    methylPREDNISolone (MEDROL) 4 MG Oral Tablet Therapy Pack  Dosepack: take as directed  Qty: 1 Package Refills: 0

## 2019-08-29 ENCOUNTER — TELEPHONE (OUTPATIENT)
Dept: INTERNAL MEDICINE CLINIC | Facility: CLINIC | Age: 74
End: 2019-08-29

## 2019-08-29 NOTE — TELEPHONE ENCOUNTER
Spoke with patient, informed of [facility] notes below  She will purchase the Zyrtec and Oatmeal bath products as recommended

## 2019-08-29 NOTE — TELEPHONE ENCOUNTER
Take Zyrtec 10 mg at night. Try Aveeno oatmeal bath or oatmeal lotion to help with itching. Can use Zyrtec in addition to her other allergy medicine Zyrtec is a little bit better for skin.

## 2019-08-29 NOTE — TELEPHONE ENCOUNTER
Patient seen in [facility] 8/24/19 for contact dermatitis due to poison ivy, rash/itching to body and face, eyelid swelling, was started on Medrol pack and completed today, rash and eyelid swelling has improved, however continues to have itching to body, no other

## 2019-10-14 ENCOUNTER — MED REC SCAN ONLY (OUTPATIENT)
Dept: INTERNAL MEDICINE CLINIC | Facility: CLINIC | Age: 74
End: 2019-10-14

## 2019-10-14 ENCOUNTER — TELEPHONE (OUTPATIENT)
Dept: INTERNAL MEDICINE CLINIC | Facility: CLINIC | Age: 74
End: 2019-10-14

## 2019-10-14 ENCOUNTER — NURSE ONLY (OUTPATIENT)
Dept: INTERNAL MEDICINE CLINIC | Facility: CLINIC | Age: 74
End: 2019-10-14
Payer: COMMERCIAL

## 2019-10-14 DIAGNOSIS — E78.5 HYPERLIPIDEMIA, UNSPECIFIED HYPERLIPIDEMIA TYPE: Primary | ICD-10-CM

## 2019-10-14 DIAGNOSIS — E11.9 CONTROLLED TYPE 2 DIABETES MELLITUS WITHOUT COMPLICATION, WITHOUT LONG-TERM CURRENT USE OF INSULIN (HCC): Primary | ICD-10-CM

## 2019-10-14 PROCEDURE — 80053 COMPREHEN METABOLIC PANEL: CPT | Performed by: INTERNAL MEDICINE

## 2019-10-14 PROCEDURE — 80061 LIPID PANEL: CPT | Performed by: INTERNAL MEDICINE

## 2019-10-14 PROCEDURE — 82043 UR ALBUMIN QUANTITATIVE: CPT | Performed by: INTERNAL MEDICINE

## 2019-10-14 PROCEDURE — 82570 ASSAY OF URINE CREATININE: CPT | Performed by: INTERNAL MEDICINE

## 2019-10-14 PROCEDURE — 83036 HEMOGLOBIN GLYCOSYLATED A1C: CPT | Performed by: INTERNAL MEDICINE

## 2019-10-14 NOTE — TELEPHONE ENCOUNTER
Overall sugars and blood pressure more recently have looked good. Occasional highs but not too high and not stay high. Continue to check periodically. No changes.

## 2019-10-14 NOTE — TELEPHONE ENCOUNTER
Pt states that she is very constipated no bowl movement for 2 days, she is taking Renew Life fiber supplement and Renew Life probiotic that has 30 billion live culture and 10 probiotic strains. Pt states weight is now 121 LBS. Please advise.

## 2019-10-14 NOTE — TELEPHONE ENCOUNTER
Is she passing gas? Can try miralax 1 teaspoon up to twice a day of no help after first dose after couple hours can try suppository of Dulcolax both of which are available over-the-counter.

## 2019-10-21 ENCOUNTER — TELEPHONE (OUTPATIENT)
Dept: INTERNAL MEDICINE CLINIC | Facility: CLINIC | Age: 74
End: 2019-10-21

## 2019-10-22 RX ORDER — LIOTHYRONINE SODIUM 5 UG/1
TABLET ORAL
Qty: 90 TABLET | Refills: 1 | Status: SHIPPED | OUTPATIENT
Start: 2019-10-22 | End: 2020-04-18

## 2019-10-23 NOTE — TELEPHONE ENCOUNTER
Refill passed per [facility], [facility] protocol.       Requested Prescriptions   Pending Prescriptions Disp Refills   • LIOTHYRONINE SODIUM 5 MCG Oral Tab [Pharmacy Med Name: LIOTHYRONINE 5MCG TABLETS] 90 tablet 0     Sig: TAKE 1 TABLET(5 MCG) BY MOUTH EVERY NIG

## 2019-11-04 NOTE — TELEPHONE ENCOUNTER
Refill passed per [facility], [facility] protocol.   Hypertensive Medications  Protocol Criteria:  · Appointment scheduled in the past 6 months or in the next 3 months  · BMP or CMP in the past 12 months  · Creatinine result < 2  Recent Outpatient Visits Controlled type 2 diabetes mellitus without complication, without long-term current use of insulin [facility])    [facility], [facility], [address], Lisette Dias MD    Office Visit    3 months ago Urinary tract infection without hematuria, site unspeci

## 2019-11-09 RX ORDER — FENOFIBRATE 145 MG/1
TABLET, COATED ORAL
Qty: 90 TABLET | Refills: 1 | Status: SHIPPED | OUTPATIENT
Start: 2019-11-09 | End: 2020-03-31

## 2019-11-10 NOTE — TELEPHONE ENCOUNTER
Refill passed per [facility], [facility] protocol.     Requested Prescriptions   Pending Prescriptions Disp Refills   • FENOFIBRATE 145 MG Oral Tab [Pharmacy Med Name: FENOFIBRATE  TAB 145MG] 90 tablet 1     Sig: TAKE 1 TABLET ONCE DAILY       Cholesterol Minus Meter

## 2019-11-21 NOTE — TELEPHONE ENCOUNTER
Refill passed per [facility], [place], [facility] protocol.   Requested Prescriptions     Pending Prescriptions Disp Refills   • [address] In Vitro Strip [place] Med Name: ONE TOUCH ULTRA BLUE TEST ST(NEW)50] 100 strip 0     Sig: [address]

## 2019-11-30 RX ORDER — LISINOPRIL AND HYDROCHLOROTHIAZIDE 25; 20 MG/1; MG/1
1 TABLET ORAL DAILY
Qty: 90 TABLET | Refills: 1 | Status: SHIPPED | OUTPATIENT
Start: 2019-11-30 | End: 2020-04-03

## 2019-11-30 NOTE — TELEPHONE ENCOUNTER
Refill passed per [facility], [facility] protocol.   Hypertensive Medications  Protocol Criteria:  · Appointment scheduled in the past 6 months or in the next 3 months  · BMP or CMP in the past 12 months  · Creatinine result < 2  Recent Outpatient Visits

## 2019-12-16 ENCOUNTER — TELEPHONE (OUTPATIENT)
Dept: INTERNAL MEDICINE CLINIC | Facility: CLINIC | Age: 74
End: 2019-12-16

## 2019-12-16 PROBLEM — Z71.85 VACCINE COUNSELING: Status: ACTIVE | Noted: 2019-12-16

## 2019-12-16 NOTE — TELEPHONE ENCOUNTER
Dr. Jose D Landers: Patient requesting an appointment to see only you. Refusing to see another doctor. States she has been remodeling her home and her blood sugars have been very labile, her blood pressures are very labile and her bowel movements are inconsistent.

## 2019-12-16 NOTE — TELEPHONE ENCOUNTER
Patient states that her blood pressure and sugars are high. Pt states that she has been stressed. Transferred call to triage.

## 2019-12-17 ENCOUNTER — OFFICE VISIT (OUTPATIENT)
Dept: INTERNAL MEDICINE CLINIC | Facility: CLINIC | Age: 74
End: 2019-12-17
Payer: COMMERCIAL

## 2019-12-17 ENCOUNTER — MED REC SCAN ONLY (OUTPATIENT)
Dept: INTERNAL MEDICINE CLINIC | Facility: CLINIC | Age: 74
End: 2019-12-17

## 2019-12-17 VITALS
HEIGHT: 62 IN | BODY MASS INDEX: 22.67 KG/M2 | TEMPERATURE: 97 F | RESPIRATION RATE: 18 BRPM | DIASTOLIC BLOOD PRESSURE: 72 MMHG | WEIGHT: 123.19 LBS | SYSTOLIC BLOOD PRESSURE: 177 MMHG | HEART RATE: 58 BPM | OXYGEN SATURATION: 100 %

## 2019-12-17 DIAGNOSIS — E03.9 ACQUIRED HYPOTHYROIDISM: ICD-10-CM

## 2019-12-17 DIAGNOSIS — I10 ESSENTIAL HYPERTENSION: Primary | ICD-10-CM

## 2019-12-17 DIAGNOSIS — Z71.85 VACCINE COUNSELING: ICD-10-CM

## 2019-12-17 DIAGNOSIS — F41.9 ANXIETY: ICD-10-CM

## 2019-12-17 DIAGNOSIS — E11.9 CONTROLLED TYPE 2 DIABETES MELLITUS WITHOUT COMPLICATION, WITHOUT LONG-TERM CURRENT USE OF INSULIN (HCC): ICD-10-CM

## 2019-12-17 PROCEDURE — 36415 COLL VENOUS BLD VENIPUNCTURE: CPT | Performed by: INTERNAL MEDICINE

## 2019-12-17 PROCEDURE — 99214 OFFICE O/P EST MOD 30 MIN: CPT | Performed by: INTERNAL MEDICINE

## 2019-12-17 PROCEDURE — 81002 URINALYSIS NONAUTO W/O SCOPE: CPT | Performed by: INTERNAL MEDICINE

## 2019-12-17 RX ORDER — VENLAFAXINE HYDROCHLORIDE 37.5 MG/1
37.5 CAPSULE, EXTENDED RELEASE ORAL 2 TIMES DAILY
Qty: 180 CAPSULE | Refills: 1 | Status: SHIPPED | OUTPATIENT
Start: 2019-12-17 | End: 2020-01-30

## 2019-12-17 RX ORDER — AMLODIPINE BESYLATE 2.5 MG/1
TABLET ORAL
Qty: 180 TABLET | Refills: 1 | Status: SHIPPED | OUTPATIENT
Start: 2019-12-17 | End: 2020-02-20

## 2019-12-17 NOTE — PATIENT INSTRUCTIONS
ASSESSMENT/PLAN:   Essential hypertension  (primary encounter diagnosis) Not good control. Change amlodipine 2.5 mg in AM and 1/2 tab of 2.5 mg in at 4 Pm and 1/2 tab 2.5 mg at night. Call in 2 weeks.  Careful with diet and excercise at least 30 kaiden it might get. 6) Label your fear level from zero to 10 and watch it go up and down. Notice that it doesn't stay at a very high level for more than a few seconds. 7) When you find yourself thinking about fear, change your what if thinking.  Focus on and pe

## 2019-12-17 NOTE — PROGRESS NOTES
HPI:    Patient ID: Katie Goff is a 66year old female. HPI   Re-decorate and stressed about partner's health. Dx. With cancer. Grandson told collazo but mom and dad do not know. Patient here for follow up of Diabetes.   Has been taking medications r Wt Readings from Last 3 Encounters:  12/17/19 : 123 lb 3.2 oz (55.9 kg)  08/24/19 : 116 lb (52.6 kg)  08/06/19 : 120 lb (54.4 kg)    BP Readings from Last 3 Encounters:  12/17/19 : (!) 177/72  08/24/19 : 147/52  08/06/19 : 147/60    Labs:   Lab Results sleep disturbance. Negative for behavioral problems, confusion, hallucinations, self-injury and suicidal ideas. The patient is nervous/anxious. The patient is not hyperactive. All other systems reviewed and are negative.         Current Outpatient Medica TYLENOL ARTHRITIS PAIN OR Two 650mg tablets AM and PM Twice Daily     • ZINC 50 MG OR TABS 1 TABLET DAILY     • COD LIVER OIL OR 1 DAILY     • VITAMIN C 500 MG OR TABS 1 TABLET DAILY       Allergies:  Glimepiride             HYPOTENSION    Comment:Hypoglyc 177/72  08/24/19 : 147/52  08/06/19 : 147/60    Wt Readings from Last 3 Encounters:  12/17/19 : 123 lb 3.2 oz (55.9 kg)  08/24/19 : 116 lb (52.6 kg)  08/06/19 : 120 lb (54.4 kg)      Physical Exam   Constitutional: She is oriented to person, place, and navi normal. Thought content normal.   Nursing note and vitals reviewed. ASSESSMENT/PLAN:   Essential hypertension  (primary encounter diagnosis) Not good control.  Change amlodipine 2.5 mg in AM and 1/2 tab of 2.5 mg in at 4 Pm and 1/2 tab 2.5 mg at n what is happening to you rather than concern yourself with  how much worse it might get. 6) Label your fear level from zero to 10 and watch it go up and down. Notice that it doesn't stay at a very high level for more than a few seconds.   7) When you find

## 2019-12-18 ENCOUNTER — TELEPHONE (OUTPATIENT)
Dept: INTERNAL MEDICINE CLINIC | Facility: CLINIC | Age: 74
End: 2019-12-18

## 2019-12-18 NOTE — TELEPHONE ENCOUNTER
Received:  Today   Message Contents   Cody Jacobo sent to MD [address]     I received your navigation order for behavioral health services.  I have reached out to your patient and left a message with my contact inform

## 2019-12-21 RX ORDER — LEVOTHYROXINE SODIUM 88 UG/1
88 TABLET ORAL
Qty: 90 TABLET | Refills: 1 | Status: SHIPPED | OUTPATIENT
Start: 2019-12-21 | End: 2020-06-10

## 2019-12-21 NOTE — TELEPHONE ENCOUNTER
Refill passed per [facility], [facility] protocol.   Hypothyroid Medications  Protocol Criteria:  Appointment scheduled in the past 12 months or the next 3 months  TSH resulted in the past 12 months that is normal  Recent Outpatient Visits            4 days ago Es

## 2019-12-21 NOTE — TELEPHONE ENCOUNTER
Current Outpatient Medications:     Received request from [facility] requesting rx to be faxed or call please     •  SYNTHROID 88 MCG Oral Tab, TAKE 1 TABLET ONCE DAILY. , Disp: 90 tablet, Rfl: 1

## 2019-12-25 ENCOUNTER — TELEPHONE (OUTPATIENT)
Dept: INTERNAL MEDICINE CLINIC | Facility: CLINIC | Age: 74
End: 2019-12-25

## 2019-12-26 NOTE — TELEPHONE ENCOUNTER
Angy Alcocer sent to MD [address]     On December 23rd, the following referrals for therapy were provided to the patient:     Hira Severino, Therapist, [facility]

## 2020-01-02 ENCOUNTER — TELEPHONE (OUTPATIENT)
Dept: OTHER | Age: 75
End: 2020-01-02

## 2020-01-02 NOTE — TELEPHONE ENCOUNTER
Pt states she needs to report BP and blood sugar readings to Dr. Reyna Pratt, she will fax those reports to the office, she is not able to send via [address] Provided pt with phone number to schedule renal US.

## 2020-01-06 ENCOUNTER — LAB ENCOUNTER (OUTPATIENT)
Dept: LAB | Age: 75
End: 2020-01-06
Attending: INTERNAL MEDICINE
Payer: MEDICARE

## 2020-01-06 ENCOUNTER — HOSPITAL ENCOUNTER (OUTPATIENT)
Dept: ULTRASOUND IMAGING | Age: 75
Discharge: HOME | End: 2020-01-06
Attending: INTERNAL MEDICINE
Payer: MEDICARE

## 2020-01-06 ENCOUNTER — TELEPHONE (OUTPATIENT)
Dept: OTHER | Age: 75
End: 2020-01-06

## 2020-01-06 DIAGNOSIS — N18.30 CKD (CHRONIC KIDNEY DISEASE) STAGE 3, GFR 30-59 ML/MIN (HCC): Primary | ICD-10-CM

## 2020-01-06 DIAGNOSIS — N18.30 CKD (CHRONIC KIDNEY DISEASE) STAGE 3, GFR 30-59 ML/MIN (HCC): ICD-10-CM

## 2020-01-06 LAB
HAV AB SER QL IA: NONREACTIVE
HBV CORE AB SERPL QL IA: NONREACTIVE
HBV SURFACE AB SER QL: NONREACTIVE
HBV SURFACE AB SERPL IA-ACNC: <3.1 MIU/ML
HBV SURFACE AG SERPL QL IA: NONREACTIVE
HCV AB SERPL QL IA: NONREACTIVE
IGA SERPL-MCNC: 135 MG/DL (ref 70–312)
IGM SERPL-MCNC: 32.7 MG/DL (ref 43–279)
IMMUNOGLOBULIN PNL SER-MCNC: 702 MG/DL (ref 791–1643)

## 2020-01-06 PROCEDURE — 87340 HEPATITIS B SURFACE AG IA: CPT

## 2020-01-06 PROCEDURE — 86706 HEP B SURFACE ANTIBODY: CPT

## 2020-01-06 PROCEDURE — 83876 ASSAY MYELOPEROXIDASE: CPT

## 2020-01-06 PROCEDURE — 86160 COMPLEMENT ANTIGEN: CPT | Performed by: INTERNAL MEDICINE

## 2020-01-06 PROCEDURE — 76770 US EXAM ABDO BACK WALL COMP: CPT | Performed by: INTERNAL MEDICINE

## 2020-01-06 PROCEDURE — 80500 HEPATITIS A B + C PROFILE: CPT

## 2020-01-06 PROCEDURE — 84165 PROTEIN E-PHORESIS SERUM: CPT

## 2020-01-06 PROCEDURE — 86038 ANTINUCLEAR ANTIBODIES: CPT | Performed by: INTERNAL MEDICINE

## 2020-01-06 PROCEDURE — 86162 COMPLEMENT TOTAL (CH50): CPT

## 2020-01-06 PROCEDURE — 86038 ANTINUCLEAR ANTIBODIES: CPT

## 2020-01-06 PROCEDURE — 83516 IMMUNOASSAY NONANTIBODY: CPT

## 2020-01-06 PROCEDURE — 84165 PROTEIN E-PHORESIS SERUM: CPT | Performed by: INTERNAL MEDICINE

## 2020-01-06 PROCEDURE — 86039 ANTINUCLEAR ANTIBODIES (ANA): CPT

## 2020-01-06 PROCEDURE — 86704 HEP B CORE ANTIBODY TOTAL: CPT

## 2020-01-06 PROCEDURE — 86708 HEPATITIS A ANTIBODY: CPT

## 2020-01-06 PROCEDURE — 82784 ASSAY IGA/IGD/IGG/IGM EACH: CPT

## 2020-01-06 PROCEDURE — 86803 HEPATITIS C AB TEST: CPT

## 2020-01-06 PROCEDURE — 36415 COLL VENOUS BLD VENIPUNCTURE: CPT | Performed by: INTERNAL MEDICINE

## 2020-01-06 PROCEDURE — 85652 RBC SED RATE AUTOMATED: CPT | Performed by: INTERNAL MEDICINE

## 2020-01-06 PROCEDURE — 86334 IMMUNOFIX E-PHORESIS SERUM: CPT

## 2020-01-06 NOTE — TELEPHONE ENCOUNTER
Pt states she faxed blood sugar results to [facility] office on Friday, asking if doctor received the fax. Please advise.

## 2020-01-07 LAB
GBM AB, IGG (MAFD): 0 AU/ML
MYELOPEROX ANTIBODIES, IGG: 0 AU/ML
SERINE PROTEASE3, IGG: 2 AU/ML

## 2020-01-07 NOTE — TELEPHONE ENCOUNTER
Patient notified of Dr. Nora Caputo note  Will try to fax glucose readings to [facility] office fax number provided.

## 2020-01-07 NOTE — TELEPHONE ENCOUNTER
Patient advised of treatment recommendations agreed with plan.      *Also wanted Dr Maria Esther Merino to know she saw a counselor 2 weeks ago and will see again on Thurs ([id number] [place])

## 2020-01-07 NOTE — TELEPHONE ENCOUNTER
Reviewed sugar readings. Looking a lot better. Prior to December 31 higher but have explanation missing pill etc.  Or change in diet.   I would continue with the full pill at dinner and monitor for the next week and have her send a message back in the nex

## 2020-01-08 LAB
ALBUMIN SERPL ELPH-MCNC: 4.18 G/DL (ref 3.75–5.21)
ALBUMIN/GLOB SERPL: 1.72 {RATIO} (ref 1–2)
ALPHA1 GLOB SERPL ELPH-MCNC: 0.28 G/DL (ref 0.19–0.46)
ALPHA2 GLOB SERPL ELPH-MCNC: 0.73 G/DL (ref 0.48–1.05)
B-GLOBULIN SERPL ELPH-MCNC: 0.73 G/DL (ref 0.68–1.23)
COMPLEMENT ACTIVITY, TOTAL EIA: 100 CAE UNITS
GAMMA GLOB SERPL ELPH-MCNC: 0.69 G/DL (ref 0.62–1.7)
M PROTEIN MFR SERPL ELPH: 6.6 G/DL (ref 6.4–8.2)

## 2020-01-09 ENCOUNTER — APPOINTMENT (OUTPATIENT)
Dept: CARDIOLOGY | Age: 75
End: 2020-01-09

## 2020-01-09 ENCOUNTER — TELEPHONE (OUTPATIENT)
Dept: OTHER | Age: 75
End: 2020-01-09

## 2020-01-09 LAB — NUCLEAR IGG TITR SER IF: POSITIVE {TITER}

## 2020-01-09 NOTE — TELEPHONE ENCOUNTER
Pt returning call for recent test results. Reviewed test results with pt along with Dr Jacques Mccloud message. Pt verbalized understanding.       Aitlio Dickens MD on 1/6/2020  1:43 PM   Complement levels are normal.  Looking at causes of decline in kidney func

## 2020-01-10 ENCOUNTER — LAB ENCOUNTER (OUTPATIENT)
Dept: LAB | Age: 75
End: 2020-01-10
Attending: INTERNAL MEDICINE
Payer: MEDICARE

## 2020-01-10 ENCOUNTER — TELEPHONE (OUTPATIENT)
Dept: INTERNAL MEDICINE CLINIC | Facility: CLINIC | Age: 75
End: 2020-01-10

## 2020-01-10 DIAGNOSIS — R76.8 ANA POSITIVE: Primary | ICD-10-CM

## 2020-01-10 PROCEDURE — 86235 NUCLEAR ANTIGEN ANTIBODY: CPT | Performed by: INTERNAL MEDICINE

## 2020-01-10 PROCEDURE — 83516 IMMUNOASSAY NONANTIBODY: CPT

## 2020-01-10 PROCEDURE — 82103 ALPHA-1-ANTITRYPSIN TOTAL: CPT

## 2020-01-10 PROCEDURE — 86431 RHEUMATOID FACTOR QUANT: CPT | Performed by: INTERNAL MEDICINE

## 2020-01-10 PROCEDURE — 86225 DNA ANTIBODY NATIVE: CPT

## 2020-01-10 PROCEDURE — 86140 C-REACTIVE PROTEIN: CPT | Performed by: INTERNAL MEDICINE

## 2020-01-10 PROCEDURE — 36415 COLL VENOUS BLD VENIPUNCTURE: CPT

## 2020-01-10 NOTE — TELEPHONE ENCOUNTER
Aye Acevedo of [facility] states patient is there at the moment to complete lab, however, needs confirmation on orders due to orders in pending add on status.      Aye Ped also wants to confirm if C3 and C4 order is to be completed due to orders were f

## 2020-01-11 LAB
A1AT SERPL-MCNC: 135 MG/DL (ref 90–200)
ANA NUCLEOLAR TITR SER IF: 80 {TITER}

## 2020-01-13 LAB
CENTROMERE AB, IGG: 1 AU/ML
RIBOSOMAL P ANTIBODY: 5 AU/ML

## 2020-01-13 NOTE — TELEPHONE ENCOUNTER
Gregg from reference lab informed of Dr. Isreal Godoy message. 01/09/2020 status showed pending add-on. Naydine will cancel add on.

## 2020-01-13 NOTE — TELEPHONE ENCOUNTER
Dr. Venkat Louis, see message below and advise on C3 and C4.     Please respond to pool: BRANDON IM WC [facility] CLINICAL STAFF

## 2020-01-14 LAB — DSDNA AB TITR SER: <10 {TITER}

## 2020-01-20 RX ORDER — VENLAFAXINE HYDROCHLORIDE 37.5 MG/1
CAPSULE, EXTENDED RELEASE ORAL
Qty: 90 CAPSULE | Refills: 0 | OUTPATIENT
Start: 2020-01-20

## 2020-01-20 RX ORDER — MONTELUKAST SODIUM 10 MG/1
TABLET ORAL
Qty: 90 TABLET | Refills: 1 | Status: SHIPPED | OUTPATIENT
Start: 2020-01-20 | End: 2020-09-30

## 2020-01-20 NOTE — TELEPHONE ENCOUNTER
Refill passed per [place] [facility], [facility] protocol.   Refill Protocol Appointment Criteria  · Appointment scheduled in the past 6 months or in the next 3 months  Recent Outpatient Visits            1 month ago Essential hypertension    [facility], [facility], [address],

## 2020-01-21 ENCOUNTER — OFFICE VISIT (OUTPATIENT)
Dept: ALLERGY | Facility: CLINIC | Age: 75
End: 2020-01-21
Payer: COMMERCIAL

## 2020-01-21 VITALS
HEIGHT: 62 IN | WEIGHT: 123 LBS | DIASTOLIC BLOOD PRESSURE: 73 MMHG | BODY MASS INDEX: 22.63 KG/M2 | SYSTOLIC BLOOD PRESSURE: 130 MMHG | HEART RATE: 58 BPM

## 2020-01-21 DIAGNOSIS — J30.89 PERENNIAL ALLERGIC RHINITIS: Primary | ICD-10-CM

## 2020-01-21 DIAGNOSIS — R79.89 BLOOD CREATININE INCREASED COMPARED WITH PRIOR MEASUREMENT: ICD-10-CM

## 2020-01-21 PROCEDURE — 94010 BREATHING CAPACITY TEST: CPT | Performed by: ALLERGY & IMMUNOLOGY

## 2020-01-21 PROCEDURE — G0463 HOSPITAL OUTPT CLINIC VISIT: HCPCS | Performed by: ALLERGY & IMMUNOLOGY

## 2020-01-21 PROCEDURE — 99204 OFFICE O/P NEW MOD 45 MIN: CPT | Performed by: ALLERGY & IMMUNOLOGY

## 2020-01-21 RX ORDER — ALBUTEROL SULFATE 90 UG/1
2 AEROSOL, METERED RESPIRATORY (INHALATION) EVERY 6 HOURS PRN
Qty: 1 INHALER | Refills: 0 | Status: SHIPPED | OUTPATIENT
Start: 2020-01-21 | End: 2020-01-21

## 2020-01-21 RX ORDER — ALBUTEROL SULFATE 90 UG/1
AEROSOL, METERED RESPIRATORY (INHALATION)
Qty: 25.5 G | Refills: 0 | Status: SHIPPED | OUTPATIENT
Start: 2020-01-21

## 2020-01-21 RX ORDER — LEVOCETIRIZINE DIHYDROCHLORIDE 5 MG/1
5 TABLET, FILM COATED ORAL NIGHTLY
Qty: 30 TABLET | Refills: 0 | Status: SHIPPED | OUTPATIENT
Start: 2020-01-21 | End: 2020-01-21

## 2020-01-21 RX ORDER — LEVOCETIRIZINE DIHYDROCHLORIDE 5 MG/1
TABLET, FILM COATED ORAL
Qty: 90 TABLET | Refills: 0 | Status: SHIPPED | OUTPATIENT
Start: 2020-01-21 | End: 2020-05-14

## 2020-01-21 NOTE — TELEPHONE ENCOUNTER
Pt last seen in Allergy today 1/21/2020 for . . . Perennial allergic rhinitis  (primary encounter diagnosis)  Blood creatinine increased compared with prior measurement    Refill request received for . . .     90 day Supply of Albuterol HFA and Xyzal.

## 2020-01-21 NOTE — PROGRESS NOTES
Moreno Smalls is a 66year old female. HPI:   Patient presents with: Allergies: per patient Dr. Ilan Simmons referred her to Dr. Shelby Kern for possible lupus or allergies. per patient she has kidney problems.       Patient is a 70-year-old female who presents [facility].    • COLONOSCOPY  2009    nml   • ELECTROCARDIOGRAM, COMPLETE  02/07/2014    SCANNED TO MEDIA TAB - 02/07/2014      Family History   Problem Relation Age of Onset   • Stroke Father    • Heart Disorder Father    • Allergies Father    • Cancer Mother daily.     • Vitamin B-12 1000 MCG Oral Tab Take 1,000 mcg by mouth daily. • DHA-EPA-Vitamin E (OMEGA-3 COMPLEX OR) Take by mouth. • aspirin 81 MG Oral Tab EC Take 1 tablet (81 mg total) by mouth daily.  30 tablet 1   • Multiple Vitamins-Iron (ONCE hematuria  Hema/Lymph:  Negative for easy bleeding and easy bruising  Integumentary:  Negative for pruritus and rash  Musculoskeletal:  Negative for joint symptoms  Neurological:  Negative for dizziness, seizures  Psychiatric:  Negative for inappropriate i wheezing or shortness of breath. Patient had been using Advair for the symptoms as needed.   Reviewed with patient that Advair should be used on a daily basis if having coughing wheezing shortness of breath more than 2 days/week on a regular basis more as

## 2020-01-21 NOTE — PATIENT INSTRUCTIONS
1. AR  Handouts on allergies and avoidance measures provided and reviewed including the potential treatment option of immunotherapy. Currently on Singulair.   Add trial of Xyzal, levocetirizine 5 mg once a night at bedtime  Reviewed albuterol should be use

## 2020-01-23 ENCOUNTER — OFFICE VISIT (OUTPATIENT)
Dept: CARDIOLOGY | Age: 75
End: 2020-01-23

## 2020-01-23 VITALS
WEIGHT: 119 LBS | DIASTOLIC BLOOD PRESSURE: 72 MMHG | BODY MASS INDEX: 21.09 KG/M2 | HEIGHT: 63 IN | HEART RATE: 60 BPM | OXYGEN SATURATION: 99 % | SYSTOLIC BLOOD PRESSURE: 128 MMHG

## 2020-01-23 DIAGNOSIS — I25.10 CORONARY ARTERY DISEASE DUE TO CALCIFIED CORONARY LESION: Primary | ICD-10-CM

## 2020-01-23 DIAGNOSIS — E11.9 CONTROLLED TYPE 2 DIABETES MELLITUS WITHOUT COMPLICATION, WITHOUT LONG-TERM CURRENT USE OF INSULIN (CMD): ICD-10-CM

## 2020-01-23 DIAGNOSIS — R01.1 MURMUR: ICD-10-CM

## 2020-01-23 DIAGNOSIS — I25.84 CORONARY ARTERY DISEASE DUE TO CALCIFIED CORONARY LESION: Primary | ICD-10-CM

## 2020-01-23 DIAGNOSIS — E78.2 MIXED HYPERLIPIDEMIA: ICD-10-CM

## 2020-01-23 DIAGNOSIS — I10 ESSENTIAL HYPERTENSION: ICD-10-CM

## 2020-01-23 PROCEDURE — 3074F SYST BP LT 130 MM HG: CPT | Performed by: INTERNAL MEDICINE

## 2020-01-23 PROCEDURE — 3078F DIAST BP <80 MM HG: CPT | Performed by: INTERNAL MEDICINE

## 2020-01-23 PROCEDURE — 99214 OFFICE O/P EST MOD 30 MIN: CPT | Performed by: INTERNAL MEDICINE

## 2020-01-23 RX ORDER — CLOTRIMAZOLE AND BETAMETHASONE DIPROPIONATE 10; .64 MG/G; MG/G
CREAM TOPICAL
COMMUNITY
Start: 2019-07-17

## 2020-01-23 RX ORDER — MULTIVITAMIN WITH IRON
TABLET ORAL
COMMUNITY
End: 2020-07-23

## 2020-01-23 RX ORDER — PREDNISOLONE ACETATE 10 MG/ML
1 SUSPENSION/ DROPS OPHTHALMIC
COMMUNITY
Start: 2018-12-07 | End: 2020-01-23

## 2020-01-23 RX ORDER — LISINOPRIL AND HYDROCHLOROTHIAZIDE 25; 20 MG/1; MG/1
1 TABLET ORAL
COMMUNITY
Start: 2019-11-30

## 2020-01-23 RX ORDER — EPINEPHRINE 0.3 MG/.3ML
INJECTION SUBCUTANEOUS
COMMUNITY
Start: 2018-01-30

## 2020-01-23 RX ORDER — ALBUTEROL SULFATE 90 UG/1
AEROSOL, METERED RESPIRATORY (INHALATION)
COMMUNITY
Start: 2020-01-21

## 2020-01-23 RX ORDER — LEVOCETIRIZINE DIHYDROCHLORIDE 5 MG/1
TABLET, FILM COATED ORAL
COMMUNITY
Start: 2020-01-21

## 2020-01-23 RX ORDER — SACCHAROMYCES BOULARDII 250 MG
250 CAPSULE ORAL
COMMUNITY
End: 2020-01-23

## 2020-01-23 ASSESSMENT — PATIENT HEALTH QUESTIONNAIRE - PHQ9
SUM OF ALL RESPONSES TO PHQ9 QUESTIONS 1 AND 2: 0
1. LITTLE INTEREST OR PLEASURE IN DOING THINGS: NOT AT ALL
SUM OF ALL RESPONSES TO PHQ9 QUESTIONS 1 AND 2: 0
2. FEELING DOWN, DEPRESSED OR HOPELESS: NOT AT ALL

## 2020-01-30 RX ORDER — VENLAFAXINE HYDROCHLORIDE 37.5 MG/1
CAPSULE, EXTENDED RELEASE ORAL
Qty: 90 CAPSULE | Refills: 1 | Status: SHIPPED | OUTPATIENT
Start: 2020-01-30 | End: 2020-07-17

## 2020-02-05 ENCOUNTER — TELEPHONE (OUTPATIENT)
Dept: INTERNAL MEDICINE CLINIC | Facility: CLINIC | Age: 75
End: 2020-02-05

## 2020-02-05 ENCOUNTER — ANCILLARY PROCEDURE (OUTPATIENT)
Dept: CARDIOLOGY | Age: 75
End: 2020-02-05
Attending: INTERNAL MEDICINE

## 2020-02-05 DIAGNOSIS — R01.1 MURMUR: ICD-10-CM

## 2020-02-05 PROCEDURE — 93306 TTE W/DOPPLER COMPLETE: CPT | Performed by: INTERNAL MEDICINE

## 2020-02-05 NOTE — PROGRESS NOTES
HPI:    Patient ID: Emili Shabazz is a 66year old female.     Patient presents with:  Wellness Visit: Medicare annual     Emili Shabazz is a 66year old female who presents for a complete physical exam.   HPI:   Patient presents with:  Wellness Visit patient.     Current Outpatient Medications   Medication Sig Dispense Refill   • VENLAFAXINE HCL ER 37.5 MG Oral Capsule SR 24 Hr TAKE 1 CAPSULE(37.5 MG) BY MOUTH EVERY NIGHT 90 capsule 1   • ALBUTEROL SULFATE  (90 Base) MCG/ACT Inhalation Aero Soln Tab TAKE 1 TABLET(5 MG) BY MOUTH NIGHTLY (Patient not taking: Reported on 2/6/2020) 90 tablet 0   • MONTELUKAST SODIUM 10 MG Oral Tab TAKE 1 TABLET(10 MG) BY MOUTH EVERY NIGHT (Patient not taking: Reported on 1/21/2020) 90 tablet 1   • clotrimazole-betamet Births2      Hx of Pap: all Paps normal  Sees gyne. Patient here for follow up of Diabetes. Has been taking medications regularly. Checks sugars 2 times daily. Fasting sugars average 100's. No lows. Watches diabetic diet, low salt.   Diab oz (56 kg)  01/21/20 : 123 lb (55.8 kg)  12/17/19 : 123 lb 3.2 oz (55.9 kg)    BP Readings from Last 3 Encounters:  02/06/20 : 115/69  01/21/20 : 130/73  12/17/19 : (!) 177/72    Labs:   Lab Results   Component Value Date/Time    GLU 89 12/17/2019 02:53 PM sores, nosebleeds, postnasal drip, rhinorrhea, sinus pressure, sinus pain, sneezing, sore throat, tinnitus, trouble swallowing and voice change. Eyes: Negative for photophobia, pain, discharge, redness, itching and visual disturbance.    Respiratory: Neg 1   • amLODIPine Besylate 2.5 MG Oral Tab 1 tab qam and 1/2 tab in 5 PM and 1/2 qhs. 180 tablet 1   • LISINOPRIL-HYDROCHLOROTHIAZIDE 20-25 MG Oral Tab TAKE 1 TABLET BY MOUTH DAILY 90 tablet 1   • ONETOUCH ULTRA BLUE In Vitro Strip CHECK BLOOD EVERY MORNING bradycardia  Gluten Meal             OTHER (SEE COMMENTS)    Comment:xx  Bees                      Beeswax                 OTHER (SEE COMMENTS)  Ibuprofen               UNKNOWN  Nsaids                  UNKNOWN    Comment:Per patient, takes baby aspirin at lb (55.8 kg)  12/17/19 : 123 lb 3.2 oz (55.9 kg)      Physical Exam   Constitutional: She is oriented to person, place, and time. Vital signs are normal. She appears well-developed and well-nourished. She is active. Non-toxic appearance.  She does not have Conjunctivae, EOM and lids are normal. Right eye exhibits no chemosis, no discharge, no exudate and no hordeolum. No foreign body present in the right eye. Left eye exhibits no chemosis, no discharge, no exudate and no hordeolum.  No foreign body present in ascites and no mass. There is no hepatosplenomegaly, splenomegaly or hepatomegaly. There is no tenderness. There is no rigidity, no rebound, no guarding and no CVA tenderness. Genitourinary: No breast swelling, tenderness, discharge or bleeding.     Pelvi hypothyroidism  Age-related osteoporosis without current pathological fracture  Encounter for annual health examination  Skin exam, screening for cancer    No orders of the defined types were placed in this encounter.       Meds This Visit:  Requested Presc Alcohol screening   Kasey Marie was screened for Alcohol abuse and had a score of 0 so is at low risk.     Patient Care Team: Patient Care Team:  Miguelina Swain MD as PCP - General (Internal Medicine)  Dayami Smith MD as Consulting Physician (Inter Oral Tab, Take 1 tablet (88 mcg total) by mouth before breakfast.  amLODIPine Besylate 2.5 MG Oral Tab, 1 tab qam and 1/2 tab in 5 PM and 1/2 qhs.   LISINOPRIL-HYDROCHLOROTHIAZIDE 20-25 MG Oral Tab, TAKE 1 TABLET BY MOUTH DAILY  ONETOUCH ULTRA BLUE In Vitro Disorder in her father; Stroke in her father; goiter in her daughter. SOCIAL HISTORY:   She  reports that she has never smoked. She has never used smokeless tobacco. She reports current alcohol use of about 1.0 standard drinks of alcohol per week.  She re loss:  No               Visual Acuity  Right Eye Visual Acuity: Uncorrected Right Eye Chart Acuity: 20/100   Left Eye Visual Acuity: Uncorrected Left Eye Chart Acuity: 20/200   Both Eyes Visual Acuity: Uncorrected Both Eyes Chart Acuity: 20/200   Able To T natural sugars. One serving a day and no more than 1 handful every day. Check feet  every AM and careful with sores and ulcers on feet bilaterally.  Check eyes every year with dilated eye exam.     Acquired hypothyroidism Clinically and biochemically euthyro This section provided for quick review of chart, separate sheet to patient  [address] Internal Lab or Procedure External Lab or Procedure   Diabetes Screening      HbgA1C   Annually Lab Results   Component Value Date Influenza  Covered Annually 9/25/2019 Please get every year    Pneumococcal 13 (Prevnar)  Covered Once after 65 07/27/2015 Please get once after your 65th birthday    Pneumococcal 23 (Pneumovax)  Covered Once after 65 02/13/2018 Please get once after y Cholesterol Calc (mg/dL)   Date Value   10/25/2010 92     LDL Cholesterol (mg/dL)   Date Value   10/14/2019 75    No flowsheet data found.      Dilated Eye exam  Annually Data entered on: 1/31/2019   Last Dilated Eye Exam 1/28/2019     No flowsheet data fou

## 2020-02-06 ENCOUNTER — MA CHART PREP (OUTPATIENT)
Dept: FAMILY MEDICINE CLINIC | Facility: CLINIC | Age: 75
End: 2020-02-06

## 2020-02-06 ENCOUNTER — TELEPHONE (OUTPATIENT)
Dept: CARDIOLOGY | Age: 75
End: 2020-02-06

## 2020-02-06 ENCOUNTER — OFFICE VISIT (OUTPATIENT)
Dept: INTERNAL MEDICINE CLINIC | Facility: CLINIC | Age: 75
End: 2020-02-06
Payer: COMMERCIAL

## 2020-02-06 ENCOUNTER — MED REC SCAN ONLY (OUTPATIENT)
Dept: INTERNAL MEDICINE CLINIC | Facility: CLINIC | Age: 75
End: 2020-02-06

## 2020-02-06 VITALS
WEIGHT: 123.38 LBS | RESPIRATION RATE: 18 BRPM | DIASTOLIC BLOOD PRESSURE: 69 MMHG | SYSTOLIC BLOOD PRESSURE: 115 MMHG | HEIGHT: 62 IN | BODY MASS INDEX: 22.7 KG/M2 | TEMPERATURE: 98 F | HEART RATE: 65 BPM | OXYGEN SATURATION: 98 %

## 2020-02-06 DIAGNOSIS — E11.9 CONTROLLED TYPE 2 DIABETES MELLITUS WITHOUT COMPLICATION, WITHOUT LONG-TERM CURRENT USE OF INSULIN (HCC): ICD-10-CM

## 2020-02-06 DIAGNOSIS — Z12.83 SKIN EXAM, SCREENING FOR CANCER: ICD-10-CM

## 2020-02-06 DIAGNOSIS — E03.9 ACQUIRED HYPOTHYROIDISM: ICD-10-CM

## 2020-02-06 DIAGNOSIS — I25.84 CORONARY ARTERY DISEASE DUE TO CALCIFIED CORONARY LESION: ICD-10-CM

## 2020-02-06 DIAGNOSIS — Z00.00 ENCOUNTER FOR ANNUAL HEALTH EXAMINATION: ICD-10-CM

## 2020-02-06 DIAGNOSIS — I25.10 CORONARY ARTERY DISEASE DUE TO CALCIFIED CORONARY LESION: ICD-10-CM

## 2020-02-06 DIAGNOSIS — M81.0 AGE-RELATED OSTEOPOROSIS WITHOUT CURRENT PATHOLOGICAL FRACTURE: ICD-10-CM

## 2020-02-06 DIAGNOSIS — I10 ESSENTIAL HYPERTENSION: Primary | ICD-10-CM

## 2020-02-06 PROBLEM — R01.1 MURMUR: Status: ACTIVE | Noted: 2020-02-06

## 2020-02-06 PROBLEM — I77.1 TORTUOUS AORTA (HCC): Status: ACTIVE | Noted: 2020-02-06

## 2020-02-06 PROBLEM — I77.1 TORTUOUS AORTA: Status: ACTIVE | Noted: 2020-02-06

## 2020-02-06 PROBLEM — N18.30 STAGE 3 CHRONIC KIDNEY DISEASE (HCC): Status: ACTIVE | Noted: 2020-02-06

## 2020-02-06 PROCEDURE — G0439 PPPS, SUBSEQ VISIT: HCPCS | Performed by: INTERNAL MEDICINE

## 2020-02-06 PROCEDURE — 96160 PT-FOCUSED HLTH RISK ASSMT: CPT | Performed by: INTERNAL MEDICINE

## 2020-02-06 PROCEDURE — 99397 PER PM REEVAL EST PAT 65+ YR: CPT | Performed by: INTERNAL MEDICINE

## 2020-02-06 NOTE — PATIENT INSTRUCTIONS
ASSESSMENT AND OTHER RELEVANT CHRONIC CONDITIONS:   Makayla Sprague is a 66year old female who presents for a Medicare Assessment. PLAN SUMMARY:   Diagnoses and all orders for this visit:    Essential hypertension Good control.  Careful with diet and 5.9 (H)     HgbA1C (%)   Date Value   10/14/2019 5.9 (H)    No flowsheet data found.     Fasting Blood Sugar (FSB)   Patient must be diagnosed with one of the following:   • Hypertension   • Dyslipidemia   • Obesity (BMI ³30 kg/m2)   • Previous elevated imp Annually No results found for: FOB, OCCULTSTOOL No flowsheet data found.      Barium Enema-   uncomfortable but covered  Covered but uncomfortable   Glaucoma Screening      Ophthalmology Visit   Covered annually for Diabetics, people with Glaucoma family hi Please get once after your 65th birthday    Hepatitis B for Moderate/High Risk       No orders found for this or any previous visit.  Medium/high risk factors:   End-stage renal disease   Hemophiliacs who received Factor VIII or IX concentrates   Clients of

## 2020-02-20 RX ORDER — CLOPIDOGREL BISULFATE 75 MG/1
TABLET ORAL
Qty: 90 TABLET | Refills: 1 | Status: SHIPPED | OUTPATIENT
Start: 2020-02-20 | End: 2020-08-17

## 2020-02-20 RX ORDER — AMLODIPINE BESYLATE 2.5 MG/1
TABLET ORAL
Qty: 180 TABLET | Refills: 1 | Status: SHIPPED | OUTPATIENT
Start: 2020-02-20 | End: 2020-11-10

## 2020-02-20 NOTE — TELEPHONE ENCOUNTER
Review pended refill request as it does not fall under a protocol. Last Rx: 8/22/19  LOV: 1 week ago    Please review; protocol failed.  D/t labs (recently sent to Ernie smith in [facility], request to local pharmacy)  Requested Prescriptions     Pending Prescript

## 2020-02-26 ENCOUNTER — NURSE TRIAGE (OUTPATIENT)
Dept: OTHER | Age: 75
End: 2020-02-26

## 2020-02-26 ENCOUNTER — APPOINTMENT (OUTPATIENT)
Dept: LAB | Age: 75
End: 2020-02-26
Attending: INTERNAL MEDICINE
Payer: MEDICARE

## 2020-02-26 ENCOUNTER — TELEPHONE (OUTPATIENT)
Dept: INTERNAL MEDICINE CLINIC | Facility: CLINIC | Age: 75
End: 2020-02-26

## 2020-02-26 DIAGNOSIS — N30.00 ACUTE CYSTITIS WITHOUT HEMATURIA: ICD-10-CM

## 2020-02-26 LAB
BILIRUB UR QL: NEGATIVE
CLARITY UR: CLEAR
COLOR UR: YELLOW
GLUCOSE UR-MCNC: NEGATIVE MG/DL
KETONES UR-MCNC: NEGATIVE MG/DL
NITRITE UR QL STRIP.AUTO: NEGATIVE
PH UR: 6 [PH] (ref 5–8)
PROT UR-MCNC: NEGATIVE MG/DL
RBC #/AREA URNS AUTO: 90 /HPF
SP GR UR STRIP: 1.02 (ref 1–1.03)
UROBILINOGEN UR STRIP-ACNC: <2
WBC #/AREA URNS AUTO: 107 /HPF

## 2020-02-26 PROCEDURE — 87186 SC STD MICRODIL/AGAR DIL: CPT

## 2020-02-26 PROCEDURE — 81001 URINALYSIS AUTO W/SCOPE: CPT

## 2020-02-26 PROCEDURE — 87086 URINE CULTURE/COLONY COUNT: CPT

## 2020-02-26 PROCEDURE — 87077 CULTURE AEROBIC IDENTIFY: CPT

## 2020-02-26 NOTE — TELEPHONE ENCOUNTER
Action Requested: Summary for Provider     []  Critical Lab, Recommendations Needed  [] Need Additional Advice  []   FYI    []   Need Orders  [] Need Medications Sent to Pharmacy  []  Other     SUMMARY: pt states that she woke up with a UTI.   Has had urina

## 2020-02-26 NOTE — TELEPHONE ENCOUNTER
Spoke with patient ( verified) and relayed Dr. Masood Boo message below--patient verbalizes understanding and agrees to start [address]. Patient aware abx may need to be changed, pended final sensitivity tomorrow.  No further questions/concerns at this ti

## 2020-02-26 NOTE — TELEPHONE ENCOUNTER
Please let her know that we have to check a urine and urine culture before we start her on antibiotic if she cannot come to the office today I will put the order for urine and urine culture when she goes to her orthopedic doctor she can do urine and urine

## 2020-02-26 NOTE — TELEPHONE ENCOUNTER
Since we already have the urine and urine culture in the lab I can start her empirically on antibiotic if she wants to or if she wants she can wait until we have the results back.   She can take Azo over-the-counter drink more fluids always wipe front to ba

## 2020-02-26 NOTE — TELEPHONE ENCOUNTER
Pt calling for urine results. Advised pt that urine still in process. Pt asking what she can do for pain upon urination. Please advise.

## 2020-03-11 ENCOUNTER — MED REC SCAN ONLY (OUTPATIENT)
Dept: INTERNAL MEDICINE CLINIC | Facility: CLINIC | Age: 75
End: 2020-03-11

## 2020-03-11 ENCOUNTER — TELEPHONE (OUTPATIENT)
Dept: INTERNAL MEDICINE CLINIC | Facility: CLINIC | Age: 75
End: 2020-03-11

## 2020-03-11 PROBLEM — H47.20 OPTIC ATROPHY: Status: ACTIVE | Noted: 2020-03-11

## 2020-03-24 ENCOUNTER — TELEPHONE (OUTPATIENT)
Dept: INTERNAL MEDICINE CLINIC | Facility: CLINIC | Age: 75
End: 2020-03-24

## 2020-03-25 ENCOUNTER — TELEPHONE (OUTPATIENT)
Dept: INTERNAL MEDICINE CLINIC | Facility: CLINIC | Age: 75
End: 2020-03-25

## 2020-03-31 ENCOUNTER — TELEPHONE (OUTPATIENT)
Dept: INTERNAL MEDICINE CLINIC | Facility: CLINIC | Age: 75
End: 2020-03-31

## 2020-03-31 RX ORDER — FENOFIBRATE 145 MG/1
TABLET, COATED ORAL
Qty: 90 TABLET | Refills: 1 | OUTPATIENT
Start: 2020-03-31 | End: 2020-03-31

## 2020-03-31 RX ORDER — FENOFIBRATE 145 MG/1
TABLET, COATED ORAL
Qty: 90 TABLET | Refills: 1 | Status: SHIPPED | OUTPATIENT
Start: 2020-03-31 | End: 2020-04-02

## 2020-03-31 NOTE — TELEPHONE ENCOUNTER
Please check with patient regarding medications if she wants us to send it to Man[facility].   [facility] Kimberlee sent us a refill request for fenofibrate problem is that I do not have the Zeer Carebethany in her prescription profile she wants me to send it to see the CV

## 2020-03-31 NOTE — TELEPHONE ENCOUNTER
Spoke with pt and she states it is [facility]

## 2020-04-01 RX ORDER — ALBUTEROL SULFATE 90 UG/1
AEROSOL, METERED RESPIRATORY (INHALATION)
Qty: 8.5 G | Refills: 0 | OUTPATIENT
Start: 2020-04-01

## 2020-04-01 NOTE — TELEPHONE ENCOUNTER
Refill requested for:  Name from pharmacy: ALBUTEROL HFA INH (200 PUFFS) 8.5GM          Will file in chart as: ALBUTEROL SULFATE  (90 Base) MCG/ACT Inhalation Aero Soln         Sig: INHALE 2 PUFFS INTO THE LUNGS EVERY 6 HOURS AS NEEDED FOR WHEEZING

## 2020-04-02 ENCOUNTER — TELEPHONE (OUTPATIENT)
Dept: INTERNAL MEDICINE CLINIC | Facility: CLINIC | Age: 75
End: 2020-04-02

## 2020-04-02 RX ORDER — FENOFIBRATE 145 MG/1
TABLET, COATED ORAL
Qty: 90 TABLET | Refills: 1 | Status: SHIPPED | OUTPATIENT
Start: 2020-04-02 | End: 2020-05-07

## 2020-04-02 NOTE — TELEPHONE ENCOUNTER
Current Outpatient Medications:   •  Fenofibrate 145 MG Oral Tab, TAKE 1 TABLET ONCE DAILY, Disp: 90 tablet, Rfl: 1    [facility] [phone] reference [id number]

## 2020-04-03 RX ORDER — LISINOPRIL AND HYDROCHLOROTHIAZIDE 25; 20 MG/1; MG/1
1 TABLET ORAL DAILY
Qty: 90 TABLET | Refills: 1 | Status: SHIPPED | OUTPATIENT
Start: 2020-04-03 | End: 2020-04-09

## 2020-04-09 ENCOUNTER — TELEPHONE (OUTPATIENT)
Dept: OTHER | Age: 75
End: 2020-04-09

## 2020-04-09 RX ORDER — LISINOPRIL AND HYDROCHLOROTHIAZIDE 25; 20 MG/1; MG/1
1 TABLET ORAL 2 TIMES DAILY
Qty: 180 TABLET | Refills: 1 | Status: CANCELLED | OUTPATIENT
Start: 2020-04-09

## 2020-04-09 RX ORDER — LISINOPRIL AND HYDROCHLOROTHIAZIDE 25; 20 MG/1; MG/1
1 TABLET ORAL 2 TIMES DAILY
Qty: 180 TABLET | Refills: 1 | Status: SHIPPED | OUTPATIENT
Start: 2020-04-09 | End: 2020-10-05

## 2020-04-09 NOTE — TELEPHONE ENCOUNTER
Spoke to patient, told her Dr Mariya Sanchez said that readings looks good, to continue to take one in the morning and one at night time. Also that Doctor sent the refill to pharmacy with correct dosing, and she updated in the system.  Patients states this is her f

## 2020-04-09 NOTE — TELEPHONE ENCOUNTER
Readings look good. We will continue the same. Refill sent to pharmacy for correct dosing, corrected in the computer with the dose.

## 2020-04-09 NOTE — TELEPHONE ENCOUNTER
Patient calling stating she is taking LISINOPRIL-HYDROCHLOROTHIAZIDE 20-25 MG Oral Tab one tablet twice daily. Per patient, she contacted the pharmacy and was told script was sent for LISINOPRIL-HYDROCHLOROTHIAZIDE 20-25 MG Oral Tab one tablet daily. Per patient, Dr. Tahir Thurman told her to increase medication to twice daily. No notes in chart stating patient was instructed to increase medication to twice daily. Patient states her blood pressure has been averaging between 125/60's while taking LISINOPRIL-HYDROCHLOROTHIAZIDE 20-25 MG Oral Tab twice daily.      Dr. Tahir Thurman:   Please advise  See pended script

## 2020-04-09 NOTE — TELEPHONE ENCOUNTER
Pharmacy calling lisinopril/hctz 20/25 mg  Prescription direction are one tablet a day patient states taking 2 tablets a day please clarify

## 2020-04-09 NOTE — TELEPHONE ENCOUNTER
Spoke to patient.  She states she is taking one tablet in the morning and one tablet at night of Lisinopril/HTZ 20-25mg    Received a call from the patient who reports she is taking LISINOPRIL-HYDROCHLOROTHIAZIDE 20-25 MG 2 tabs daily.      Patient reports

## 2020-04-09 NOTE — TELEPHONE ENCOUNTER
Received a call from the patient who reports she is taking LISINOPRIL-HYDROCHLOROTHIAZIDE 20-25 MG 2 tabs daily.      Patient reports her B/p readings are as follows:    4/1 125/56 66  4/1 119/63 75    4/2 137/69 63  4/2 130/68 65    4/3 124/55 60  4/3 125/

## 2020-04-18 RX ORDER — LIOTHYRONINE SODIUM 5 UG/1
TABLET ORAL
Qty: 90 TABLET | Refills: 1 | Status: SHIPPED | OUTPATIENT
Start: 2020-04-18 | End: 2020-10-03

## 2020-04-22 ENCOUNTER — TELEPHONE (OUTPATIENT)
Dept: INTERNAL MEDICINE CLINIC | Facility: CLINIC | Age: 75
End: 2020-04-22

## 2020-04-22 DIAGNOSIS — R19.7 DIARRHEA, UNSPECIFIED TYPE: ICD-10-CM

## 2020-04-22 DIAGNOSIS — E11.9 CONTROLLED TYPE 2 DIABETES MELLITUS WITHOUT COMPLICATION, WITHOUT LONG-TERM CURRENT USE OF INSULIN (HCC): ICD-10-CM

## 2020-04-22 DIAGNOSIS — F41.9 ANXIETY: ICD-10-CM

## 2020-04-22 DIAGNOSIS — I10 ESSENTIAL HYPERTENSION: Primary | ICD-10-CM

## 2020-04-22 PROCEDURE — 99443 PHONE E/M BY PHYS 21-30 MIN: CPT | Performed by: INTERNAL MEDICINE

## 2020-04-22 RX ORDER — DICYCLOMINE HYDROCHLORIDE 10 MG/1
10 CAPSULE ORAL
Qty: 30 CAPSULE | Refills: 0 | Status: SHIPPED | OUTPATIENT
Start: 2020-04-22 | End: 2020-09-30

## 2020-04-22 RX ORDER — PEN NEEDLE, DIABETIC 30 GX3/16"
1 NEEDLE, DISPOSABLE MISCELLANEOUS
Qty: 30 EACH | Refills: 0 | Status: SHIPPED | OUTPATIENT
Start: 2020-04-22 | End: 2020-09-30

## 2020-04-22 RX ORDER — PEN NEEDLE, DIABETIC 30 GX3/16"
1 NEEDLE, DISPOSABLE MISCELLANEOUS
Qty: 30 EACH | Refills: 0 | Status: SHIPPED | OUTPATIENT
Start: 2020-04-22 | End: 2020-06-26

## 2020-04-22 NOTE — TELEPHONE ENCOUNTER
Ami/ Pharmacist of [facility] is calling to clarify directions/ dosage for patient's medication Semaglutide,0.25 or 0.5MG/DOS, (OZEMPIC, 0.25 OR 0.5 MG/DOSE,) 2 MG/1.5ML Subcutaneous Solution Pen-injector.     Bria Rodriguez states she received scripts with 2

## 2020-04-22 NOTE — TELEPHONE ENCOUNTER
Pt states over the last week her blood sugars have been elevated.  Blood sugar readings as follows:    4/17/2020 , 187 at 1730, 124 at 2200  4/18/2020 , 122 at 1550, 148 at 2150  4/19/2020 no readings  4/20/2020 , 166 at 2215  4/21/2020

## 2020-04-22 NOTE — TELEPHONE ENCOUNTER
Virtual Telephone Check-In    Valentina Casey verbally consents to a Virtual/Telephone Check-In visit on 04/22/20. Patient understands and accepts financial responsibility for any deductible, co-insurance and/or co-pays associated with this service. Inhalation Aero Soln, INHALE 2 PUFFS INTO THE LUNGS EVERY 6 HOURS AS NEEDED FOR WHEEZING, Disp: 25.5 g, Rfl: 0  •  LEVOCETIRIZINE DIHYDROCHLORIDE 5 MG Oral Tab, TAKE 1 TABLET(5 MG) BY MOUTH NIGHTLY (Patient not taking: Reported on 2/6/2020), Disp: 90 table discussed: I, Dr. Mehnaz Dowell, spoke with pt. Diarrhea X 4 weeks. Lot of stress. Like when watery and suddenly. Nonbloody. Cramping. No N,V,F,C. Digestive gold tabs. 6 a day and helps. No urinary c/o. 3 bottles of water a day. No GERD.  No exposure of covi mg/dL - - - -   HDL 40 - 59 mg/dL - - - -   LDL <100 mg/dL - - - -   Microalbumin 0.0 - 17.0 ug/mL - - - -   Microalb/Creatinine Ratio 0.0 - 30.0 mg/g creat - - - -   Microalb/Creatinine Ratio 0.0 - 30.0 mg/g creat - - - -   Microalbumin Urine mg/dL - - - foods more and grill occasionally. Avoid fried foods. No salt. Use other seasonings. Controlled type 2 diabetes mellitus without complication, without long-term current use of insulin (HCC) Not good control. Add ozempic 0.25 once a week.  Discussed with

## 2020-04-23 ENCOUNTER — TELEPHONE (OUTPATIENT)
Dept: INTERNAL MEDICINE CLINIC | Facility: CLINIC | Age: 75
End: 2020-04-23

## 2020-04-23 NOTE — TELEPHONE ENCOUNTER
Luis E Learn sent to MD Andrew [address] Dr[address]     I received your navigation order for behavioral health services.  I have reached out to your patient and left a message with my contact information.      I will continue my outreach

## 2020-04-23 NOTE — TELEPHONE ENCOUNTER
There is because there is an initial starting phase where she does 0.25 once a week for 2 weeks and then she will increase 2.5. If she wants to just do the 0.5 inches give her the 0.5 and then the patient will know she needs to start up 0.25 that is fine.

## 2020-04-24 NOTE — TELEPHONE ENCOUNTER
[facility] and per Domenico Day there is no question or issue with Ozempic Rx; no further action needed.

## 2020-04-27 ENCOUNTER — TELEPHONE (OUTPATIENT)
Dept: INTERNAL MEDICINE CLINIC | Facility: CLINIC | Age: 75
End: 2020-04-27

## 2020-04-27 NOTE — TELEPHONE ENCOUNTER
Spoke with patient about the insurance not covering metformin 500mg tablets. Patients states that she has plenty at home. When she needs she will let us know.

## 2020-04-30 ENCOUNTER — TELEPHONE (OUTPATIENT)
Dept: INTERNAL MEDICINE CLINIC | Facility: CLINIC | Age: 75
End: 2020-04-30

## 2020-04-30 NOTE — TELEPHONE ENCOUNTER
Denice Miner sent to MD [address]     On 4-30-20, the following referrals for therapy were provided to the patient:     Javon Jacques, Psychologist, Daniela Woodward, Therapist, Davy Vidal

## 2020-05-06 ENCOUNTER — NURSE TRIAGE (OUTPATIENT)
Dept: INTERNAL MEDICINE CLINIC | Facility: CLINIC | Age: 75
End: 2020-05-06

## 2020-05-06 NOTE — TELEPHONE ENCOUNTER
Action Requested: Summary for Provider     []  Critical Lab, Recommendations Needed  [] Need Additional Advice  []   FYI    []   Need Orders  [] Need Medications Sent to Pharmacy  []  Other     SUMMARY:Pt not sure if eating too much cheese aggravated her a

## 2020-05-07 ENCOUNTER — VIRTUAL PHONE E/M (OUTPATIENT)
Dept: INTERNAL MEDICINE CLINIC | Facility: CLINIC | Age: 75
End: 2020-05-07
Payer: COMMERCIAL

## 2020-05-07 ENCOUNTER — TELEPHONE (OUTPATIENT)
Dept: INTERNAL MEDICINE CLINIC | Facility: CLINIC | Age: 75
End: 2020-05-07

## 2020-05-07 DIAGNOSIS — E11.9 CONTROLLED TYPE 2 DIABETES MELLITUS WITHOUT COMPLICATION, WITHOUT LONG-TERM CURRENT USE OF INSULIN (HCC): Primary | ICD-10-CM

## 2020-05-07 DIAGNOSIS — M54.2 NECK PAIN: ICD-10-CM

## 2020-05-07 DIAGNOSIS — I10 ESSENTIAL HYPERTENSION: ICD-10-CM

## 2020-05-07 PROCEDURE — 99442 PHONE E/M BY PHYS 11-20 MIN: CPT | Performed by: INTERNAL MEDICINE

## 2020-05-07 RX ORDER — FENOFIBRATE 145 MG/1
TABLET, COATED ORAL
Qty: 90 TABLET | Refills: 1 | Status: SHIPPED | OUTPATIENT
Start: 2020-05-07 | End: 2021-01-25

## 2020-05-07 NOTE — PATIENT INSTRUCTIONS
General Neck and Back Pain    Both neck and back pain are usually caused by injury to the muscles or ligaments of the spine. Sometimes the disks that separate each bone of the spine may cause pain by pressing on a nearby nerve.  Back and neck pain may appea · Poor conditioning, lack of regular exercise  · Spinal disc disease or arthritis  · Stress  · Pregnancy, or illness like appendicitis, bladder or kidney infection, pelvic infections   Home care  · For neck pain: Use a comfortable pillow that supports the · You may use over-the-counter medicine to control pain, unless another pain medicine was prescribed. If you have chronic conditions like diabetes, liver or kidney disease, stomach ulcers,  gastrointestinal bleeding, or are taking blood thinner medicines. Different types of stress on the neck can damage muscles and tendons (soft tissues) and cause cervical strain.  Cervical tissues can be damaged by:  · The neck being forced past its normal range of motion, such as in a car accident or sports injury  · Const 1. Sit in a chair with your feet flat on the floor, or stand up. Relax your shoulders. 2. Look straight ahead. Gently glide your chin straight back. It’s a small movement. Don’t tilt your head up or down, or bend your neck forward. 3. Hold for 5 seconds. Reach overhead and slightly back with both arms. Keep your shoulders and neck aligned and your elbows behind your shoulders:  · With your palms facing the ceiling, turn your fingers inward. · Take a deep breath.  Breathe out, and lower your elbows toward y · Return to starting position. Repeat 5 times. Date Last Reviewed: 3/1/2018  © 8836-6550 [employer]. [address] [facility] [place], [place], [address] [place] [address]. All rights reserved.  This information is not intended as a substitute for professional medical c To start, lie on your back, knees bent and feet flat on the floor. Keep your ears, shoulders, and hips aligned, but don’t press your lower back to the floor. Rest your hands on your pelvis. Breathe deeply and relax.   Here are the steps for the active neck To start, lie on your back, knees bent and feet flat on the floor. Keep your ears, shoulders, and hips aligned, but don’t press your lower back to the floor. Rest your hands on your pelvis. Breathe deeply and relax.   Here are the steps for passive neck rot 9. Press your palm against your forehead. Resist with your neck muscles. Hold for 10 seconds. Relax. Repeat 5 times. 10. Do the exercise again, pressing on the side of your head. Repeat 5 times. Switch sides.   11. Do the exercise again, pressing on the ba The first goal of treatment is to relieve your symptoms. Your healthcare provider may recommend self-care treatments. These include resting, applying ice and heat, taking medicine, and doing exercises.  Your healthcare provider may also recommend that you s · Joint mobilization. The PT gently moves your vertebrae to help restore motion in your neck joints and reduce neck pain. · Soft tissue mobilization. The PT massages and stretches the muscles in your neck and shoulders. · Electrical stimulation.  Electric Use a full-length mirror to check your posture. To begin, stand normally. Then slowly back up against a wall. Is there space between your head and the wall? Do you slouch your shoulders? Is your chin pointing up or down?  All these can lead to neck tension Date Last Reviewed: 10/1/2017  © 2361-1940 [employer]. [address] [facility] [place], [address] [place]. All rights reserved. This information is not intended as a substitute for professional medical care.  Always follow your healthcare professional

## 2020-05-07 NOTE — TELEPHONE ENCOUNTER
Can Do video visit today 5-7-20 at 4 PM.  She needs to make sure she registers and gets ready 15 minutes before the appointment.

## 2020-05-07 NOTE — TELEPHONE ENCOUNTER
Patient following up, requesting to speak with Dr Josh Rodriguez for symptoms reported yesterday.  Has not called her [place] specialist because she feels pain might be related to her BS going up, usually 115-120s, yesterday ,207,147, today BS was 151, patient i

## 2020-05-07 NOTE — TELEPHONE ENCOUNTER
Spoke with the patient who is having difficulty logging onto the [facility] blayne to start her video visit for today 5/7/20. Patient was advised to restart her mobile device and to disable the bluetooth and wifi and then log back into her [employer] blayne.  Patient v

## 2020-05-07 NOTE — PROGRESS NOTES
Virtual Telephone Check-In    Valentina Casey verbally consents to a Virtual/Telephone Check-In visit on 05/07/20. Patient understands and accepts financial responsibility for any deductible, co-insurance and/or co-pays associated with this service. every 7 days. , Disp: 1 pen, Rfl: 1  •  Insulin Pen Needle (PEN NEEDLES) 32G X 4 MM Does not apply Misc, 1 each by Does not apply route every 7 days. , Disp: 30 each, Rfl: 0  •  LIOTHYRONINE SODIUM 5 MCG Oral Tab, TAKE 1 TABLET(5 MCG) BY MOUTH EVERY NIGHT, D EC, Take 1 tablet (81 mg total) by mouth daily. , Disp: 30 tablet, Rfl: 1  •  EPINEPHrine 0.3 MG/0.3ML Injection Solution Auto-injector, Use as directed., Disp: 1 each, Rfl: 1  •  Multiple Vitamins-Iron (ONCE DAILY/IRON) Oral Tab, Take by mouth., Disp: , Rf 10/14/2019 12:45 PM    Valentina Kathleen 10/14/2019 12:45 PM         Patient here for follow up of Diabetes. Has been taking medications regularly. Started 4-27-20 ozempic. Checks sugars 2 times daily. Fasting sugars average 120-130's.    Watches diabetic d rate/palpitations, dizziness, N,V, exertional arm pain nor neck pain  Patient is alert and oriented x3. Able to speak in full sentence without short of breath. Alert and oriented on phone, no SOB or wheezing, not anxious  & MS sharp.   Movement neck notice visitation. There are limitations visit as no physical exam could be performed. Every conscious effort was taken to allow for sufficient and adequate time. This billing was spent on reviewing labs, medications, radiological test and decision making.   Ap

## 2020-05-14 RX ORDER — LEVOCETIRIZINE DIHYDROCHLORIDE 5 MG/1
TABLET, FILM COATED ORAL
Qty: 90 TABLET | Refills: 0 | OUTPATIENT
Start: 2020-05-14

## 2020-05-14 RX ORDER — LEVOCETIRIZINE DIHYDROCHLORIDE 5 MG/1
TABLET, FILM COATED ORAL
Qty: 30 TABLET | Refills: 0 | Status: SHIPPED | OUTPATIENT
Start: 2020-05-14 | End: 2020-06-29

## 2020-05-14 NOTE — TELEPHONE ENCOUNTER
Spoke to patient. Informed her that she is due to follow up in the next month. RN informed her that we are scheduling for telemedicine visits so she doesn't have to come into the office. Pt reports she will call back to schedule.

## 2020-05-14 NOTE — TELEPHONE ENCOUNTER
Refill requested for    Name from pharmacy: LEVOCETIRIZINE 5MG TABLETS         Will file in chart as: LEVOCETIRIZINE DIHYDROCHLORIDE 5 MG Oral Tab         Sig: TAKE 1 TABLET(5 MG) BY MOUTH EVERY NIGHT    Disp:  90 tablet    Refills:  0 (Pharmacy requested:

## 2020-05-21 ENCOUNTER — TELEPHONE (OUTPATIENT)
Dept: INTERNAL MEDICINE CLINIC | Facility: CLINIC | Age: 75
End: 2020-05-21

## 2020-05-21 NOTE — TELEPHONE ENCOUNTER
Okay to do probiotics. Pressure still running higher. Blood pressures look okay. With Ozempic increase to 1 mg once a week. Call in 3 weeks with sugars. Days different times can send message through my chart.

## 2020-05-21 NOTE — TELEPHONE ENCOUNTER
Patient advised of treatment recommendations agreed with plan. Reports since starting Ozempic has been testing BS 3-4 times daily, requesting new order for strips. Pended below for review please advise.

## 2020-05-22 ENCOUNTER — TELEPHONE (OUTPATIENT)
Dept: INTERNAL MEDICINE CLINIC | Facility: CLINIC | Age: 75
End: 2020-05-22

## 2020-05-22 RX ORDER — BLOOD SUGAR DIAGNOSTIC
STRIP MISCELLANEOUS
Qty: 100 STRIP | Refills: 11 | Status: SHIPPED | OUTPATIENT
Start: 2020-05-22 | End: 2020-11-10 | Stop reason: ALTCHOICE

## 2020-05-22 NOTE — TELEPHONE ENCOUNTER
Increase the dose of those and back to 0.5 once a week. If she has not tried the 0.5 yet. Keep the metformin the same.

## 2020-05-22 NOTE — TELEPHONE ENCOUNTER
Patient was left a message to call back. Transfer to triage dept [id number]    Chart reviewed. RX sent for test strips. Patient to start 0.5mg once a week. Keep same dose of metformin.

## 2020-05-22 NOTE — TELEPHONE ENCOUNTER
Pt returned call, she verbalized understanding, she confirmed that she has been injecting 0.25mg not 1mg ad she will start her next dose tomorrow for 0.5mg, she will be out of pens for next week's injection, Rx is pended for sign-off #12#0

## 2020-05-22 NOTE — TELEPHONE ENCOUNTER
Pt calling for clarification. Pt states her spouse saw Dr Leah Carmichael and spouse was provided MD with pt log of blood sugars.    Per pt \"I was told to increase my Ozempic to 1.0 mg but the pharmacist said that is a high dose and that he doesn't have the Rx f

## 2020-05-28 ENCOUNTER — TELEPHONE (OUTPATIENT)
Dept: INTERNAL MEDICINE CLINIC | Facility: CLINIC | Age: 75
End: 2020-05-28

## 2020-05-28 NOTE — TELEPHONE ENCOUNTER
Looks better. Would continue same dose and pick at the 0.5.   It is normal when starting the medications to have some nausea that usually resolves once the body gets used to it every time you increase the dose the nausea will come back and then it will sta

## 2020-05-28 NOTE — TELEPHONE ENCOUNTER
Patient calling with concerns of medication and readings of blood sugar  Since 5/23/20 increased Ozempic to 0.5 mg  Readings as follows:  5/23/20 8:30 am  131,                1:45 pm 109                6:15 pm 112  5/24/20 9:15 am  121,                 3:3

## 2020-06-02 ENCOUNTER — NURSE TRIAGE (OUTPATIENT)
Dept: INTERNAL MEDICINE CLINIC | Facility: CLINIC | Age: 75
End: 2020-06-02

## 2020-06-02 NOTE — TELEPHONE ENCOUNTER
Action Requested: Summary for Provider     []  Critical Lab, Recommendations Needed  [] Need Additional Advice  []   FYI    []   Need Orders  [] Need Medications Sent to Pharmacy  []  Other     SUMMARY:   Please advise on visit-  Can not do a video visit.

## 2020-06-03 NOTE — TELEPHONE ENCOUNTER
Pt informed of Dr Isreal Godoy response below. Pt states symptoms have improved from yesterday and declines office visit. States will call for appt if does not continue to improve.

## 2020-06-10 ENCOUNTER — TELEPHONE (OUTPATIENT)
Dept: INTERNAL MEDICINE CLINIC | Facility: CLINIC | Age: 75
End: 2020-06-10

## 2020-06-10 RX ORDER — ATORVASTATIN CALCIUM 40 MG/1
40 TABLET, FILM COATED ORAL NIGHTLY
Qty: 90 TABLET | Refills: 1 | Status: SHIPPED | OUTPATIENT
Start: 2020-06-10 | End: 2020-12-07

## 2020-06-10 RX ORDER — LEVOTHYROXINE SODIUM 88 UG/1
88 TABLET ORAL
Qty: 90 TABLET | Refills: 1 | Status: SHIPPED | OUTPATIENT
Start: 2020-06-10 | End: 2020-06-29 | Stop reason: DRUGHIGH

## 2020-06-23 ENCOUNTER — TELEPHONE (OUTPATIENT)
Dept: PULMONOLOGY | Facility: CLINIC | Age: 75
End: 2020-06-23

## 2020-06-26 PROBLEM — J45.909 ASTHMA (HCC): Status: ACTIVE | Noted: 2020-06-26

## 2020-06-26 PROBLEM — J45.909 ASTHMA: Status: ACTIVE | Noted: 2020-06-26

## 2020-06-29 ENCOUNTER — TELEPHONE (OUTPATIENT)
Dept: INTERNAL MEDICINE CLINIC | Facility: CLINIC | Age: 75
End: 2020-06-29

## 2020-06-29 ENCOUNTER — OFFICE VISIT (OUTPATIENT)
Dept: INTERNAL MEDICINE CLINIC | Facility: CLINIC | Age: 75
End: 2020-06-29
Payer: COMMERCIAL

## 2020-06-29 VITALS
DIASTOLIC BLOOD PRESSURE: 69 MMHG | OXYGEN SATURATION: 97 % | SYSTOLIC BLOOD PRESSURE: 129 MMHG | TEMPERATURE: 98 F | RESPIRATION RATE: 19 BRPM | HEART RATE: 62 BPM | HEIGHT: 62.5 IN | WEIGHT: 115.81 LBS | BODY MASS INDEX: 20.78 KG/M2

## 2020-06-29 DIAGNOSIS — E03.9 ACQUIRED HYPOTHYROIDISM: ICD-10-CM

## 2020-06-29 DIAGNOSIS — I25.84 CORONARY ARTERY DISEASE DUE TO CALCIFIED CORONARY LESION: Primary | ICD-10-CM

## 2020-06-29 DIAGNOSIS — I10 ESSENTIAL HYPERTENSION: ICD-10-CM

## 2020-06-29 DIAGNOSIS — I25.10 CORONARY ARTERY DISEASE DUE TO CALCIFIED CORONARY LESION: Primary | ICD-10-CM

## 2020-06-29 DIAGNOSIS — E11.9 CONTROLLED TYPE 2 DIABETES MELLITUS WITHOUT COMPLICATION, WITHOUT LONG-TERM CURRENT USE OF INSULIN (HCC): ICD-10-CM

## 2020-06-29 LAB
ALBUMIN SERPL-MCNC: 3.9 G/DL
ALBUMIN/GLOB SERPL: 1.2 {RATIO}
ALP SERPL-CCNC: 35 U/L
ALT SERPL-CCNC: 32 UNITS/L
ANION GAP SERPL CALC-SCNC: 8 MMOL/L
AST SERPL-CCNC: 27 UNITS/L
BILIRUB SERPL-MCNC: 0.4 MG/DL
BUN SERPL-MCNC: 21 MG/DL
BUN/CREAT SERPL: 21.2
CALCIUM SERPL-MCNC: 9.5 MG/DL
CHLORIDE SERPL-SCNC: 101 MMOL/L
CHOLEST SERPL-MCNC: 135 MG/DL
CO2 SERPL-SCNC: 27 MMOL/L
CREAT SERPL-MCNC: 0.99 MG/DL
ESTIMATED AVERAGE GLUCOSE: 117
GLOBULIN SER-MCNC: 3.2 G/DL
GLUCOSE SERPL-MCNC: 83 MG/DL
HBA1C MFR BLD: 5.7 %
HDLC SERPL-MCNC: 48 MG/DL
LDLC SERPL CALC-MCNC: 66 MG/DL
LENGTH OF FAST TIME PATIENT: YES H
NONHDLC SERPL-MCNC: 87 MG/DL
POTASSIUM SERPL-SCNC: 4.2 MMOL/L
PROT SERPL-MCNC: 7.1 G/DL
SODIUM SERPL-SCNC: 136 MMOL/L
TRIGL SERPL-MCNC: 106 MG/DL
TSH SERPL-ACNC: 0.16 MCUNITS/ML
VLDLC SERPL CALC-MCNC: 21 MG/DL

## 2020-06-29 PROCEDURE — 99214 OFFICE O/P EST MOD 30 MIN: CPT | Performed by: INTERNAL MEDICINE

## 2020-06-29 PROCEDURE — 36415 COLL VENOUS BLD VENIPUNCTURE: CPT | Performed by: INTERNAL MEDICINE

## 2020-06-29 RX ORDER — LEVOCETIRIZINE DIHYDROCHLORIDE 5 MG/1
5 TABLET, FILM COATED ORAL NIGHTLY
Qty: 90 TABLET | Refills: 1 | Status: SHIPPED | OUTPATIENT
Start: 2020-06-29 | End: 2020-11-10

## 2020-06-29 NOTE — PROGRESS NOTES
HPI:    Patient ID: Sai Randhawa is a 66year old female. Patient presents with: Follow - Up: 4 months for Diabetes  Refill Request: Levocetirizine 5mg tab       Patient here for follow up of Diabetes. Has been taking medications regularly.     Tamiko Wing exercises 3 times a  week (walks 30 minutes) and has been following low salt diet. Weight has been down since ozempic. Nausea on ozempic but gets better.  .  Wt Readings from Last 3 Encounters:  06/29/20 : 115 lb 12.8 oz (52.5 kg)  02/17/20 : 121 lb (54.9 appetite change, chills, diaphoresis, fatigue, fever and unexpected weight change. Respiratory: Negative for apnea, cough, choking, chest tightness, shortness of breath, wheezing and stridor.     Cardiovascular: Negative for chest pain, palpitations and l tablet by mouth 2 (two) times daily.  180 tablet 1   • CLOPIDOGREL BISULFATE 75 MG Oral Tab TAKE ONE TABLET BY MOUTH EVERY DAY 90 tablet 1   • amLODIPine Besylate 2.5 MG Oral Tab TAKE 1 TABLET(2.5 MG) BY MOUTH TWICE DAILY 180 tablet 1   • VENLAFAXINE HCL ER with no             adverse effects noted  Sulfa Antibiotics         Sulfanilamide           UNKNOWN    HISTORY:  Past Medical History:   Diagnosis Date   • ANXIETY    • Appendicitis    • Asthma    • DEPRESSION    • Essential hypertension    • Generalized appearance. She does not appear ill. No distress. She is not intubated. Neck: Trachea normal and phonation normal. No thyroid mass and no thyromegaly present.    Cardiovascular: Normal rate, regular rhythm, S1 normal, S2 normal, normal heart sounds, intac sugars at different times on different dates. Careful with low sugars. Carry something with you and check sugar if can. Can carry adams cracker, etc. Decrease carbohydrates. But also, careful with fruits and natural sugars. One serving a day and no more th

## 2020-06-29 NOTE — PATIENT INSTRUCTIONS
ASSESSMENT/PLAN:   Coronary artery disease due to calcified coronary lesion  (primary encounter diagnosis) Stable. Essential hypertension Good control. Careful with diet and excercise at least 30 minutes 3-4 times a week.  Check blood pressures a

## 2020-06-30 ENCOUNTER — TELEPHONE (OUTPATIENT)
Dept: INTERNAL MEDICINE CLINIC | Facility: CLINIC | Age: 75
End: 2020-06-30

## 2020-06-30 DIAGNOSIS — E55.9 VITAMIN D DEFICIENCY: ICD-10-CM

## 2020-06-30 DIAGNOSIS — E53.8 B12 DEFICIENCY: Primary | ICD-10-CM

## 2020-06-30 DIAGNOSIS — R53.83 FATIGUE, UNSPECIFIED TYPE: ICD-10-CM

## 2020-07-01 NOTE — TELEPHONE ENCOUNTER
Spoke to pt, reviewed lab thyroid lab results. Stop synthroid 88mcg start levothyroxine 75mcg. recheck in 4 weeks. Pt states she did not mention during ov she has been feeling more fatigued. Adding additional labs.  Pt understands and agrees no further qu

## 2020-07-02 ENCOUNTER — HOSPITAL ENCOUNTER (OUTPATIENT)
Dept: CT IMAGING | Age: 75
Discharge: HOME | End: 2020-07-02
Attending: INTERNAL MEDICINE
Payer: MEDICARE

## 2020-07-02 DIAGNOSIS — R91.8 PULMONARY NODULES: ICD-10-CM

## 2020-07-02 PROCEDURE — 71250 CT THORAX DX C-: CPT | Performed by: INTERNAL MEDICINE

## 2020-07-06 DIAGNOSIS — R91.8 PULMONARY NODULES: Primary | ICD-10-CM

## 2020-07-17 RX ORDER — VENLAFAXINE HYDROCHLORIDE 37.5 MG/1
CAPSULE, EXTENDED RELEASE ORAL
Qty: 90 CAPSULE | Refills: 1 | Status: SHIPPED | OUTPATIENT
Start: 2020-07-17 | End: 2020-11-10

## 2020-07-20 RX ORDER — PEN NEEDLE, DIABETIC 30 GX3/16"
1 NEEDLE, DISPOSABLE MISCELLANEOUS
COMMUNITY
Start: 2020-04-22

## 2020-07-20 RX ORDER — DICYCLOMINE HYDROCHLORIDE 10 MG/1
10 CAPSULE ORAL
COMMUNITY
Start: 2020-04-22

## 2020-07-23 ENCOUNTER — OFFICE VISIT (OUTPATIENT)
Dept: CARDIOLOGY | Age: 75
End: 2020-07-23

## 2020-07-23 VITALS
BODY MASS INDEX: 20.2 KG/M2 | OXYGEN SATURATION: 99 % | SYSTOLIC BLOOD PRESSURE: 90 MMHG | WEIGHT: 114 LBS | HEIGHT: 63 IN | DIASTOLIC BLOOD PRESSURE: 40 MMHG | HEART RATE: 66 BPM

## 2020-07-23 DIAGNOSIS — R01.1 MURMUR: ICD-10-CM

## 2020-07-23 DIAGNOSIS — I25.10 CORONARY ARTERY DISEASE DUE TO CALCIFIED CORONARY LESION: Primary | ICD-10-CM

## 2020-07-23 DIAGNOSIS — E78.2 MIXED HYPERLIPIDEMIA: ICD-10-CM

## 2020-07-23 DIAGNOSIS — E11.9 CONTROLLED TYPE 2 DIABETES MELLITUS WITHOUT COMPLICATION, WITHOUT LONG-TERM CURRENT USE OF INSULIN (CMD): ICD-10-CM

## 2020-07-23 DIAGNOSIS — I10 ESSENTIAL HYPERTENSION: ICD-10-CM

## 2020-07-23 DIAGNOSIS — I25.84 CORONARY ARTERY DISEASE DUE TO CALCIFIED CORONARY LESION: Primary | ICD-10-CM

## 2020-07-23 PROCEDURE — 3074F SYST BP LT 130 MM HG: CPT | Performed by: INTERNAL MEDICINE

## 2020-07-23 PROCEDURE — 99213 OFFICE O/P EST LOW 20 MIN: CPT | Performed by: INTERNAL MEDICINE

## 2020-07-23 PROCEDURE — 3078F DIAST BP <80 MM HG: CPT | Performed by: INTERNAL MEDICINE

## 2020-07-23 SDOH — HEALTH STABILITY: MENTAL HEALTH: HOW OFTEN DO YOU HAVE A DRINK CONTAINING ALCOHOL?: MONTHLY OR LESS

## 2020-07-23 ASSESSMENT — PATIENT HEALTH QUESTIONNAIRE - PHQ9
SUM OF ALL RESPONSES TO PHQ9 QUESTIONS 1 AND 2: 0
1. LITTLE INTEREST OR PLEASURE IN DOING THINGS: NOT AT ALL
2. FEELING DOWN, DEPRESSED OR HOPELESS: NOT AT ALL
CLINICAL INTERPRETATION OF PHQ9 SCORE: NO FURTHER SCREENING NEEDED
SUM OF ALL RESPONSES TO PHQ9 QUESTIONS 1 AND 2: 0
CLINICAL INTERPRETATION OF PHQ2 SCORE: NO FURTHER SCREENING NEEDED

## 2020-08-17 RX ORDER — BLOOD SUGAR DIAGNOSTIC
STRIP MISCELLANEOUS
Qty: 150 STRIP | Refills: 11 | Status: SHIPPED | OUTPATIENT
Start: 2020-08-17 | End: 2021-08-26

## 2020-08-17 RX ORDER — CLOPIDOGREL BISULFATE 75 MG/1
TABLET ORAL
Qty: 90 TABLET | Refills: 1 | Status: SHIPPED | OUTPATIENT
Start: 2020-08-17 | End: 2020-11-12

## 2020-08-27 ENCOUNTER — LAB ENCOUNTER (OUTPATIENT)
Dept: LAB | Age: 75
End: 2020-08-27
Attending: INTERNAL MEDICINE
Payer: MEDICARE

## 2020-08-27 DIAGNOSIS — E03.9 ACQUIRED HYPOTHYROIDISM: ICD-10-CM

## 2020-08-27 DIAGNOSIS — E03.9 ACQUIRED HYPOTHYROIDISM: Primary | ICD-10-CM

## 2020-08-27 LAB
T4 FREE SERPL-MCNC: 1.3 NG/DL (ref 0.8–1.7)
TSI SER-ACNC: 0.67 MIU/ML (ref 0.36–3.74)

## 2020-08-27 PROCEDURE — 36415 COLL VENOUS BLD VENIPUNCTURE: CPT

## 2020-08-27 PROCEDURE — 84443 ASSAY THYROID STIM HORMONE: CPT

## 2020-08-27 PROCEDURE — 84439 ASSAY OF FREE THYROXINE: CPT

## 2020-09-09 RX ORDER — LEVOTHYROXINE SODIUM 88 MCG
TABLET ORAL
COMMUNITY
Start: 2020-08-18 | End: 2020-09-30 | Stop reason: ALTCHOICE

## 2020-09-10 ENCOUNTER — OFFICE VISIT (OUTPATIENT)
Dept: INTERNAL MEDICINE CLINIC | Facility: CLINIC | Age: 75
End: 2020-09-10
Payer: COMMERCIAL

## 2020-09-10 ENCOUNTER — TELEPHONE (OUTPATIENT)
Dept: INTERNAL MEDICINE CLINIC | Facility: CLINIC | Age: 75
End: 2020-09-10

## 2020-09-10 VITALS
SYSTOLIC BLOOD PRESSURE: 119 MMHG | WEIGHT: 113.63 LBS | OXYGEN SATURATION: 97 % | RESPIRATION RATE: 19 BRPM | DIASTOLIC BLOOD PRESSURE: 73 MMHG | HEIGHT: 62.5 IN | HEART RATE: 59 BPM | BODY MASS INDEX: 20.39 KG/M2 | TEMPERATURE: 97 F

## 2020-09-10 DIAGNOSIS — I10 ESSENTIAL HYPERTENSION: ICD-10-CM

## 2020-09-10 DIAGNOSIS — I77.1 TORTUOUS AORTA (HCC): ICD-10-CM

## 2020-09-10 DIAGNOSIS — R10.33 PERIUMBILICAL ABDOMINAL PAIN: ICD-10-CM

## 2020-09-10 DIAGNOSIS — I25.10 CORONARY ARTERY DISEASE DUE TO CALCIFIED CORONARY LESION: ICD-10-CM

## 2020-09-10 DIAGNOSIS — Z71.85 VACCINE COUNSELING: ICD-10-CM

## 2020-09-10 DIAGNOSIS — E11.9 CONTROLLED TYPE 2 DIABETES MELLITUS WITHOUT COMPLICATION, WITHOUT LONG-TERM CURRENT USE OF INSULIN (HCC): Primary | ICD-10-CM

## 2020-09-10 DIAGNOSIS — E11.65 TYPE 2 DIABETES MELLITUS WITH HYPERGLYCEMIA, WITHOUT LONG-TERM CURRENT USE OF INSULIN (HCC): ICD-10-CM

## 2020-09-10 DIAGNOSIS — I25.84 CORONARY ARTERY DISEASE DUE TO CALCIFIED CORONARY LESION: ICD-10-CM

## 2020-09-10 PROBLEM — J44.9 ASTHMA WITH COPD (CHRONIC OBSTRUCTIVE PULMONARY DISEASE): Chronic | Status: ACTIVE | Noted: 2020-09-10

## 2020-09-10 PROBLEM — J44.89 ASTHMA WITH COPD (CHRONIC OBSTRUCTIVE PULMONARY DISEASE): Chronic | Status: ACTIVE | Noted: 2020-09-10

## 2020-09-10 PROBLEM — J44.9 ASTHMA WITH COPD (CHRONIC OBSTRUCTIVE PULMONARY DISEASE) (HCC): Chronic | Status: ACTIVE | Noted: 2020-09-10

## 2020-09-10 PROBLEM — J44.89 ASTHMA WITH COPD (CHRONIC OBSTRUCTIVE PULMONARY DISEASE) (HCC): Chronic | Status: ACTIVE | Noted: 2020-09-10

## 2020-09-10 LAB
ALBUMIN SERPL-MCNC: 4.3 G/DL (ref 3.4–5)
ALBUMIN/GLOB SERPL: 1.4 {RATIO} (ref 1–2)
ALP LIVER SERPL-CCNC: 43 U/L (ref 55–142)
ALT SERPL-CCNC: 62 U/L (ref 13–56)
AMYLASE SERPL-CCNC: 60 U/L (ref 25–115)
ANION GAP SERPL CALC-SCNC: 5 MMOL/L (ref 0–18)
APPEARANCE: CLEAR
AST SERPL-CCNC: 43 U/L (ref 15–37)
BASOPHILS # BLD AUTO: 0.05 X10(3) UL (ref 0–0.2)
BASOPHILS NFR BLD AUTO: 1.1 %
BILIRUB SERPL-MCNC: 0.5 MG/DL (ref 0.1–2)
BUN BLD-MCNC: 17 MG/DL (ref 7–18)
BUN/CREAT SERPL: 16.7 (ref 10–20)
CALCIUM BLD-MCNC: 10.3 MG/DL (ref 8.5–10.1)
CHLORIDE SERPL-SCNC: 98 MMOL/L (ref 98–112)
CO2 SERPL-SCNC: 30 MMOL/L (ref 21–32)
CREAT BLD-MCNC: 1.02 MG/DL (ref 0.55–1.02)
DEPRECATED RDW RBC AUTO: 48 FL (ref 35.1–46.3)
EOSINOPHIL # BLD AUTO: 0.06 X10(3) UL (ref 0–0.7)
EOSINOPHIL NFR BLD AUTO: 1.3 %
ERYTHROCYTE [DISTWIDTH] IN BLOOD BY AUTOMATED COUNT: 12.7 % (ref 11–15)
GLOBULIN PLAS-MCNC: 3 G/DL (ref 2.8–4.4)
GLUCOSE BLD-MCNC: 84 MG/DL (ref 70–99)
HCT VFR BLD AUTO: 37.4 % (ref 35–48)
HGB BLD-MCNC: 12.4 G/DL (ref 12–16)
IGA SERPL-MCNC: 141 MG/DL (ref 70–312)
IMM GRANULOCYTES # BLD AUTO: 0.02 X10(3) UL (ref 0–1)
IMM GRANULOCYTES NFR BLD: 0.4 %
LIPASE SERPL-CCNC: 164 U/L (ref 73–393)
LYMPHOCYTES # BLD AUTO: 1.35 X10(3) UL (ref 1–4)
LYMPHOCYTES NFR BLD AUTO: 30.2 %
M PROTEIN MFR SERPL ELPH: 7.3 G/DL (ref 6.4–8.2)
MCH RBC QN AUTO: 33.4 PG (ref 26–34)
MCHC RBC AUTO-ENTMCNC: 33.2 G/DL (ref 31–37)
MCV RBC AUTO: 100.8 FL (ref 80–100)
MONOCYTES # BLD AUTO: 0.88 X10(3) UL (ref 0.1–1)
MONOCYTES NFR BLD AUTO: 19.7 %
MULTISTIX LOT#: 1044 NUMERIC
NEUTROPHILS # BLD AUTO: 2.11 X10 (3) UL (ref 1.5–7.7)
NEUTROPHILS # BLD AUTO: 2.11 X10(3) UL (ref 1.5–7.7)
NEUTROPHILS NFR BLD AUTO: 47.3 %
OSMOLALITY SERPL CALC.SUM OF ELEC: 277 MOSM/KG (ref 275–295)
PATIENT FASTING Y/N/NP: NO
PH, URINE: 7 (ref 4.5–8)
PLATELET # BLD AUTO: 303 10(3)UL (ref 150–450)
POTASSIUM SERPL-SCNC: 3.9 MMOL/L (ref 3.5–5.1)
RBC # BLD AUTO: 3.71 X10(6)UL (ref 3.8–5.3)
SODIUM SERPL-SCNC: 133 MMOL/L (ref 136–145)
SPECIFIC GRAVITY: 1.02 (ref 1–1.03)
URINE-COLOR: YELLOW
UROBILINOGEN,SEMI-QN: 0.2 MG/DL (ref 0–1.9)
WBC # BLD AUTO: 4.5 X10(3) UL (ref 4–11)

## 2020-09-10 PROCEDURE — 81003 URINALYSIS AUTO W/O SCOPE: CPT | Performed by: INTERNAL MEDICINE

## 2020-09-10 PROCEDURE — 36415 COLL VENOUS BLD VENIPUNCTURE: CPT | Performed by: INTERNAL MEDICINE

## 2020-09-10 PROCEDURE — 99214 OFFICE O/P EST MOD 30 MIN: CPT | Performed by: INTERNAL MEDICINE

## 2020-09-10 PROCEDURE — 3078F DIAST BP <80 MM HG: CPT | Performed by: INTERNAL MEDICINE

## 2020-09-10 PROCEDURE — 3074F SYST BP LT 130 MM HG: CPT | Performed by: INTERNAL MEDICINE

## 2020-09-10 PROCEDURE — 3008F BODY MASS INDEX DOCD: CPT | Performed by: INTERNAL MEDICINE

## 2020-09-10 RX ORDER — ENZYMES,DIGESTIVE
CAPSULE ORAL
COMMUNITY

## 2020-09-10 NOTE — PROGRESS NOTES
HPI:    Patient ID: Truman Holland is a 66year old female. Patient presents with:  Abdominal Pain: started on June after she started ozempic . She is taking an enzyme formula that help her. Patient here for follow up of Diabetes.   Has been abisai low salt diet. Weight has been stable.   Wt Readings from Last 3 Encounters:  09/10/20 : 113 lb 9.6 oz (51.5 kg)  06/29/20 : 115 lb 12.8 oz (52.5 kg)  02/17/20 : 121 lb (54.9 kg)    BP Readings from Last 3 Encounters:  09/10/20 : 119/73  06/29/20 : 129/69 intermittently. The problem has been gradually improving (Since chnaged to digest gold. ). The pain is located in the periumbilical region. The pain is at a severity of 7/10. The quality of the pain is colicky.  The abdominal pain radiates to the epigastric negative. Current Outpatient Medications   Medication Sig Dispense Refill   • Digestive Enzymes (ENZYME DIGEST) Oral Cap Take by mouth. Patient is taking Digest Gold with ATPro one cap in the morning and two at dinner time.      • CLOPIDOGREL BISULFA MCG/ACT Inhalation Aero Soln INHALE 2 PUFFS INTO THE LUNGS EVERY 6 HOURS AS NEEDED FOR WHEEZING 25.5 g 0   • Coenzyme Q10 (COQ10) 200 MG Oral Cap Take 200 mg by mouth daily. • Vitamin B-12 1000 MCG Oral Tab Take 1,000 mcg by mouth daily.      • DHA-EPA- medical Rx. Past Surgical History:   Procedure Laterality Date   • APPENDECTOMY     • CATARACT Bilateral 01/28/2019    Dr. Taisha Hull at [facility].     • COLONOSCOPY  2009    nml   • ELECTROCARDIOGRAM, COMPLETE  02/07/2014    SCANNED TO MEDIA TAB - 02/07/201 intact distal pulses and normal pulses. Pulses:       Carotid pulses are 2+ on the right side and 2+ on the left side. Radial pulses are 2+ on the right side and 2+ on the left side.         Dorsalis pedis pulses are 2+ on the right side and 2+ on t with hyperglycemia, without long-term current use of insulin (hcc)  Tortuous aorta (hcc) Doing good. Careful with diet and excercise at least 30 minutes 3-4 times a week. Check sugars at different times on different dates. Careful with low sugars.  Carry so

## 2020-09-10 NOTE — PATIENT INSTRUCTIONS
ASSESSMENT/PLAN:   Controlled type 2 diabetes mellitus without complication, without long-term current use of insulin (hcc)  (primary encounter diagnosis)     Coronary artery disease due to calcified coronary lesion Stable.      Essential hypertension

## 2020-09-14 ENCOUNTER — TELEPHONE (OUTPATIENT)
Dept: INTERNAL MEDICINE CLINIC | Facility: CLINIC | Age: 75
End: 2020-09-14

## 2020-09-14 DIAGNOSIS — R79.89 ELEVATED LFTS: Primary | ICD-10-CM

## 2020-09-14 LAB — TTG IGA SER-ACNC: 0.1 U/ML (ref ?–7)

## 2020-09-14 NOTE — TELEPHONE ENCOUNTER
Advised patient of message below. Patient verbalized understanding  Patient is requesting  order for US Abdomen Limited be entered as a STAT order. Per patient, she would like the office staff to assist with appointment scheduling.    Patient  stated he

## 2020-09-14 NOTE — TELEPHONE ENCOUNTER
Spoke with patient to let her know that her abdominal ultrasound will be tomorrow at 8 am, she needs to be fasting for 6 hours. She can takes her medication with a little bit of water. Per Dr Young Pate to just take half of her Diabetic pill at night time.  And

## 2020-09-14 NOTE — TELEPHONE ENCOUNTER
Left message letting patient know that Benjamin Shermanmorris would like for her to get the ultrasound of her abdomen done sooner then 10/8/20

## 2020-09-14 NOTE — TELEPHONE ENCOUNTER
Dr Mehnaz Dowell: Patient called central scheduling to schedule US abd limited. (liver)  Next available 10/8/20. Is that too far out or do you want it done sooner? LOV 9/10/20   Elevated liver enzymes per result notes.

## 2020-09-15 ENCOUNTER — LAB ENCOUNTER (OUTPATIENT)
Dept: LAB | Facility: HOSPITAL | Age: 75
End: 2020-09-15
Attending: INTERNAL MEDICINE
Payer: MEDICARE

## 2020-09-15 ENCOUNTER — HOSPITAL ENCOUNTER (OUTPATIENT)
Dept: ULTRASOUND IMAGING | Facility: HOSPITAL | Age: 75
Discharge: HOME | End: 2020-09-15
Attending: INTERNAL MEDICINE
Payer: MEDICARE

## 2020-09-15 DIAGNOSIS — E83.52 HYPERCALCEMIA: ICD-10-CM

## 2020-09-15 DIAGNOSIS — R79.89 ELEVATED LFTS: ICD-10-CM

## 2020-09-15 LAB
CLAM IGE QN: <0.1 KUA/L (ref ?–0.1)
CODFISH IGE QN: <0.1 KUA/L (ref ?–0.1)
CORN IGE QN: <0.1 KUA/L (ref ?–0.1)
COW MILK IGE QN: <0.1 KUA/L (ref ?–0.1)
EGG WHITE IGE QN: <0.1 KUA/L (ref ?–0.1)
IGE SERPL-ACNC: 4.65 KU/L (ref 2–214)
PEANUT IGE QN: <0.1 KUA/L (ref ?–0.1)
SCALLOP IGE QN: <0.1 KUA/L (ref ?–0.1)
SESAME SEED IGE QN: <0.1 KUA/L (ref ?–0.1)
SHRIMP IGE QN: <0.1 KUA/L (ref ?–0.1)
SOYBEAN IGE QN: <0.1 KUA/L (ref ?–0.1)
WALNUT IGE QN: <0.1 KUA/L (ref ?–0.1)
WHEAT IGE QN: <0.1 KUA/L (ref ?–0.1)

## 2020-09-15 PROCEDURE — 82652 VIT D 1 25-DIHYDROXY: CPT | Performed by: INTERNAL MEDICINE

## 2020-09-15 PROCEDURE — 76705 ECHO EXAM OF ABDOMEN: CPT | Performed by: INTERNAL MEDICINE

## 2020-09-15 PROCEDURE — 83970 ASSAY OF PARATHORMONE: CPT | Performed by: INTERNAL MEDICINE

## 2020-09-15 PROCEDURE — 84100 ASSAY OF PHOSPHORUS: CPT | Performed by: INTERNAL MEDICINE

## 2020-09-15 PROCEDURE — 82330 ASSAY OF CALCIUM: CPT

## 2020-09-15 PROCEDURE — 83735 ASSAY OF MAGNESIUM: CPT | Performed by: INTERNAL MEDICINE

## 2020-09-15 PROCEDURE — 36415 COLL VENOUS BLD VENIPUNCTURE: CPT

## 2020-09-17 ENCOUNTER — TELEPHONE (OUTPATIENT)
Dept: INTERNAL MEDICINE CLINIC | Facility: CLINIC | Age: 75
End: 2020-09-17

## 2020-09-17 LAB
CALCIUM IONIZED PH 7.4: 1.38 MMOL/L
CALCIUM IONIZED, SERUM: 1.44 MMOL/L

## 2020-09-17 NOTE — TELEPHONE ENCOUNTER
Advised patient of Dr. John Monge note. Patient verbalized understanding. Number provided for Endocrinology.               Viewed by Angela Lopez on 9/16/2020  7:57 PM   Written by Dada Willard MD on 9/16/2020  7:55 PM   Phosphorus and magnesium lev

## 2020-09-21 ENCOUNTER — TELEPHONE (OUTPATIENT)
Dept: INTERNAL MEDICINE CLINIC | Facility: CLINIC | Age: 75
End: 2020-09-21

## 2020-09-21 RX ORDER — A/SINGAPORE/GP1908/2015 IVR-180 (AN A/MICHIGAN/45/2015 (H1N1)PDM09-LIKE VIRUS, A/HONG KONG/4801/2014, NYMC X-263B (H3N2) (AN A/HONG KONG/4801/2014-LIKE VIRUS), AND B/BRISBANE/60/2008, WILD TYPE (A B/BRISBANE/60/2008-LIKE VIRUS) 15; 15; 15 UG/.5ML; UG/.5ML; UG/.5ML
INJECTION, SUSPENSION INTRAMUSCULAR
COMMUNITY
Start: 2020-09-09 | End: 2021-09-15 | Stop reason: ALTCHOICE

## 2020-09-22 NOTE — TELEPHONE ENCOUNTER
Called pharmacy and the lady stated that it was approved.  (Metformin) Not involved with initial patient care but was reviewing labs and found to have a low potassium. Also has UTI along with her mastitis. Patient interviewed and does not want IV medications at this time. Patient is to follow-up with her OB if not better within 24 hours.

## 2020-09-24 ENCOUNTER — TELEPHONE (OUTPATIENT)
Dept: INTERNAL MEDICINE CLINIC | Facility: CLINIC | Age: 75
End: 2020-09-24

## 2020-09-24 NOTE — TELEPHONE ENCOUNTER
Patient called regarding lab results and concerned, she stated no one called her also stated needs ultrasound.  And already had one,     Please call her and go over details of lab results -    Thanks    [address] [facility]

## 2020-09-24 NOTE — TELEPHONE ENCOUNTER
(okay to leave detailed message on vm)    Pt called, ana rosa she does not understand her test results and has some concerns about the calcium number.   Advised pt that she received the results before the provider reviewed them and that her request will go to D

## 2020-09-25 NOTE — TELEPHONE ENCOUNTER
PTH is made by four tiny parathyroid glands in your neck. These glands control calcium levels in your blood. When calcium levels are too low, the glands release PTH to bring the calcium levels back up into a normal range.  When your calcium levels rise, the

## 2020-09-30 ENCOUNTER — OFFICE VISIT (OUTPATIENT)
Dept: ENDOCRINOLOGY CLINIC | Facility: CLINIC | Age: 75
End: 2020-09-30
Payer: COMMERCIAL

## 2020-09-30 VITALS
DIASTOLIC BLOOD PRESSURE: 55 MMHG | RESPIRATION RATE: 16 BRPM | WEIGHT: 114 LBS | HEIGHT: 62.5 IN | SYSTOLIC BLOOD PRESSURE: 109 MMHG | HEART RATE: 70 BPM | BODY MASS INDEX: 20.45 KG/M2

## 2020-09-30 DIAGNOSIS — E11.65 TYPE 2 DIABETES MELLITUS WITH HYPERGLYCEMIA, WITHOUT LONG-TERM CURRENT USE OF INSULIN (HCC): ICD-10-CM

## 2020-09-30 DIAGNOSIS — E03.9 ACQUIRED HYPOTHYROIDISM: ICD-10-CM

## 2020-09-30 DIAGNOSIS — E83.52 HYPERCALCEMIA: Primary | ICD-10-CM

## 2020-09-30 PROCEDURE — 99203 OFFICE O/P NEW LOW 30 MIN: CPT | Performed by: INTERNAL MEDICINE

## 2020-09-30 PROCEDURE — 3008F BODY MASS INDEX DOCD: CPT | Performed by: INTERNAL MEDICINE

## 2020-09-30 PROCEDURE — 3078F DIAST BP <80 MM HG: CPT | Performed by: INTERNAL MEDICINE

## 2020-09-30 PROCEDURE — 3074F SYST BP LT 130 MM HG: CPT | Performed by: INTERNAL MEDICINE

## 2020-09-30 NOTE — H&P
New Patient Evaluation - History and Physical    CONSULT - Reason for Visit:  Hypercalcemia. Requesting Physician: Dr. Ilan Simmons.     CHIEF COMPLAINT:  Patient presents with:  Consult: Hypercalcemia       HISTORY OF PRESENT ILLNESS:   Umesh Phipps is a 66 yea Cancer Paternal Aunt 76        age at dx 76       CURRENT MEDICATIONS:    Current Outpatient Medications   Medication Sig Dispense Refill   • Digestive Enzymes (ENZYME DIGEST) Oral Cap Take by mouth.  Patient is taking Digest Gold with ATPro one cap in the Oral Tab Take 1,000 mcg by mouth daily. • DHA-EPA-Vitamin E (OMEGA-3 COMPLEX OR) Take by mouth. • aspirin 81 MG Oral Tab EC Take 1 tablet (81 mg total) by mouth daily.  30 tablet 1   • EPINEPHrine 0.3 MG/0.3ML Injection Solution Auto-injector Use as rash, blister, cellulitis,      PHYSICAL EXAM:    09/30/20  1511   BP: 109/55   Pulse: 70   Resp: 16   Weight: 114 lb (51.7 kg)   Height: 5' 2.5\" (1.588 m)     BMI: Body mass index is 20.52 kg/m².      CONSTITUTIONAL:  awake, alert, cooperative, no apparen and ionized calcium and PTH dose when TSH is being rechecked. Vitamin D level is normal    Return to clinic in 2 months    9/30/2020  Bhavya Thakkar MD

## 2020-10-01 ENCOUNTER — TELEPHONE (OUTPATIENT)
Dept: INTERNAL MEDICINE CLINIC | Facility: CLINIC | Age: 75
End: 2020-10-01

## 2020-10-01 NOTE — TELEPHONE ENCOUNTER
Pt saw endocrinologist yesterday, was given medication instructions    D/c LIOTHYRONINE SODIUM 5 MCG because it raises the calcium  ,but then advised to check with PCP    Also advised to d/c Metformin because she do not think Pt needs it, Pt stated she ask

## 2020-10-02 NOTE — TELEPHONE ENCOUNTER
Reviewed records from Dr. Eber Canela. She was thinking that the metformin can cause some GI distress with Ozempic so to stop the metformin since the sugars are well controlled and A1c is good.   We can monitor the sugars and if the sugars start to climb ba

## 2020-10-02 NOTE — TELEPHONE ENCOUNTER
Patient is calling to clarify medications. She recently had a visit with Wilber Thompson on 9/30/20.  Pt was advised to go off her metformin but patient is asking if she can cut back and just take one pill instead of stopping the medication altogether

## 2020-10-03 ENCOUNTER — VIRTUAL PHONE E/M (OUTPATIENT)
Dept: INTERNAL MEDICINE CLINIC | Facility: CLINIC | Age: 75
End: 2020-10-03
Payer: COMMERCIAL

## 2020-10-03 DIAGNOSIS — E11.65 TYPE 2 DIABETES MELLITUS WITH HYPERGLYCEMIA, WITHOUT LONG-TERM CURRENT USE OF INSULIN (HCC): ICD-10-CM

## 2020-10-03 DIAGNOSIS — Z71.85 VACCINE COUNSELING: ICD-10-CM

## 2020-10-03 DIAGNOSIS — I10 ESSENTIAL HYPERTENSION: Primary | ICD-10-CM

## 2020-10-03 DIAGNOSIS — E03.9 ACQUIRED HYPOTHYROIDISM: ICD-10-CM

## 2020-10-03 PROCEDURE — 99443 PHONE E/M BY PHYS 21-30 MIN: CPT | Performed by: INTERNAL MEDICINE

## 2020-10-03 NOTE — PROGRESS NOTES
Virtual Telephone Check-In    Sylvia Saucedo verbally consents to a Virtual/Telephone Check-In visit on 10/03/20. Added to do video but her phone would not let her and she was having difficulty. Converted to a phone.     Patient understands and accepts time., Disp: , Rfl:   •  CLOPIDOGREL BISULFATE 75 MG Oral Tab, TAKE 1 TABLET BY MOUTH EVERY DAY, Disp: 90 tablet, Rfl: 1  •  ONETOUCH ULTRA In Vitro Strip, CHECK BLOOD EVERY MORNING, Disp: 150 strip, Rfl: 11  •  VENLAFAXINE HCL ER 37.5 MG Oral Capsule SR 2 Disp: , Rfl:   •  DHA-EPA-Vitamin E (OMEGA-3 COMPLEX OR), Take by mouth., Disp: , Rfl:   •  aspirin 81 MG Oral Tab EC, Take 1 tablet (81 mg total) by mouth daily. , Disp: 30 tablet, Rfl: 1  •  EPINEPHrine 0.3 MG/0.3ML Injection Solution Auto-injector, Use a and excercise at least 30 minutes 3-4 times a week. Check blood pressures at different times on different days. Can purchase own blood pressure monitor. If not, check at local pharmacy. Bake foods more and grill occasionally. Avoid fried foods. No salt.  Us tests are ordered as detailed in the plan of care above. Olivier Foley.  Mary Capone MD

## 2020-10-05 RX ORDER — LISINOPRIL AND HYDROCHLOROTHIAZIDE 25; 20 MG/1; MG/1
1 TABLET ORAL 2 TIMES DAILY
Qty: 180 TABLET | Refills: 1 | Status: SHIPPED | OUTPATIENT
Start: 2020-10-05 | End: 2021-03-04

## 2020-10-05 NOTE — TELEPHONE ENCOUNTER
-  Please see refill request below. Medication pended for approval.  Medication last filled 4-9-2020 #180, 1 refill.   LOV 10-3-2020    Thank you

## 2020-10-05 NOTE — TELEPHONE ENCOUNTER
Pharmacy calling in for refill for patient, patient is out of medication.  First request was 9/30 but didn't come through    Lisinopril-hydroCHLOROthiazide 20-25 MG Oral Tab

## 2020-10-06 ENCOUNTER — LAB ENCOUNTER (OUTPATIENT)
Dept: LAB | Age: 75
End: 2020-10-06
Attending: INTERNAL MEDICINE
Payer: MEDICARE

## 2020-10-06 DIAGNOSIS — E11.65 TYPE 2 DIABETES MELLITUS WITH HYPERGLYCEMIA, WITHOUT LONG-TERM CURRENT USE OF INSULIN (HCC): ICD-10-CM

## 2020-10-06 PROCEDURE — 82570 ASSAY OF URINE CREATININE: CPT

## 2020-10-06 PROCEDURE — 82043 UR ALBUMIN QUANTITATIVE: CPT

## 2020-10-15 RX ORDER — LIOTHYRONINE SODIUM 5 UG/1
TABLET ORAL
Qty: 90 TABLET | Refills: 1 | OUTPATIENT
Start: 2020-10-15

## 2020-11-09 ENCOUNTER — TELEPHONE (OUTPATIENT)
Dept: INTERNAL MEDICINE CLINIC | Facility: CLINIC | Age: 75
End: 2020-11-09

## 2020-11-09 NOTE — TELEPHONE ENCOUNTER
[facility], [address] [place] [address]., [phone], [phone]   Outpatient Medication Detail     Disp Refills Start End    CLOPIDOGREL BISULFATE 75 MG Oral Tab 90 tablet 1 8/17/2020     Sig:

## 2020-11-09 NOTE — TELEPHONE ENCOUNTER
Patient needs a (2 weeks supply) refill on     CLOPIDOGREL BISULFATE 75 MG Oral Tab 90 tablet 1 8/17/2020    Sig:   TAKE 1 TABLET BY MOUTH EVERY DAY     Route:   (none)       Thank you.

## 2020-11-10 ENCOUNTER — TELEPHONE (OUTPATIENT)
Dept: INTERNAL MEDICINE CLINIC | Facility: CLINIC | Age: 75
End: 2020-11-10

## 2020-11-10 DIAGNOSIS — E03.9 ACQUIRED HYPOTHYROIDISM: ICD-10-CM

## 2020-11-10 RX ORDER — VENLAFAXINE HYDROCHLORIDE 37.5 MG/1
37.5 CAPSULE, EXTENDED RELEASE ORAL NIGHTLY
Qty: 90 CAPSULE | Refills: 1 | Status: SHIPPED | OUTPATIENT
Start: 2020-11-10 | End: 2021-01-13

## 2020-11-10 RX ORDER — LEVOCETIRIZINE DIHYDROCHLORIDE 5 MG/1
5 TABLET, FILM COATED ORAL NIGHTLY
Qty: 90 TABLET | Refills: 1 | Status: SHIPPED | OUTPATIENT
Start: 2020-11-10 | End: 2021-05-12

## 2020-11-10 RX ORDER — AMLODIPINE BESYLATE 2.5 MG/1
TABLET ORAL
Qty: 180 TABLET | Refills: 1 | Status: SHIPPED | OUTPATIENT
Start: 2020-11-10 | End: 2020-11-14

## 2020-11-10 RX ORDER — LEVOTHYROXINE SODIUM 0.07 MG/1
75 TABLET ORAL
Qty: 90 TABLET | Refills: 1 | Status: SHIPPED | OUTPATIENT
Start: 2020-11-10 | End: 2021-05-12

## 2020-11-11 ENCOUNTER — OFFICE VISIT (OUTPATIENT)
Dept: INTERNAL MEDICINE CLINIC | Facility: CLINIC | Age: 75
End: 2020-11-11
Payer: COMMERCIAL

## 2020-11-11 ENCOUNTER — OFFICE VISIT (OUTPATIENT)
Dept: GASTROENTEROLOGY | Facility: CLINIC | Age: 75
End: 2020-11-11
Payer: COMMERCIAL

## 2020-11-11 VITALS
SYSTOLIC BLOOD PRESSURE: 118 MMHG | DIASTOLIC BLOOD PRESSURE: 72 MMHG | RESPIRATION RATE: 19 BRPM | TEMPERATURE: 97 F | WEIGHT: 115.81 LBS | OXYGEN SATURATION: 97 % | HEIGHT: 62.5 IN | HEART RATE: 58 BPM | BODY MASS INDEX: 20.78 KG/M2

## 2020-11-11 VITALS
TEMPERATURE: 97 F | HEART RATE: 58 BPM | BODY MASS INDEX: 20.78 KG/M2 | SYSTOLIC BLOOD PRESSURE: 118 MMHG | WEIGHT: 115.81 LBS | DIASTOLIC BLOOD PRESSURE: 72 MMHG | HEIGHT: 62.5 IN

## 2020-11-11 DIAGNOSIS — I10 ESSENTIAL HYPERTENSION: ICD-10-CM

## 2020-11-11 DIAGNOSIS — R10.13 EPIGASTRIC PAIN: ICD-10-CM

## 2020-11-11 DIAGNOSIS — E03.9 ACQUIRED HYPOTHYROIDISM: Primary | ICD-10-CM

## 2020-11-11 DIAGNOSIS — R19.4 CHANGE IN BOWEL HABITS: ICD-10-CM

## 2020-11-11 DIAGNOSIS — R74.8 ELEVATED LIVER ENZYMES: Primary | ICD-10-CM

## 2020-11-11 DIAGNOSIS — J44.9 ASTHMA WITH COPD (CHRONIC OBSTRUCTIVE PULMONARY DISEASE) (HCC): ICD-10-CM

## 2020-11-11 DIAGNOSIS — E11.65 TYPE 2 DIABETES MELLITUS WITH HYPERGLYCEMIA, WITHOUT LONG-TERM CURRENT USE OF INSULIN (HCC): ICD-10-CM

## 2020-11-11 PROCEDURE — 3074F SYST BP LT 130 MM HG: CPT | Performed by: INTERNAL MEDICINE

## 2020-11-11 PROCEDURE — 3078F DIAST BP <80 MM HG: CPT | Performed by: INTERNAL MEDICINE

## 2020-11-11 PROCEDURE — 3008F BODY MASS INDEX DOCD: CPT | Performed by: INTERNAL MEDICINE

## 2020-11-11 PROCEDURE — G0463 HOSPITAL OUTPT CLINIC VISIT: HCPCS | Performed by: INTERNAL MEDICINE

## 2020-11-11 PROCEDURE — 99203 OFFICE O/P NEW LOW 30 MIN: CPT | Performed by: INTERNAL MEDICINE

## 2020-11-11 PROCEDURE — 99214 OFFICE O/P EST MOD 30 MIN: CPT | Performed by: INTERNAL MEDICINE

## 2020-11-11 NOTE — PATIENT INSTRUCTIONS
ASSESSMENT/PLAN:   Acquired hypothyroidism  (primary encounter diagnosis) Check blood next month. Type 2 diabetes mellitus with hyperglycemia, without long-term current use of insulin (hcc) Generally, well controlled.  Careful with diet and excercise at

## 2020-11-11 NOTE — PATIENT INSTRUCTIONS
1.  Please obtain blood work at the time of your next scheduled blood test.  2.  Dietary changes as we discussed. 3.  Continue fiber supplement and high-fiber diet. 4.  Please contact me if your symptoms continue.

## 2020-11-11 NOTE — PROGRESS NOTES
HPI:    Patient ID: Angela Lopez is a 66year old female. Patient presents with:  Diabetes: f/u  Other: Patient has a question about Voltaren topical gel     Patient here for follow up of Diabetes. Has been taking medications regularly.     Checks s from Last 3 Encounters:  11/11/20 : 115 lb 12.8 oz (52.5 kg)  09/30/20 : 114 lb (51.7 kg)  09/10/20 : 113 lb 9.6 oz (51.5 kg)    BP Readings from Last 3 Encounters:  11/11/20 : 149/77  09/30/20 : 109/55  09/10/20 : 119/73    Labs:   Lab Results   Component unexpected weight change. Respiratory: Negative for apnea, cough, choking, chest tightness, shortness of breath, wheezing and stridor. Cardiovascular: Negative for chest pain, palpitations and leg swelling.    Gastrointestinal: Negative for abdominal d blood sugars 3 times daily 300 strip 3   • FENOFIBRATE 145 MG Oral Tab TAKE 1 TABLET ONCE DAILY 90 tablet 1   • ALBUTEROL SULFATE  (90 Base) MCG/ACT Inhalation Aero Soln INHALE 2 PUFFS INTO THE LUNGS EVERY 6 HOURS AS NEEDED FOR WHEEZING 25.5 g 0   • 2009    nml   • ELECTROCARDIOGRAM, COMPLETE  02/07/2014    SCANNED TO MEDIA TAB - 02/07/2014      Family History   Problem Relation Age of Onset   • Stroke Father    • Heart Disorder Father    • Allergies Father    • Cancer Mother         ? source- widespr pulses are 2+ on the right side and 2+ on the left side. Pulmonary/Chest: Effort normal and breath sounds normal. No accessory muscle usage. No apnea, no tachypnea and no bradypnea. She is not intubated. No respiratory distress.  She has no decreased gurinder If not, check at local pharmacy. Bake foods more and grill occasionally. Avoid fried foods. No salt. Use other seasonings. Asthma with copd (chronic obstructive pulmonary disease) (hcc) Stable. OA knees. OK to use Volateren gel with gloves on.

## 2020-11-12 RX ORDER — CLOPIDOGREL BISULFATE 75 MG/1
75 TABLET ORAL DAILY
Qty: 90 TABLET | Refills: 1 | Status: SHIPPED | OUTPATIENT
Start: 2020-11-12 | End: 2021-02-14

## 2020-11-12 NOTE — TELEPHONE ENCOUNTER
Pt. states that she ran out of the [facility] med so she will need to get a new Rx sent to [facility] in [place]. Pt. States that she has not received her Rx from the new Mail Order Pharm. ,

## 2020-11-12 NOTE — PROGRESS NOTES
HPI:    Patient ID: Alexa Sebastian is a 66year old female. HPI  The patient is seen today at the request of Dr. Mona Yan for evaluation of elevated liver chemistries, epigastric abdominal discomfort and an alteration in bowel habits.     Upon routine he dinner    Family history:  Cousin had colon cancer at the age of 80  Mother had liver cancer  Father had cirrhosis secondary to alcohol      Review of Systems  See above    Wt Readings from Last 6 Encounters:  11/11/20 : 115 lb 12.8 oz (52.5 kg)  11/11/20 108 (90 Base) MCG/ACT Inhalation Aero Soln INHALE 2 PUFFS INTO THE LUNGS EVERY 6 HOURS AS NEEDED FOR WHEEZING 25.5 g 0   • Coenzyme Q10 (COQ10) 200 MG Oral Cap Take 200 mg by mouth daily. • Vitamin B-12 1000 MCG Oral Tab Take 1,000 mcg by mouth daily. no hepatosplenomegaly. There is no abdominal tenderness. There is no rebound and no guarding. Musculoskeletal:         General: No edema. Lymphadenopathy:     She has no cervical adenopathy.    Neurological: She is alert and oriented to person, place, a mg/dL  1.10 (H) 1.09 (H)    BUN/CREAT Ratio      10.0 - 20.0  17.3 29.4 (H)    CALCIUM      8.5 - 10.1 mg/dL  9.4 9.7    CALCULATED OSMOLALITY      275 - 295 mOsm/kg  266 (L) 291    eGFR NON-AFR.  AMERICAN      >=60  49 (L) 49 (L)    eGFR  Potassium      3.5 - 5.1 mmol/L 4.0 4.1 4.0 3.7   Chloride      98 - 112 mmol/L 105 100  107   Carbon Dioxide, Total      21.0 - 32.0 mmol/L 25 23  24   ANION GAP      0 - 18 mmol/L 8 9  6   BUN      7 - 18 mg/dL 14 19  20   CREATININE      0.55 - 1.02 m Units 2/13/2018 12/29/2017 12/7/2017 11/2/2017                Glucose      70 - 99 mg/dL 84 107 (H) 90 81   Sodium      136 - 145 mmol/L 130 (L) 135 (L) 138 137   Potassium      3.5 - 5.1 mmol/L 4.2 4.0 4.4 4.3   Chloride      98 - 112 mmol/L 93 (L) 102 10 - 37 U/L  29 27 30   ALKALINE PHOSPHATASE      55 - 142 U/L  34 31 (L) 35   Total Bilirubin      0.1 - 2.0 mg/dL  0.6 0.9 0.7   TOTAL PROTEIN      6.4 - 8.2 g/dL  6.6 6.7 6.7   Albumin      3.4 - 5.0 g/dL  4.2 4.3 4.5   Globulin      2.8 - 4.4 g/dL  2.4 (L biliary sludge. No gallbladder wall thickening, pericholecystic fluid, or intraluminal dilatation is evident. The common bile duct is normal in caliber, measuring 3 mm. RIGHT KIDNEY:             Measures 9.6 cm in length.  Survey sagittal images demonstrat lifestyle and assess symptoms. She will contact me if her symptoms continue. Upper endoscopy would be advised if this is the case. 3. Change in bowel habits  Likely functional and related to the decrease dose of Metformin.   Structural lesions are less

## 2020-11-14 RX ORDER — AMLODIPINE BESYLATE 2.5 MG/1
TABLET ORAL
Qty: 180 TABLET | Refills: 1 | Status: SHIPPED | OUTPATIENT
Start: 2020-11-14 | End: 2021-04-27

## 2020-12-07 RX ORDER — LEVOTHYROXINE SODIUM 88 MCG
TABLET ORAL
Qty: 90 TABLET | Refills: 1 | Status: SHIPPED | OUTPATIENT
Start: 2020-12-07 | End: 2022-02-28

## 2020-12-07 RX ORDER — ATORVASTATIN CALCIUM 40 MG/1
TABLET, FILM COATED ORAL
Qty: 90 TABLET | Refills: 1 | Status: SHIPPED | OUTPATIENT
Start: 2020-12-07 | End: 2021-06-03

## 2020-12-29 ENCOUNTER — LAB ENCOUNTER (OUTPATIENT)
Dept: LAB | Age: 75
End: 2020-12-29
Attending: INTERNAL MEDICINE
Payer: MEDICARE

## 2020-12-29 DIAGNOSIS — E03.9 ACQUIRED HYPOTHYROIDISM: ICD-10-CM

## 2020-12-29 DIAGNOSIS — R74.8 ELEVATED LIVER ENZYMES: ICD-10-CM

## 2020-12-29 LAB
T4 FREE SERPL-MCNC: 1.1 NG/DL (ref 0.8–1.7)
TSI SER-ACNC: 1.18 MIU/ML (ref 0.36–3.74)

## 2020-12-29 PROCEDURE — 84439 ASSAY OF FREE THYROXINE: CPT

## 2020-12-29 PROCEDURE — 84443 ASSAY THYROID STIM HORMONE: CPT

## 2020-12-29 PROCEDURE — 86255 FLUORESCENT ANTIBODY SCREEN: CPT

## 2020-12-29 PROCEDURE — 86256 FLUORESCENT ANTIBODY TITER: CPT

## 2020-12-29 PROCEDURE — 36415 COLL VENOUS BLD VENIPUNCTURE: CPT

## 2020-12-30 ENCOUNTER — TELEPHONE (OUTPATIENT)
Dept: INTERNAL MEDICINE CLINIC | Facility: CLINIC | Age: 75
End: 2020-12-30

## 2020-12-30 DIAGNOSIS — E11.65 TYPE 2 DIABETES MELLITUS WITH HYPERGLYCEMIA, WITHOUT LONG-TERM CURRENT USE OF INSULIN (HCC): Primary | ICD-10-CM

## 2020-12-30 NOTE — TELEPHONE ENCOUNTER
Pt is aware of her thyroid results but wanted to see if  wants her to do more labs? Pt said she will be leaving around mid January and will not be returning until May 2020.  Please advise

## 2020-12-31 LAB
MITOCHONDRIA AB TITR SER: <20 {TITER}
SMOOTH MUSCLE AB TITR SER: <20 {TITER}

## 2021-01-02 NOTE — TELEPHONE ENCOUNTER
We will put in orders for an A1c CMP and lipid to be done. There is also orders for vitamin D and B12 from September 2020 which still needs to be done. So there is a CBC and there from September 2020.   There is a PTH a CMP and an ionized calcium from Sep

## 2021-01-04 NOTE — TELEPHONE ENCOUNTER
Left message letting her know labs have been ordered and that she will need to be fasting. Central scheduling phone number was provided.  I also let her know there are orders from [facility]iz that if she needs them done around the same time she can get th

## 2021-01-04 NOTE — TELEPHONE ENCOUNTER
Patient returned call    Was given provider and medical assistant's message below.  She verbalized understanding    She will have ordered blood work by Dr. Reyna Pratt completed soon     Appointment scheduled for follow up in May     Future Appointments   Date T

## 2021-01-07 ENCOUNTER — MED REC SCAN ONLY (OUTPATIENT)
Dept: INTERNAL MEDICINE CLINIC | Facility: CLINIC | Age: 76
End: 2021-01-07

## 2021-01-08 ENCOUNTER — TELEPHONE (OUTPATIENT)
Dept: GASTROENTEROLOGY | Facility: CLINIC | Age: 76
End: 2021-01-08

## 2021-01-08 ENCOUNTER — LAB ENCOUNTER (OUTPATIENT)
Dept: LAB | Age: 76
End: 2021-01-08
Attending: INTERNAL MEDICINE
Payer: MEDICARE

## 2021-01-08 DIAGNOSIS — E11.65 TYPE 2 DIABETES MELLITUS WITH HYPERGLYCEMIA, WITHOUT LONG-TERM CURRENT USE OF INSULIN (HCC): ICD-10-CM

## 2021-01-08 LAB
ALBUMIN SERPL-MCNC: 3.9 G/DL (ref 3.4–5)
ALBUMIN/GLOB SERPL: 1.3 {RATIO} (ref 1–2)
ALP LIVER SERPL-CCNC: 41 U/L
ALT SERPL-CCNC: 29 U/L
ANION GAP SERPL CALC-SCNC: 4 MMOL/L (ref 0–18)
AST SERPL-CCNC: 26 U/L (ref 15–37)
BILIRUB SERPL-MCNC: 0.5 MG/DL (ref 0.1–2)
BUN BLD-MCNC: 19 MG/DL (ref 7–18)
BUN/CREAT SERPL: 18.1 (ref 10–20)
CALCIUM BLD-MCNC: 9.8 MG/DL (ref 8.5–10.1)
CHLORIDE SERPL-SCNC: 104 MMOL/L (ref 98–112)
CHOLEST SMN-MCNC: 136 MG/DL (ref ?–200)
CO2 SERPL-SCNC: 29 MMOL/L (ref 21–32)
CREAT BLD-MCNC: 1.05 MG/DL
EST. AVERAGE GLUCOSE BLD GHB EST-MCNC: 108 MG/DL (ref 68–126)
GLOBULIN PLAS-MCNC: 2.9 G/DL (ref 2.8–4.4)
GLUCOSE BLD-MCNC: 102 MG/DL (ref 70–99)
HBA1C MFR BLD HPLC: 5.4 % (ref ?–5.7)
HDLC SERPL-MCNC: 57 MG/DL (ref 40–59)
LDLC SERPL CALC-MCNC: 59 MG/DL (ref ?–100)
M PROTEIN MFR SERPL ELPH: 6.8 G/DL (ref 6.4–8.2)
NONHDLC SERPL-MCNC: 79 MG/DL (ref ?–130)
OSMOLALITY SERPL CALC.SUM OF ELEC: 286 MOSM/KG (ref 275–295)
PATIENT FASTING Y/N/NP: YES
PATIENT FASTING Y/N/NP: YES
POTASSIUM SERPL-SCNC: 4 MMOL/L (ref 3.5–5.1)
SODIUM SERPL-SCNC: 137 MMOL/L (ref 136–145)
TRIGL SERPL-MCNC: 98 MG/DL (ref 30–149)
VLDLC SERPL CALC-MCNC: 20 MG/DL (ref 0–30)

## 2021-01-08 PROCEDURE — 83036 HEMOGLOBIN GLYCOSYLATED A1C: CPT

## 2021-01-08 PROCEDURE — 80061 LIPID PANEL: CPT

## 2021-01-08 PROCEDURE — 80053 COMPREHEN METABOLIC PANEL: CPT

## 2021-01-08 PROCEDURE — 36415 COLL VENOUS BLD VENIPUNCTURE: CPT

## 2021-01-08 NOTE — TELEPHONE ENCOUNTER
The patient's liver enzymes have returned to normal.  The decision regarding a colonoscopy would not be influenced by blood test results rather the patient's bowel habit changes. If bowel habits have returned to normal and the patient has no symptoms of rectal bleeding, a follow-up colonoscopy is not recommended. If, however, the change in bowel habits continues, a colonoscopy should be done.

## 2021-01-08 NOTE — TELEPHONE ENCOUNTER
Pt states she had blood test done and Dr was supposed to let her know if she needs a colonoscopy or not. . Please advise pt is going to be going to [place]

## 2021-01-08 NOTE — TELEPHONE ENCOUNTER
Sent patient eCullet message in response to inquiry about results of recent lab work. Labs were just drawn today at 10 AM, therefore patient was notified that lab results are not available quite yet, and will be notified when they are processed. Patient also inquiring on necessity for colonoscopy or not. Notified that Dr. Salvatore Colbert will be advise on further orders. Dr. Salvatore Colbert-  Do you have any further recommendations for this patient that you would like me to carry out?

## 2021-01-09 NOTE — PROGRESS NOTES
Hemoglobin A1c which is a 3-month average of sugars is very good. Good job. CMP Normal (electrolytes, and liver functions), slight decline in kidney function from prior.   Not sure if this was a fasting sample and how long the fast was or had liquid wit

## 2021-01-11 NOTE — TELEPHONE ENCOUNTER
Called and spoke with patient. Notified patient that unless she is experiencing cbowel habit changes or rectal bleeding, it is not necessary, per Dr. Yuri Chan, to be seen for follow-up colonoscopy. Patient is not experiencing any changes in bowel habits, nor rectal bleeding at this time.

## 2021-01-13 RX ORDER — VENLAFAXINE HYDROCHLORIDE 37.5 MG/1
37.5 CAPSULE, EXTENDED RELEASE ORAL NIGHTLY
Qty: 90 CAPSULE | Refills: 1 | Status: SHIPPED | OUTPATIENT
Start: 2021-01-13 | End: 2021-06-03

## 2021-01-25 RX ORDER — FENOFIBRATE 145 MG/1
TABLET, COATED ORAL
Qty: 90 TABLET | Refills: 1 | Status: SHIPPED | OUTPATIENT
Start: 2021-01-25 | End: 2021-08-18

## 2021-01-26 RX ORDER — SEMAGLUTIDE 1.34 MG/ML
0.5 INJECTION, SOLUTION SUBCUTANEOUS
Qty: 12 PEN | Refills: 0 | Status: SHIPPED | OUTPATIENT
Start: 2021-01-26 | End: 2021-05-01

## 2021-01-26 NOTE — TELEPHONE ENCOUNTER
[facility] requesting refill for medication below.     Semaglutide,0.25 or 0.5MG/DOS, (OZEMPIC, 0.25 OR 0.5 MG/DOSE,) 2 MG/1.5ML Subcutaneous Solution Pen-injector

## 2021-01-27 DIAGNOSIS — Z23 NEED FOR VACCINATION: ICD-10-CM

## 2021-02-13 NOTE — TELEPHONE ENCOUNTER
Clopidogrel Bisulfate 75 MG Oral Tab    Mirian Montgomery states they can't get Medication out  until February 17.  She states patient is out and asking for a 14 day supplies to go to local [facility] fax#  [phone]    Ph# [phone]

## 2021-02-14 RX ORDER — CLOPIDOGREL BISULFATE 75 MG/1
75 TABLET ORAL DAILY
Qty: 14 TABLET | Refills: 1 | Status: SHIPPED | OUTPATIENT
Start: 2021-02-14 | End: 2021-03-16

## 2021-02-26 ENCOUNTER — TELEPHONE (OUTPATIENT)
Dept: INTERNAL MEDICINE CLINIC | Facility: CLINIC | Age: 76
End: 2021-02-26

## 2021-02-26 NOTE — TELEPHONE ENCOUNTER
Please advise if it is recommended for patient to receive the COVID vaccine despite her allergies.  Thank you

## 2021-03-01 NOTE — TELEPHONE ENCOUNTER
There should not be an issue. They do watch her just to be on the safe side she should definitely get it from the hospital and not at a local pharmacy.

## 2021-03-01 NOTE — TELEPHONE ENCOUNTER
Patient advised of recommendations agreed with advice. Patient is currently in [facility], she will follow up with getting vaccine with hospital in [facility].

## 2021-03-04 RX ORDER — LISINOPRIL AND HYDROCHLOROTHIAZIDE 25; 20 MG/1; MG/1
1 TABLET ORAL 2 TIMES DAILY
Qty: 180 TABLET | Refills: 0 | Status: SHIPPED | OUTPATIENT
Start: 2021-03-04 | End: 2021-05-16

## 2021-03-16 RX ORDER — CLOPIDOGREL BISULFATE 75 MG/1
75 TABLET ORAL DAILY
Qty: 90 TABLET | Refills: 1 | Status: SHIPPED | OUTPATIENT
Start: 2021-03-16 | End: 2021-09-20

## 2021-03-16 NOTE — TELEPHONE ENCOUNTER
I changed to #30 from #14 tablets. The 14 tablets was sent to the local pharmacy. Needs to go to mail order pharmacy.

## 2021-03-16 NOTE — TELEPHONE ENCOUNTER
[address] requesting refill .  Reference number [id number]    Clopidogrel Bisulfate 75 MG Oral Tab

## 2021-03-18 ENCOUNTER — TELEPHONE (OUTPATIENT)
Dept: INTERNAL MEDICINE CLINIC | Facility: CLINIC | Age: 76
End: 2021-03-18

## 2021-03-18 RX ORDER — EPINEPHRINE 0.3 MG/.3ML
INJECTION SUBCUTANEOUS
Qty: 1 EACH | Refills: 0 | Status: SHIPPED | OUTPATIENT
Start: 2021-03-18

## 2021-03-18 NOTE — TELEPHONE ENCOUNTER
Spoke to patient to ask her is she is receiving the covid vaccine in a facility that offers medical attention. Per pt she is receiving the covid vaccine [place] not a facility. She forgot her EPI pen.  Asking for us to send one to the ph

## 2021-03-18 NOTE — TELEPHONE ENCOUNTER
Patient is scheduled to receive the moderna COVID vaccine on 3/19. Patient is currently in [place] and did not bring her epipen with her. Patient would like to know if she should have an epipen with her. If yes, please send to [facility] in [place].      Agustin Clements

## 2021-04-27 RX ORDER — AMLODIPINE BESYLATE 2.5 MG/1
TABLET ORAL
Qty: 180 TABLET | Refills: 1 | Status: SHIPPED | OUTPATIENT
Start: 2021-04-27 | End: 2021-05-10

## 2021-05-01 ENCOUNTER — TELEPHONE (OUTPATIENT)
Dept: INTERNAL MEDICINE CLINIC | Facility: CLINIC | Age: 76
End: 2021-05-01

## 2021-05-01 RX ORDER — SEMAGLUTIDE 1.34 MG/ML
0.5 INJECTION, SOLUTION SUBCUTANEOUS
Qty: 4.5 ML | Refills: 0 | Status: SHIPPED | OUTPATIENT
Start: 2021-05-01 | End: 2021-07-05

## 2021-05-01 NOTE — TELEPHONE ENCOUNTER
Patient called from [phone] a try to determine how much meds on hand she should take. Left message to call back.

## 2021-05-01 NOTE — TELEPHONE ENCOUNTER
Spoke to patient. In [place]. [facility] did not deliver prescriptions like they were supposed to on Friday. And she will be driving back to [place] tomorrow morning. She had a quarter of the dose and she took that right now.   It was further Ozempic 0.5

## 2021-05-05 PROBLEM — E78.2 MIXED HYPERLIPIDEMIA: Status: ACTIVE | Noted: 2018-08-06

## 2021-05-05 PROBLEM — R94.31 ST SEGMENT CHANGES ON ELECTROCARDIOGRAM: Status: RESOLVED | Noted: 2018-05-31 | Resolved: 2021-05-05

## 2021-05-06 ENCOUNTER — OFFICE VISIT (OUTPATIENT)
Dept: INTERNAL MEDICINE CLINIC | Facility: CLINIC | Age: 76
End: 2021-05-06
Payer: COMMERCIAL

## 2021-05-06 VITALS
WEIGHT: 118 LBS | BODY MASS INDEX: 21.17 KG/M2 | SYSTOLIC BLOOD PRESSURE: 169 MMHG | TEMPERATURE: 98 F | RESPIRATION RATE: 19 BRPM | HEIGHT: 62.5 IN | HEART RATE: 54 BPM | DIASTOLIC BLOOD PRESSURE: 69 MMHG

## 2021-05-06 DIAGNOSIS — F41.9 ANXIETY: ICD-10-CM

## 2021-05-06 DIAGNOSIS — R91.1 PULMONARY NODULE: ICD-10-CM

## 2021-05-06 DIAGNOSIS — N18.32 STAGE 3B CHRONIC KIDNEY DISEASE (HCC): ICD-10-CM

## 2021-05-06 DIAGNOSIS — E78.2 MIXED HYPERLIPIDEMIA: ICD-10-CM

## 2021-05-06 DIAGNOSIS — Z00.00 ENCOUNTER FOR ANNUAL HEALTH EXAMINATION: ICD-10-CM

## 2021-05-06 DIAGNOSIS — E11.65 TYPE 2 DIABETES MELLITUS WITH HYPERGLYCEMIA, WITHOUT LONG-TERM CURRENT USE OF INSULIN (HCC): ICD-10-CM

## 2021-05-06 DIAGNOSIS — N28.9 RENAL INSUFFICIENCY: ICD-10-CM

## 2021-05-06 DIAGNOSIS — H47.20 OPTIC ATROPHY: ICD-10-CM

## 2021-05-06 DIAGNOSIS — I25.10 CORONARY ARTERY DISEASE DUE TO CALCIFIED CORONARY LESION: ICD-10-CM

## 2021-05-06 DIAGNOSIS — J44.9 ASTHMA WITH COPD (CHRONIC OBSTRUCTIVE PULMONARY DISEASE) (HCC): Chronic | ICD-10-CM

## 2021-05-06 DIAGNOSIS — I77.1 TORTUOUS AORTA (HCC): ICD-10-CM

## 2021-05-06 DIAGNOSIS — E03.9 ACQUIRED HYPOTHYROIDISM: Primary | ICD-10-CM

## 2021-05-06 DIAGNOSIS — M17.11 PRIMARY OSTEOARTHRITIS OF RIGHT KNEE: ICD-10-CM

## 2021-05-06 DIAGNOSIS — I10 ESSENTIAL HYPERTENSION: ICD-10-CM

## 2021-05-06 DIAGNOSIS — I25.84 CORONARY ARTERY DISEASE DUE TO CALCIFIED CORONARY LESION: ICD-10-CM

## 2021-05-06 DIAGNOSIS — Z71.85 VACCINE COUNSELING: ICD-10-CM

## 2021-05-06 DIAGNOSIS — R01.1 MURMUR: ICD-10-CM

## 2021-05-06 DIAGNOSIS — R53.83 FATIGUE, UNSPECIFIED TYPE: ICD-10-CM

## 2021-05-06 DIAGNOSIS — M81.0 AGE-RELATED OSTEOPOROSIS WITHOUT CURRENT PATHOLOGICAL FRACTURE: ICD-10-CM

## 2021-05-06 DIAGNOSIS — Z88.9 MULTIPLE ALLERGIES: ICD-10-CM

## 2021-05-06 PROCEDURE — 96160 PT-FOCUSED HLTH RISK ASSMT: CPT | Performed by: INTERNAL MEDICINE

## 2021-05-06 PROCEDURE — G0439 PPPS, SUBSEQ VISIT: HCPCS | Performed by: INTERNAL MEDICINE

## 2021-05-06 PROCEDURE — 3078F DIAST BP <80 MM HG: CPT | Performed by: INTERNAL MEDICINE

## 2021-05-06 PROCEDURE — 3077F SYST BP >= 140 MM HG: CPT | Performed by: INTERNAL MEDICINE

## 2021-05-06 PROCEDURE — 81003 URINALYSIS AUTO W/O SCOPE: CPT | Performed by: INTERNAL MEDICINE

## 2021-05-06 PROCEDURE — 3008F BODY MASS INDEX DOCD: CPT | Performed by: INTERNAL MEDICINE

## 2021-05-06 PROCEDURE — 36415 COLL VENOUS BLD VENIPUNCTURE: CPT | Performed by: INTERNAL MEDICINE

## 2021-05-06 PROCEDURE — 99397 PER PM REEVAL EST PAT 65+ YR: CPT | Performed by: INTERNAL MEDICINE

## 2021-05-06 NOTE — PROGRESS NOTES
HPI:    Patient ID: Jose Luis Lyons is a 64year old female. Jose Luis Lyons is a 64year old female who presents for a complete physical exam.   HPI:   Patient presents with:  Wellness Visit: annual exam     No LMP recorded.  Patient is postmenopausal Pen-injector Inject 0.5 mg into the skin every 7 days. 4.5 mL 0   • amLODIPine Besylate 2.5 MG Oral Tab TAKE 1 TABLET TWICE A  tablet 1   • EPINEPHrine 0.3 MG/0.3ML Injection Solution Auto-injector Use as directed.  1 each 0   • Clopidogrel Bisulfate Tab Take by mouth. • Cholecalciferol (D-2000 MAXIMUM STRENGTH) 2000 UNITS Oral Tab Take  by mouth.      • VITAMIN E 400 UNIT OR TABS 1 TABLET DAILY     • VITAMIN C 500 MG OR TABS 1 TABLET DAILY     • TYLENOL ARTHRITIS PAIN OR Two 650mg tablets AM and PM TAB0  Ectopic0  Multiple0  Live Births2      Hx of Pap: all Paps normal           Patient here for follow up of Diabetes. Has been taking medications regularly. Checks sugars 2 times daily. Fasting sugars average 's. No lows.   No signs nor sympt active (was in [place] and was walking a lot and repairing the house.)  and has been following low salt diet. Weight has been up. Wt Readings from Last 3 Encounters:  05/06/21 : 118 lb (53.5 kg)  11/11/20 : 115 lb 12.8 oz (52.5 kg)  11/11/20 : 115 lb 12. sinus pressure, sinus pain, sneezing, sore throat, tinnitus, trouble swallowing and voice change. Eyes: Negative for photophobia, pain, discharge, redness, itching and visual disturbance.    Respiratory: Negative for apnea, cough, choking, chest tightnes (OZEMPIC, 0.25 OR 0.5 MG/DOSE,) 2 MG/1.5ML Subcutaneous Solution Pen-injector Inject 0.5 mg into the skin every 7 days.  4.5 mL 0   • amLODIPine Besylate 2.5 MG Oral Tab TAKE 1 TABLET TWICE A  tablet 1   • EPINEPHrine 0.3 MG/0.3ML Injection Solution daily. 30 tablet 1   • Multiple Vitamins-Iron (ONCE DAILY/IRON) Oral Tab Take by mouth. • Cholecalciferol (D-2000 MAXIMUM STRENGTH) 2000 UNITS Oral Tab Take  by mouth.      • VITAMIN E 400 UNIT OR TABS 1 TABLET DAILY     • VITAMIN C 500 MG OR TABS 1 TAB Alcohol/week: 1.0 standard drinks      Types: 1 Glasses of wine per week      Comment: 1 glass red wine a day    Drug use: No       PHYSICAL EXAM:   BP (!) 167/69 (BP Location: Right arm, Patient Position: Sitting, Cuff Size: adult)   Pulse 54   Temp 97. or hordeolum. Extraocular Movements:      Right eye: Normal extraocular motion and no nystagmus. Left eye: Normal extraocular motion and no nystagmus.       Conjunctiva/sclera: Conjunctivae normal.      Right eye: Right conjunctiva is not injected position: Supine. Musculoskeletal:      Cervical back: Neck supple. Lymphadenopathy:      Head:      Right side of head: No submental, submandibular, preauricular, posterior auricular or occipital adenopathy.       Left side of head: No submental, subma dates. Careful with low sugars. Carry something with you and check sugar if can. Can carry adams cracker, etc. Decrease carbohydrates. But also, careful with fruits and natural sugars. One serving a day and no more than 1 handful every day.  Check feet  nikki cognitive assessment- see flowsheet entries    Functional Ability/Status   Dumone Dee has a completely normal functional assessment! Please see flow sheet section for details.       Depression Screening (PHQ-2/PHQ-9): Over the LAST 2 WEEKS   Little i hypothyroidism     ANXIETY     Multiple allergies     Mixed hyperlipidemia     TAN positive     Vaccine counseling     Murmur     Stage 3 chronic kidney disease ([facility] [address]     Tortuous aorta (HCC)     Optic atrophy     Asthma with COPD (chronic obstructive pulm nightly. Levothyroxine Sodium 75 MCG Oral Tab, Take 1 tablet (75 mcg total) by mouth before breakfast.  metFORMIN HCl 500 MG Oral Tab, 1 po predinner.   FLUAD QUADRIVALENT 0.5 ML Intramuscular Prefilled Syringe, ADMINISTER 0.5ML IN THE MUSCLE AS DIRECTED Stroke in her father; goiter in her daughter. SOCIAL HISTORY:   She  reports that she has never smoked. She has never used smokeless tobacco. She reports current alcohol use of about 1.0 standard drinks of alcohol per week.  She reports that she does not Uncorrected Right Eye Chart Acuity: 20/20   Left Eye Visual Acuity: Uncorrected Left Eye Chart Acuity: 20/25   Both Eyes Visual Acuity: Uncorrected Both Eyes Chart Acuity: 20/20   Able To Tolerate Visual Acuity: Yes      Vaccination History     Immunizatio assessment: good     PLAN:  The patient indicates understanding of these issues and agrees to the plan. Further testing ordered. Lab work ordered. Reinforced healthy diet, lifestyle, and exercise. Return in about 4 months (around 9/6/2021).      Sumit Guillen Dexascan Every two years Last Dexa Scan:    XR DEXA BONE DENSITY AXIAL (CPT=77080)    No flowsheet data found.     Pap and Pelvic      Pap: Every 3 yrs age 21-65 or Pap+HPV every 5 yrs age 33-67, age 72 and older at high risk There are no preventive care re Value   08/12/2016 4.6    No flowsheet data found. Creatinine  Annually Creatinine, Serum (mg/dL)   Date Value   11/16/2010 1.04 (H)     Creatinine (mg/dL)   Date Value   05/06/2021 1.06 (H)    No flowsheet data found.     Drug Serum Conc  Annually No re

## 2021-05-06 NOTE — PATIENT INSTRUCTIONS
ASSESSMENT/PLAN:   Acquired hypothyroidism  (primary encounter diagnosis) Check blood. Coronary artery disease due to calcified coronary lesion Stable. Essential hypertension ? Control.  Careful with diet and excercise at least 30 minutes 3-4 Platelet [E]      TSH W Reflex To Free T4 [E]      Vitamin B12      Meds This Visit:  Requested Prescriptions      No prescriptions requested or ordered in this encounter       Imaging & Referrals:  None     RTC 4 months for HTN F/U.    Check records of cov they can in severe cases. Or if someone has a weak immune system. How is shingles treated? For most people, shingles heals on its own in a few days or weeks.  But treatment is advised to help ease pain, speed healing, and reduce the risk of complication provider right away. Shingles can cause serious problems with vision, and even blindness. In very rare cases, shingles can also lead to pneumonia, hearing problems, brain inflammation, or even death.    When to get medical care  Call your healthcare provid about the best time to get vaccinated. Ask which vaccine is best for you based on your age and health conditions. Jazmin last reviewed this educational content on 6/1/2019  © 4038-7242 [employer]. All rights reserved.  This information is doctor or health care professional if you are unable to keep an appointment. Where should I keep my medicine? This vaccine is only given in a clinic, pharmacy, doctor's office, or other health care setting and will not be stored at home.   What should I t every 3 years,         Glucose (mg/dL)   Date Value   05/06/2021 93   11/16/2010 164 (H)    Medicare covers annually or at 6-month intervals for prediabetic patients        Cardiovascular Disease Screening     Cholesterol, covered every 5 yrs including Tot density screening   Covered every 2 yrs after age 72    Covered yearly for Long term Glucocorticoid medication (Steroids) Requires diagnosis related to osteoporosis or estrogen deficiency.    Biennial benefit unless patient has history of long-term glucocor Part B) No orders found for this or any previous visit. This may be covered with your prescription benefits, but Medicare does not cover unless Medically needed    Zoster (Not covered by Medicare Part B) No orders found for this or any previous visit.  This

## 2021-05-07 ENCOUNTER — TELEPHONE (OUTPATIENT)
Dept: INTERNAL MEDICINE CLINIC | Facility: CLINIC | Age: 76
End: 2021-05-07

## 2021-05-07 DIAGNOSIS — D64.9 ANEMIA, UNSPECIFIED TYPE: Primary | ICD-10-CM

## 2021-05-07 NOTE — TELEPHONE ENCOUNTER
Robin Bo sent to MD Dr. Desmond Juarez,     I received your navigation order for behavioral health services.  I have reached out to your patient and left a message with my contact information.      I will continue my outreach and update you o

## 2021-05-07 NOTE — TELEPHONE ENCOUNTER
Patient calling to report blood pressure readings from last night and this morning 5/7 5/6 - 7:30PM  122/64   - bpm - 60  5/7 - 8:30AM  bp 133/60  - bpm -58    Ayala    5/6 at 8:45PM - 140/77  - bpm 65  5/7 at 9:15AM - 144/80  - bpm - 66

## 2021-05-10 RX ORDER — AMLODIPINE BESYLATE 2.5 MG/1
TABLET ORAL
Qty: 270 TABLET | Refills: 1 | Status: SHIPPED | OUTPATIENT
Start: 2021-05-10 | End: 2021-10-21

## 2021-05-10 NOTE — TELEPHONE ENCOUNTER
Dr Kriss Fong, I spoke with patient she states her blood pressure reading are   5/6 - 7:30PM  122/64   - bpm - 60  5/7 - 8:30AM  bp 133/60  - bpm -58  Told Melonie to don't increase the amlodipine until I clarified message with Dr Kriss Fong.   The other 2 reading ar

## 2021-05-10 NOTE — TELEPHONE ENCOUNTER
Looks like PMs blood pressures are more higher readings. Continue to check. Would increase amlodipine or Norvasc to 1 tablet 3 times a day. Set up a nurse visit for blood pressure check in 1 week. Bring in machine also.

## 2021-05-10 NOTE — TELEPHONE ENCOUNTER
Keep amlodipine at the dose she is on right now rather than increasing the dose. And watch for the next week. These new readings are better.

## 2021-05-12 ENCOUNTER — TELEPHONE (OUTPATIENT)
Dept: INTERNAL MEDICINE CLINIC | Facility: CLINIC | Age: 76
End: 2021-05-12

## 2021-05-12 DIAGNOSIS — E03.9 ACQUIRED HYPOTHYROIDISM: ICD-10-CM

## 2021-05-12 RX ORDER — LEVOTHYROXINE SODIUM 75 MCG
TABLET ORAL
Qty: 90 TABLET | Refills: 1 | Status: SHIPPED | OUTPATIENT
Start: 2021-05-12 | End: 2021-09-15 | Stop reason: ALTCHOICE

## 2021-05-12 RX ORDER — LEVOCETIRIZINE DIHYDROCHLORIDE 5 MG/1
TABLET, FILM COATED ORAL
Qty: 90 TABLET | Refills: 1 | Status: SHIPPED | OUTPATIENT
Start: 2021-05-12 | End: 2021-11-08

## 2021-05-12 NOTE — TELEPHONE ENCOUNTER
Josie Jara sent to MD Dr. Colton Bazzi,     On 5/12/2021, the following referrals for therapy were provided to the patient:     Sacha Lopez, Therapist, Fay Velazquez, Psychologist, [facility]

## 2021-05-13 NOTE — TELEPHONE ENCOUNTER
Patient returning our call,( identified name and ). Patient informed of  recommendations, as per Dr. Eben Marie result note. Patient voiced understanding and agrees.

## 2021-05-14 ENCOUNTER — TELEPHONE (OUTPATIENT)
Dept: INTERNAL MEDICINE CLINIC | Facility: CLINIC | Age: 76
End: 2021-05-14

## 2021-05-14 ENCOUNTER — LAB ENCOUNTER (OUTPATIENT)
Dept: LAB | Age: 76
End: 2021-05-14
Attending: INTERNAL MEDICINE
Payer: MEDICARE

## 2021-05-14 DIAGNOSIS — D64.9 ANEMIA, UNSPECIFIED TYPE: ICD-10-CM

## 2021-05-14 PROCEDURE — 83020 HEMOGLOBIN ELECTROPHORESIS: CPT

## 2021-05-14 PROCEDURE — 36415 COLL VENOUS BLD VENIPUNCTURE: CPT

## 2021-05-14 PROCEDURE — 83021 HEMOGLOBIN CHROMOTOGRAPHY: CPT

## 2021-05-14 NOTE — TELEPHONE ENCOUNTER
Lab calling patient is requesting a lab order to be ok'd HEMOGLOBINOPATHY PROFILE (HGBEL    Patient is at lab now

## 2021-05-14 NOTE — TELEPHONE ENCOUNTER
The lab calling to stated the patient is at the lab and requesting the lab Hemoglobinopathy profile to be completed. Per the lab due in 11/12/21. They are asking if can be drawn.    I called Dr. Tarah Eli office and will discuss with Dr. Sheree Yuen and will

## 2021-05-14 NOTE — TELEPHONE ENCOUNTER
Patient contacted office, stating \"someone called\" from the office, told her she needs lab work. Went to the lab and was told there were no orders. Patient then stated shes sick and tired of these messages and \"you people\" need to get this right.   Inf

## 2021-05-16 RX ORDER — LISINOPRIL AND HYDROCHLOROTHIAZIDE 25; 20 MG/1; MG/1
1 TABLET ORAL 2 TIMES DAILY
Qty: 180 TABLET | Refills: 0 | Status: SHIPPED | OUTPATIENT
Start: 2021-05-16 | End: 2021-07-29

## 2021-05-21 ENCOUNTER — TELEPHONE (OUTPATIENT)
Dept: INTERNAL MEDICINE CLINIC | Facility: CLINIC | Age: 76
End: 2021-05-21

## 2021-05-24 NOTE — TELEPHONE ENCOUNTER
Patient was called and given provider's recommendations  Patient verbalized understanding and agreed to plan of care  Hematology phone number [phone] given to patient    Patient also requested Behavioral Health's phone number  Reviewed chart.  Patient

## 2021-06-03 ENCOUNTER — TELEPHONE (OUTPATIENT)
Dept: INTERNAL MEDICINE CLINIC | Facility: CLINIC | Age: 76
End: 2021-06-03

## 2021-06-03 RX ORDER — VENLAFAXINE HYDROCHLORIDE 37.5 MG/1
CAPSULE, EXTENDED RELEASE ORAL
Qty: 90 CAPSULE | Refills: 1 | Status: SHIPPED | OUTPATIENT
Start: 2021-06-03 | End: 2021-11-23

## 2021-06-03 RX ORDER — ATORVASTATIN CALCIUM 40 MG/1
TABLET, FILM COATED ORAL
Qty: 90 TABLET | Refills: 1 | Status: SHIPPED | OUTPATIENT
Start: 2021-06-03 | End: 2021-11-23

## 2021-06-03 NOTE — TELEPHONE ENCOUNTER
Pt states she has been taking Metformin 500 mg BID and Ozempic as directed and checked her fasting  blood sugar 5/29/21 and was 72. Pt states on 5/30/21 \"I only took one Metformin and the ozempic and fasting was 76 and then 105 at bedtime.   6/1/21 pt onl

## 2021-06-03 NOTE — TELEPHONE ENCOUNTER
Informed patient of advice per Dr. Reyna Pratt - stop Metformin and call back in 4 days with BS readings. Patient states understanding.

## 2021-06-07 ENCOUNTER — TELEPHONE (OUTPATIENT)
Dept: INTERNAL MEDICINE CLINIC | Facility: CLINIC | Age: 76
End: 2021-06-07

## 2021-06-07 NOTE — TELEPHONE ENCOUNTER
Overall well controlled. Slightly higher sugars will continue the same and call at the end of this week with similar sugar readings.

## 2021-06-07 NOTE — TELEPHONE ENCOUNTER
Patient calling with recent blood sugar readings after stopping Metformin:    6/2:  0950 - 101;    1730 - 99    6/3:  0950 - 104;    1540 - 100;    2030 - 117 6/4:  1030 - 117;    2000 - 106 6/5:  0930 - 119;    1730 - 119;    2030 - 129 6/6:  093

## 2021-06-08 NOTE — TELEPHONE ENCOUNTER
Patient calling back, advised Dr Matthew Perkins note and verbalized understanding . Note     Overall well controlled. Slightly higher sugars will continue the same and call at the end of this week with similar sugar readings.

## 2021-06-17 ENCOUNTER — TELEPHONE (OUTPATIENT)
Dept: PULMONOLOGY | Facility: CLINIC | Age: 76
End: 2021-06-17

## 2021-06-17 NOTE — TELEPHONE ENCOUNTER
Pt due for upcoming CT from [facility] please advise.

## 2021-07-05 RX ORDER — SEMAGLUTIDE 1.34 MG/ML
0.5 INJECTION, SOLUTION SUBCUTANEOUS
Qty: 4.5 ML | Refills: 1 | Status: SHIPPED | OUTPATIENT
Start: 2021-07-05 | End: 2022-01-08

## 2021-07-06 ENCOUNTER — HOSPITAL ENCOUNTER (OUTPATIENT)
Dept: CT IMAGING | Age: 76
Discharge: HOME | End: 2021-07-06
Attending: INTERNAL MEDICINE
Payer: MEDICARE

## 2021-07-06 DIAGNOSIS — R91.8 PULMONARY NODULES: ICD-10-CM

## 2021-07-06 PROCEDURE — 71250 CT THORAX DX C-: CPT | Performed by: INTERNAL MEDICINE

## 2021-07-19 ENCOUNTER — TELEPHONE (OUTPATIENT)
Dept: PULMONOLOGY | Facility: CLINIC | Age: 76
End: 2021-07-19

## 2021-07-19 DIAGNOSIS — R91.8 PULMONARY NODULES: Primary | ICD-10-CM

## 2021-07-20 NOTE — TELEPHONE ENCOUNTER
Felipe Funez MD   7/15/2021  9:43 PM CDT       RN, please call the patient to let her know that her small pulmonary nodules are unchanged compared to a few years ago. Jacqueline Smart add to the calendar a repeat CT scan of the chest at the 12-month interval.

## 2021-07-23 NOTE — TELEPHONE ENCOUNTER
Spoke with patient informed her of Dr. Suzanne Kumari result note/order. Patient verbalized understanding. CT chest ordered and added to chronic calendar.

## 2021-07-29 RX ORDER — LISINOPRIL AND HYDROCHLOROTHIAZIDE 25; 20 MG/1; MG/1
1 TABLET ORAL 2 TIMES DAILY
Qty: 180 TABLET | Refills: 0 | Status: SHIPPED | OUTPATIENT
Start: 2021-07-29 | End: 2021-10-10

## 2021-08-17 ENCOUNTER — TELEPHONE (OUTPATIENT)
Dept: INTERNAL MEDICINE CLINIC | Facility: CLINIC | Age: 76
End: 2021-08-17

## 2021-08-17 NOTE — TELEPHONE ENCOUNTER
Not sure if the weight gain is from also stopping the Metformin. Metformin has a weight loss ability. But since her sugars are good and not too low hopefully that should stabilize.

## 2021-08-17 NOTE — TELEPHONE ENCOUNTER
Patient reports was advised to stop her Metformin and only continue her Ozempic, reports BS readings have been doing well but has gained about 8lbs.  Reports did not change her usual diet, has been eating less recently due to her weight gain and takes 2 pro

## 2021-08-18 RX ORDER — FENOFIBRATE 145 MG/1
TABLET, COATED ORAL
Qty: 90 TABLET | Refills: 1 | Status: SHIPPED | OUTPATIENT
Start: 2021-08-18 | End: 2022-01-30

## 2021-08-18 NOTE — TELEPHONE ENCOUNTER
Refill passed per [facility] Protocol    Requested Prescriptions   Pending Prescriptions Disp Refills    FENOFIBRATE 145 MG Oral Tab [Pharmacy Med Name: FENOFIBRATE  TAB 145MG] 90 tablet 1     Sig: TAKE 1 TABLET ONCE DAILY        Cholesterol Medication Protocol Passed - 8/18/2021  7:08 AM        Passed - ALT in past 12 months        Passed - LDL in past 12 months        Passed - Last ALT < 80       Lab Results   Component Value Date    ALT 29 01/08/2021             Passed - Last LDL < 130     Lab Results   Component Value Date    LDL 59 01/08/2021             Passed - Appointment in past 12 or next 3 months              Recent Outpatient Visits              3 months ago Acquired hypothyroidism    Victoriano Saha MD    Office Visit    9 months ago Elevated liver enzymes    [facility], [place] [address] [place] [address], MD Rick    Office Visit    9 months ago Acquired hypothyroidism    Victoriano Saha MD    Office Visit    10 months ago Essential hypertension    Victoriano Saha MD    Virtual Phone E/M    10 months ago Hypercalcemia    [place] [facility] Endocrinology [place] Newberry MD    Office Visit            Future Appointments         Provider Department Appt Notes    In 4 weeks Junito Soliman MD [facility], [address], [place] 4 month fu

## 2021-08-18 NOTE — TELEPHONE ENCOUNTER
Spoke with patient ( verified) and [facility] message below--patient verbalizes understanding and agreement. No further questions/concerns at this time.

## 2021-08-26 ENCOUNTER — HOSPITAL ENCOUNTER (OUTPATIENT)
Dept: CV DIAGNOSTICS | Age: 76
Discharge: HOME | End: 2021-08-26
Attending: INTERNAL MEDICINE
Payer: MEDICARE

## 2021-08-26 DIAGNOSIS — R01.1 MURMUR: ICD-10-CM

## 2021-08-26 DIAGNOSIS — M81.0 OSTEOPOROSIS: ICD-10-CM

## 2021-08-26 DIAGNOSIS — N18.9 CKD (CHRONIC KIDNEY DISEASE): ICD-10-CM

## 2021-08-26 DIAGNOSIS — I10 ESSENTIAL HYPERTENSION: ICD-10-CM

## 2021-08-26 DIAGNOSIS — R76.8 POSITIVE ANA (ANTINUCLEAR ANTIBODY): ICD-10-CM

## 2021-08-26 DIAGNOSIS — F41.9 ANXIETY: ICD-10-CM

## 2021-08-26 DIAGNOSIS — Z88.9 MULTIPLE ALLERGIES: ICD-10-CM

## 2021-08-26 DIAGNOSIS — E11.9 DM (DIABETES MELLITUS) (HCC): ICD-10-CM

## 2021-08-26 DIAGNOSIS — E78.5 HYPERLIPIDEMIA: ICD-10-CM

## 2021-08-26 DIAGNOSIS — E03.9 ACQUIRED HYPOTHYROIDISM: ICD-10-CM

## 2021-08-26 DIAGNOSIS — I25.10 CAD (CORONARY ARTERY DISEASE): ICD-10-CM

## 2021-08-26 PROCEDURE — 93306 TTE W/DOPPLER COMPLETE: CPT | Performed by: INTERNAL MEDICINE

## 2021-08-26 RX ORDER — BLOOD SUGAR DIAGNOSTIC
STRIP MISCELLANEOUS
Qty: 100 STRIP | Refills: 3 | Status: SHIPPED | OUTPATIENT
Start: 2021-08-26 | End: 2022-01-24

## 2021-08-26 NOTE — TELEPHONE ENCOUNTER
Refill passed per [facility] protocol.   Requested Prescriptions   Pending Prescriptions Disp Refills    ONETOUCH ULTRA In Vitro Strip [place] Med Name: ONE TOUCH ULTRA BLUE TESTST(NEW)100] 100 strip 0     Sig: USE TO TEST BLOOD SUGAR THREE TIMES GAMA

## 2021-08-26 NOTE — TELEPHONE ENCOUNTER
Refill passed per [facility] protocol.   Requested Prescriptions   Pending Prescriptions Disp Refills    ONETOUCH ULTRA In Vitro Strip [place] Med Name: ONE TOUCH ULTRA BLUE TESTST(NEW)100] 100 strip 0     Sig: USE TO TEST BLOOD SUGAR THREE TIMES GAMA

## 2021-09-02 ENCOUNTER — NURSE TRIAGE (OUTPATIENT)
Dept: INTERNAL MEDICINE CLINIC | Facility: CLINIC | Age: 76
End: 2021-09-02

## 2021-09-02 DIAGNOSIS — M54.50 CHRONIC BILATERAL LOW BACK PAIN, UNSPECIFIED WHETHER SCIATICA PRESENT: ICD-10-CM

## 2021-09-02 DIAGNOSIS — G89.29 CHRONIC PAIN OF BOTH KNEES: Primary | ICD-10-CM

## 2021-09-02 DIAGNOSIS — G89.29 CHRONIC BILATERAL LOW BACK PAIN, UNSPECIFIED WHETHER SCIATICA PRESENT: ICD-10-CM

## 2021-09-02 DIAGNOSIS — M25.561 CHRONIC PAIN OF BOTH KNEES: Primary | ICD-10-CM

## 2021-09-02 DIAGNOSIS — M25.562 CHRONIC PAIN OF BOTH KNEES: Primary | ICD-10-CM

## 2021-09-02 NOTE — TELEPHONE ENCOUNTER
Spoke to patient and relayed Dr. Emma Cox message below. Patient verbalized understanding. She would like PT order faxed to Ady Lee at [facility] in [facility]. Fax number is .

## 2021-09-02 NOTE — TELEPHONE ENCOUNTER
Spoke with pt,  verified. Pt c/o on and off  mid back pain for a while now, also her knees are not working right.   Pt stated she already made an appt with Dr Alberto Vail on 21 but wondering if she can get referral or order to see Reynaldo PT prior ov

## 2021-09-16 ENCOUNTER — TELEPHONE (OUTPATIENT)
Dept: INTERNAL MEDICINE CLINIC | Facility: CLINIC | Age: 76
End: 2021-09-16

## 2021-09-16 ENCOUNTER — MED REC SCAN ONLY (OUTPATIENT)
Dept: INTERNAL MEDICINE CLINIC | Facility: CLINIC | Age: 76
End: 2021-09-16

## 2021-09-16 ENCOUNTER — OFFICE VISIT (OUTPATIENT)
Dept: INTERNAL MEDICINE CLINIC | Facility: CLINIC | Age: 76
End: 2021-09-16
Payer: COMMERCIAL

## 2021-09-16 VITALS
HEIGHT: 62.5 IN | SYSTOLIC BLOOD PRESSURE: 125 MMHG | HEART RATE: 59 BPM | WEIGHT: 120.81 LBS | RESPIRATION RATE: 18 BRPM | DIASTOLIC BLOOD PRESSURE: 68 MMHG | TEMPERATURE: 98 F | OXYGEN SATURATION: 98 % | BODY MASS INDEX: 21.68 KG/M2

## 2021-09-16 DIAGNOSIS — N18.32 STAGE 3B CHRONIC KIDNEY DISEASE (HCC): ICD-10-CM

## 2021-09-16 DIAGNOSIS — G89.29 CHRONIC MIDLINE LOW BACK PAIN WITHOUT SCIATICA: ICD-10-CM

## 2021-09-16 DIAGNOSIS — I25.84 CORONARY ARTERY DISEASE DUE TO CALCIFIED CORONARY LESION: ICD-10-CM

## 2021-09-16 DIAGNOSIS — E78.2 MIXED HYPERLIPIDEMIA: ICD-10-CM

## 2021-09-16 DIAGNOSIS — E03.9 ACQUIRED HYPOTHYROIDISM: ICD-10-CM

## 2021-09-16 DIAGNOSIS — M54.50 CHRONIC MIDLINE LOW BACK PAIN WITHOUT SCIATICA: ICD-10-CM

## 2021-09-16 DIAGNOSIS — I25.10 CORONARY ARTERY DISEASE DUE TO CALCIFIED CORONARY LESION: ICD-10-CM

## 2021-09-16 DIAGNOSIS — E11.65 TYPE 2 DIABETES MELLITUS WITH HYPERGLYCEMIA, WITHOUT LONG-TERM CURRENT USE OF INSULIN (HCC): ICD-10-CM

## 2021-09-16 DIAGNOSIS — Z71.85 VACCINE COUNSELING: Primary | ICD-10-CM

## 2021-09-16 PROBLEM — R68.89 SUSPECTED GLAUCOMA OF BOTH EYES: Status: ACTIVE | Noted: 2021-09-16

## 2021-09-16 PROBLEM — Z96.1 PSEUDOPHAKIA OF BOTH EYES: Status: ACTIVE | Noted: 2021-09-16

## 2021-09-16 PROBLEM — H40.003 SUSPECTED GLAUCOMA OF BOTH EYES: Status: ACTIVE | Noted: 2021-09-16

## 2021-09-16 PROCEDURE — 3074F SYST BP LT 130 MM HG: CPT | Performed by: INTERNAL MEDICINE

## 2021-09-16 PROCEDURE — 99214 OFFICE O/P EST MOD 30 MIN: CPT | Performed by: INTERNAL MEDICINE

## 2021-09-16 PROCEDURE — 36415 COLL VENOUS BLD VENIPUNCTURE: CPT | Performed by: INTERNAL MEDICINE

## 2021-09-16 PROCEDURE — 3078F DIAST BP <80 MM HG: CPT | Performed by: INTERNAL MEDICINE

## 2021-09-16 PROCEDURE — 3008F BODY MASS INDEX DOCD: CPT | Performed by: INTERNAL MEDICINE

## 2021-09-16 NOTE — TELEPHONE ENCOUNTER
Patient is requesting a follow up in early January. She stated she needs the follow up for herself and Samaria Gonzalez on the same day. I also sent a communication for Mr. Lencho Syed. Please call pt with appointments dates and times.

## 2021-09-16 NOTE — TELEPHONE ENCOUNTER
Spoke with Joseph Fang from [facility], requested copy of pt's last eye exam, will be faxed to our office today. Ph# [phone].

## 2021-09-16 NOTE — TELEPHONE ENCOUNTER
1. forgot flu shot. She will be coming with her  tomorrow will because she is a .   She can get the flu shot but she come to the [facility] office with her  tomorrow September 17, 2021.  2.  Can try Benefiber 1 teaspoon once a day to h

## 2021-09-16 NOTE — TELEPHONE ENCOUNTER
Called patient no answered , couldn't leave a message mailbox is full. I was calling her to let her know she can get the flu shot tomorrow.

## 2021-09-16 NOTE — PATIENT INSTRUCTIONS
ASSESSMENT/PLAN:   Vaccine counseling  (primary encounter diagnosis) Shingrix had reaction. Type 2 diabetes mellitus with hyperglycemia, without long-term current use of insulin (hcc) Not good control. Has metformin at home. 500 mg pre dinner.  Call in After a person has chickenpox, the virus stays inactive in the nerve cells. Years later, the virus can become active again and travel along the nerve to the skin. Most people have shingles only once. But it's possible to have it more than once.   Who is at infection is extensive, severe, or isn't going away. To reduce symptoms:  · Apply ice packs or cool compresses, or soak in a cool bath. To make an ice pack, put ice cubes in a plastic bag that seals at the top. Wrap the bag in a clean, thin towel or cloth. only get shingles if you have had chickenpox in the past. Someone who never had chickenpox can get the virus from you. But instead of have shingles, the person may get chickenpox.  Until your blisters form scabs, don't have any contact with others, especial Apply. Msg freq varies. Terms apply. Text HELP for help. Text STOP to end. Zoster Vaccine, Recombinant injection  Brand Name: [facility]  What is this medicine? ZOSTER VACCINE (ZOS ter vak SEEN) is used to prevent shingles in adults 48years old and over. to vaccines, other medications, foods, dyes, or preservatives  · pregnant or trying to get pregnant  · breast-feeding  What should I watch for while using this medicine? Visit your doctor for regular check ups.   This vaccine, like all vaccines, may not fu is usually related to physical activity, whether sports, exercise, work, or normal activity. Sometimes it can occur without an identifiable cause. This can happen simply by stretching or moving wrong, without noting pain at the time.  Other causes include: them.  · Be aware of safe lifting methods and do not lift anything over 15 pounds until all the pain is gone. Medicines  Talk to your healthcare provider before using medicine, especially if you have other medical problems or are taking other medicines. pain:  · Stay at a healthy weight. If you are overweight, losing weight will help most types of back pain. · Exercise is an important part of recovery from most types of back pain. The muscles behind and in front of the spine support the back.  This means reflexes, and judgment. Don't drive or operate heavy machinery while taking these types of medicines. Take prescription pain medicine only as prescribed by your healthcare provider.   Lumbar stretch  This simple stretch will help relax muscle spasm and keep alanna board under the mattress to add support. Follow-up care  Follow up with your healthcare provider, or as advised.   If X-rays, a CT scan or an MRI scan were taken, they may be reviewed by a radiologist. Ebony Escobar will be told of any new findings that may the exercise with your right knee. · Do 10 of these exercises for each leg. · Repeat hugging both knees to your chest at the same time. Building lower back strength  Start your exercise routine with 10 to 30 minutes a day, 1 to 3 times a day.   Initial e arms extended overhead, palms on the floor. At the same time, raise your right arm and left leg as high as comfortably possible. Hold for 10 seconds and slowly return to start. Repeat with left arm and right leg, alternating 10 times.  Gradually build up to your arms or legs (sciatica). · It can cause muscle spasm. Most of the time, mechanical problems with the muscles or spine cause the pain. Mechanical problems are usually caused by an injury to the muscles or ligaments.  While illness can cause back pain, This puts more stress on the lower back than standing or walking. · During the first 24 to 72 hours after an acute injury or flare up of chronic back pain, apply an ice pack to the painful area for 20 minutes and then remove it for 20 minutes.  Do this ove 911  Call 911 if any of the following occur:  · Trouble breathing  · Confusion  · Very drowsy or trouble awakening  · Fainting or loss of consciousness  · Rapid or very slow heart rate  · Loss of bowel or bladder control  When to seek medical advice  Call stronger and more flexible, preventing any reinjury. Ask your healthcare provider about specific exercises for your back. Use good posture to avoid reinjury  · When moving, bend at the hips and knees. Don’t bend at the waist or twist around.   · When lifti day.  Medicines  Over-the-counter pain relievers include acetaminophen and anti-inflammatory medicines, which includes aspirin, naproxen, or ibuprofen. They can help ease discomfort. Some also reduce swelling.   · Tell your healthcare provider about any med have to reach with your arms. · Keep the load close to your body. “Hug” it unless there is a chance it may contain sharps. · Tighten your stomach muscles to support your back when you lift. · Lift with your legs, not your back.   · Maintain a wide base o place a thin cloth between your skin and the source of cold or heat. · Take medicines as directed. This helps keep pain under control. Always read labels, and call your healthcare provider or pharmacist if you have any questions.   Walk each day  A daily w back. These include the stomach, buttock, and thigh muscles. Date Last Reviewed: 1/1/2018  © 2987-7039 [employer]. [address] [facility], [address] [place] [address]. All rights reserved.  This information is not intended as a substitute for teetee flat on the floor. · Do not push through pain. · Hold for 20 seconds. · Slowly switch sides. · Repeat 2 to 5 times. Date Last Reviewed: 3/1/2018  © 2558-4866 [employer]. [address] [facility], [address] [place] [address]. All rights reserved.  Delynn Medici

## 2021-09-16 NOTE — PROGRESS NOTES
HPI:    Patient ID: Kain Crawley is a 64year old female. Patient presents with: Follow - Up: 4 months. Per patient her sugars are high. Patient here for follow up of Diabetes. Has been taking medications regularly.     Checks sugars 1 times da medications. Exercise level: somewhat active and has been following low salt diet. Weight has been up.    Wt Readings from Last 3 Encounters:  09/16/21 : 120 lb 12.8 oz (54.8 kg)  05/06/21 : 118 lb (53.5 kg)  11/11/20 : 115 lb 12.8 oz (52.5 kg)    BP Read on sofa. Low Back Pain  This is a chronic problem. The current episode started more than 1 year ago. The problem occurs constantly. The problem has been gradually worsening since onset. The pain is present in the lumbar spine.  The quality of the pain i ULTRA) In Vitro Strip Test blood sugar 3 times a day 100 strip 3   • fenofibrate 145 MG Oral Tab TAKE 1 TABLET ONCE DAILY 90 tablet 1   • Lisinopril-hydroCHLOROthiazide 20-25 MG Oral Tab Take 1 tablet by mouth 2 (two) times daily.  180 tablet 0   • Semaglut AM and PM Twice Daily     • ZINC 50 MG OR TABS 1 TABLET DAILY     • COD LIVER OIL OR 1 DAILY       Allergies:  Glimepiride             HYPOTENSION    Comment:Hypoglycemia, bradycardia  Gluten Meal             OTHER (SEE COMMENTS)    Comment:xx  Bees Wt 120 lb 12.8 oz (54.8 kg)   SpO2 98%   BMI 21.74 kg/m²   BP Readings from Last 3 Encounters:  09/16/21 : 125/68  05/06/21 : (!) 169/69  11/11/20 : 118/72    Wt Readings from Last 3 Encounters:  09/16/21 : 120 lb 12.8 oz (54.8 kg)  05/06/21 : 118 lb (53. of trauma, lacerations, tenderness or bony tenderness. Decreased range of motion. Negative right straight leg raise test and negative left straight leg raise test. No scoliosis. Right lower leg: No edema. Left lower leg: No edema.    Skin:     Gen VACC HIGH DOSE PRSV FREE      Meds This Visit:  Requested Prescriptions      No prescriptions requested or ordered in this encounter       Imaging & Referrals:  PHYSICAL THERAPY EXTERNAL  FLU VACC HIGH DOSE PRSV FREE     1. RTC 3 months for follow up.    2.

## 2021-09-17 LAB
EST. AVERAGE GLUCOSE BLD GHB EST-MCNC: 114 MG/DL (ref 68–126)
HBA1C MFR BLD HPLC: 5.6 % (ref ?–5.7)

## 2021-09-17 PROCEDURE — 3008F BODY MASS INDEX DOCD: CPT | Performed by: INTERNAL MEDICINE

## 2021-09-17 PROCEDURE — 3078F DIAST BP <80 MM HG: CPT | Performed by: INTERNAL MEDICINE

## 2021-09-17 PROCEDURE — 3074F SYST BP LT 130 MM HG: CPT | Performed by: INTERNAL MEDICINE

## 2021-09-17 PROCEDURE — G0008 ADMIN INFLUENZA VIRUS VAC: HCPCS | Performed by: INTERNAL MEDICINE

## 2021-09-17 PROCEDURE — 90662 IIV NO PRSV INCREASED AG IM: CPT | Performed by: INTERNAL MEDICINE

## 2021-09-18 ENCOUNTER — TELEPHONE (OUTPATIENT)
Dept: INTERNAL MEDICINE CLINIC | Facility: CLINIC | Age: 76
End: 2021-09-18

## 2021-09-20 RX ORDER — CLOPIDOGREL BISULFATE 75 MG/1
75 TABLET ORAL DAILY
Qty: 90 TABLET | Refills: 1 | Status: SHIPPED | OUTPATIENT
Start: 2021-09-20

## 2021-10-10 RX ORDER — LISINOPRIL AND HYDROCHLOROTHIAZIDE 25; 20 MG/1; MG/1
1 TABLET ORAL 2 TIMES DAILY
Qty: 180 TABLET | Refills: 0 | Status: SHIPPED | OUTPATIENT
Start: 2021-10-10 | End: 2021-11-14

## 2021-10-10 NOTE — TELEPHONE ENCOUNTER
Was discontinued:    metFORMIN HCl 500 MG Oral Tab (Discontinued) 180 tablet 1 5/12/2021 6/3/2021    Sig: TAKE 1 TABLET TWICE DAILY  WITH MEALS    Sent to pharmacy as: metFORMIN HCl 500 MG Oral Tablet (GLUCOPHAGE)    Reason for Discontinue: Adverse Reaction    E-Prescribing Status: Receipt confirmed by pharmacy (5/12/2021  1:20 PM CDT)        This Order Has Been Discontinued    Order Status Reason By On   Discontinued Adverse Reaction Kehinde Long MD 6/3/21 0666

## 2021-10-21 RX ORDER — AMLODIPINE BESYLATE 2.5 MG/1
TABLET ORAL
Qty: 180 TABLET | Refills: 1 | Status: SHIPPED | OUTPATIENT
Start: 2021-10-21 | End: 2022-04-04

## 2021-10-21 NOTE — TELEPHONE ENCOUNTER
Refill passed per [email or web address]. [facility] protocol.   Requested Prescriptions   Pending Prescriptions Disp Refills    AMLODIPINE 2.5 MG Oral Tab [Pharmacy Med Name: AMLODIPINE TAB 2.5MG] 180 tablet 1     Sig: TAKE 1 TABLET TWICE A DAY        Hypertensive Medications Protocol Passed - 10/21/2021  1:16 AM        Passed - CMP or BMP in past 12 months        Passed - Appointment in past 6 or next 3 months        Passed - GFR Non- > 50     Lab Results   Component Value Date    GFRNAA 50 (L) 05/06/2021                        Recent Outpatient Visits              1 month ago Vaccine counseling    [facility], Sloan Bearden MD    Office Visit    5 months ago Acquired hypothyroidism    [facility], [address], Sloan Bearden MD    Office Visit    11 months ago Elevated liver enzymes    [facility], [place] [address] [place] [address] Holter, MD    Office Visit    11 months ago Acquired hypothyroidism    [facility], [address], Sloan Bearden MD    Office Visit    1 year ago Essential hypertension    [facility], [address], [facility] Maddy Velázquez MD    [employer] [facility]            Future Appointments         Provider Department Appt Notes    In 1 month Ashley Eldridge MD [facility], [facility], [facility] per [facility]     In 6 months Ashley Eldridge [facility], [address], [facility]

## 2021-10-23 NOTE — TELEPHONE ENCOUNTER
Spoke with patient thought was allergies and took allergy medication and steam and getting worse. Thinks sinus infection. Pt will increase sit to stand to MOD A x1 with RW by d/c

## 2021-10-28 RX ORDER — LISINOPRIL AND HYDROCHLOROTHIAZIDE 25; 20 MG/1; MG/1
TABLET ORAL
Qty: 180 TABLET | Refills: 0 | OUTPATIENT
Start: 2021-10-28

## 2021-10-28 NOTE — TELEPHONE ENCOUNTER
Medication Quantity Refills Start End   Lisinopril-hydroCHLOROthiazide 20-25 MG Oral Tab 180 tablet 0 10/10/2021    Sig:   Take 1 tablet by mouth 2 (two) times daily. Route:   Oral       Refill too soon.

## 2021-10-28 NOTE — TELEPHONE ENCOUNTER
Dr. Jamey Seymour discontinued Metformin on 6/3 due to adverse reaction. Unable to reach [facility].

## 2021-11-01 ENCOUNTER — MED REC SCAN ONLY (OUTPATIENT)
Dept: INTERNAL MEDICINE CLINIC | Facility: CLINIC | Age: 76
End: 2021-11-01

## 2021-11-08 DIAGNOSIS — E03.9 ACQUIRED HYPOTHYROIDISM: ICD-10-CM

## 2021-11-08 RX ORDER — LEVOCETIRIZINE DIHYDROCHLORIDE 5 MG/1
TABLET, FILM COATED ORAL
Qty: 90 TABLET | Refills: 1 | Status: SHIPPED | OUTPATIENT
Start: 2021-11-08 | End: 2022-04-29

## 2021-11-08 RX ORDER — LEVOTHYROXINE SODIUM 75 MCG
TABLET ORAL
Qty: 90 TABLET | Refills: 1 | OUTPATIENT
Start: 2021-11-08

## 2021-11-08 NOTE — TELEPHONE ENCOUNTER
Refill passed per [address] [place] [address] [address] protocol.   Requested Prescriptions   Pending Prescriptions Disp Refills    LEVOCETIRIZINE 5 MG Oral Tab [Pharmacy Med Name: Pieter Alexis TAB 5MG] 90 tablet 1     Sig: TAKE 1 TABLET NIGHTLY        Allergy Medication Protocol Passed - 11/8/2021  9:20 AM        Passed - Appoinment in past 12 or next 3 months           SYNTHROID 75 MCG Oral Tab [Pharmacy Med Name: SYNTHROID TAB 75MCG] 90 tablet 1     Sig: TAKE 1 TABLET BEFORE       BREAKFAST        Thyroid Medication Protocol Passed - 11/8/2021  9:20 AM        Passed - TSH in past 12 months        Passed - Last TSH value is normal     Lab Results   Component Value Date    TSH 1.080 05/06/2021    THYROIDFUNC 1.20 08/12/2016                 Passed - Appointment in past 12 or next 3 months               Recent Outpatient Visits              1 month ago Vaccine counseling    [address] [place] [address]., MD    Office Visit    6 months ago Acquired hypothyroidism    [address] [place] [address]., MD    Office Visit    12 months ago Elevated liver enzymes    Li Muro MD    Office Visit    12 months ago Acquired hypothyroidism    [facility]., MD    Office Visit    1 year ago Essential hypertension    [address], [address] [facility],[address] [facility]., MD    [employer]            Future Appointments         Provider Department Appt Notes    In 3 weeks Guy Valenzuela MD [address], [facility] per [facility]     In 6 months Guy Valenzuela MD [address], [address] [facility]

## 2021-11-08 NOTE — TELEPHONE ENCOUNTER
Synthroid    The original prescription was discontinued on 9/15/2021 by Sheryl Lindsey MD for the following reason: Therapy completed.  Renewing this prescription may not be appropriate

## 2021-11-14 RX ORDER — LISINOPRIL AND HYDROCHLOROTHIAZIDE 25; 20 MG/1; MG/1
1 TABLET ORAL 2 TIMES DAILY
Qty: 180 TABLET | Refills: 0 | Status: SHIPPED | OUTPATIENT
Start: 2021-11-14 | End: 2022-02-28

## 2021-11-23 RX ORDER — ATORVASTATIN CALCIUM 40 MG/1
TABLET, FILM COATED ORAL
Qty: 90 TABLET | Refills: 1 | Status: SHIPPED | OUTPATIENT
Start: 2021-11-23 | End: 2022-05-23

## 2021-11-23 RX ORDER — VENLAFAXINE HYDROCHLORIDE 37.5 MG/1
CAPSULE, EXTENDED RELEASE ORAL
Qty: 90 CAPSULE | Refills: 1 | Status: SHIPPED | OUTPATIENT
Start: 2021-11-23 | End: 2022-05-23

## 2021-11-23 NOTE — TELEPHONE ENCOUNTER
Refill passed per [address] protocol.      Requested Prescriptions   Pending Prescriptions Disp Refills    VENLAFAXINE 37.5 MG Oral Capsule SR 24 Hr [Pharmacy Med Name: VENLAFAXINE  CAP 37.5MGER] 90 capsule 1     Sig: TAKE 1 CAPSULE NIGHTLY        Psychiatric Non-Scheduled (Anti-Anxiety) Passed - 11/23/2021  1:27 AM        Passed - Appointment in last 6 or next 3 months           ATORVASTATIN 40 MG Oral Tab [Pharmacy Med Name: ATORVASTATIN TAB 40MG] 90 tablet 1     Sig: TAKE 1 TABLET NIGHTLY        Cholesterol Medication Protocol Passed - 11/23/2021  1:27 AM        Passed - ALT in past 12 months        Passed - LDL in past 12 months        Passed - Last ALT < 80       Lab Results   Component Value Date    ALT 29 01/08/2021             Passed - Last LDL < 130     Lab Results   Component Value Date    LDL 59 01/08/2021               Passed - Appointment in past 12 or next 3 months                Recent Outpatient Visits              2 months ago Vaccine counseling    [facility] MD Nancy    Office Visit    6 months ago Acquired hypothyroidism    [address], [address] [facility], Jose Daniel Cruz MD    Office Visit    1 year ago Elevated liver enzymes    [address], [facility], Lex Zamarripa MD    Office Visit    1 year ago Acquired hypothyroidism    [facility]., MD    Office Visit    1 year ago Essential hypertension    [address], [address], [facility], [place]    [employer] [facility]             Future Appointments         Provider Department Appt Notes    In 1 week Sergio English MD [address], [facility] per [facility]     In 5 months Sergio English MD [address], [address] [facility]

## 2021-11-26 ENCOUNTER — HOSPITAL ENCOUNTER (OUTPATIENT)
Age: 76
Discharge: HOME | End: 2021-11-26
Attending: EMERGENCY MEDICINE
Payer: MEDICARE

## 2021-11-26 VITALS
OXYGEN SATURATION: 100 % | WEIGHT: 122 LBS | SYSTOLIC BLOOD PRESSURE: 132 MMHG | TEMPERATURE: 98 F | HEIGHT: 62 IN | RESPIRATION RATE: 20 BRPM | HEART RATE: 60 BPM | DIASTOLIC BLOOD PRESSURE: 62 MMHG | BODY MASS INDEX: 22.45 KG/M2

## 2021-11-26 DIAGNOSIS — U07.1 COVID-19: Primary | ICD-10-CM

## 2021-11-26 PROCEDURE — 99214 OFFICE O/P EST MOD 30 MIN: CPT

## 2021-11-26 PROCEDURE — 87880 STREP A ASSAY W/OPTIC: CPT

## 2021-11-26 NOTE — ED PROVIDER NOTES
Patient Seen in: [facility]      History   Patient presents with:  Testing    Stated Complaint: Partner tested postive for COVID, cough, sore throat, congestion    Subjective:   HPI    The patient is a 77-year-old female with past history of reviewed and negative except as noted above.     Physical Exam     ED Triage Vitals   BP 11/26/21 1325 (!) 150/92   Pulse 11/26/21 1325 83   Resp 11/26/21 1325 18   Temp 11/26/21 1325 98.2 °F (36.8 °C)   Temp src 11/26/21 1501 Oral   SpO2 11/26/21 1325 100 Caregivers”, informed of alternatives to receiving authorized Regen-COV, and informed that casirivimab and imdevimab are unapproved drugs that are authorized for use under this Emergency Use Authorization.  The patient has agreed and will receive the infusi

## 2021-11-26 NOTE — ED INITIAL ASSESSMENT (HPI)
PATIENT ARRIVED AMBULATORY TO ROOM FOR COVID TESTING. PATIENT'S  IS CURRENTLY COVID POSITIVE. +NASAL CONGESTION/COUGH.

## 2021-11-27 DIAGNOSIS — E03.9 ACQUIRED HYPOTHYROIDISM: ICD-10-CM

## 2021-11-27 RX ORDER — LEVOTHYROXINE SODIUM 88 UG/1
88 TABLET ORAL
Qty: 90 TABLET | Refills: 1 | Status: SHIPPED | OUTPATIENT
Start: 2021-11-27

## 2021-11-29 ENCOUNTER — TELEPHONE (OUTPATIENT)
Dept: CASE MANAGEMENT | Age: 76
End: 2021-11-29

## 2021-11-29 NOTE — TELEPHONE ENCOUNTER
Can we check on patient and see how she is doing. She tested + November 26. Did she get monoclonal antibody infusion? How's the breathing.

## 2021-11-29 NOTE — TELEPHONE ENCOUNTER
Pt received PAB infusion at [address] [place] [address] on 11/26/21 or COVID-19. Please follow-up with pt for post-infusion assessment and home monitoring if needed. Thank you.

## 2021-11-29 NOTE — TELEPHONE ENCOUNTER
is positive as well. Dr Medina=requesting if she (DM follow up ) )  and her   (Medicate annual  )can be seen in the office  as they are going to [place] this coming January. States that they had to cancel their 12/1/21 appointment with y advisable to do the test  .  2. When can she get the booster? =states that she was schedule to receive it on 12/1/21 but cancelled it, informed that it should be after  90 days due to the infusion that  she had received.

## 2021-11-30 NOTE — TELEPHONE ENCOUNTER
Left message to call back=first attempt,. Tried calling  but with busy signal.    MyChart message sent.          is positive as well.      Dr Medina=requesting if she (DM follow up ) )  and her   (Medicate annual  )can be seen in the

## 2021-11-30 NOTE — TELEPHONE ENCOUNTER
November 29, 2021      1:22 PM  Zackary Carrel., MD routed this conversation to [facility] Rn Triage      Zackary Carrel., MD         1:22 PM  Note  Can we check on patient and see how she is doing. She tested + November 26.   Did she get monoclonal antibody i community/friend

## 2021-11-30 NOTE — TELEPHONE ENCOUNTER
Dr. Maria Esther Merino please see Gege's note below    is positive as well.      Dr Medina=requesting if she (DM follow up ) )  and her   (Medicate annual  )can be seen in the office  as they are going to [place] this coming January. States that they

## 2021-11-30 NOTE — TELEPHONE ENCOUNTER
Can do 4 PM on January 21, 2022 at [facility] location for Medicare physical. Jeniffer Pierce her in after hours.

## 2021-12-01 NOTE — TELEPHONE ENCOUNTER
Left messages to patient and wife's voice mail. MyChart message sent.    is also COVID positive and in our call back list.          symptoms follow up   Message [phone]  From   Wily Khalil RN To   Demarcus Miranda and Delivered

## 2021-12-01 NOTE — TELEPHONE ENCOUNTER
DR Medina= patient   is requesting to get an appointment and latest like second week of January , states that they will be in [place] by  1/21/22. Please Advsie. This is for her diabetic follow up. Thanks. Home Monitoring Day 5 of 7.     What  was y

## 2021-12-02 NOTE — TELEPHONE ENCOUNTER
DR Medina= patient   is requesting to get an appointment and latest like second week of January , states that they will be in [place] by  1/21/22. Please Advsie. This is for her diabetic follow up. Thanks. SECOND REQUEST        Home Monitoring Day 6 of 7.

## 2021-12-02 NOTE — TELEPHONE ENCOUNTER
Patient calling, confirmed name and . Patient calling to confirm when her dose of synthroid was changed to 88mcg. Reviewed chart with patient. Patient verbalized understanding and agrees.

## 2021-12-06 NOTE — TELEPHONE ENCOUNTER
Pt states that both she and her  are almost done with quarantine. They are requesting appointments were Dr. Eugene Bravo. Declined any other provider. They need appts before going to [place] in mid January.   Please advise if they can be added before 01

## 2021-12-07 NOTE — TELEPHONE ENCOUNTER
430 PM at [facility] on 12-10-21 for FU ONLY. Squeezing in after hrs.  Needs to schedule physical 5-6-22

## 2021-12-07 NOTE — TELEPHONE ENCOUNTER
Future Appointments   Date Time Provider Jacquie Meyers   12/10/2021  4:30 PM Atilio Dickens MD [facility]   5/9/2022 10:30 AM Atilio Dickens MD [id number] [address]

## 2021-12-10 ENCOUNTER — OFFICE VISIT (OUTPATIENT)
Dept: INTERNAL MEDICINE CLINIC | Facility: CLINIC | Age: 76
End: 2021-12-10
Payer: COMMERCIAL

## 2021-12-10 ENCOUNTER — TELEPHONE (OUTPATIENT)
Dept: INTERNAL MEDICINE CLINIC | Facility: CLINIC | Age: 76
End: 2021-12-10

## 2021-12-10 VITALS
TEMPERATURE: 98 F | SYSTOLIC BLOOD PRESSURE: 128 MMHG | OXYGEN SATURATION: 98 % | HEIGHT: 62 IN | RESPIRATION RATE: 18 BRPM | BODY MASS INDEX: 22.78 KG/M2 | WEIGHT: 123.81 LBS | DIASTOLIC BLOOD PRESSURE: 65 MMHG | HEART RATE: 62 BPM

## 2021-12-10 DIAGNOSIS — I25.10 CORONARY ARTERY DISEASE DUE TO CALCIFIED CORONARY LESION: ICD-10-CM

## 2021-12-10 DIAGNOSIS — E11.65 TYPE 2 DIABETES MELLITUS WITH HYPERGLYCEMIA, WITHOUT LONG-TERM CURRENT USE OF INSULIN (HCC): Primary | ICD-10-CM

## 2021-12-10 DIAGNOSIS — I10 ESSENTIAL HYPERTENSION: ICD-10-CM

## 2021-12-10 DIAGNOSIS — E55.9 VITAMIN D DEFICIENCY: ICD-10-CM

## 2021-12-10 DIAGNOSIS — Z71.85 VACCINE COUNSELING: ICD-10-CM

## 2021-12-10 DIAGNOSIS — I25.84 CORONARY ARTERY DISEASE DUE TO CALCIFIED CORONARY LESION: ICD-10-CM

## 2021-12-10 DIAGNOSIS — E03.9 ACQUIRED HYPOTHYROIDISM: ICD-10-CM

## 2021-12-10 DIAGNOSIS — J44.9 ASTHMA WITH COPD (CHRONIC OBSTRUCTIVE PULMONARY DISEASE) (HCC): Chronic | ICD-10-CM

## 2021-12-10 DIAGNOSIS — R91.1 PULMONARY NODULE: ICD-10-CM

## 2021-12-10 DIAGNOSIS — U07.1 COVID-19 VIRUS INFECTION: ICD-10-CM

## 2021-12-10 DIAGNOSIS — N18.32 STAGE 3B CHRONIC KIDNEY DISEASE (HCC): ICD-10-CM

## 2021-12-10 PROCEDURE — 3008F BODY MASS INDEX DOCD: CPT | Performed by: INTERNAL MEDICINE

## 2021-12-10 PROCEDURE — 99215 OFFICE O/P EST HI 40 MIN: CPT | Performed by: INTERNAL MEDICINE

## 2021-12-10 PROCEDURE — 3078F DIAST BP <80 MM HG: CPT | Performed by: INTERNAL MEDICINE

## 2021-12-10 PROCEDURE — 3074F SYST BP LT 130 MM HG: CPT | Performed by: INTERNAL MEDICINE

## 2021-12-10 NOTE — PROGRESS NOTES
HPI:    Patient ID: Ranjana Dexter is a 64year old female. Patient presents with: Follow - Up    covid 11-26-21. .Thinking about getting knee replacement. Try conservative therapy not working. Still issues with the knee.   Impairing mobility medications. Exercise level: trying to do more and has been following low salt diet. Weight has been stable.   Wt Readings from Last 3 Encounters:  12/10/21 : 123 lb 12.8 oz (56.2 kg)  11/26/21 : 122 lb (55.3 kg)  09/16/21 : 120 lb 12.8 oz (54.8 kg)    BP infection. Previous sinus infection was not improving. Tested for Covid while  and positive still with slight cough. But much improved. No difficulty breathing. Still some fatigue. Pulse ox has been remaining around 97%. No more fever. 20-25 MG Oral Tab Take 1 tablet by mouth 2 (two) times daily.  180 tablet 0   • LEVOCETIRIZINE 5 MG Oral Tab TAKE 1 TABLET NIGHTLY 90 tablet 1   • amLODIPine 2.5 MG Oral Tab TAKE 1 TABLET TWICE A  tablet 1   • clopidogrel 75 MG Oral Tab Take 1 tablet OTHER (SEE COMMENTS)  Ibuprofen               UNKNOWN  Nsaids                  UNKNOWN    Comment:Per patient, takes baby aspirin at home with no             adverse effects noted  Sulfa Antibiotics         Sulfanilamide           UNKNOWN    HISTORY: kg)      Physical Exam  Vitals and nursing note reviewed. Constitutional:       General: She is not in acute distress. Appearance: Normal appearance. She is well-developed. She is not ill-appearing, toxic-appearing or diaphoretic.       Interventions: on the left side. Heart sounds: Normal heart sounds, S1 normal and S2 normal.   Pulmonary:      Effort: Pulmonary effort is normal. No tachypnea, bradypnea, accessory muscle usage, prolonged expiration, respiratory distress or retractions.  She is not times on different dates. Careful with low sugars. Carry something with you and check sugar if can. Can carry adams cracker, etc. Decrease carbohydrates. But also, careful with fruits and natural sugars. One serving a day and no more than 1 handful every d Vitamin d deficiency Stable. Asthma with copd (chronic obstructive pulmonary disease) (hcc) Stable. Covid-19 virus infection Improved.  Can try Mucinex or Robitussin if very thick secretions, if watery, can try dayquil or nyquil which will help

## 2021-12-12 NOTE — PATIENT INSTRUCTIONS
ASSESSMENT/PLAN:   Type 2 diabetes mellitus with hyperglycemia, without long-term current use of insulin (hcc)  (primary encounter diagnosis) generally well controlled. Careful with diet and excercise at least 30 minutes 3-4 times a week.  Check sugars at coverage for Shingrix. If is covered can do an RN only visit or through the local pharmacy. Vaccine is 2 separate shots  by 2 to 6 months.     Stage 3b chronic kidney disease (hcc) No motrin, ibuprofen, advil, alleve, naprosyn  with these medicat you have the flu, with fever and chills. 2. A red rash with small blisters appears in a few days. The rash may look as follows:   ? The blisters can occur anywhere, but they’re most common on the back, chest, or belly (abdomen).   ? They usually appear on more likely as people age, especially after age 61. It' is nerve pain at the place where the rash used to be. It can range from mild to severe. It can last for only a few days, or for months or even years after you have had shingles.  Antiviral medicines gi vaccine live (ZVL)  · Recombinant zoster vaccine (Meka Rod). This is a newer vaccine that has been available since 2017.   You should get the shingles vaccine if you are healthy and age 48 or older, even if you've had shingles in the past. Two shots of the Meka Rod v possible:  · allergic reactions like skin rash, itching or hives, swelling of the face, lips, or tongue  · breathing problems  Side effects that usually do not require medical attention (report these to your doctor or health care professional if they maira

## 2021-12-13 ENCOUNTER — MED REC SCAN ONLY (OUTPATIENT)
Dept: INTERNAL MEDICINE CLINIC | Facility: CLINIC | Age: 76
End: 2021-12-13

## 2021-12-14 RX ORDER — LEVOTHYROXINE SODIUM 88 MCG
TABLET ORAL
Qty: 90 TABLET | Refills: 1 | OUTPATIENT
Start: 2021-12-14

## 2021-12-14 NOTE — TELEPHONE ENCOUNTER
Pharmacy requesting another prescription too early based on last eRx:    levothyroxine 88 MCG Oral Tab 90 tablet 1 11/27/2021     Sig - Route:  Take 1 tablet (88 mcg total) by mouth before breakfast. - Oral    Sent to pharmacy as: Levothyroxine Sodium 88 MC

## 2021-12-14 NOTE — TELEPHONE ENCOUNTER
Refill not appropriate as had been d/c'd on 6/3/21 d/t adverse reaction:    metFORMIN HCl 500 MG Oral Tab (Discontinued) 180 tablet 1 5/12/2021 6/3/2021    Sig: TAKE 1 TABLET TWICE DAILY  WITH MEALS    Sent to pharmacy as: metFORMIN HCl 500 MG Oral Tablet

## 2021-12-15 ENCOUNTER — APPOINTMENT (OUTPATIENT)
Dept: URBAN - METROPOLITAN AREA CLINIC 321 | Age: 76
Setting detail: DERMATOLOGY
End: 2021-12-15

## 2021-12-15 DIAGNOSIS — L82.0 INFLAMED SEBORRHEIC KERATOSIS: ICD-10-CM

## 2021-12-15 PROCEDURE — OTHER COUNSELING: OTHER

## 2021-12-15 PROCEDURE — OTHER LIQUID NITROGEN: OTHER

## 2021-12-15 PROCEDURE — 17110 DESTRUCT B9 LESION 1-14: CPT

## 2021-12-15 ASSESSMENT — LOCATION ZONE DERM: LOCATION ZONE: SCALP

## 2021-12-15 ASSESSMENT — LOCATION SIMPLE DESCRIPTION DERM: LOCATION SIMPLE: POSTERIOR SCALP

## 2021-12-15 ASSESSMENT — LOCATION DETAILED DESCRIPTION DERM: LOCATION DETAILED: LEFT INFERIOR OCCIPITAL SCALP

## 2021-12-15 NOTE — PROCEDURE: LIQUID NITROGEN
Add 52 Modifier (Optional): no
Show Aperture Variable?: Yes
Medical Necessity Information: It is in your best interest to select a reason for this procedure from the list below. All of these items fulfill various CMS LCD requirements except the new and changing color options.
Medical Necessity Clause: This procedure was medically necessary because the lesions that were treated were:
Detail Level: Simple
Duration Of Freeze Thaw-Cycle (Seconds): 5-10
Consent: The patient's consent was obtained including but not limited to risks of crusting, scabbing, blistering, scarring, darker or lighter pigmentary change, recurrence, incomplete removal and infection.
Number Of Freeze-Thaw Cycles: 1 freeze-thaw cycle
Post-Care Instructions: I reviewed with the patient in detail post-care instructions. Patient is to wear sunprotection, and avoid picking at any of the treated lesions. Pt may apply Vaseline to crusted or scabbing areas.

## 2021-12-15 NOTE — TELEPHONE ENCOUNTER
-RN please follow up on below. Spoke to Quintin Jesus RN w/ Dr Bhavin Wang office reg. If okay for pt to hold either ASA or Plavix d/t bruising. RN will discuss w/ Dr. Bhavin Wang and CB.

## 2021-12-16 ENCOUNTER — TELEPHONE (OUTPATIENT)
Dept: INTERNAL MEDICINE CLINIC | Facility: CLINIC | Age: 76
End: 2021-12-16

## 2021-12-16 NOTE — TELEPHONE ENCOUNTER
Fabián Shin- Dr Solange Cote, Cardiology calling to advise want the patient to continue taking ASPIRIN and the medication below. •  clopidogrel 75 MG Oral Tab, Take 1 tablet (75 mg total) by mouth daily. , Disp: 90 tablet, Rfl: 1

## 2021-12-16 NOTE — TELEPHONE ENCOUNTER
An try tumeric spice and avelina. Careful with GERD with avelina. Good B/P's are better. Maybe from pain. Max. Tylenol dose if 3000 mg in 24 hrs., Depends on dose of tylenol taking but that is max.
Informed pt Dr. Kasey Jenkins below. Pt verbalized understanding.
Patient is currently in [place] sh would like to let you know she stop Celebrex few weeks ago taking tylenol 4 times a day  But if it ok to take up to 6. Her blood pressure is better .  She also wants to know if she can take tumeric spice and Niki for he
Pt initially presented to [facility] c.o severe testicular pain, left testicular pain most severe at 2230, yesterday no pain but he had N/V  no urinary symptoms. The pain initially started ~6265-1526, tolerable at this time.  He did not tell his parents until the evening when pain increased.

## 2021-12-29 ENCOUNTER — TELEPHONE (OUTPATIENT)
Dept: INTERNAL MEDICINE CLINIC | Facility: CLINIC | Age: 76
End: 2021-12-29

## 2022-01-08 RX ORDER — SEMAGLUTIDE 1.34 MG/ML
0.5 INJECTION, SOLUTION SUBCUTANEOUS
Qty: 4.5 ML | Refills: 1 | Status: SHIPPED | OUTPATIENT
Start: 2022-01-08 | End: 2022-01-10

## 2022-01-10 ENCOUNTER — TELEPHONE (OUTPATIENT)
Dept: INTERNAL MEDICINE CLINIC | Facility: CLINIC | Age: 77
End: 2022-01-10

## 2022-01-10 RX ORDER — SEMAGLUTIDE 1.34 MG/ML
0.5 INJECTION, SOLUTION SUBCUTANEOUS
Qty: 4.5 ML | Refills: 1 | Status: SHIPPED | OUTPATIENT
Start: 2022-01-10

## 2022-01-10 NOTE — TELEPHONE ENCOUNTER
Patient is requesting to have the prescription for [facility]em[facility] to [facility] in [facility]. Please advise. Patient is leaving town this week.      [facility]/pharmacy #[id number] - [facility], [address] Christoph Arrieta

## 2022-01-20 ENCOUNTER — NURSE TRIAGE (OUTPATIENT)
Dept: INTERNAL MEDICINE CLINIC | Facility: CLINIC | Age: 77
End: 2022-01-20

## 2022-01-20 NOTE — TELEPHONE ENCOUNTER
Action Requested: Summary for Provider     []  Critical Lab, Recommendations Needed  [x] Need Additional Advice  []   FYI    []   Need Orders  [] Need Medications Sent to Pharmacy  []  Other     SUMMARY: Patient states she vomited yesterday once and then t

## 2022-01-20 NOTE — TELEPHONE ENCOUNTER
Wash hands thoroughly. BRAT (Bananas, rice apples, tea, bread, etc). [place] diet. Avoid spicey foods. Avoid dairy for 1 week. OK for yogurt. Increase probiotics. ER if worsening or change in symptoms. Has she been moving her bowels regularly?   If she havin

## 2022-01-24 RX ORDER — BLOOD SUGAR DIAGNOSTIC
STRIP MISCELLANEOUS
Qty: 100 STRIP | Refills: 3 | Status: SHIPPED | OUTPATIENT
Start: 2022-01-24 | End: 2022-08-21

## 2022-01-24 NOTE — TELEPHONE ENCOUNTER
Refill passed per [email or web address] protocol.   Requested Prescriptions   Pending Prescriptions Disp Refills    ONETOUCH ULTRA In Vitro Strip [place] Med Name: 33723 AGRIMAPS TESTST(CKG)391] 100 strip 3     Sig: TEST BLOOD SUGAR THREE TIMES DAILY        Diabetic Supplies Protocol Passed - 1/23/2022  6:12 AM        Passed - Appointment in past 12 or next 3 months               Recent Outpatient Visits              2 weeks ago Encounter for well woman exam with routine gynecological exam    OB-GYN - Reyna Abarca MD    Office Visit    1 month ago Type 2 diabetes mellitus with hyperglycemia, without long-term current use of insulin [facility])    [facility], [address] [facility], Ashwin Townsend MD    Office Visit    4 months ago Vaccine counseling    [address] [facility], Biran Alejo MD    Office Visit    8 months ago Acquired hypothyroidism    [facility], [address], Brian Alejo MD    Office Visit    1 year ago Elevated liver enzymes    Parul Diaz MD    Office Visit            Future Appointments         Provider Department Appt Notes    In 3 months Jenifer Armenta MD [facility], [address], [facility]     In 3 months [address], Girish Lugo (h)JAX SCREEN MAMMOGRAM, DIGITAL,emr,lm [email or web address]

## 2022-01-27 NOTE — TELEPHONE ENCOUNTER
Verified name and  of patient. Vomiting stopped after the 2nd day and the cough is improving. Pt states she is feeling much better today.          Blood sugar was 125 this morning at 9:00 am. Pt is asking if she should continue with the metformin and the

## 2022-01-29 NOTE — TELEPHONE ENCOUNTER
Please review; protocol failed/No Protcol    Requested Prescriptions   Pending Prescriptions Disp Refills    FENOFIBRATE 145 MG Oral Tab [Pharmacy Med Name: FENOFIBRATE  TAB 145MG] 90 tablet 1     Sig: TAKE 1 TABLET ONCE DAILY        Cholesterol Medication Protocol Failed - 1/29/2022  1:24 PM        Failed - ALT in past 12 months        Failed - LDL in past 12 months        Failed - Last ALT < 80       Lab Results   Component Value Date    ALT 29 01/08/2021             Failed - Last LDL < 130     Lab Results   Component Value Date    LDL 59 01/08/2021               Passed - Appointment in past 12 or next 3 months              Recent Outpatient Visits              3 weeks ago Encounter for well woman exam with routine gynecological exam    OB-GYN - Adrian Kocher, MD    Office Visit    1 month ago Type 2 diabetes mellitus with hyperglycemia, without long-term current use of insulin [facility])    Denys Merlin., MD    Office Visit    4 months ago Vaccine counseling    Jade Bustamante MD    Office Visit    8 months ago Acquired hypothyroidism    [employer], [facility], [address], Jade Coppola MD    Office Visit    1 year ago Elevated liver enzymes    Joanna Evans MD    Office Visit            Future Appointments         Provider Department Appt Notes    In 3 months Fco aPige MD [place] [facility], [address], [facility]     In 3 months [address] [place] ()JAX SCREEN MAMMOGRAM, DIGITAL,emr,lm [email or web address],

## 2022-01-30 RX ORDER — FENOFIBRATE 145 MG/1
TABLET, COATED ORAL
Qty: 90 TABLET | Refills: 1 | Status: SHIPPED | OUTPATIENT
Start: 2022-01-30 | End: 2022-12-08

## 2022-01-31 ENCOUNTER — TELEPHONE (OUTPATIENT)
Dept: INTERNAL MEDICINE CLINIC | Facility: CLINIC | Age: 77
End: 2022-01-31

## 2022-01-31 RX ORDER — GLYBURIDE 1.25 MG/1
1.25 TABLET ORAL 2 TIMES DAILY WITH MEALS
Qty: 60 TABLET | Refills: 0 | Status: SHIPPED | OUTPATIENT
Start: 2022-01-31

## 2022-01-31 RX ORDER — GLYBURIDE 1.25 MG/1
1.25 TABLET ORAL 2 TIMES DAILY WITH MEALS
Qty: 60 TABLET | Refills: 0 | Status: SHIPPED | OUTPATIENT
Start: 2022-01-31 | End: 2022-01-31

## 2022-01-31 RX ORDER — GLYBURIDE 1.25 MG/1
TABLET ORAL
Qty: 180 TABLET | Refills: 0 | OUTPATIENT
Start: 2022-01-31

## 2022-01-31 NOTE — TELEPHONE ENCOUNTER
Can do amaryl 2 mg as needed q12hrs. if sugars > 140. Careful with lower suagrs. Effect of steroids may be up to 1 week. Does she want us to send Rx. ?

## 2022-01-31 NOTE — TELEPHONE ENCOUNTER
Patient wanted the medication sent to the pharmacy in [place], med resent patient notified. [facility] pharmacy in [address] [place] [address] contacted med hcarlieed.

## 2022-01-31 NOTE — TELEPHONE ENCOUNTER
Patient states she went to Urgent Care in [place], saw Dr. Trupti Hess. She is asking if  if she needs to manage her blood sugars differently while on Prednisone.      Patient states she was diagnosed with acute bronchitis, sinusitis and post na

## 2022-01-31 NOTE — TELEPHONE ENCOUNTER
We will try glyburide 1.25 as needed. Careful it is very palpitations. But is just a temporary. We are she wanted set?

## 2022-01-31 NOTE — TELEPHONE ENCOUNTER
Spoke with patient relayed PCP message patient would like to try the medication but she has an allergy to glimepiride. Please advise if there is another medication that can be sent.

## 2022-02-28 RX ORDER — SEMAGLUTIDE 1.34 MG/ML
0.5 INJECTION, SOLUTION SUBCUTANEOUS
Qty: 4.5 ML | Refills: 1 | Status: SHIPPED | OUTPATIENT
Start: 2022-02-28 | End: 2022-06-21

## 2022-02-28 RX ORDER — LEVOTHYROXINE SODIUM 88 MCG
TABLET ORAL
Qty: 90 TABLET | Refills: 1 | Status: SHIPPED | OUTPATIENT
Start: 2022-02-28 | End: 2022-10-26

## 2022-03-01 RX ORDER — LISINOPRIL AND HYDROCHLOROTHIAZIDE 25; 20 MG/1; MG/1
1 TABLET ORAL 2 TIMES DAILY
Qty: 180 TABLET | Refills: 0 | Status: SHIPPED | OUTPATIENT
Start: 2022-03-01 | End: 2022-05-21

## 2022-03-01 NOTE — TELEPHONE ENCOUNTER
Lisinopril-hydrochlorothiazide passes protocol but with POP UP HIGH WARNING ALERT due to allergy. Kristian Galvan (on behalf of Dr Nikko Irizarry )=pended. Refill passed per [facility] protocol.      Requested Prescriptions   Pending Prescriptions Disp Refills    LISINOPRIL-HYDROCHLOROTHIAZIDE 20-25 MG Oral Tab [Pharmacy Med Name: LISINOP/HCTZ TAB 20-25MG] 180 tablet 0     Sig: TAKE 1 TABLET TWICE A DAY        Hypertensive Medications Protocol Passed - 2/28/2022  9:44 PM        Passed - CMP or BMP in past 12 months        Passed - Appointment in past 6 or next 3 months        Passed - GFR Non- > 50     Lab Results   Component Value Date    GFRNAA 48 (L) 05/06/2021                    SYNTHROID 80 MCG Oral Tab [Pharmacy Med Name: SYNTHROID TAB 88MCG] 90 tablet 1     Sig: TAKE 1 TABLET BEFORE       BREAKFAST        Thyroid Medication Protocol Passed - 2/28/2022  9:44 PM        Passed - TSH in past 12 months        Passed - Last TSH value is normal     Lab Results   Component Value Date    TSH 1.080 05/06/2021    THYROIDFUNC 1.20 08/12/2016                 Passed - Appointment in past 12 or next 3 months           OZEMPIC, 0.25 OR 0.5 MG/DOSE, 2 MG/1.5ML Subcutaneous Solution Pen-injector [Pharmacy Med Name: Hima Foot PEN 0.25/0.5] 4.5 mL 1     Sig: INJECT 0.5MG SUBCUTANEOUSLYEVERY 7 DAYS        Diabetes Medication Protocol Passed - 2/28/2022  9:44 PM        Passed - Last A1C < 7.5 and within past 6 months     Lab Results   Component Value Date    A1C 5.9 (H) 12/10/2021               Passed - Appointment in past 6 or next 3 months        Passed - GFR Non- > 50     Lab Results   Component Value Date    GFRNAA 50 (L) 05/06/2021                 Passed - GFR in the past 12 months               Future Appointments         Provider Department Appt Notes    In 2 months Jules Yung MD [facility], [address], [facility] wellness     In 2 months Nick Reyes 81. - [address], Ronald Coon (h)JAX SCREEN MAMMOGRAM, DIGITAL,emr,lm [email or web address]             Recent Outpatient Visits              1 month ago Encounter for well woman exam with routine gynecological exam    OB-GYN - Joel Hudson MD    Office Visit    2 months ago Type 2 diabetes mellitus with hyperglycemia, without long-term current use of insulin [facility])    Ryan Rowley MD    Office Visit    5 months ago Vaccine counseling    Veronika Aviles, Sue Elias MD    Office Visit    9 months ago Acquired hypothyroidism    [address] [place], [address], Sue Elias MD    Office Visit    1 year ago Elevated liver enzymes    Ivana Rivera MD    Office Visit

## 2022-03-08 ENCOUNTER — TELEPHONE (OUTPATIENT)
Dept: INTERNAL MEDICINE CLINIC | Facility: CLINIC | Age: 77
End: 2022-03-08

## 2022-03-08 NOTE — TELEPHONE ENCOUNTER
Platelet-rich plasma therapy, sometimes called PRP therapy or autologous conditioned plasma (ACP) therapy, attempts to take advantage of the blood's natural healing properties to repair damaged cartilage, tendons, ligaments, muscles, or even bone. effects of PRP are not permanent but effects can last up to 18 months in an average with a maximum of 2years as the longest reported effect. Some research suggests PRP injections work no better than a placebo treatment. Even in studies that do provide evidence that PRP works, not all patients benefit. Rocco Carey PRP has been shown to provide some symptomatic relief in early OA of the knee and to be at least as effective as intra-articular hyaluronic acid and steroid injections for symptom control [5]. This therapy is a minimally invasive intervention which could be used to enhance tissue regeneration. For example, PRP can produce great results when used to treat mild knee arthritis, but can often fail when used to treat moderate or severe arthritis. Also, not sure how much insurance covers. Maybe expensive.

## 2022-03-08 NOTE — TELEPHONE ENCOUNTER
I received a call from pt. She stated that she is in [place] right now. She will be back the second week of May. She has bad knees. She went to a Orthopedic doctor and he want to do  ACP injection on her knees and pt wants to know if this is ok for her to get done. She will like your recommendation.  Please advise

## 2022-03-09 NOTE — TELEPHONE ENCOUNTER
Patient called back (verified name and ), advised DR Medina's note, patient asked if DR Drew Sanders if this medication will do harm to her, informed that Dr Drew Sanders did not mentioned about it, but she mentioned on how long it will lasts and some OA patient may benefit from it and it  might not be covered by insurance and maybe expensive,stated that she will call her insuarnce for the coverage, no questions or concerns at this time.

## 2022-04-04 RX ORDER — AMLODIPINE BESYLATE 2.5 MG/1
TABLET ORAL
Qty: 180 TABLET | Refills: 1 | Status: SHIPPED | OUTPATIENT
Start: 2022-04-04 | End: 2022-10-13

## 2022-04-04 RX ORDER — CLOPIDOGREL BISULFATE 75 MG/1
75 TABLET ORAL DAILY
Qty: 90 TABLET | Refills: 1 | Status: SHIPPED | OUTPATIENT
Start: 2022-04-04 | End: 2022-09-13

## 2022-04-04 NOTE — TELEPHONE ENCOUNTER
Refill passed per [employer] protocol.       Requested Prescriptions   Pending Prescriptions Disp Refills    AMLODIPINE 2.5 MG Oral Tab [Pharmacy Med Name: AMLODIPINE TAB 2.5MG] 180 tablet 1     Sig: TAKE 1 TABLET TWICE A DAY        Hypertensive Medications Protocol Passed - 4/4/2022  1:14 AM        Passed - CMP or BMP in past 12 months        Passed - Appointment in past 6 or next 3 months        Passed - GFR Non- > 50     Lab Results   Component Value Date    GFRNAA 50 (L) 05/06/2021                    CLOPIDOGREL 75 MG Oral Tab [Pharmacy Med Name: CLOPIDOGREL  TAB 75MG] 90 tablet 1     Sig: TAKE 1 TABLET DAILY        There is no refill protocol information for this order            Future Appointments         Provider Department Appt Notes    In 1 month Sheryl Lindsey MD [facility] [place], [address] [facility]     In 1 month [address] Jacki Yadav (h)JAX SCREEN MAMMOGRAM, DIGITAL,emr,lm [email or web address]            Recent Outpatient Visits              2 months ago Encounter for well woman exam with routine gynecological exam    OB-GYN - Yolanda Wade MD    Office Visit    3 months ago Type 2 diabetes mellitus with hyperglycemia, without long-term current use of insulin [facility])    Aida Ulloa MD    Office Visit    6 months ago Vaccine counseling    [address] [facility], Shivani Wright MD    Office Visit    11 months ago Acquired hypothyroidism    [facility] [place], [address], Shivani Wright MD    Office Visit    1 year ago Elevated liver enzymes    Jf Price MD    Office Visit

## 2022-04-04 NOTE — TELEPHONE ENCOUNTER
Please review. Protocol failed/ No protocol      Requested Prescriptions   Pending Prescriptions Disp Refills    CLOPIDOGREL 75 MG Oral Tab [Pharmacy Med Name: CLOPIDOGREL  TAB 75MG] 90 tablet 1     Sig: TAKE 1 TABLET DAILY        There is no refill protocol information for this order       Signed Prescriptions Disp Refills    AMLODIPINE 2.5 MG Oral Tab 180 tablet 1     Sig: TAKE 1 TABLET TWICE A DAY        Hypertensive Medications Protocol Passed - 4/4/2022  1:14 AM        Passed - CMP or BMP in past 12 months        Passed - Appointment in past 6 or next 3 months        Passed - GFR Non- > 50     Lab Results   Component Value Date    GFRNAA 50 (L) 05/06/2021                       Future Appointments         Provider Department Appt Notes    In 1 month Tiffani Dalton MD [place] [address] [place], [place], [facility]     In 1 month [address] ()JAX SCREEN MAMMOGRAM, DIGITAL,emr,lm [email or web address]             Recent Outpatient Visits              2 months ago Encounter for well woman exam with routine gynecological exam    OB-GYN - Tuan Potts MD    Office Visit    3 months ago Type 2 diabetes mellitus with hyperglycemia, without long-term current use of insulin [facility])    Bhargav Isabel MD    Office Visit    6 months ago Vaccine counseling    [address] [facility], Janis Zamora MD    Office Visit    11 months ago Acquired hypothyroidism    [place] [employer] [facility] [place], [place], Janis Zamora MD    Office Visit    1 year ago Elevated liver enzymes    Iván Spivey MD    Office Visit

## 2022-04-21 ENCOUNTER — NURSE TRIAGE (OUTPATIENT)
Dept: INTERNAL MEDICINE CLINIC | Facility: CLINIC | Age: 77
End: 2022-04-21

## 2022-04-29 RX ORDER — LEVOCETIRIZINE DIHYDROCHLORIDE 5 MG/1
5 TABLET, FILM COATED ORAL NIGHTLY
Qty: 90 TABLET | Refills: 1 | Status: SHIPPED | OUTPATIENT
Start: 2022-04-29 | End: 2022-10-27

## 2022-04-29 NOTE — TELEPHONE ENCOUNTER
Refill passed per [email or web address], [facility] protocol.   Requested Prescriptions   Pending Prescriptions Disp Refills    LEVOCETIRIZINE 5 MG Oral Tab [Pharmacy Med Name: Margarita Bautista TAB 5MG] 90 tablet 1     Sig: TAKE 1 TABLET NIGHTLY        Allergy Medication Protocol Passed - 4/29/2022  7:05 AM        Passed - Appoinment in past 12 or next 3 months             Recent Outpatient Visits              3 months ago Encounter for well woman exam with routine gynecological exam    OB-GYN - Rodolfo Sanderson MD    Office Visit    4 months ago Type 2 diabetes mellitus with hyperglycemia, without long-term current use of insulin [facility])    Genna Flowers MD    Office Visit    7 months ago Vaccine counseling    [address] [facility], Zuri Oliver MD    Office Visit    11 months ago Acquired hypothyroidism    [address] [facility], Zuri Oliver MD    Office Visit    1 year ago Elevated liver enzymes    Chris Do MD    Office Visit           Future Appointments         Provider Department Appt Notes    In 1 week Chanel Azar MD [place] [facility] [place], [facility], [address], [facility]

## 2022-05-08 PROBLEM — U07.1 COVID-19 VIRUS INFECTION: Status: RESOLVED | Noted: 2021-12-10 | Resolved: 2022-05-08

## 2022-05-09 ENCOUNTER — OFFICE VISIT (OUTPATIENT)
Dept: INTERNAL MEDICINE CLINIC | Facility: CLINIC | Age: 77
End: 2022-05-09
Payer: COMMERCIAL

## 2022-05-09 VITALS
RESPIRATION RATE: 17 BRPM | OXYGEN SATURATION: 98 % | HEART RATE: 58 BPM | WEIGHT: 125.38 LBS | TEMPERATURE: 98 F | BODY MASS INDEX: 23.07 KG/M2 | SYSTOLIC BLOOD PRESSURE: 128 MMHG | DIASTOLIC BLOOD PRESSURE: 67 MMHG | HEIGHT: 62 IN

## 2022-05-09 DIAGNOSIS — I25.84 CORONARY ARTERY DISEASE DUE TO CALCIFIED CORONARY LESION: ICD-10-CM

## 2022-05-09 DIAGNOSIS — E11.65 TYPE 2 DIABETES MELLITUS WITH HYPERGLYCEMIA, WITHOUT LONG-TERM CURRENT USE OF INSULIN (HCC): ICD-10-CM

## 2022-05-09 DIAGNOSIS — J44.9 ASTHMA WITH COPD (CHRONIC OBSTRUCTIVE PULMONARY DISEASE) (HCC): Chronic | ICD-10-CM

## 2022-05-09 DIAGNOSIS — U07.1 COVID-19 VIRUS INFECTION: ICD-10-CM

## 2022-05-09 DIAGNOSIS — Z88.9 MULTIPLE ALLERGIES: ICD-10-CM

## 2022-05-09 DIAGNOSIS — H40.003 SUSPECTED GLAUCOMA OF BOTH EYES: ICD-10-CM

## 2022-05-09 DIAGNOSIS — I25.10 CORONARY ARTERY DISEASE DUE TO CALCIFIED CORONARY LESION: ICD-10-CM

## 2022-05-09 DIAGNOSIS — Z00.00 ENCOUNTER FOR ANNUAL HEALTH EXAMINATION: ICD-10-CM

## 2022-05-09 DIAGNOSIS — N18.32 STAGE 3B CHRONIC KIDNEY DISEASE (HCC): ICD-10-CM

## 2022-05-09 DIAGNOSIS — Z71.85 VACCINE COUNSELING: ICD-10-CM

## 2022-05-09 DIAGNOSIS — M17.11 PRIMARY OSTEOARTHRITIS OF RIGHT KNEE: ICD-10-CM

## 2022-05-09 DIAGNOSIS — Z96.1 PSEUDOPHAKIA OF BOTH EYES: ICD-10-CM

## 2022-05-09 DIAGNOSIS — D64.9 ANEMIA, UNSPECIFIED TYPE: ICD-10-CM

## 2022-05-09 DIAGNOSIS — M81.0 AGE-RELATED OSTEOPOROSIS WITHOUT CURRENT PATHOLOGICAL FRACTURE: ICD-10-CM

## 2022-05-09 DIAGNOSIS — R76.8 ANA POSITIVE: ICD-10-CM

## 2022-05-09 DIAGNOSIS — I10 ESSENTIAL HYPERTENSION: ICD-10-CM

## 2022-05-09 DIAGNOSIS — I77.1 TORTUOUS AORTA (HCC): ICD-10-CM

## 2022-05-09 DIAGNOSIS — H47.20 OPTIC ATROPHY: ICD-10-CM

## 2022-05-09 DIAGNOSIS — E78.2 MIXED HYPERLIPIDEMIA: ICD-10-CM

## 2022-05-09 DIAGNOSIS — R91.1 PULMONARY NODULE: ICD-10-CM

## 2022-05-09 DIAGNOSIS — E03.9 ACQUIRED HYPOTHYROIDISM: Primary | ICD-10-CM

## 2022-05-09 LAB
APPEARANCE: CLEAR
BILIRUBIN: NEGATIVE
GLUCOSE (URINE DIPSTICK): NEGATIVE MG/DL
KETONES (URINE DIPSTICK): NEGATIVE MG/DL
LEUKOCYTES: NEGATIVE
MULTISTIX LOT#: NORMAL NUMERIC
NITRITE, URINE: NEGATIVE
OCCULT BLOOD: NEGATIVE
PH, URINE: 7 (ref 4.5–8)
PROTEIN (URINE DIPSTICK): NEGATIVE MG/DL
SPECIFIC GRAVITY: 1.01 (ref 1–1.03)
URINE-COLOR: YELLOW
UROBILINOGEN,SEMI-QN: 0.2 MG/DL (ref 0–1.9)

## 2022-05-16 ENCOUNTER — APPOINTMENT (OUTPATIENT)
Dept: URBAN - METROPOLITAN AREA CLINIC 244 | Age: 77
Setting detail: DERMATOLOGY
End: 2022-05-18

## 2022-05-16 DIAGNOSIS — L82.1 OTHER SEBORRHEIC KERATOSIS: ICD-10-CM

## 2022-05-16 DIAGNOSIS — D22 MELANOCYTIC NEVI: ICD-10-CM

## 2022-05-16 DIAGNOSIS — L81.4 OTHER MELANIN HYPERPIGMENTATION: ICD-10-CM

## 2022-05-16 DIAGNOSIS — Z87.2 PERSONAL HISTORY OF DISEASES OF THE SKIN AND SUBCUTANEOUS TISSUE: ICD-10-CM

## 2022-05-16 PROBLEM — D22.5 MELANOCYTIC NEVI OF TRUNK: Status: ACTIVE | Noted: 2022-05-16

## 2022-05-16 PROCEDURE — OTHER COUNSELING: OTHER

## 2022-05-16 PROCEDURE — 99213 OFFICE O/P EST LOW 20 MIN: CPT

## 2022-05-16 ASSESSMENT — LOCATION ZONE DERM
LOCATION ZONE: TRUNK
LOCATION ZONE: EAR

## 2022-05-16 ASSESSMENT — LOCATION SIMPLE DESCRIPTION DERM
LOCATION SIMPLE: RIGHT EAR
LOCATION SIMPLE: LEFT LOWER BACK
LOCATION SIMPLE: LEFT UPPER BACK
LOCATION SIMPLE: RIGHT UPPER BACK
LOCATION SIMPLE: CHEST

## 2022-05-16 ASSESSMENT — LOCATION DETAILED DESCRIPTION DERM
LOCATION DETAILED: RIGHT INFERIOR HELIX
LOCATION DETAILED: LEFT MEDIAL UPPER BACK
LOCATION DETAILED: LEFT SUPERIOR MEDIAL MIDBACK
LOCATION DETAILED: RIGHT SUPERIOR MEDIAL UPPER BACK
LOCATION DETAILED: LEFT INFERIOR LATERAL MIDBACK
LOCATION DETAILED: UPPER STERNUM

## 2022-05-21 NOTE — TELEPHONE ENCOUNTER
Protocol failed or has No Protocol, please review  Requested Prescriptions   Pending Prescriptions Disp Refills    LISINOPRIL-HYDROCHLOROTHIAZIDE 20-25 MG Oral Tab [Pharmacy Med Name: LISINOP/HCTZ TAB 20-25MG] 180 tablet 0     Sig: TAKE 1 TABLET TWICE A DAY        Hypertensive Medications Protocol Failed - 5/21/2022 11:56 AM        Failed - CMP or BMP in past 12 months        Passed - Appointment in past 6 or next 3 months        Passed - GFR Non- > 50     Lab Results   Component Value Date    GFRNAA 50 (L) 05/06/2021                     Future Appointments         Provider Department Appt Notes    In 3 months Kehinde Long MD [facility], [address] [facility] [address] [facility]           Recent Outpatient Visits              1 week ago Acquired hypothyroidism    [facility], [address] [place], [place] MD Deana    Office Visit    4 months ago Encounter for well woman exam with routine gynecological exam    OB-GYN - Roc Eric, Vanessa Bryant MD    Office Visit    5 months ago Type 2 diabetes mellitus with hyperglycemia, without long-term current use of insulin [facility])    Augustina Simmons MD    Office Visit    8 months ago Vaccine counseling    Terrie Day MD    Office Visit    1 year ago Acquired hypothyroidism    [facility], [address] [facility], [place] MD Deana    Office Visit

## 2022-05-22 RX ORDER — LISINOPRIL AND HYDROCHLOROTHIAZIDE 25; 20 MG/1; MG/1
1 TABLET ORAL 2 TIMES DAILY
Qty: 180 TABLET | Refills: 1 | Status: SHIPPED | OUTPATIENT
Start: 2022-05-22 | End: 2022-10-26

## 2022-05-23 RX ORDER — VENLAFAXINE HYDROCHLORIDE 37.5 MG/1
CAPSULE, EXTENDED RELEASE ORAL
Qty: 90 CAPSULE | Refills: 1 | Status: SHIPPED | OUTPATIENT
Start: 2022-05-23 | End: 2022-11-11

## 2022-05-23 RX ORDER — ATORVASTATIN CALCIUM 40 MG/1
40 TABLET, FILM COATED ORAL NIGHTLY
Qty: 90 TABLET | Refills: 1 | Status: SHIPPED | OUTPATIENT
Start: 2022-05-23 | End: 2022-11-11

## 2022-05-23 NOTE — TELEPHONE ENCOUNTER
Protocol failed or has No Protocol, please review  Requested Prescriptions   Pending Prescriptions Disp Refills    ATORVASTATIN 40 MG Oral Tab [Pharmacy Med Name: ATORVASTATIN TAB 40MG] 90 tablet 1     Sig: TAKE 1 TABLET NIGHTLY        Cholesterol Medication Protocol Failed - 5/22/2022  7:13 PM        Failed - ALT in past 12 months        Failed - LDL in past 12 months        Failed - Last ALT < 80       Lab Results   Component Value Date    ALT 29 01/08/2021             Failed - Last LDL < 130     Lab Results   Component Value Date    LDL 59 01/08/2021               Passed - Appointment in past 12 or next 3 months          Signed Prescriptions Disp Refills    VENLAFAXINE ER 37.5 MG Oral Capsule SR 24 Hr 90 capsule 1     Sig: TAKE 1 CAPSULE NIGHTLY        Psychiatric Non-Scheduled (Anti-Anxiety) Passed - 5/22/2022  7:13 PM        Passed - Appointment in last 6 or next 3 months            Future Appointments         Provider Department Appt Notes    In 3 months Jade Morley MD [employer] [facility], [address] [place] [address] 3 [facility]           Recent Outpatient Visits              2 weeks ago Acquired hypothyroidism    [place] [employer] [place], [place], Noe Cole MD    Office Visit    4 months ago Encounter for well woman exam with routine gynecological exam    OB-GYN - Lita Laguna MD    Office Visit    5 months ago Type 2 diabetes mellitus with hyperglycemia, without long-term current use of insulin [facility])    Mychal Juarez MD    Office Visit    8 months ago Vaccine counseling    Marchelle Lesch, Ardelle Creighton., MD    Office Visit    1 year ago Acquired hypothyroidism    [place] [employer] [place], [place], Noe Cole MD    Office Visit

## 2022-05-23 NOTE — TELEPHONE ENCOUNTER
Refill passed per [address] [facility] protocol.   Requested Prescriptions   Pending Prescriptions Disp Refills    VENLAFAXINE ER 37.5 MG Oral Capsule SR 24 Hr [Pharmacy Med Name: VENLAFAXINE  CAP 37.5 ER] 90 capsule 1     Sig: TAKE 1 CAPSULE NIGHTLY        Psychiatric Non-Scheduled (Anti-Anxiety) Passed - 5/22/2022  7:13 PM        Passed - Appointment in last 6 or next 3 months           ATORVASTATIN 40 MG Oral Tab [Pharmacy Med Name: ATORVASTATIN TAB 40MG] 90 tablet 1     Sig: TAKE 1 TABLET NIGHTLY        Cholesterol Medication Protocol Failed - 5/22/2022  7:13 PM        Failed - ALT in past 12 months        Failed - LDL in past 12 months        Failed - Last ALT < 80       Lab Results   Component Value Date    ALT 29 01/08/2021             Failed - Last LDL < 130     Lab Results   Component Value Date    LDL 59 01/08/2021               Passed - Appointment in past 12 or next 3 months            Recent Outpatient Visits              2 weeks ago Acquired hypothyroidism    [facility], [address] [place] [address],[address], Victoriano Merino MD    Office Visit    4 months ago Encounter for well woman exam with routine gynecological exam    OB-GYN - Ines Jack MD    Office Visit    5 months ago Type 2 diabetes mellitus with hyperglycemia, without long-term current use of insulin [facility])    Tavon Sy MD    Office Visit    8 months ago Vaccine counseling    Victoriano Saha MD    Office Visit    1 year ago Acquired hypothyroidism    Victoriano Saha MD    Office Visit          Future Appointments         Provider Department Appt Notes    In 3 months Junito Soliman MD [facility], [address] [place] [address]

## 2022-06-01 ENCOUNTER — HOSPITAL ENCOUNTER (OUTPATIENT)
Dept: CV DIAGNOSTICS | Facility: HOSPITAL | Age: 77
Discharge: HOME | End: 2022-06-01
Attending: INTERNAL MEDICINE
Payer: MEDICARE

## 2022-06-01 DIAGNOSIS — I10 HYPERTENSION, ESSENTIAL: ICD-10-CM

## 2022-06-01 DIAGNOSIS — E11.9 DM II (DIABETES MELLITUS, TYPE II), CONTROLLED (HCC): ICD-10-CM

## 2022-06-01 DIAGNOSIS — N18.30 CKD (CHRONIC KIDNEY DISEASE) STAGE 3, GFR 30-59 ML/MIN (HCC): ICD-10-CM

## 2022-06-01 DIAGNOSIS — Z98.61 HISTORY OF PTCA 2: ICD-10-CM

## 2022-06-01 DIAGNOSIS — E78.5 HYPERLIPIDEMIA: ICD-10-CM

## 2022-06-01 DIAGNOSIS — I25.10 CAD (CORONARY ARTERY DISEASE), NATIVE CORONARY ARTERY: ICD-10-CM

## 2022-06-01 PROCEDURE — 93306 TTE W/DOPPLER COMPLETE: CPT | Performed by: INTERNAL MEDICINE

## 2022-06-09 ENCOUNTER — MED REC SCAN ONLY (OUTPATIENT)
Dept: INTERNAL MEDICINE CLINIC | Facility: CLINIC | Age: 77
End: 2022-06-09

## 2022-06-10 ENCOUNTER — LAB ENCOUNTER (OUTPATIENT)
Dept: LAB | Age: 77
End: 2022-06-10
Attending: INTERNAL MEDICINE
Payer: MEDICARE

## 2022-06-10 ENCOUNTER — HOSPITAL ENCOUNTER (OUTPATIENT)
Dept: BONE DENSITY | Age: 77
Discharge: HOME | End: 2022-06-10
Attending: INTERNAL MEDICINE
Payer: MEDICARE

## 2022-06-10 DIAGNOSIS — E78.2 MIXED HYPERLIPIDEMIA: ICD-10-CM

## 2022-06-10 DIAGNOSIS — E11.65 TYPE 2 DIABETES MELLITUS WITH HYPERGLYCEMIA, WITHOUT LONG-TERM CURRENT USE OF INSULIN (HCC): ICD-10-CM

## 2022-06-10 DIAGNOSIS — M81.0 AGE-RELATED OSTEOPOROSIS WITHOUT CURRENT PATHOLOGICAL FRACTURE: ICD-10-CM

## 2022-06-10 DIAGNOSIS — E03.9 ACQUIRED HYPOTHYROIDISM: ICD-10-CM

## 2022-06-10 LAB
CHOLEST SERPL-MCNC: 149 MG/DL (ref ?–200)
EST. AVERAGE GLUCOSE BLD GHB EST-MCNC: 123 MG/DL (ref 68–126)
FASTING PATIENT LIPID ANSWER: YES
HBA1C MFR BLD: 5.9 % (ref ?–5.7)
HDLC SERPL-MCNC: 45 MG/DL (ref 40–59)
LDLC SERPL CALC-MCNC: 85 MG/DL (ref ?–100)
NONHDLC SERPL-MCNC: 104 MG/DL (ref ?–130)
TRIGL SERPL-MCNC: 100 MG/DL (ref 30–149)
TSI SER-ACNC: 1.15 MIU/ML (ref 0.36–3.74)
VLDLC SERPL CALC-MCNC: 16 MG/DL (ref 0–30)

## 2022-06-10 PROCEDURE — 84443 ASSAY THYROID STIM HORMONE: CPT

## 2022-06-10 PROCEDURE — 80061 LIPID PANEL: CPT

## 2022-06-10 PROCEDURE — 36415 COLL VENOUS BLD VENIPUNCTURE: CPT

## 2022-06-10 PROCEDURE — 83036 HEMOGLOBIN GLYCOSYLATED A1C: CPT

## 2022-06-10 PROCEDURE — 77080 DXA BONE DENSITY AXIAL: CPT | Performed by: INTERNAL MEDICINE

## 2022-06-13 ENCOUNTER — TELEPHONE (OUTPATIENT)
Dept: INTERNAL MEDICINE CLINIC | Facility: CLINIC | Age: 77
End: 2022-06-13

## 2022-06-13 NOTE — TELEPHONE ENCOUNTER
Advised patient of Grace Waterman note and number given to endocrinology. Patient verbalized understanding. Your dexa scan shows osteoporosis. Improved bone mineral density of the lower back. Worsening bone density left hip. Recommend following up with Endocrinology.    Written by YONNY Barth on 6/11/2022 11:24 AM CDT  Seen by patient Jefferson Swanson on 6/12/2022 12:34 PM

## 2022-06-14 ENCOUNTER — TELEPHONE (OUTPATIENT)
Dept: PULMONOLOGY | Facility: CLINIC | Age: 77
End: 2022-06-14

## 2022-06-14 NOTE — TELEPHONE ENCOUNTER
Patient is due for upcoming CT CHEST (NO IV) [employer] appointment scheduled for July 6,2022. Referral still in open status. Manage Care please advise.

## 2022-06-21 RX ORDER — SEMAGLUTIDE 1.34 MG/ML
0.5 INJECTION, SOLUTION SUBCUTANEOUS
Qty: 4.5 ML | Refills: 1 | Status: SHIPPED | OUTPATIENT
Start: 2022-06-21 | End: 2022-10-26

## 2022-06-27 NOTE — TELEPHONE ENCOUNTER
Per managed care note 6/10/22, referral #[id number] is pending. Managed Care - Patient scheduled on 7/6/22, any update?

## 2022-06-28 NOTE — TELEPHONE ENCOUNTER
Andrew Reaves,    This particular case is being handled by Sukumar. I did reach out to her and let her know the patient was calling to check the status. She stated she will work the case and call the patient with the status.     Thank you  Beth Patton

## 2022-07-06 ENCOUNTER — HOSPITAL ENCOUNTER (OUTPATIENT)
Dept: CT IMAGING | Age: 77
Discharge: HOME | End: 2022-07-06
Attending: INTERNAL MEDICINE
Payer: MEDICARE

## 2022-07-06 DIAGNOSIS — R91.8 PULMONARY NODULES: ICD-10-CM

## 2022-07-06 PROCEDURE — 71250 CT THORAX DX C-: CPT | Performed by: INTERNAL MEDICINE

## 2022-07-13 DIAGNOSIS — R91.1 LUNG NODULE: Primary | ICD-10-CM

## 2022-08-09 ENCOUNTER — OFFICE VISIT (OUTPATIENT)
Dept: ENDOCRINOLOGY CLINIC | Facility: CLINIC | Age: 77
End: 2022-08-09
Payer: COMMERCIAL

## 2022-08-09 VITALS
DIASTOLIC BLOOD PRESSURE: 48 MMHG | HEART RATE: 64 BPM | HEIGHT: 62 IN | BODY MASS INDEX: 22.45 KG/M2 | WEIGHT: 122 LBS | SYSTOLIC BLOOD PRESSURE: 152 MMHG

## 2022-08-09 DIAGNOSIS — M81.0 AGE-RELATED OSTEOPOROSIS WITHOUT CURRENT PATHOLOGICAL FRACTURE: Primary | ICD-10-CM

## 2022-08-09 DIAGNOSIS — E55.9 VITAMIN D DEFICIENCY: ICD-10-CM

## 2022-08-09 PROCEDURE — 99214 OFFICE O/P EST MOD 30 MIN: CPT | Performed by: INTERNAL MEDICINE

## 2022-08-09 PROCEDURE — 3008F BODY MASS INDEX DOCD: CPT | Performed by: INTERNAL MEDICINE

## 2022-08-09 PROCEDURE — 3078F DIAST BP <80 MM HG: CPT | Performed by: INTERNAL MEDICINE

## 2022-08-09 PROCEDURE — 3077F SYST BP >= 140 MM HG: CPT | Performed by: INTERNAL MEDICINE

## 2022-08-21 RX ORDER — BLOOD SUGAR DIAGNOSTIC
STRIP MISCELLANEOUS
Qty: 100 STRIP | Refills: 5 | Status: SHIPPED | OUTPATIENT
Start: 2022-08-21 | End: 2023-01-05

## 2022-08-23 ENCOUNTER — OFFICE VISIT (OUTPATIENT)
Dept: INTERNAL MEDICINE CLINIC | Facility: CLINIC | Age: 77
End: 2022-08-23
Payer: COMMERCIAL

## 2022-08-23 VITALS
SYSTOLIC BLOOD PRESSURE: 111 MMHG | OXYGEN SATURATION: 98 % | DIASTOLIC BLOOD PRESSURE: 66 MMHG | RESPIRATION RATE: 17 BRPM | WEIGHT: 120.19 LBS | HEART RATE: 61 BPM | TEMPERATURE: 98 F | HEIGHT: 62 IN | BODY MASS INDEX: 22.12 KG/M2

## 2022-08-23 DIAGNOSIS — H93.92 PROBLEM OF LEFT EAR: ICD-10-CM

## 2022-08-23 DIAGNOSIS — I25.10 CORONARY ARTERY DISEASE DUE TO CALCIFIED CORONARY LESION: ICD-10-CM

## 2022-08-23 DIAGNOSIS — I10 ESSENTIAL HYPERTENSION: ICD-10-CM

## 2022-08-23 DIAGNOSIS — Z71.85 VACCINE COUNSELING: ICD-10-CM

## 2022-08-23 DIAGNOSIS — E03.9 ACQUIRED HYPOTHYROIDISM: Primary | ICD-10-CM

## 2022-08-23 DIAGNOSIS — E55.9 VITAMIN D DEFICIENCY: ICD-10-CM

## 2022-08-23 DIAGNOSIS — I25.84 CORONARY ARTERY DISEASE DUE TO CALCIFIED CORONARY LESION: ICD-10-CM

## 2022-08-23 DIAGNOSIS — D64.9 ANEMIA, UNSPECIFIED TYPE: ICD-10-CM

## 2022-08-23 DIAGNOSIS — N18.32 STAGE 3B CHRONIC KIDNEY DISEASE (HCC): ICD-10-CM

## 2022-08-23 PROCEDURE — 3078F DIAST BP <80 MM HG: CPT | Performed by: INTERNAL MEDICINE

## 2022-08-23 PROCEDURE — 99215 OFFICE O/P EST HI 40 MIN: CPT | Performed by: INTERNAL MEDICINE

## 2022-08-23 PROCEDURE — 3074F SYST BP LT 130 MM HG: CPT | Performed by: INTERNAL MEDICINE

## 2022-08-23 PROCEDURE — 1126F AMNT PAIN NOTED NONE PRSNT: CPT | Performed by: INTERNAL MEDICINE

## 2022-08-23 PROCEDURE — 3008F BODY MASS INDEX DOCD: CPT | Performed by: INTERNAL MEDICINE

## 2022-08-25 ENCOUNTER — LAB ENCOUNTER (OUTPATIENT)
Dept: LAB | Age: 77
End: 2022-08-25
Attending: INTERNAL MEDICINE
Payer: MEDICARE

## 2022-08-25 DIAGNOSIS — E55.9 VITAMIN D DEFICIENCY: ICD-10-CM

## 2022-08-25 DIAGNOSIS — M81.0 AGE-RELATED OSTEOPOROSIS WITHOUT CURRENT PATHOLOGICAL FRACTURE: ICD-10-CM

## 2022-08-25 DIAGNOSIS — D64.9 ANEMIA, UNSPECIFIED TYPE: ICD-10-CM

## 2022-08-25 LAB
ALBUMIN SERPL-MCNC: 3.8 G/DL (ref 3.4–5)
ALBUMIN/GLOB SERPL: 1.3 {RATIO} (ref 1–2)
ALP LIVER SERPL-CCNC: 36 U/L
ALT SERPL-CCNC: 54 U/L
ANION GAP SERPL CALC-SCNC: 6 MMOL/L (ref 0–18)
AST SERPL-CCNC: 40 U/L (ref 15–37)
BASOPHILS # BLD AUTO: 0.04 X10(3) UL (ref 0–0.2)
BASOPHILS NFR BLD AUTO: 0.6 %
BILIRUB SERPL-MCNC: 0.3 MG/DL (ref 0.1–2)
BUN BLD-MCNC: 24 MG/DL (ref 7–18)
BUN/CREAT SERPL: 22.9 (ref 10–20)
CALCIUM BLD-MCNC: 9.2 MG/DL (ref 8.5–10.1)
CHLORIDE SERPL-SCNC: 105 MMOL/L (ref 98–112)
CO2 SERPL-SCNC: 28 MMOL/L (ref 21–32)
CREAT BLD-MCNC: 1.05 MG/DL
DEPRECATED HBV CORE AB SER IA-ACNC: 73.7 NG/ML
DEPRECATED RDW RBC AUTO: 48.4 FL (ref 35.1–46.3)
EOSINOPHIL # BLD AUTO: 0.23 X10(3) UL (ref 0–0.7)
EOSINOPHIL NFR BLD AUTO: 3.6 %
ERYTHROCYTE [DISTWIDTH] IN BLOOD BY AUTOMATED COUNT: 13 % (ref 11–15)
FASTING STATUS PATIENT QL REPORTED: YES
GFR SERPLBLD BASED ON 1.73 SQ M-ARVRAT: 54 ML/MIN/1.73M2 (ref 60–?)
GLOBULIN PLAS-MCNC: 2.9 G/DL (ref 2.8–4.4)
GLUCOSE BLD-MCNC: 103 MG/DL (ref 70–99)
HCT VFR BLD AUTO: 37.5 %
HGB BLD-MCNC: 12.2 G/DL
IMM GRANULOCYTES # BLD AUTO: 0.01 X10(3) UL (ref 0–1)
IMM GRANULOCYTES NFR BLD: 0.2 %
IRON SATN MFR SERPL: 11 %
IRON SERPL-MCNC: 46 UG/DL
LYMPHOCYTES # BLD AUTO: 2.17 X10(3) UL (ref 1–4)
LYMPHOCYTES NFR BLD AUTO: 34.1 %
MCH RBC QN AUTO: 32.9 PG (ref 26–34)
MCHC RBC AUTO-ENTMCNC: 32.5 G/DL (ref 31–37)
MCV RBC AUTO: 101.1 FL
MONOCYTES # BLD AUTO: 0.63 X10(3) UL (ref 0.1–1)
MONOCYTES NFR BLD AUTO: 9.9 %
NEUTROPHILS # BLD AUTO: 3.28 X10 (3) UL (ref 1.5–7.7)
NEUTROPHILS # BLD AUTO: 3.28 X10(3) UL (ref 1.5–7.7)
NEUTROPHILS NFR BLD AUTO: 51.6 %
OSMOLALITY SERPL CALC.SUM OF ELEC: 292 MOSM/KG (ref 275–295)
PLATELET # BLD AUTO: 280 10(3)UL (ref 150–450)
POTASSIUM SERPL-SCNC: 4 MMOL/L (ref 3.5–5.1)
PROT SERPL-MCNC: 6.7 G/DL (ref 6.4–8.2)
PTH-INTACT SERPL-MCNC: 23.6 PG/ML (ref 18.5–88)
RBC # BLD AUTO: 3.71 X10(6)UL
SODIUM SERPL-SCNC: 139 MMOL/L (ref 136–145)
TIBC SERPL-MCNC: 401 UG/DL (ref 240–450)
TRANSFERRIN SERPL-MCNC: 269 MG/DL (ref 200–360)
VIT B12 SERPL-MCNC: 1451 PG/ML (ref 193–986)
VIT D+METAB SERPL-MCNC: 57.6 NG/ML (ref 30–100)
WBC # BLD AUTO: 6.4 X10(3) UL (ref 4–11)

## 2022-08-25 PROCEDURE — 84466 ASSAY OF TRANSFERRIN: CPT

## 2022-08-25 PROCEDURE — 85025 COMPLETE CBC W/AUTO DIFF WBC: CPT

## 2022-08-25 PROCEDURE — 82728 ASSAY OF FERRITIN: CPT

## 2022-08-25 PROCEDURE — 82306 VITAMIN D 25 HYDROXY: CPT

## 2022-08-25 PROCEDURE — 84080 ASSAY ALKALINE PHOSPHATASES: CPT

## 2022-08-25 PROCEDURE — 83970 ASSAY OF PARATHORMONE: CPT

## 2022-08-25 PROCEDURE — 83540 ASSAY OF IRON: CPT

## 2022-08-25 PROCEDURE — 80053 COMPREHEN METABOLIC PANEL: CPT

## 2022-08-25 PROCEDURE — 83615 LACTATE (LD) (LDH) ENZYME: CPT | Performed by: INTERNAL MEDICINE

## 2022-08-25 PROCEDURE — 82607 VITAMIN B-12: CPT

## 2022-08-25 PROCEDURE — 36415 COLL VENOUS BLD VENIPUNCTURE: CPT

## 2022-08-27 LAB — BONE SPECIFIC ALKALINE PHOSPHATASE: 6.7 UG/L

## 2022-08-29 ENCOUNTER — TELEPHONE (OUTPATIENT)
Dept: ENDOCRINOLOGY CLINIC | Facility: CLINIC | Age: 77
End: 2022-08-29

## 2022-08-30 NOTE — TELEPHONE ENCOUNTER
Dr. Ric Umana to patient to relay message below -[facility] sent with message  Patient is asking which option you recommend - please advise -thanks

## 2022-08-30 NOTE — TELEPHONE ENCOUNTER
Hi!  Please let patient know that I have reviewed all of her blood work and they are all within normal limits except for that renal function is decreased. Initially, I had thought that she could be restarted on alendronate or we could administer a dose of Reclast. However, we could also start her on Prolia as well. I would like to offer this as an option. Please let her know. Thank you!

## 2022-08-30 NOTE — TELEPHONE ENCOUNTER
rn called patient and inform of message below,  Patient verbalized understanding.   Sent jessica cortez to [employer]  When it returns, please book rn appointment for prolia if pt agrees

## 2022-09-02 NOTE — TELEPHONE ENCOUNTER
Received pt's Prolia SOB via [employer] fax    PA Required: Yes  Prolia OOP COST: $20  Facility Fee: No  Admin Fee: 0%    Fax sent to scanning  ---------------------------------------------------    Medication  CGM  pump supply Requested: (Denosumab)  Prolia                                                             CoverMyMeds Used: No    Key: N/a    Sig: Inject 60mg/ mL into the skin every 6 months. DX Code: M81.0                                       Case Number/Pending Ref#: N/a      Routing over to PA Dept for assistance, thanks.

## 2022-09-09 ENCOUNTER — NURSE ONLY (OUTPATIENT)
Dept: ENDOCRINOLOGY CLINIC | Facility: CLINIC | Age: 77
End: 2022-09-09
Payer: COMMERCIAL

## 2022-09-09 DIAGNOSIS — M81.0 AGE-RELATED OSTEOPOROSIS WITHOUT CURRENT PATHOLOGICAL FRACTURE: Primary | ICD-10-CM

## 2022-09-13 RX ORDER — CLOPIDOGREL BISULFATE 75 MG/1
75 TABLET ORAL DAILY
Qty: 90 TABLET | Refills: 1 | Status: SHIPPED | OUTPATIENT
Start: 2022-09-13 | End: 2023-04-03

## 2022-09-13 NOTE — TELEPHONE ENCOUNTER
Please review. Protocol failed / No protocol.    Requested Prescriptions   Pending Prescriptions Disp Refills    CLOPIDOGREL 75 MG Oral Tab [Pharmacy Med Name: CLOPIDOGREL  TAB 75MG] 90 tablet 1     Sig: TAKE 1 TABLET DAILY        There is no refill protocol information for this order          Future Appointments         Provider Department Appt Notes    In 3 months Kaylyn Suárez MD [place] [employer] [facility], [address], [place] 4 mth     In 8 months Kaylyn Suárez MD [place] [employer] [facility], [address] [facility] 4 mth            Recent Outpatient Visits              4 days ago Age-related osteoporosis without current pathological fracture    [place] [employer] [facility] Endocrinology    Nurse Only    3 weeks ago Acquired hypothyroidism    [facility], Yolanda Morris MD    Office Visit    1 month ago Age-related osteoporosis without current pathological fracture    [place] [employer] [facility] Endocrinology Katharine Miner MD    Office Visit    4 months ago Acquired hypothyroidism    [place] [employer] [facility], [address], Yolanda Morris MD    Office Visit    8 months ago Encounter for well woman exam with routine gynecological exam    OB-GYN - Samson Hernandez MD    Office Visit

## 2022-09-16 ENCOUNTER — TELEPHONE (OUTPATIENT)
Dept: INTERNAL MEDICINE CLINIC | Facility: CLINIC | Age: 77
End: 2022-09-16

## 2022-09-16 NOTE — TELEPHONE ENCOUNTER
Patient is requesting a sooner appointment by the 2nd week of 01/2023 due to her appointment on 01/10/2023 was cancelled due to schedule changed and Dr next appointment will be in 3/2023 and the patient will be in [place] for 4 months. She only want to see Dr. Vazquez Service. Can we use RES 24 slot?

## 2022-09-23 RX ORDER — LISINOPRIL AND HYDROCHLOROTHIAZIDE 25; 20 MG/1; MG/1
TABLET ORAL
Qty: 180 TABLET | Refills: 1 | OUTPATIENT
Start: 2022-09-23

## 2022-09-23 NOTE — TELEPHONE ENCOUNTER
Refill passed per [employer] [facility] protocol.     Requested Prescriptions   Pending Prescriptions Disp Refills    METFORMIN HCL 1000 MG Oral Tab [Pharmacy Med Name: METFORMIN TAB 1000MG] 90 tablet 1     Sig: TAKE 1 TABLET DAILY WITH   BREAKFAST        Diabetes Medication Protocol Passed - 9/23/2022  8:43 AM        Passed - Last A1C < 7.5 and within past 6 months     Lab Results   Component Value Date    A1C 5.9 (H) 06/10/2022               Passed - In person appointment or virtual visit in the past 6 mos or appointment in next 3 mos       Recent Outpatient Visits              2 weeks ago Age-related osteoporosis without current pathological fracture    [place] [employer] [facility] Endocrinology    Nurse Only    1 month ago Acquired hypothyroidism    [place] [employer] [facility], [address], Venkatesh Jaimes MD    Office Visit    1 month ago Age-related osteoporosis without current pathological fracture    [place] [employer] [facility] Endocrinology Loreto Nunes MD    Office Visit    4 months ago Acquired hypothyroidism    [place] [employer] [facility], [address], Venkatesh Jaimes MD    Office Visit    8 months ago Encounter for well woman exam with routine gynecological exam    OB-GYN - Chika Lee, Roderick Avila MD    Office Visit     Future Appointments         Provider Department Appt Notes    In 4 months Roise Meigs., MD [employer] [facility], [address] per dr Mary Haas - due for annual medicare px after 5/9/23 please schedule appt     In 8 months Roise Meigs., MD [employer] [facility], [address], [place] 4 mth                Passed - [facility] or GFRNAA > 50     GFR Evaluation  EGFRCR: 47 , resulted on 8/25/2022            Passed - GFR in the past 12 months          Refused Prescriptions Disp Refills    LISINOPRIL-HYDROCHLOROTHIAZIDE 20-25 MG Oral Tab [Pharmacy Med Name: LISINOP/HCTZ TAB 20-25MG] 180 tablet 1     Sig: TAKE 1 TABLET TWICE A DAY        Hypertensive Medications Protocol Passed - 9/23/2022  8:43 AM        Passed - In person appointment in the past 12 or next 3 months       Recent Outpatient Visits              2 weeks ago Age-related osteoporosis without current pathological fracture    [facility] [facility] Endocr[facility]ology    Nurse Only    1 month ago Acquired hypothyroidism    Alie Guthrie MD    Office Visit    1 month ago Age-related osteoporosis without current pathological fracture    [address] [facility] End[facility] Deven Pena MD    Office Visit    4 months ago Acquired hypothyroidism    Alie Guthrie MD    Office Visit    8 months ago Encounter for well woman exam with routine gynecological exam    OB-GYN - Yolanda Gallardo MD    Office Visit     Future Appointments         Provider Department Appt Notes    In 4 months Joan White MD [address] [facility], [address], [employer] per dr Kim Damico - due for annual medicare px after 5/9/23 please schedule appt     In 8 months Joan White MD [address] [facility], [address], [place] 4 mth                Passed - Last BP reading less than 140/90     BP Readings from Last 1 Encounters:  08/23/22 : 111/66                Passed - CMP or BMP in past 6 months     Recent Results (from the past 4392 hour(s))   COMP METABOLIC PANEL (14)    Collection Time: 08/25/22 10:36 AM   Result Value Ref Range    Glucose 103 (H) 70 - 99 mg/dL    Sodium 139 136 - 145 mmol/L    Potassium 4.0 3.5 - 5.1 mmol/L    Chloride 105 98 - 112 mmol/L    CO2 28.0 21.0 - 32.0 mmol/L    Anion Gap 6 0 - 18 mmol/L    BUN 24 (H) 7 - 18 mg/dL    Creatinine 1.05 (H) 0.55 - 1.02 mg/dL    BUN/CREA Ratio 22.9 (H) 10.0 - 20.0    Calcium, Total 9.2 8.5 - 10.1 mg/dL    Calculated Osmolality 292 275 - 295 mOsm/kg    eGFR-Cr 54 (L) >=60 mL/min/1.73m2    ALT 54 13 - 56 U/L    AST 40 (H) 15 - 37 U/L    Alkaline Phosphatase 36 (L) 55 - 142 U/L    Bilirubin, Total 0.3 0.1 - 2.0 mg/dL    Total Protein 6.7 6.4 - 8.2 g/dL    Albumin 3.8 3.4 - 5.0 g/dL    Globulin  2.9 2.8 - 4.4 g/dL    A/G Ratio 1.3 1.0 - 2.0    Patient Fasting for CMP? Yes      *Note: Due to a large number of results and/or encounters for the requested time period, some results have not been displayed. A complete set of results can be found in Results Review.                  Passed - In person appointment or virtual visit in the past 6 months       Recent Outpatient Visits              2 weeks ago Age-related osteoporosis without current pathological fracture    [place] [employer] [facility] Endocrinology    Nurse Only    1 month ago Acquired hypothyroidism    [facility], [address], Radha Mccoy MD    Office Visit    1 month ago Age-related osteoporosis without current pathological fracture    [facility] Endocrinology Bandar Villanueva MD    Office Visit    4 months ago Acquired hypothyroidism    [facility], [address], Radha Mccoy MD    Office Visit    8 months ago Encounter for well woman exam with routine gynecological exam    OB-GYN - Deborah Love MD    Office Visit     Future Appointments         Provider Department Appt Notes    In 4 months Enrrique Conde MD [place] [facility], [address] per dr Joyce Browning - due for annual medicare px after 5/9/23 please schedule appt     In 8 months Enrrique Conde MD [place] [facility], [address], [place] 4 mth                Passed - [facility] or [facility] > 50     GFR Evaluation  EGFRCR: 54 , resulted on 8/25/2022                  Future Appointments         Provider Department Appt Notes    In 4 months Enrrique Conde MD [employer], [address] per dr Joyce Pascal due for annual medicare px after 5/9/23 please schedule appt     In 8 months Enrrique Conde MD [place] [facility], [address] 4 mth             Recent Outpatient Visits              2 weeks ago Age-related osteoporosis without current pathological fracture    [facility] Endocrinology    Nurse Only    1 month ago Acquired hypothyroidism    Janis Vega MD    Office Visit    1 month ago Age-related osteoporosis without current pathological fracture    [facility] Endocrinology Shila Bundy MD    Office Visit    4 months ago Acquired hypothyroidism    [facility], [address], Janis Zamora MD    Office Visit    8 months ago Encounter for well woman exam with routine gynecological exam    OB-GYN - Tuan Potts MD    Office Visit

## 2022-09-23 NOTE — TELEPHONE ENCOUNTER
Lisinopril-hydrochlorothiazide Refill passed per [employer] protocol, but too soon to refill.      Requested Prescriptions   Pending Prescriptions Disp Refills    METFORMIN HCL 1000 MG Oral Tab [Pharmacy Med Name: METFORMIN TAB 1000MG] 90 tablet 1     Sig: TAKE 1 TABLET DAILY WITH   BREAKFAST        Diabetes Medication Protocol Passed - 9/22/2022  6:52 PM        Passed - Last A1C < 7.5 and within past 6 months     Lab Results   Component Value Date    A1C 5.9 (H) 06/10/2022               Passed - In person appointment or virtual visit in the past 6 mos or appointment in next 3 mos       Recent Outpatient Visits              2 weeks ago Age-related osteoporosis without current pathological fracture    [place] [employer] [facility] Endocrinology    Nurse Only    1 month ago Acquired hypothyroidism    [employer] [facility], [address], Ramiro Sales MD    Office Visit    1 month ago Age-related osteoporosis without current pathological fracture    [place] [employer] [facility] Endocrinology Georgia Lizarraga MD    Office Visit    4 months ago Acquired hypothyroidism    [employer] [facility], [address], Ramiro Sales MD    Office Visit    8 months ago Encounter for well woman exam with routine gynecological exam    OB-GYN - Maeve Bansal, Betty Davis MD    Office Visit     Future Appointments         Provider Department Appt Notes    In 4 months Cassandra Began., MD [employer], [address] per dr Olivier Ortiz - jose for annual medicare px after 5/9/23 please schedule appt     In 8 months Cassandra Began., MD [employer], [address], [place] 4 mth                Passed - [facility] or [facility] > 50     GFR Evaluation  EGFRCR: 47 , resulted on 8/25/2022            Passed - GFR in the past 12 months           LISINOPRIL-HYDROCHLOROTHIAZIDE 20-25 MG Oral Tab [Pharmacy Med Name: LISINOP/HCTZ TAB 20-25MG] 180 tablet 1     Sig: TAKE 1 TABLET TWICE A DAY        Hypertensive Medications Protocol Passed - 9/22/2022  6:52 PM        Passed - In person appointment in the past 12 or next 3 months       Recent Outpatient Visits              2 weeks ago Age-related osteoporosis without current pathological fracture    [facility] Endocrinology    Nurse Only    1 month ago Acquired hypothyroidism    [facility], [address] [facility], Sloan Bearden MD    Office Visit    1 month ago Age-related osteoporosis without current pathological fracture    [facility] Endocrinology Paolo Jewell MD    Office Visit    4 months ago Acquired hypothyroidism    [facility], Sloan Bearden MD    Office Visit    8 months ago Encounter for well woman exam with routine gynecological exam    OB-GYN - Twan Bates, Sherryle Flicker, MD    Office Visit     Future Appointments         Provider Department Appt Notes    In 4 months Ashley Eldridge MD [facility], [address] [facility],[address], [facility] per dr Maira Harkins - due for annual medicare px after 5/9/23 please schedule appt     In 8 months Ashley Eldridge MD [facility], [address], [place] 4 mth                Passed - Last BP reading less than 140/90     BP Readings from Last 1 Encounters:  08/23/22 : 111/66                Passed - CMP or BMP in past 6 months     Recent Results (from the past 4392 hour(s))   COMP METABOLIC PANEL (14)    Collection Time: 08/25/22 10:36 AM   Result Value Ref Range    Glucose 103 (H) 70 - 99 mg/dL    Sodium 139 136 - 145 mmol/L    Potassium 4.0 3.5 - 5.1 mmol/L    Chloride 105 98 - 112 mmol/L    CO2 28.0 21.0 - 32.0 mmol/L    Anion Gap 6 0 - 18 mmol/L    BUN 24 (H) 7 - 18 mg/dL    Creatinine 1.05 (H) 0.55 - 1.02 mg/dL    BUN/CREA Ratio 22.9 (H) 10.0 - 20.0    Calcium, Total 9.2 8.5 - 10.1 mg/dL    Calculated Osmolality 292 275 - 295 mOsm/kg    eGFR-Cr 54 (L) >=60 mL/min/1.73m2    ALT 54 13 - 56 U/L    AST 40 (H) 15 - 37 U/L    Alkaline Phosphatase 36 (L) 55 - 142 U/L    Bilirubin, Total 0.3 0.1 - 2.0 mg/dL    Total Protein 6.7 6.4 - 8.2 g/dL    Albumin 3.8 3.4 - 5.0 g/dL    Globulin  2.9 2.8 - 4.4 g/dL    A/G Ratio 1.3 1.0 - 2.0    Patient Fasting for CMP? Yes      *Note: Due to a large number of results and/or encounters for the requested time period, some results have not been displayed. A complete set of results can be found in Results Review.                  Passed - In person appointment or virtual visit in the past 6 months       Recent Outpatient Visits              2 weeks ago Age-related osteoporosis without current pathological fracture    [place] [employer] [facility] Endocrinology    Nurse Only    1 month ago Acquired hypothyroidism    Oneida Stanton MD    Office Visit    1 month ago Age-related osteoporosis without current pathological fracture    [place] [employer] [facility] Endocrinology Quita Curtis MD    Office Visit    4 months ago Acquired hypothyroidism    [place] [employer] [facility], [address], Oneida Escobedo MD    Office Visit    8 months ago Encounter for well woman exam with routine gynecological exam    OB-GYN - Ara Mcneal MD    Office Visit     Future Appointments         Provider Department Appt Notes    In 4 months Perfecto Goldmann., MD [place] [employer] [facility], [address] [facility] per dr Kaylin Bond - due for annual medicare px after 5/9/23 please schedule appt     In 8 months Perfecto Goldmann., MD [place] [employer] [facility], [address], [place] 4 mth                Passed - [facility] or GFRNAA > 50     GFR Evaluation  EGFRCR: 47 , resulted on 8/25/2022                Recent Outpatient Visits              2 weeks ago Age-related osteoporosis without current pathological fracture    [place] [employer] [facility] Endocrinology    Nurse Only    1 month ago Acquired hypothyroidism    [place] [employer] [facility], [address], Oneida Escobedo MD    Office Visit    1 month ago Age-related osteoporosis without current pathological fracture    [place] [employer] [facility] Endocrinology Dav Wakler MD    Office Visit    4 months ago Acquired hypothyroidism    [facility], [address], Sue Elias MD    Office Visit    8 months ago Encounter for well woman exam with routine gynecological exam    OB-GYN - Joel Hudson MD    Office Visit          Future Appointments         Provider Department Appt Notes    In 4 months MD Jose Johnson, [address] per dr Uzma Shah - due for annual medicare px after 5/9/23 please schedule appt     In 8 months MD [facility], [address], [address] 4 mth

## 2022-09-23 NOTE — TELEPHONE ENCOUNTER
Dr. Mary Warner - Please advise on Metformin Refill,     Per last visit Plan:   \"DM type 2. Not good control. Increase ozempic to 1.0 mg. Call in 2 weeks. In 1 week, cut metformin to 500 mg predinner from 1000 mg predinner.  \"

## 2022-09-24 NOTE — TELEPHONE ENCOUNTER
Attempted to contact patient. Left message to call back.
Informed patient.
OK.
OK. B/P's are good. Continue to monitor.
Patient dropped off paper work at . [employer] paper work on [address] [employer].
Spoke with patient regarding your comment about the forms. She expressed understanding and thanks you for your time. She will  forms from the  at the [place] office if you could have Nely Cueto leave out front.  She wanted also to let you know he
Viewed forms. Forms have to be filled out by a mental health professional with her letterhead and also license and also a letter that was evaluated by a licensed therapist for mental health condition. Unfortunately cannot do the paperwork.
Wants to know if you are able to complete some forms that she needs to be able to obtain an emotional support dog when she flies.
fall precautions

## 2022-10-13 RX ORDER — AMLODIPINE BESYLATE 2.5 MG/1
TABLET ORAL
Qty: 180 TABLET | Refills: 1 | Status: SHIPPED | OUTPATIENT
Start: 2022-10-13

## 2022-10-13 NOTE — TELEPHONE ENCOUNTER
Refill passed per [address] [place] [address] [facility] protocol.   Requested Prescriptions   Pending Prescriptions Disp Refills    AMLODIPINE 2.5 MG Oral Tab [Pharmacy Med Name: AMLODIPINE TAB 2.5MG] 180 tablet 1     Sig: TAKE 1 TABLET TWICE A DAY        Hypertensive Medications Protocol Passed - 10/12/2022  1:17 AM        Passed - In person appointment in the past 12 or next 3 months       Recent Outpatient Visits              1 month ago Age-related osteoporosis without current pathological fracture    [facility] [facility]    Nurse Only    1 month ago Acquired hypothyroidism    [address], [address], Andrew Gallagher MD    Office Visit    2 months ago Age-related osteoporosis without current pathological fracture    [address] [place] [address] [facility] Nanci Oliver MD    Office Visit    5 months ago Acquired hypothyroidism    [facility], Andrew Gallagher MD    Office Visit    9 months ago Encounter for well woman exam with routine gynecological exam    OB-GYN - Army Marcelo Cruz MD    Office Visit     Future Appointments         Provider Department Appt Notes    In 3 months Nelsy Ayala MD [facility], [address] per dr Coy Oliva - due for annual medicare px after 5/9/23 please schedule appt     In 7 months Nelsy Ayala MD [address], [address], [place] 4 mth                Passed - Last BP reading less than 140/90     BP Readings from Last 1 Encounters:  08/23/22 : 111/66                Passed - CMP or BMP in past 6 months     Recent Results (from the past 4392 hour(s))   COMP METABOLIC PANEL (14)    Collection Time: 08/25/22 10:36 AM   Result Value Ref Range    Glucose 103 (H) 70 - 99 mg/dL    Sodium 139 136 - 145 mmol/L    Potassium 4.0 3.5 - 5.1 mmol/L    Chloride 105 98 - 112 mmol/L    CO2 28.0 21.0 - 32.0 mmol/L    Anion Gap 6 0 - 18 mmol/L    BUN 24 (H) 7 - 18 mg/dL    Creatinine 1.05 (H) 0.55 - 1.02 mg/dL BUN/CREA Ratio 22.9 (H) 10.0 - 20.0    Calcium, Total 9.2 8.5 - 10.1 mg/dL    Calculated Osmolality 292 275 - 295 mOsm/kg    eGFR-Cr 54 (L) >=60 mL/min/1.73m2    ALT 54 13 - 56 U/L    AST 40 (H) 15 - 37 U/L    Alkaline Phosphatase 36 (L) 55 - 142 U/L    Bilirubin, Total 0.3 0.1 - 2.0 mg/dL    Total Protein 6.7 6.4 - 8.2 g/dL    Albumin 3.8 3.4 - 5.0 g/dL    Globulin  2.9 2.8 - 4.4 g/dL    A/G Ratio 1.3 1.0 - 2.0    Patient Fasting for CMP? Yes      *Note: Due to a large number of results and/or encounters for the requested time period, some results have not been displayed. A complete set of results can be found in Results Review.                  Passed - In person appointment or virtual visit in the past 6 months       Recent Outpatient Visits              1 month ago Age-related osteoporosis without current pathological fracture    [address] [facility]    Nurse Only    1 month ago Acquired hypothyroidism    [address], [address], Oneida Escobedo MD    Office Visit    2 months ago Age-related osteoporosis without current pathological fracture    [address] [facility] [facility] Quita Curtis MD    Office Visit    5 months ago Acquired hypothyroidism    [facility] Stanton MD    Office Visit    9 months ago Encounter for well woman exam with routine gynecological exam    OB-GYN - Ara Mcneal MD    Office Visit     Future Appointments         Provider Department Appt Notes    In 3 months Perfecto Goldmann., MD [address], [address] per dr Kaylin Bond - due for annual medicare px after 5/9/23 please schedule appt     In 7 months Perfecto Goldmann., MD [address], [address], [place] 4 mth                Passed - [facility] or [facility] > 50     GFR Evaluation  EGFRCR: 54 , resulted on 8/25/2022                Recent Outpatient Visits              1 month ago Age-related osteoporosis without current pathological fracture    [facility]    Nurse Only    1 month ago Acquired hypothyroidism    [facility], [address], Fariba Palmer MD    Office Visit    2 months ago Age-related osteoporosis without current pathological fracture    [facility] Kristal Yo MD    Office Visit    5 months ago Acquired hypothyroidism    Fariba Knowles MD    Office Visit    9 months ago Encounter for well woman exam with routine gynecological exam    OB-GYN - Rupa Mcnamara MD    Office Visit          Future Appointments         Provider Department Appt Notes    In 3 months Zak Barajas MD [facility], [address] per dr Corrigan Rj - due for annual medicare px after 5/9/23 please schedule appt     In 7 months Zak Barajas MD [facility], [address], [place] 4 mth

## 2022-12-08 RX ORDER — FENOFIBRATE 145 MG/1
TABLET, COATED ORAL
Qty: 90 TABLET | Refills: 1 | Status: SHIPPED | OUTPATIENT
Start: 2022-12-08

## 2022-12-08 NOTE — TELEPHONE ENCOUNTER
Refill passed per [facility] protocol.   Requested Prescriptions   Pending Prescriptions Disp Refills    FENOFIBRATE 145 MG Oral Tab [Pharmacy Med Name: FENOFIBRATE  TAB 145MG] 90 tablet 1     Sig: TAKE 1 TABLET ONCE DAILY       Cholesterol Medication Protocol Passed - 12/8/2022  1:17 AM        Passed - ALT in past 12 months        Passed - LDL in past 12 months        Passed - Last ALT < 80     Lab Results   Component Value Date    ALT 54 08/25/2022             Passed - Last LDL < 130     Lab Results   Component Value Date    LDL 85 06/10/2022             Passed - In person appointment or virtual visit in the past 12 mos or appointment in next 3 mos     Recent Outpatient Visits              3 months ago Age-related osteoporosis without current pathological fracture    [facility]    Nurse Only    3 months ago Acquired hypothyroidism    RefJade Gonzalez MD    Office Visit    4 months ago Age-related osteoporosis without current pathological fracture    [facility] Alli Rowland MD    Office Visit    7 months ago Acquired hypothyroidism    Jade Bustamante MD    Office Visit    11 months ago Encounter for well woman exam with routine gynecological exam    OB-GYN - Bronson Xiong, Loreta Sprague MD    Office Visit          Future Appointments         Provider Department Appt Notes    In 4 weeks Fco Paige MD [facility], [address] per dr Suzna Gonzalez - due for annual medicare px after 5/9/23 please schedule appt     In 6 months Fco Paige MD [facility], [address], [place] [facility]

## 2023-01-05 ENCOUNTER — OFFICE VISIT (OUTPATIENT)
Dept: INTERNAL MEDICINE CLINIC | Facility: CLINIC | Age: 78
End: 2023-01-05
Payer: COMMERCIAL

## 2023-01-05 VITALS
DIASTOLIC BLOOD PRESSURE: 60 MMHG | HEART RATE: 55 BPM | TEMPERATURE: 98 F | RESPIRATION RATE: 16 BRPM | SYSTOLIC BLOOD PRESSURE: 112 MMHG | WEIGHT: 121 LBS | BODY MASS INDEX: 22.26 KG/M2 | HEIGHT: 62 IN | OXYGEN SATURATION: 99 %

## 2023-01-05 DIAGNOSIS — I25.10 CORONARY ARTERY DISEASE DUE TO CALCIFIED CORONARY LESION: ICD-10-CM

## 2023-01-05 DIAGNOSIS — I10 ESSENTIAL HYPERTENSION: ICD-10-CM

## 2023-01-05 DIAGNOSIS — N18.32 STAGE 3B CHRONIC KIDNEY DISEASE (HCC): ICD-10-CM

## 2023-01-05 DIAGNOSIS — E03.9 ACQUIRED HYPOTHYROIDISM: ICD-10-CM

## 2023-01-05 DIAGNOSIS — R76.8 ANA POSITIVE: ICD-10-CM

## 2023-01-05 DIAGNOSIS — I77.1 TORTUOUS AORTA (HCC): ICD-10-CM

## 2023-01-05 DIAGNOSIS — R01.1 MURMUR: ICD-10-CM

## 2023-01-05 DIAGNOSIS — E11.65 TYPE 2 DIABETES MELLITUS WITH HYPERGLYCEMIA, WITHOUT LONG-TERM CURRENT USE OF INSULIN (HCC): Primary | ICD-10-CM

## 2023-01-05 DIAGNOSIS — M17.11 PRIMARY OSTEOARTHRITIS OF RIGHT KNEE: ICD-10-CM

## 2023-01-05 DIAGNOSIS — J44.9 ASTHMA WITH COPD (CHRONIC OBSTRUCTIVE PULMONARY DISEASE) (HCC): Chronic | ICD-10-CM

## 2023-01-05 DIAGNOSIS — H47.20 OPTIC ATROPHY: ICD-10-CM

## 2023-01-05 DIAGNOSIS — R68.89 SUSPECTED GLAUCOMA OF BOTH EYES: ICD-10-CM

## 2023-01-05 DIAGNOSIS — M81.0 AGE-RELATED OSTEOPOROSIS WITHOUT CURRENT PATHOLOGICAL FRACTURE: ICD-10-CM

## 2023-01-05 DIAGNOSIS — I25.84 CORONARY ARTERY DISEASE DUE TO CALCIFIED CORONARY LESION: ICD-10-CM

## 2023-01-05 DIAGNOSIS — R91.1 PULMONARY NODULE: ICD-10-CM

## 2023-01-05 DIAGNOSIS — D64.9 ANEMIA, UNSPECIFIED TYPE: ICD-10-CM

## 2023-01-05 DIAGNOSIS — Z96.1 PSEUDOPHAKIA OF BOTH EYES: ICD-10-CM

## 2023-01-05 DIAGNOSIS — Z71.85 VACCINE COUNSELING: ICD-10-CM

## 2023-01-05 DIAGNOSIS — E55.9 VITAMIN D DEFICIENCY: ICD-10-CM

## 2023-01-05 DIAGNOSIS — Z00.00 ENCOUNTER FOR ANNUAL HEALTH EXAMINATION: ICD-10-CM

## 2023-01-05 DIAGNOSIS — E78.2 MIXED HYPERLIPIDEMIA: ICD-10-CM

## 2023-01-05 LAB
ALBUMIN SERPL-MCNC: 4.2 G/DL (ref 3.4–5)
ALBUMIN/GLOB SERPL: 1.4 {RATIO} (ref 1–2)
ALP LIVER SERPL-CCNC: 31 U/L
ALT SERPL-CCNC: 27 U/L
ANION GAP SERPL CALC-SCNC: 6 MMOL/L (ref 0–18)
APPEARANCE: CLEAR
AST SERPL-CCNC: 22 U/L (ref 15–37)
BILIRUB SERPL-MCNC: 0.5 MG/DL (ref 0.1–2)
BILIRUBIN: NEGATIVE
BUN BLD-MCNC: 23 MG/DL (ref 7–18)
BUN/CREAT SERPL: 22.8 (ref 10–20)
CALCIUM BLD-MCNC: 9.7 MG/DL (ref 8.5–10.1)
CHLORIDE SERPL-SCNC: 104 MMOL/L (ref 98–112)
CHOLEST SERPL-MCNC: 151 MG/DL (ref ?–200)
CO2 SERPL-SCNC: 29 MMOL/L (ref 21–32)
CREAT BLD-MCNC: 1.01 MG/DL
CREAT UR-SCNC: 69 MG/DL
EST. AVERAGE GLUCOSE BLD GHB EST-MCNC: 123 MG/DL (ref 68–126)
FASTING PATIENT LIPID ANSWER: YES
FASTING STATUS PATIENT QL REPORTED: YES
GFR SERPLBLD BASED ON 1.73 SQ M-ARVRAT: 57 ML/MIN/1.73M2 (ref 60–?)
GLOBULIN PLAS-MCNC: 2.9 G/DL (ref 2.8–4.4)
GLUCOSE (URINE DIPSTICK): NEGATIVE MG/DL
GLUCOSE BLD-MCNC: 92 MG/DL (ref 70–99)
HBA1C MFR BLD: 5.9 % (ref ?–5.7)
HDLC SERPL-MCNC: 61 MG/DL (ref 40–59)
KETONES (URINE DIPSTICK): NEGATIVE MG/DL
LDLC SERPL CALC-MCNC: 71 MG/DL (ref ?–100)
LEUKOCYTES: NEGATIVE
MICROALBUMIN UR-MCNC: 1.13 MG/DL
MICROALBUMIN/CREAT 24H UR-RTO: 16.4 UG/MG (ref ?–30)
MULTISTIX LOT#: NORMAL NUMERIC
NITRITE, URINE: NEGATIVE
NONHDLC SERPL-MCNC: 90 MG/DL (ref ?–130)
OCCULT BLOOD: NEGATIVE
OSMOLALITY SERPL CALC.SUM OF ELEC: 291 MOSM/KG (ref 275–295)
PH, URINE: 7 (ref 4.5–8)
POTASSIUM SERPL-SCNC: 4.1 MMOL/L (ref 3.5–5.1)
PROT SERPL-MCNC: 7.1 G/DL (ref 6.4–8.2)
PROTEIN (URINE DIPSTICK): NEGATIVE MG/DL
SODIUM SERPL-SCNC: 139 MMOL/L (ref 136–145)
SPECIFIC GRAVITY: 1.01 (ref 1–1.03)
TRIGL SERPL-MCNC: 103 MG/DL (ref 30–149)
URINE-COLOR: YELLOW
UROBILINOGEN,SEMI-QN: 0.2 MG/DL (ref 0–1.9)
VIT D+METAB SERPL-MCNC: 39.9 NG/ML (ref 30–100)
VLDLC SERPL CALC-MCNC: 16 MG/DL (ref 0–30)

## 2023-01-05 PROCEDURE — 36415 COLL VENOUS BLD VENIPUNCTURE: CPT | Performed by: INTERNAL MEDICINE

## 2023-01-05 PROCEDURE — 3074F SYST BP LT 130 MM HG: CPT | Performed by: INTERNAL MEDICINE

## 2023-01-05 PROCEDURE — 1126F AMNT PAIN NOTED NONE PRSNT: CPT | Performed by: INTERNAL MEDICINE

## 2023-01-05 PROCEDURE — 99397 PER PM REEVAL EST PAT 65+ YR: CPT | Performed by: INTERNAL MEDICINE

## 2023-01-05 PROCEDURE — 3008F BODY MASS INDEX DOCD: CPT | Performed by: INTERNAL MEDICINE

## 2023-01-05 PROCEDURE — 3078F DIAST BP <80 MM HG: CPT | Performed by: INTERNAL MEDICINE

## 2023-01-05 PROCEDURE — 81003 URINALYSIS AUTO W/O SCOPE: CPT | Performed by: INTERNAL MEDICINE

## 2023-01-05 PROCEDURE — G0439 PPPS, SUBSEQ VISIT: HCPCS | Performed by: INTERNAL MEDICINE

## 2023-01-05 PROCEDURE — 96160 PT-FOCUSED HLTH RISK ASSMT: CPT | Performed by: INTERNAL MEDICINE

## 2023-01-05 RX ORDER — BLOOD SUGAR DIAGNOSTIC
STRIP MISCELLANEOUS
Qty: 100 STRIP | Refills: 5 | Status: SHIPPED | OUTPATIENT
Start: 2023-01-05

## 2023-01-06 NOTE — PROGRESS NOTES
Lipid or cholesterol is good. Urine shows no protein spillage from the affected diabetes. Hemoglobin A1c which is a 3-month average of sugars looks great. Goal is 6.5 or less.

## 2023-01-18 ENCOUNTER — ORDER TRANSCRIPTION (OUTPATIENT)
Dept: ADMINISTRATIVE | Facility: HOSPITAL | Age: 78
End: 2023-01-18

## 2023-01-18 DIAGNOSIS — I25.10 CAD (CORONARY ARTERY DISEASE): Primary | ICD-10-CM

## 2023-01-22 ENCOUNTER — TELEPHONE (OUTPATIENT)
Dept: ENDOCRINOLOGY CLINIC | Facility: CLINIC | Age: 78
End: 2023-01-22

## 2023-01-23 NOTE — TELEPHONE ENCOUNTER
Hi!  Please let patient know that I have reviewed some of her blood tests that I had ordered for her to have done before her Prolia shot. They are within normal limits. Please remind her to have them done before her next shot. Thank you.

## 2023-01-23 NOTE — TELEPHONE ENCOUNTER
Spoke to patient and relayed message below. Patient stated she has personal issues going on and will contact office once she wants to resume therapy. I will route to Dr. Veronica Rodriguez as an Monda Dubin.

## 2023-01-25 NOTE — TELEPHONE ENCOUNTER
Hi!  Please let her know that she is due for her next shot in March. If she is not able to get it done at least by the middle of April, then she should let us know. Thank you!

## 2023-01-25 NOTE — TELEPHONE ENCOUNTER
Spoke to patient - she stated she is waiting for some test results from cardiology before she goes to [place] (may not go)   Patient stated understanding that she is due for next prolia after 3/10/23 - she will contact clinic if she is in town (otherwise will have shot in [place]); patient stated understanding that labs will be due prior to injection

## 2023-01-30 ENCOUNTER — TELEPHONE (OUTPATIENT)
Dept: INTERNAL MEDICINE CLINIC | Facility: CLINIC | Age: 78
End: 2023-01-30

## 2023-01-30 NOTE — TELEPHONE ENCOUNTER
Patient would like to know let provider know that Shakir Case - Cardiologist - is requesting she have a CT scan done tomorrow 01/31/23 at 9:am.

## 2023-01-31 ENCOUNTER — HOSPITAL ENCOUNTER (OUTPATIENT)
Dept: CT IMAGING | Facility: HOSPITAL | Age: 78
Discharge: HOME | End: 2023-01-31
Attending: INTERNAL MEDICINE
Payer: MEDICARE

## 2023-01-31 VITALS — WEIGHT: 116 LBS | BODY MASS INDEX: 21.35 KG/M2 | HEIGHT: 62 IN

## 2023-01-31 DIAGNOSIS — I25.10 CAD (CORONARY ARTERY DISEASE): ICD-10-CM

## 2023-01-31 PROCEDURE — 75574 CT ANGIO HRT W/3D IMAGE: CPT | Performed by: INTERNAL MEDICINE

## 2023-01-31 RX ORDER — DILTIAZEM HYDROCHLORIDE 5 MG/ML
5 INJECTION INTRAVENOUS SEE ADMIN INSTRUCTIONS
Status: DISCONTINUED | OUTPATIENT
Start: 2023-01-31 | End: 2023-02-02

## 2023-01-31 RX ORDER — NITROGLYCERIN 0.4 MG/1
0.4 TABLET SUBLINGUAL ONCE
Status: COMPLETED | OUTPATIENT
Start: 2023-01-31 | End: 2023-01-31

## 2023-01-31 RX ORDER — METOPROLOL TARTRATE 5 MG/5ML
5 INJECTION INTRAVENOUS SEE ADMIN INSTRUCTIONS
Status: DISCONTINUED | OUTPATIENT
Start: 2023-01-31 | End: 2023-02-02

## 2023-01-31 RX ADMIN — NITROGLYCERIN 0.4 MG: 0.4 TABLET SUBLINGUAL at 09:29:00

## 2023-01-31 NOTE — IMAGING NOTE
TO RAD HOLDING AT 0905      HX TAKEN: HISTORY OF STENTS, FATIGUE    PT CONSENTED AT 0916     BASELINE VITAL SIGNS   HR 50   /71 BMI 21.2    CTA ORDERED BY DR NARVAEZ WAS PT GIVEN CTA  PREMEDS, YES: 25MG PO METOPROLOL X2 DOSES    18 GAUGE IV STARTED AT 0920   GFR = 57   CREATINE = 1.01    TO CT TABLE @ 0927    CONNECT TO MONITOR  HR 50 /61      NITROGLYCERIN 0.4 MILLIGRAMS SUBLINGUAL GIVEN AT 0929      INJECTION STARTED AT 0933 MULTIPLE BEATS AVERAGE HR 49 DURING SCAN PROCEDURE COMPLETE    POST SCAN HR 51  /59 AT 0937    PT TO HOLDING AREA  HR 46 /66. PT INFORMED TO TAKE BP MEDS ONCE SHE GETS HOME. STATES HER BP IS HIGH WHEN SHE IS ANXIOUS.     AVS  PROVIDED       1014 DISCHARGED HOME

## 2023-02-11 ENCOUNTER — NURSE ONLY (OUTPATIENT)
Dept: LAB | Age: 78
End: 2023-02-11
Attending: INTERNAL MEDICINE
Payer: MEDICARE

## 2023-02-11 ENCOUNTER — LAB ENCOUNTER (OUTPATIENT)
Dept: LAB | Age: 78
End: 2023-02-11
Attending: INTERNAL MEDICINE
Payer: MEDICARE

## 2023-02-11 DIAGNOSIS — Z01.818 PRE-OP TESTING: ICD-10-CM

## 2023-02-11 LAB
ANION GAP SERPL CALC-SCNC: 8 MMOL/L (ref 0–18)
BUN BLD-MCNC: 28 MG/DL (ref 7–18)
BUN/CREAT SERPL: 24.6 (ref 10–20)
CALCIUM BLD-MCNC: 9.7 MG/DL (ref 8.5–10.1)
CHLORIDE SERPL-SCNC: 104 MMOL/L (ref 98–112)
CO2 SERPL-SCNC: 26 MMOL/L (ref 21–32)
CREAT BLD-MCNC: 1.14 MG/DL
DEPRECATED RDW RBC AUTO: 48.1 FL (ref 35.1–46.3)
ERYTHROCYTE [DISTWIDTH] IN BLOOD BY AUTOMATED COUNT: 12.9 % (ref 11–15)
FASTING STATUS PATIENT QL REPORTED: YES
GFR SERPLBLD BASED ON 1.73 SQ M-ARVRAT: 49 ML/MIN/1.73M2 (ref 60–?)
GLUCOSE BLD-MCNC: 128 MG/DL (ref 70–99)
HCT VFR BLD AUTO: 38.4 %
HGB BLD-MCNC: 12.1 G/DL
INR BLD: 1.04 (ref 0.85–1.16)
MCH RBC QN AUTO: 32.3 PG (ref 26–34)
MCHC RBC AUTO-ENTMCNC: 31.5 G/DL (ref 31–37)
MCV RBC AUTO: 102.4 FL
OSMOLALITY SERPL CALC.SUM OF ELEC: 293 MOSM/KG (ref 275–295)
PLATELET # BLD AUTO: 271 10(3)UL (ref 150–450)
POTASSIUM SERPL-SCNC: 4.2 MMOL/L (ref 3.5–5.1)
PROTHROMBIN TIME: 13.5 SECONDS (ref 11.6–14.8)
RBC # BLD AUTO: 3.75 X10(6)UL
SODIUM SERPL-SCNC: 138 MMOL/L (ref 136–145)
WBC # BLD AUTO: 5.8 X10(3) UL (ref 4–11)

## 2023-02-11 PROCEDURE — 36415 COLL VENOUS BLD VENIPUNCTURE: CPT

## 2023-02-11 PROCEDURE — 85610 PROTHROMBIN TIME: CPT

## 2023-02-11 PROCEDURE — 80048 BASIC METABOLIC PNL TOTAL CA: CPT

## 2023-02-11 PROCEDURE — 82746 ASSAY OF FOLIC ACID SERUM: CPT | Performed by: INTERNAL MEDICINE

## 2023-02-11 PROCEDURE — 85027 COMPLETE CBC AUTOMATED: CPT

## 2023-02-12 LAB — SARS-COV-2 RNA RESP QL NAA+PROBE: NOT DETECTED

## 2023-02-14 ENCOUNTER — HOSPITAL ENCOUNTER (OUTPATIENT)
Dept: INTERVENTIONAL RADIOLOGY/VASCULAR | Facility: HOSPITAL | Age: 78
Discharge: HOME | End: 2023-02-14
Attending: INTERNAL MEDICINE | Admitting: INTERNAL MEDICINE
Payer: MEDICARE

## 2023-02-14 VITALS
OXYGEN SATURATION: 96 % | SYSTOLIC BLOOD PRESSURE: 127 MMHG | HEART RATE: 59 BPM | DIASTOLIC BLOOD PRESSURE: 46 MMHG | WEIGHT: 118 LBS | BODY MASS INDEX: 21.71 KG/M2 | HEIGHT: 62 IN | RESPIRATION RATE: 17 BRPM | TEMPERATURE: 97 F

## 2023-02-14 DIAGNOSIS — I25.10 CAD (CORONARY ARTERY DISEASE): ICD-10-CM

## 2023-02-14 DIAGNOSIS — R06.02 SOB (SHORTNESS OF BREATH): ICD-10-CM

## 2023-02-14 DIAGNOSIS — R94.39 ABNORMAL STRESS TEST: ICD-10-CM

## 2023-02-14 DIAGNOSIS — I25.2 HX OF NON-ST ELEVATION MYOCARDIAL INFARCTION (NSTEMI): ICD-10-CM

## 2023-02-14 DIAGNOSIS — R93.1 ABNORMAL CARDIAC CT ANGIOGRAPHY: ICD-10-CM

## 2023-02-14 DIAGNOSIS — Z01.818 PRE-OP TESTING: Primary | ICD-10-CM

## 2023-02-14 LAB — GLUCOSE BLDC GLUCOMTR-MCNC: 117 MG/DL (ref 70–99)

## 2023-02-14 PROCEDURE — 93458 L HRT ARTERY/VENTRICLE ANGIO: CPT

## 2023-02-14 PROCEDURE — 027034Z DILATION OF CORONARY ARTERY, ONE ARTERY WITH DRUG-ELUTING INTRALUMINAL DEVICE, PERCUTANEOUS APPROACH: ICD-10-PCS | Performed by: INTERNAL MEDICINE

## 2023-02-14 PROCEDURE — B211YZZ FLUOROSCOPY OF MULTIPLE CORONARY ARTERIES USING OTHER CONTRAST: ICD-10-PCS | Performed by: INTERNAL MEDICINE

## 2023-02-14 PROCEDURE — 36415 COLL VENOUS BLD VENIPUNCTURE: CPT

## 2023-02-14 PROCEDURE — 4A023N8 MEASUREMENT OF CARDIAC SAMPLING AND PRESSURE, BILATERAL, PERCUTANEOUS APPROACH: ICD-10-PCS | Performed by: INTERNAL MEDICINE

## 2023-02-14 PROCEDURE — 99153 MOD SED SAME PHYS/QHP EA: CPT

## 2023-02-14 PROCEDURE — 4A033BC MEASUREMENT OF ARTERIAL PRESSURE, CORONARY, PERCUTANEOUS APPROACH: ICD-10-PCS | Performed by: INTERNAL MEDICINE

## 2023-02-14 PROCEDURE — 82962 GLUCOSE BLOOD TEST: CPT

## 2023-02-14 PROCEDURE — 99152 MOD SED SAME PHYS/QHP 5/>YRS: CPT

## 2023-02-14 PROCEDURE — 93571 IV DOP VEL&/PRESS C FLO 1ST: CPT

## 2023-02-14 PROCEDURE — 85347 COAGULATION TIME ACTIVATED: CPT

## 2023-02-14 RX ORDER — CLOPIDOGREL BISULFATE 75 MG/1
TABLET ORAL
Status: COMPLETED
Start: 2023-02-14 | End: 2023-02-14

## 2023-02-14 RX ORDER — HEPARIN SODIUM 1000 [USP'U]/ML
INJECTION, SOLUTION INTRAVENOUS; SUBCUTANEOUS
Status: COMPLETED
Start: 2023-02-14 | End: 2023-02-14

## 2023-02-14 RX ORDER — NITROGLYCERIN 20 MG/100ML
INJECTION INTRAVENOUS
Status: COMPLETED
Start: 2023-02-14 | End: 2023-02-14

## 2023-02-14 RX ORDER — VERAPAMIL HYDROCHLORIDE 2.5 MG/ML
INJECTION, SOLUTION INTRAVENOUS
Status: COMPLETED
Start: 2023-02-14 | End: 2023-02-14

## 2023-02-14 RX ORDER — CHLORHEXIDINE GLUCONATE 4 G/100ML
30 SOLUTION TOPICAL
Status: DISCONTINUED | OUTPATIENT
Start: 2023-02-14 | End: 2023-02-14

## 2023-02-14 RX ORDER — ASPIRIN 81 MG/1
81 TABLET ORAL DAILY
Status: DISCONTINUED | OUTPATIENT
Start: 2023-02-15 | End: 2023-02-14

## 2023-02-14 RX ORDER — LIDOCAINE HYDROCHLORIDE 20 MG/ML
INJECTION, SOLUTION EPIDURAL; INFILTRATION; INTRACAUDAL; PERINEURAL
Status: COMPLETED
Start: 2023-02-14 | End: 2023-02-14

## 2023-02-14 RX ORDER — MIDAZOLAM HYDROCHLORIDE 1 MG/ML
INJECTION INTRAMUSCULAR; INTRAVENOUS
Status: COMPLETED
Start: 2023-02-14 | End: 2023-02-14

## 2023-02-14 RX ORDER — ASPIRIN 81 MG/1
81 TABLET ORAL DAILY
COMMUNITY

## 2023-02-14 RX ORDER — SODIUM CHLORIDE 9 MG/ML
INJECTION, SOLUTION INTRAVENOUS
Status: COMPLETED | OUTPATIENT
Start: 2023-02-14 | End: 2023-02-14

## 2023-02-14 RX ADMIN — SODIUM CHLORIDE: 9 INJECTION, SOLUTION INTRAVENOUS at 07:30:00

## 2023-02-14 NOTE — INTERVAL H&P NOTE
Pre-op Diagnosis: * No pre-op diagnosis entered *    The above referenced H&P was reviewed by Cristhian Valdovinos MD on 2/14/2023, the patient was examined and no significant changes have occurred in the patient's condition since the H&P was performed. I discussed with the patient and/or legal representative the potential benefits, risks and side effects of this procedure; the likelihood of the patient achieving goals; and potential problems that might occur during recuperation. I discussed reasonable alternatives to the procedure, including risks, benefits and side effects related to the alternatives and risks related to not receiving this procedure. We will proceed with procedure as planned.

## 2023-02-14 NOTE — DISCHARGE INSTRUCTIONS
TRANSRADIAL DISCHARGE INSTRUCTION  HOME CARE INSTRUCTIONS FOLLOWING CORONARY ANGIOGRAPHY,  INSERTION OF STENT IN THE CORONARY    Activity  DO NOT drive after the procedure. You may resume driving late the following day according to the nurse or physician's instructions  Plan on resting and relaxing tonight and tomorrow  Resume your normal activity after 48 hours, or as instructed by your physician  Avoid drinking alcohol for the next 24 hours  Avoid wrist flexion, extension, and fine motor activities (i.e. texting, typing, using computer mouse, etc.) for 24 hours  Do not lift or pull anything heavier than 5 to 8  pounds with affected hand for 1 week    What is Normal?  A small lump at the procedure site associated with mild tenderness when touched  The procedure site may be bruised or discolored  There may be a small amount of drainage on the bandage    Special Instructions   Drink plenty of fluids during the next 24 hours to \"flush\" the contrast from your system  Keep the bandage clean and dry  After 24 hours, you must remove the bandage. Do not put ointment, powders, or creams to site.   You can shower after removing the bandage, and wash the procedure site gently with soap and water  DO NOT submerge the procedure site for 1 week (no bath tubs or pools)  If you choose to wear a bandage for a few days, make sure it remains clean and dry and that it is changed daily  For local swelling: apply ice  Bleeding can occur at the procedure site - both on the outside of the skin and/or beneath the surface of the skin        If bleeding occurs: Elevate hand above heart and apply local pressure  Swelling or a large lump at the procedure site can occur, which may be accompanied by moderate to severe pain  If the bleeding does not stop, call 911 and continue to apply pressure    When to contact physician:   Swelling, pain, or bleeding at the site that is not relieved by applying ice or pressure  Signs of infection: Redness, warmth, drainage at the site, chills, or temperature of 100.5 or greater  Changes in sensation, numbness, or tingling of affected hand    You Received a Stent:    You will remain on an antiplatelet drug and/or aspirin. Antiplatelet medications are usually taken for six months to one year and should not be stopped unless your cardiologist directs you to do so. These medications help to prevent blockage at the stent site. If another physician or dentist asks you to stop your antiplatelet medication, you need to consult your cardiologist first.  Together, your cardiologist and other physician can discuss the risks that may be involved if you are not taking the antiplatelet medication   If an MRI is necessary, it may be done 4-6 weeks after your procedure. Verify this with your cardiologist  Keep your stent card with you at all times! If you need an MRI in the future, your stent card will need to be shown to the technologist before performing the MRI. A duplicate card CANNOT be reproduced. Other    You may resume your present diet, unless otherwise specified by your physician. A list of your medications was provided to you at discharge.         **If you have any question or concern, please call the on-call nurse at [phone]

## 2023-02-14 NOTE — IVS NOTE
DISCHARGE NOTE     Pt is able to sit up and ambulate without difficulty. Pt voided and tolerated fluids and food. Procedural site remains dry and intact with good circulation, motion, and sensation. No signs and symptoms of bleeding/hematoma noted. IV access removed  Instruction provided, patient/family verbalizes understanding. Dr. Maria Del Carmen Mackey with patient/family post procedure.      Pt discharge via wheelchair to [address] [place] [address] B       Follow up Appointment: 02/20 at 310pm with Dr. Mary Saldivar Prescription: none - already on asa and plavix

## 2023-02-14 NOTE — CARDIAC REHAB
Cardiac Rehab Phase I    Activity:  Distance   Assistance needed   Patient tolerated activity . Education:  Handouts provided and reviewed: [address] Diet: Healthy Cardiac diet reviewed. Disease Process: Disease process reviewed.     Reviewed the following:       RISK FACTORS: Reviewed      SMOKING CESSATION: Reviewed        HOME EXERCISE ACTIVITY: Reviewed      OUTPATIENT CARDIAC REHAB: Referred to Cardiac Rehabilitation

## 2023-02-14 NOTE — PROCEDURES
[facility]    Cardiac Cath Procedure Note  Gail Rodriguez Patient Status:  Outpatient    1945 MRN [id number]   Location [facility] Attending Lilia Chatterjee MD   Hosp Day # 0 PCP Maryam Landers. Lanny Babinski, MD       Cardiologist: Louann Berry MD  Primary Proceduralist: Louann Berry MD  Procedure Performed: LHC, LV and FFR and PCI of the LAD  Date of Procedure: 2023   Indication: Abnormal stress test, abnormal CTA    Summary of procedure:    Successful PCI of the mid LAD due to IFR positive (0.88) lesion  Patent RCA stents without any obstructive disease  Moderate but nonobstructive circumflex anatomy      Assessment:  CAD status post IFR and PCI of the LAD, diagonal bifurcation remained intact and did not need balloon or stent. Patent RCA stents with nonobstructive disease, moderate but nonobstructive circumflex territory. Preserved ejection fraction      Recommendations:    DAPT: Aspirin and Plavix  Observe for 6 hours and discharge home      Left Ventriculography and hemodynamics:   LV EF not done  LV EDP 4 mmHg, 1 L fluid bolus given  No gradient across aortic valve        Coronary Angiography  RCA:  Dominant and free of obstructive disease, supplies PDA and PL. Patent proximal and mid vessel stents    Left main:  Free of obstructive disease    Left anterior descending: Heavily calcified proximal and mid LAD, at the bifurcation of the first diagonal at the mid LAD there is a 80% stenosis. Proximal LAD appears to be nonobstructive, 50 to 60% stenosis. Circumflex:  Free of obstructive disease, supplies single large OM to the lateral wall      LAD FFR and intervention    Guide Catheter:  EBU 3.75  Wire: Comet II   Wire advanced outside the guide catheter, wire zeroed and advanced through lesion. dFR measurement taken, 0.88 (significant)    Lesion Characteristics-moderately torturous, heavily calcified. Type non-C lesion.   Pre-intervention stenosis 80%, Post intervention stenosis 0%. Pre DAVID 3, Post DAVID 3.      Guide Catheter: EBU 3.75  Wire: Run through down diagonal, comet FFR wire down LAD  Pre-dil:  2.5 x 20 mm NC balloon at 20 MELVI  Stent: 3.0 x 24 mm SYNERGY FRANK 16 MELVI  Post-dil:  none  Post FFR done due to proximal LAD with worsening appearance, dFR 0.9 distal to stent, 0.94 proximal to stent therefore proximal LAD unlikely to be significant. Summary of Case: After written informed consent was obtained from the patient, patient was brought to the cardiac catheterization laboratory. Patient was prepped and draped in the usual sterile fashion. Lidocaine 1% was used to infiltrate the right radial artery for local anesthesia and a 6 Amharic introducer sheath was inserted into the right radial artery. Selective coronary angiography performed with JR4 catheter for RCA and JL3.5 catheter for LCA. Angiography performed in standard projections. 6 Greenlandic JR4 catheter placed in LV for hemodynamics. Specimen sent to: No specimen collected  Estimated blood loss: 10 cc  Closure:  TR band      IV was maintained by RN and moderate conscious sedation of versed and fentanyl was given. Patient was assessed and monitoring of oxygen, heart rate and blood pressure by nurse and myself during the exam 35 minutes.       Hakeem Ridley MD  02/14/23

## 2023-02-14 NOTE — DIETARY NOTE
NUTRITION EDUCATION NOTE     Received consult for cardiac nutrition education. Verbally reviewed basic cardiac diet restrictions. Provided with Eating Heart-Healthy and NCM handout to reinforce. Receptive to instruction. Would benefit from outpt f/u. Expect fair compliance.         Denice Perales RD, LDN  Clinical Dietitian  P: [phone]

## 2023-02-15 LAB — ISTAT ACTIVATED CLOTTING TIME: 209 SECONDS (ref 125–137)

## 2023-02-16 ENCOUNTER — TELEPHONE (OUTPATIENT)
Dept: INTERNAL MEDICINE CLINIC | Facility: CLINIC | Age: 78
End: 2023-02-16

## 2023-02-16 NOTE — TELEPHONE ENCOUNTER
Patient is requesting a call by Dr. Frederic Vieyra. She recently had a stent placed and she has some questions regarding the stent and her diabetes.     [phone] is the best phone number to reach patient

## 2023-02-16 NOTE — TELEPHONE ENCOUNTER
Spoke to Pt advised Dr out of office but has a Dr covering her     Stent put inTuesday- doing great , questions about BS, been off  Metformin 1000mg since last Thursday and to resume tonight 100 mg tonight, Ozempic weekly on Saturday, BS doing good but concern since its been low ranging from 119 and the lowest is 82 last night, concern about taking the whole tablet, asking if she should take 1/2 of the tablet.     Please advise

## 2023-02-22 ENCOUNTER — ORDER TRANSCRIPTION (OUTPATIENT)
Dept: CARDIAC REHAB | Facility: HOSPITAL | Age: 78
End: 2023-02-22

## 2023-02-22 DIAGNOSIS — Z98.61 S/P PTCA (PERCUTANEOUS TRANSLUMINAL CORONARY ANGIOPLASTY): Primary | ICD-10-CM

## 2023-02-28 ENCOUNTER — TELEPHONE (OUTPATIENT)
Dept: INTERNAL MEDICINE CLINIC | Facility: CLINIC | Age: 78
End: 2023-02-28

## 2023-02-28 RX ORDER — BLOOD SUGAR DIAGNOSTIC
STRIP MISCELLANEOUS
Qty: 100 STRIP | Refills: 5 | Status: SHIPPED | OUTPATIENT
Start: 2023-02-28

## 2023-02-28 NOTE — TELEPHONE ENCOUNTER
Patient states she is a long time patient of  and had a stent placed on 2/14/23 by Hellen Amaya. Patient states she feels fine,doing well. Patient asking for 's advice if it is ok for her to travel to [place] at the end of the month. Hellen Amaya has cleared her to go. Patient was offered help with scheduling an appointment,but states she wants to ask  first. Please advise.

## 2023-02-28 NOTE — TELEPHONE ENCOUNTER
As long as she is feeling okay Dr. Laurita Barreto has said okay for her to travel? Then it is okay by me.

## 2023-02-28 NOTE — TELEPHONE ENCOUNTER
Refill passed per "[employer] [facility] protocol.     Requested Prescriptions   Pending Prescriptions Disp Refills    ONETOUCH ULTRA In Vitro Strip [facility] Med Name: [id number] Toni Pereira TESTSTNEW100] 100 strip 5     Sig: TEST BLOOD SUGAR THREE TIMES DAILY       Diabetic Supplies Protocol Passed - 2/28/2023  5:55 AM        Passed - In person appointment or virtual visit in the past 12 mos or appointment in next 3 mos     Recent Outpatient Visits              2 weeks ago Pre-op testing    [address], [facility] Only    1 month ago Type 2 diabetes mellitus with hyperglycemia, without long-term current use of insulin ([facility]    [address], [address], Kvng Goodman MD    Office Visit    5 months ago Age-related osteoporosis without current pathological fracture    [facility], [address], [place]    Nurse Only    6 months ago Acquired hypothyroidism    [address], [address], Kvng Goodman MD    Office Visit    6 months ago Age-related osteoporosis without current pathological fracture    [address], [address], Fredrick Mckeon MD    Office Visit          Future Appointments         Provider Department Appt Notes    In 2 weeks [address] Cardiopulmonary Rehab Stent x1 2/14 Jaiden Mater    In 3 months Mary Cardenas MD [address]                     Recent Outpatient Visits              2 weeks ago Pre-op testing    [address] [facility], [facility] Only    1 month ago Type 2 diabetes mellitus with hyperglycemia, without long-term current use of insulin [facility])    [address], [address], Kvng Goodman MD    Office Visit    5 months ago Age-related osteoporosis without current pathological fracture    [place], [place]    Nurse Only    6 months ago Acquired hypothyroidism    Neal Mancini MD    Office Visit    6 months ago Age-related osteoporosis without current pathological fracture    Hung Mancini MD    Office Visit            Future Appointments         Provider Department Appt Notes    In 2 weeks [address] Cardiopulmonary Rehab Stent x1 2/14 [place]    In 3 months Guy Smith., MD [facility], [address], [address]

## 2023-02-28 NOTE — TELEPHONE ENCOUNTER
Spoke to patient. Patient requesting test strips to be cancelled at [facility] in [facility] and instead sent to [facility] in [facility]. [facility] pharmacy contacted and test strips cancelled. Test strips sent to [facility].

## 2023-03-15 ENCOUNTER — CARDPULM VISIT (OUTPATIENT)
Dept: CARDIAC REHAB | Facility: HOSPITAL | Age: 78
End: 2023-03-15
Attending: INTERNAL MEDICINE
Payer: MEDICARE

## 2023-03-15 DIAGNOSIS — Z98.61 S/P PTCA (PERCUTANEOUS TRANSLUMINAL CORONARY ANGIOPLASTY): ICD-10-CM

## 2023-03-20 ENCOUNTER — CARDPULM VISIT (OUTPATIENT)
Dept: CARDIAC REHAB | Facility: HOSPITAL | Age: 78
End: 2023-03-20
Attending: INTERNAL MEDICINE
Payer: MEDICARE

## 2023-03-20 PROCEDURE — 93798 PHYS/QHP OP CAR RHAB W/ECG: CPT

## 2023-03-21 ENCOUNTER — MED REC SCAN ONLY (OUTPATIENT)
Dept: INTERNAL MEDICINE CLINIC | Facility: CLINIC | Age: 78
End: 2023-03-21

## 2023-03-22 ENCOUNTER — CARDPULM VISIT (OUTPATIENT)
Dept: CARDIAC REHAB | Facility: HOSPITAL | Age: 78
End: 2023-03-22
Attending: INTERNAL MEDICINE
Payer: MEDICARE

## 2023-03-22 PROCEDURE — 93798 PHYS/QHP OP CAR RHAB W/ECG: CPT

## 2023-03-23 ENCOUNTER — CARDPULM VISIT (OUTPATIENT)
Dept: CARDIAC REHAB | Facility: HOSPITAL | Age: 78
End: 2023-03-23
Attending: INTERNAL MEDICINE
Payer: MEDICARE

## 2023-03-23 PROCEDURE — 93798 PHYS/QHP OP CAR RHAB W/ECG: CPT

## 2023-03-27 ENCOUNTER — CARDPULM VISIT (OUTPATIENT)
Dept: CARDIAC REHAB | Facility: HOSPITAL | Age: 78
End: 2023-03-27
Attending: INTERNAL MEDICINE
Payer: MEDICARE

## 2023-03-27 PROCEDURE — 93798 PHYS/QHP OP CAR RHAB W/ECG: CPT

## 2023-03-29 ENCOUNTER — CARDPULM VISIT (OUTPATIENT)
Dept: CARDIAC REHAB | Facility: HOSPITAL | Age: 78
End: 2023-03-29
Attending: INTERNAL MEDICINE
Payer: MEDICARE

## 2023-03-29 PROCEDURE — 93798 PHYS/QHP OP CAR RHAB W/ECG: CPT

## 2023-03-30 ENCOUNTER — CARDPULM VISIT (OUTPATIENT)
Dept: CARDIAC REHAB | Facility: HOSPITAL | Age: 78
End: 2023-03-30
Attending: INTERNAL MEDICINE
Payer: MEDICARE

## 2023-03-30 PROCEDURE — 93798 PHYS/QHP OP CAR RHAB W/ECG: CPT

## 2023-04-03 ENCOUNTER — CARDPULM VISIT (OUTPATIENT)
Dept: CARDIAC REHAB | Facility: HOSPITAL | Age: 78
End: 2023-04-03
Attending: INTERNAL MEDICINE
Payer: MEDICARE

## 2023-04-03 PROCEDURE — 93798 PHYS/QHP OP CAR RHAB W/ECG: CPT

## 2023-04-05 ENCOUNTER — CARDPULM VISIT (OUTPATIENT)
Dept: CARDIAC REHAB | Facility: HOSPITAL | Age: 78
End: 2023-04-05
Attending: INTERNAL MEDICINE
Payer: MEDICARE

## 2023-04-05 PROCEDURE — 93798 PHYS/QHP OP CAR RHAB W/ECG: CPT

## 2023-04-06 ENCOUNTER — CARDPULM VISIT (OUTPATIENT)
Dept: CARDIAC REHAB | Facility: HOSPITAL | Age: 78
End: 2023-04-06
Attending: INTERNAL MEDICINE
Payer: MEDICARE

## 2023-04-06 PROCEDURE — 93798 PHYS/QHP OP CAR RHAB W/ECG: CPT

## 2023-04-10 ENCOUNTER — APPOINTMENT (OUTPATIENT)
Dept: CARDIAC REHAB | Facility: HOSPITAL | Age: 78
End: 2023-04-10
Attending: INTERNAL MEDICINE
Payer: MEDICARE

## 2023-04-10 RX ORDER — BLOOD SUGAR DIAGNOSTIC
1 STRIP MISCELLANEOUS 2 TIMES DAILY
Qty: 100 EACH | Refills: 5 | Status: SHIPPED | OUTPATIENT
Start: 2023-04-10

## 2023-04-12 ENCOUNTER — CARDPULM VISIT (OUTPATIENT)
Dept: CARDIAC REHAB | Facility: HOSPITAL | Age: 78
End: 2023-04-12
Attending: INTERNAL MEDICINE
Payer: MEDICARE

## 2023-04-12 PROCEDURE — 93798 PHYS/QHP OP CAR RHAB W/ECG: CPT

## 2023-04-13 ENCOUNTER — CARDPULM VISIT (OUTPATIENT)
Dept: CARDIAC REHAB | Facility: HOSPITAL | Age: 78
End: 2023-04-13
Attending: INTERNAL MEDICINE
Payer: MEDICARE

## 2023-04-13 PROCEDURE — 93798 PHYS/QHP OP CAR RHAB W/ECG: CPT

## 2023-04-17 ENCOUNTER — LAB ENCOUNTER (OUTPATIENT)
Dept: LAB | Facility: REFERENCE LAB | Age: 78
End: 2023-04-17
Attending: INTERNAL MEDICINE
Payer: MEDICARE

## 2023-04-17 ENCOUNTER — CARDPULM VISIT (OUTPATIENT)
Dept: CARDIAC REHAB | Facility: HOSPITAL | Age: 78
End: 2023-04-17
Attending: INTERNAL MEDICINE
Payer: MEDICARE

## 2023-04-17 DIAGNOSIS — M81.0 AGE-RELATED OSTEOPOROSIS WITHOUT CURRENT PATHOLOGICAL FRACTURE: ICD-10-CM

## 2023-04-17 LAB — PTH-INTACT SERPL-MCNC: 15.1 PG/ML (ref 18.5–88)

## 2023-04-17 PROCEDURE — 83970 ASSAY OF PARATHORMONE: CPT

## 2023-04-17 PROCEDURE — 36415 COLL VENOUS BLD VENIPUNCTURE: CPT

## 2023-04-17 PROCEDURE — 93798 PHYS/QHP OP CAR RHAB W/ECG: CPT

## 2023-04-17 PROCEDURE — 84080 ASSAY ALKALINE PHOSPHATASES: CPT

## 2023-04-18 LAB — ALKALINE PHOSPHATASE BONE SPECIFIC: 9.4 UG/L

## 2023-04-19 ENCOUNTER — OFFICE VISIT (OUTPATIENT)
Dept: ENDOCRINOLOGY CLINIC | Facility: CLINIC | Age: 78
End: 2023-04-19

## 2023-04-19 ENCOUNTER — CARDPULM VISIT (OUTPATIENT)
Dept: CARDIAC REHAB | Facility: HOSPITAL | Age: 78
End: 2023-04-19
Attending: INTERNAL MEDICINE
Payer: MEDICARE

## 2023-04-19 VITALS
DIASTOLIC BLOOD PRESSURE: 64 MMHG | HEART RATE: 57 BPM | BODY MASS INDEX: 21.89 KG/M2 | HEIGHT: 62.5 IN | SYSTOLIC BLOOD PRESSURE: 163 MMHG | WEIGHT: 122 LBS

## 2023-04-19 DIAGNOSIS — M81.0 AGE-RELATED OSTEOPOROSIS WITHOUT CURRENT PATHOLOGICAL FRACTURE: Primary | ICD-10-CM

## 2023-04-19 DIAGNOSIS — E55.9 VITAMIN D DEFICIENCY: ICD-10-CM

## 2023-04-19 PROCEDURE — 99214 OFFICE O/P EST MOD 30 MIN: CPT | Performed by: INTERNAL MEDICINE

## 2023-04-19 PROCEDURE — 3008F BODY MASS INDEX DOCD: CPT | Performed by: INTERNAL MEDICINE

## 2023-04-19 PROCEDURE — 3077F SYST BP >= 140 MM HG: CPT | Performed by: INTERNAL MEDICINE

## 2023-04-19 PROCEDURE — 3078F DIAST BP <80 MM HG: CPT | Performed by: INTERNAL MEDICINE

## 2023-04-19 PROCEDURE — 99211 OFF/OP EST MAY X REQ PHY/QHP: CPT

## 2023-04-20 ENCOUNTER — CARDPULM VISIT (OUTPATIENT)
Dept: CARDIAC REHAB | Facility: HOSPITAL | Age: 78
End: 2023-04-20
Attending: INTERNAL MEDICINE
Payer: MEDICARE

## 2023-04-20 PROCEDURE — 93798 PHYS/QHP OP CAR RHAB W/ECG: CPT

## 2023-04-24 ENCOUNTER — CARDPULM VISIT (OUTPATIENT)
Dept: CARDIAC REHAB | Facility: HOSPITAL | Age: 78
End: 2023-04-24
Attending: INTERNAL MEDICINE
Payer: MEDICARE

## 2023-04-24 ENCOUNTER — TELEPHONE (OUTPATIENT)
Dept: ENDOCRINOLOGY CLINIC | Facility: CLINIC | Age: 78
End: 2023-04-24

## 2023-04-24 PROCEDURE — 93798 PHYS/QHP OP CAR RHAB W/ECG: CPT

## 2023-04-24 NOTE — TELEPHONE ENCOUNTER
Patient calling to schedule injection - patient doesn't know which injection. Please call - wants to schedule for today, Wednesday or Thursday of this week. Thank you.

## 2023-04-25 ENCOUNTER — NURSE ONLY (OUTPATIENT)
Dept: ENDOCRINOLOGY CLINIC | Facility: CLINIC | Age: 78
End: 2023-04-25

## 2023-04-25 DIAGNOSIS — M81.0 AGE-RELATED OSTEOPOROSIS WITHOUT CURRENT PATHOLOGICAL FRACTURE: Primary | ICD-10-CM

## 2023-04-25 PROCEDURE — 96372 THER/PROPH/DIAG INJ SC/IM: CPT | Performed by: INTERNAL MEDICINE

## 2023-04-25 NOTE — TELEPHONE ENCOUNTER
Pt calling again to schedule a Nurse visit for an injection. Pt will be going to [place] for a month, leaving Sunday. Please call.

## 2023-04-25 NOTE — PROGRESS NOTES
Pt presents to clinic for prolia #2 administration. Pt received and tolerated prolia 60 mg subcutaneous to the left upper arm. RN scheduled an appointment in 6 months and reminded her to get blood work done 1-2 weeks prior to that appointment. Pt voiced understanding and left the clinic with no issues.      Future Appointments   Date Time Provider Jacquie Mira   10/31/2023  9:15 AM Robinson Figueroa MD [facility]

## 2023-04-26 ENCOUNTER — CARDPULM VISIT (OUTPATIENT)
Dept: CARDIAC REHAB | Facility: HOSPITAL | Age: 78
End: 2023-04-26
Attending: INTERNAL MEDICINE
Payer: MEDICARE

## 2023-04-26 PROCEDURE — 93798 PHYS/QHP OP CAR RHAB W/ECG: CPT

## 2023-04-27 ENCOUNTER — CARDPULM VISIT (OUTPATIENT)
Dept: CARDIAC REHAB | Facility: HOSPITAL | Age: 78
End: 2023-04-27
Attending: INTERNAL MEDICINE
Payer: MEDICARE

## 2023-04-28 PROCEDURE — 93798 PHYS/QHP OP CAR RHAB W/ECG: CPT

## 2023-05-01 ENCOUNTER — APPOINTMENT (OUTPATIENT)
Dept: CARDIAC REHAB | Facility: HOSPITAL | Age: 78
End: 2023-05-01
Attending: INTERNAL MEDICINE
Payer: MEDICARE

## 2023-05-03 ENCOUNTER — APPOINTMENT (OUTPATIENT)
Dept: CARDIAC REHAB | Facility: HOSPITAL | Age: 78
End: 2023-05-03
Attending: INTERNAL MEDICINE
Payer: MEDICARE

## 2023-05-04 ENCOUNTER — APPOINTMENT (OUTPATIENT)
Dept: CARDIAC REHAB | Facility: HOSPITAL | Age: 78
End: 2023-05-04
Attending: INTERNAL MEDICINE
Payer: MEDICARE

## 2023-05-06 ENCOUNTER — TELEPHONE (OUTPATIENT)
Dept: INTERNAL MEDICINE CLINIC | Facility: CLINIC | Age: 78
End: 2023-05-06

## 2023-05-06 NOTE — TELEPHONE ENCOUNTER
Patient called (identified name and ),   She is in [place] for another month. Could not take Ozempic with her because it had to be refrigerated. Took last dose 1 week ago. Due for dose today, but pharmacy cannot get it until 2023. She also takes metformin 1000 mg in the evening. She is asking if she needs to increase metformin dosing until she can get Ozempic? Message routed to on call, Dr Bronson Curtis, please advise? States blood glucose usually runs 100 to 110 in AM.  Just took glucose reading while on the phone, got 220, had peanut butter on toast for breakfast.  States evening glucose last night was 121.

## 2023-05-06 NOTE — TELEPHONE ENCOUNTER
Per communication with Dr Anastacia Atwood (on call for IM), no medication adjustment for now. This RN notified patient, continue present management, call us with update on Monday 5/08 if able to obtain the [address]. Patient agreed to plan.

## 2023-05-08 ENCOUNTER — APPOINTMENT (OUTPATIENT)
Dept: CARDIAC REHAB | Facility: HOSPITAL | Age: 78
End: 2023-05-08
Attending: INTERNAL MEDICINE
Payer: MEDICARE

## 2023-05-08 ENCOUNTER — TELEPHONE (OUTPATIENT)
Dept: INTERNAL MEDICINE CLINIC | Facility: CLINIC | Age: 78
End: 2023-05-08

## 2023-05-08 RX ORDER — SEMAGLUTIDE 0.68 MG/ML
0.5 INJECTION, SOLUTION SUBCUTANEOUS
Qty: 9 ML | Refills: 1 | Status: SHIPPED | OUTPATIENT
Start: 2023-05-08

## 2023-05-08 RX ORDER — SEMAGLUTIDE 0.68 MG/ML
0.5 INJECTION, SOLUTION SUBCUTANEOUS WEEKLY
Qty: 4.5 ML | Refills: 1 | Status: SHIPPED | OUTPATIENT
Start: 2023-05-08

## 2023-05-08 NOTE — TELEPHONE ENCOUNTER
Patient called back. States the pharmacy in [place] needs us to call down there because they have a question about the script for Ozempic. Blood sugars running 130-140 but patient being very careful with her diet.       Outpatient Medication Detail     Disp Refills Start End    semaglutide (OZEMPIC, 0.25 OR 0.5 MG/DOSE,) 2 MG/1.5ML Subcutaneous Solution Pen-injector 4.5 mL 1 5/5/2023     Sig - Route: Inject 0.5 mg into the skin every 7 days. - Subcutaneous    Sent to pharmacy as: Ozempic (0.25 or 0.5 MG/DOSE) 2 MG/1.5ML Solution Pen-injector    E-Prescribing Status: Receipt confirmed by pharmacy (5/5/2023 12:16 PM CDT)      Pharmacy    [place] [address] #[id number] - [address], [phone], [phone]

## 2023-05-10 ENCOUNTER — APPOINTMENT (OUTPATIENT)
Dept: CARDIAC REHAB | Facility: HOSPITAL | Age: 78
End: 2023-05-10
Attending: INTERNAL MEDICINE
Payer: MEDICARE

## 2023-05-10 RX ORDER — VENLAFAXINE HYDROCHLORIDE 37.5 MG/1
37.5 CAPSULE, EXTENDED RELEASE ORAL NIGHTLY
Qty: 90 CAPSULE | Refills: 3 | Status: SHIPPED | OUTPATIENT
Start: 2023-05-10

## 2023-05-10 RX ORDER — ATORVASTATIN CALCIUM 40 MG/1
40 TABLET, FILM COATED ORAL NIGHTLY
Qty: 90 TABLET | Refills: 3 | Status: SHIPPED | OUTPATIENT
Start: 2023-05-10

## 2023-05-10 NOTE — TELEPHONE ENCOUNTER
Refill passed per [facility], [facility] protocol    Requested Prescriptions   Pending Prescriptions Disp Refills    VENLAFAXINE ER 37.5 MG Oral Capsule SR 24 Hr [Pharmacy Med Name: VENLAFAXINE  CAP 37.5 ER] 90 capsule 1     Sig: TAKE 1 CAPSULE NIGHTLY       Psychiatric Non-Scheduled (Anti-Anxiety) Passed - 5/10/2023  7:13 AM        Passed - In person appointment or virtual visit in the past 6 mos or appointment in next 3 mos     Recent Outpatient Visits              2 weeks ago Age-related osteoporosis without current pathological fracture    [facility], [address] [place] [address]    Nurse Only    3 weeks ago Age-related osteoporosis without current pathological fracture    John Fan MD    Office Visit    2 months ago Pre-op testing    [address] [place] [address], [address] [place] [address] Only    4 months ago Type 2 diabetes mellitus with hyperglycemia, without long-term current use of insulin ([facility]    [address], [address], Neil Kelly MD    Office Visit    8 months ago Age-related osteoporosis without current pathological fracture    [address], [address], [place]    Nurse Only          Future Appointments         Provider Department Appt Notes    In 4 weeks Aline Horne MD [address], [address], [place] 4 mth     In 5 months Devaughn Samuel MD [facility], [address] [place] [address] Follow up / prolia                 ATORVASTATIN 40 MG Oral Tab [Pharmacy Med Name: ATORVASTATIN TAB 40MG] 90 tablet 1     Sig: TAKE 1 TABLET NIGHTLY       Cholesterol Medication Protocol Passed - 5/10/2023  7:13 AM        Passed - ALT in past 12 months        Passed - LDL in past 12 months        Passed - Last ALT < 80     Lab Results   Component Value Date    ALT 27 01/05/2023             Passed - Last LDL < 130     Lab Results   Component Value Date    LDL 71 01/05/2023               Passed - In person appointment or virtual visit in the past 12 mos or appointment in next 3 mos     Recent Outpatient Visits              2 weeks ago Age-related osteoporosis without current pathological fracture    [facility], [address] [place] [address]    Nurse Only    3 weeks ago Age-related osteoporosis without current pathological fracture    Adriana Giles MD    Office Visit    2 months ago Pre-op testing    [address] [facility], [address] [place] [address] Only    4 months ago Type 2 diabetes mellitus with hyperglycemia, without long-term current use of insulin (Gladys Hoffmann)    [facility], [address] [place] [address] Rd,[address], Sue Elias MD    Office Visit    8 months ago Age-related osteoporosis without current pathological fracture    [facility], [address], [facility] Only          Future Appointments         Provider Department Appt Notes    In 4 weeks MD Graciela Johnson, [address], [place] 4 mth     In 5 months MD Graciela Yousif, [address] [place] [address], [place] Follow up / Lisbeth Maciel

## 2023-05-11 ENCOUNTER — APPOINTMENT (OUTPATIENT)
Dept: CARDIAC REHAB | Facility: HOSPITAL | Age: 78
End: 2023-05-11
Attending: INTERNAL MEDICINE
Payer: MEDICARE

## 2023-05-11 DIAGNOSIS — E03.9 ACQUIRED HYPOTHYROIDISM: ICD-10-CM

## 2023-05-11 RX ORDER — LEVOTHYROXINE SODIUM 88 UG/1
88 TABLET ORAL NIGHTLY
Qty: 90 TABLET | Refills: 3 | Status: SHIPPED | OUTPATIENT
Start: 2023-05-11

## 2023-05-11 NOTE — TELEPHONE ENCOUNTER
Refill passed per [place] [employer], [facility] protocol. Patient called back. RN able to locate fills under synthroid from 2022. Filled regularly per [place]  and patient confirmed no lapse in medication.    Requested Prescriptions   Pending Prescriptions Disp Refills    SYNTHROID 80 MCG Oral Tab [Pharmacy Med Name: SYNTHROID TAB 88MCG] 90 tablet 1     Sig: TAKE 1 TABLET BEFORE       BREAKFAST       Thyroid Medication Protocol Passed - 5/11/2023  2:41 PM        Passed - TSH in past 12 months        Passed - Last TSH value is normal     Lab Results   Component Value Date    TSH 1.150 06/10/2022    THYROIDFUNC 1.20 08/12/2016                 Passed - In person appointment or virtual visit in the past 12 mos or appointment in next 3 mos     Recent Outpatient Visits              2 weeks ago Age-related osteoporosis without current pathological fracture    [facility], [address] [place] [address], [facility]    Nurse Only    3 weeks ago Age-related osteoporosis without current pathological fracture    Radha Tee MD    Office Visit    2 months ago Pre-op testing    [address] [place] [address], [address] [place] [address] Only    4 months ago Type 2 diabetes mellitus with hyperglycemia, without long-term current use of insulin ([facility] [address]    [facility], [address], Gustavo Palmer MD    Office Visit    8 months ago Age-related osteoporosis without current pathological fracture    [facility]on, [address], [address] Only          Future Appointments         Provider Department Appt Notes    In 4 weeks MD Alexsandra Kwon, [address] [facility],[address] [facility], [place] 4 mth     In 5 months MD Alexsandra Zhong, [address] [place] [address] Follow up / Anoop Villalta

## 2023-05-11 NOTE — TELEPHONE ENCOUNTER
Medication last prescribed 11/27/21 for 90 tablets with 1 refill. Patient was called to see if she is still taking levothyroxine 88 mg. Left message on voicemail to call back.

## 2023-05-13 NOTE — TELEPHONE ENCOUNTER
Patient called back. Patient currently in [place]. She received a [employer] message but unable to open. Informed her medications have been filled and sent [employer]. appt 6/8/23; will be coming in for OV.

## 2023-05-15 ENCOUNTER — APPOINTMENT (OUTPATIENT)
Dept: CARDIAC REHAB | Facility: HOSPITAL | Age: 78
End: 2023-05-15
Attending: INTERNAL MEDICINE
Payer: MEDICARE

## 2023-05-15 ENCOUNTER — TELEPHONE (OUTPATIENT)
Dept: INTERNAL MEDICINE CLINIC | Facility: CLINIC | Age: 78
End: 2023-05-15

## 2023-05-15 ENCOUNTER — MED REC SCAN ONLY (OUTPATIENT)
Dept: INTERNAL MEDICINE CLINIC | Facility: CLINIC | Age: 78
End: 2023-05-15

## 2023-05-17 ENCOUNTER — APPOINTMENT (OUTPATIENT)
Dept: CARDIAC REHAB | Facility: HOSPITAL | Age: 78
End: 2023-05-17
Attending: INTERNAL MEDICINE
Payer: MEDICARE

## 2023-05-18 ENCOUNTER — APPOINTMENT (OUTPATIENT)
Dept: CARDIAC REHAB | Facility: HOSPITAL | Age: 78
End: 2023-05-18
Attending: INTERNAL MEDICINE
Payer: MEDICARE

## 2023-05-22 ENCOUNTER — APPOINTMENT (OUTPATIENT)
Dept: CARDIAC REHAB | Facility: HOSPITAL | Age: 78
End: 2023-05-22
Attending: INTERNAL MEDICINE
Payer: MEDICARE

## 2023-05-24 ENCOUNTER — APPOINTMENT (OUTPATIENT)
Dept: CARDIAC REHAB | Facility: HOSPITAL | Age: 78
End: 2023-05-24
Attending: INTERNAL MEDICINE
Payer: MEDICARE

## 2023-05-25 ENCOUNTER — APPOINTMENT (OUTPATIENT)
Dept: CARDIAC REHAB | Facility: HOSPITAL | Age: 78
End: 2023-05-25
Attending: INTERNAL MEDICINE
Payer: MEDICARE

## 2023-05-29 ENCOUNTER — APPOINTMENT (OUTPATIENT)
Dept: CARDIAC REHAB | Facility: HOSPITAL | Age: 78
End: 2023-05-29
Attending: INTERNAL MEDICINE
Payer: MEDICARE

## 2023-05-31 ENCOUNTER — APPOINTMENT (OUTPATIENT)
Dept: CARDIAC REHAB | Facility: HOSPITAL | Age: 78
End: 2023-05-31
Attending: INTERNAL MEDICINE
Payer: MEDICARE

## 2023-06-01 ENCOUNTER — APPOINTMENT (OUTPATIENT)
Dept: CARDIAC REHAB | Facility: HOSPITAL | Age: 78
End: 2023-06-01
Attending: INTERNAL MEDICINE
Payer: MEDICARE

## 2023-06-05 ENCOUNTER — APPOINTMENT (OUTPATIENT)
Dept: CARDIAC REHAB | Facility: HOSPITAL | Age: 78
End: 2023-06-05
Attending: INTERNAL MEDICINE
Payer: MEDICARE

## 2023-06-07 ENCOUNTER — CARDPULM VISIT (OUTPATIENT)
Dept: CARDIAC REHAB | Facility: HOSPITAL | Age: 78
End: 2023-06-07
Attending: INTERNAL MEDICINE
Payer: MEDICARE

## 2023-06-07 PROBLEM — Z12.31 SCREENING MAMMOGRAM FOR BREAST CANCER: Status: ACTIVE | Noted: 2023-06-07

## 2023-06-07 PROCEDURE — 93798 PHYS/QHP OP CAR RHAB W/ECG: CPT

## 2023-06-07 RX ORDER — FUROSEMIDE 20 MG/1
10 TABLET ORAL DAILY PRN
COMMUNITY
Start: 2023-04-13

## 2023-06-08 ENCOUNTER — APPOINTMENT (OUTPATIENT)
Dept: CARDIAC REHAB | Facility: HOSPITAL | Age: 78
End: 2023-06-08
Attending: INTERNAL MEDICINE
Payer: MEDICARE

## 2023-06-08 ENCOUNTER — HOSPITAL ENCOUNTER (OUTPATIENT)
Dept: ULTRASOUND IMAGING | Facility: HOSPITAL | Age: 78
Discharge: HOME | End: 2023-06-08
Attending: INTERNAL MEDICINE
Payer: MEDICARE

## 2023-06-08 ENCOUNTER — OFFICE VISIT (OUTPATIENT)
Dept: INTERNAL MEDICINE CLINIC | Facility: CLINIC | Age: 78
End: 2023-06-08

## 2023-06-08 VITALS
DIASTOLIC BLOOD PRESSURE: 63 MMHG | TEMPERATURE: 98 F | HEIGHT: 62 IN | SYSTOLIC BLOOD PRESSURE: 150 MMHG | WEIGHT: 120 LBS | HEART RATE: 51 BPM | BODY MASS INDEX: 22.08 KG/M2 | OXYGEN SATURATION: 99 %

## 2023-06-08 DIAGNOSIS — E03.9 ACQUIRED HYPOTHYROIDISM: ICD-10-CM

## 2023-06-08 DIAGNOSIS — I10 ESSENTIAL HYPERTENSION: ICD-10-CM

## 2023-06-08 DIAGNOSIS — M81.0 AGE-RELATED OSTEOPOROSIS WITHOUT CURRENT PATHOLOGICAL FRACTURE: ICD-10-CM

## 2023-06-08 DIAGNOSIS — M79.661 RIGHT CALF PAIN: ICD-10-CM

## 2023-06-08 DIAGNOSIS — N18.32 STAGE 3B CHRONIC KIDNEY DISEASE (HCC): ICD-10-CM

## 2023-06-08 DIAGNOSIS — R91.1 PULMONARY NODULE: ICD-10-CM

## 2023-06-08 DIAGNOSIS — I25.10 CORONARY ARTERY DISEASE DUE TO CALCIFIED CORONARY LESION: ICD-10-CM

## 2023-06-08 DIAGNOSIS — E55.9 VITAMIN D DEFICIENCY: ICD-10-CM

## 2023-06-08 DIAGNOSIS — E78.2 MIXED HYPERLIPIDEMIA: ICD-10-CM

## 2023-06-08 DIAGNOSIS — D64.9 ANEMIA, UNSPECIFIED TYPE: ICD-10-CM

## 2023-06-08 DIAGNOSIS — Z12.31 SCREENING MAMMOGRAM FOR BREAST CANCER: ICD-10-CM

## 2023-06-08 DIAGNOSIS — M17.11 PRIMARY OSTEOARTHRITIS OF RIGHT KNEE: ICD-10-CM

## 2023-06-08 DIAGNOSIS — Z71.85 VACCINE COUNSELING: ICD-10-CM

## 2023-06-08 DIAGNOSIS — I25.84 CORONARY ARTERY DISEASE DUE TO CALCIFIED CORONARY LESION: ICD-10-CM

## 2023-06-08 DIAGNOSIS — E11.65 TYPE 2 DIABETES MELLITUS WITH HYPERGLYCEMIA, WITHOUT LONG-TERM CURRENT USE OF INSULIN (HCC): Primary | ICD-10-CM

## 2023-06-08 PROCEDURE — 1126F AMNT PAIN NOTED NONE PRSNT: CPT | Performed by: INTERNAL MEDICINE

## 2023-06-08 PROCEDURE — 1160F RVW MEDS BY RX/DR IN RCRD: CPT | Performed by: INTERNAL MEDICINE

## 2023-06-08 PROCEDURE — 93971 EXTREMITY STUDY: CPT | Performed by: INTERNAL MEDICINE

## 2023-06-08 PROCEDURE — 3078F DIAST BP <80 MM HG: CPT | Performed by: INTERNAL MEDICINE

## 2023-06-08 PROCEDURE — 3077F SYST BP >= 140 MM HG: CPT | Performed by: INTERNAL MEDICINE

## 2023-06-08 PROCEDURE — 93000 ELECTROCARDIOGRAM COMPLETE: CPT | Performed by: INTERNAL MEDICINE

## 2023-06-08 PROCEDURE — 99215 OFFICE O/P EST HI 40 MIN: CPT | Performed by: INTERNAL MEDICINE

## 2023-06-08 PROCEDURE — 1159F MED LIST DOCD IN RCRD: CPT | Performed by: INTERNAL MEDICINE

## 2023-06-08 PROCEDURE — 3008F BODY MASS INDEX DOCD: CPT | Performed by: INTERNAL MEDICINE

## 2023-06-09 ENCOUNTER — APPOINTMENT (OUTPATIENT)
Dept: CARDIAC REHAB | Facility: HOSPITAL | Age: 78
End: 2023-06-09
Attending: INTERNAL MEDICINE
Payer: MEDICARE

## 2023-06-12 ENCOUNTER — APPOINTMENT (OUTPATIENT)
Dept: CARDIAC REHAB | Facility: HOSPITAL | Age: 78
End: 2023-06-12
Attending: INTERNAL MEDICINE
Payer: MEDICARE

## 2023-06-12 ENCOUNTER — APPOINTMENT (OUTPATIENT)
Dept: URBAN - METROPOLITAN AREA CLINIC 244 | Age: 78
Setting detail: DERMATOLOGY
End: 2023-06-13

## 2023-06-12 DIAGNOSIS — L81.4 OTHER MELANIN HYPERPIGMENTATION: ICD-10-CM

## 2023-06-12 DIAGNOSIS — Z87.2 PERSONAL HISTORY OF DISEASES OF THE SKIN AND SUBCUTANEOUS TISSUE: ICD-10-CM

## 2023-06-12 DIAGNOSIS — L82.1 OTHER SEBORRHEIC KERATOSIS: ICD-10-CM

## 2023-06-12 DIAGNOSIS — D22 MELANOCYTIC NEVI: ICD-10-CM

## 2023-06-12 PROBLEM — D22.5 MELANOCYTIC NEVI OF TRUNK: Status: ACTIVE | Noted: 2023-06-12

## 2023-06-12 PROCEDURE — 99213 OFFICE O/P EST LOW 20 MIN: CPT

## 2023-06-12 PROCEDURE — 93798 PHYS/QHP OP CAR RHAB W/ECG: CPT

## 2023-06-12 PROCEDURE — OTHER COUNSELING: OTHER

## 2023-06-12 ASSESSMENT — LOCATION DETAILED DESCRIPTION DERM
LOCATION DETAILED: UPPER STERNUM
LOCATION DETAILED: LEFT SUPERIOR MEDIAL MIDBACK
LOCATION DETAILED: LEFT MEDIAL UPPER BACK
LOCATION DETAILED: LEFT INFERIOR LATERAL MIDBACK
LOCATION DETAILED: RIGHT SUPERIOR MEDIAL UPPER BACK
LOCATION DETAILED: RIGHT INFERIOR HELIX

## 2023-06-12 ASSESSMENT — LOCATION SIMPLE DESCRIPTION DERM
LOCATION SIMPLE: RIGHT UPPER BACK
LOCATION SIMPLE: CHEST
LOCATION SIMPLE: LEFT UPPER BACK
LOCATION SIMPLE: LEFT LOWER BACK
LOCATION SIMPLE: RIGHT EAR

## 2023-06-12 ASSESSMENT — LOCATION ZONE DERM
LOCATION ZONE: EAR
LOCATION ZONE: TRUNK

## 2023-06-13 ENCOUNTER — MED REC SCAN ONLY (OUTPATIENT)
Dept: INTERNAL MEDICINE CLINIC | Facility: CLINIC | Age: 78
End: 2023-06-13

## 2023-06-14 ENCOUNTER — CARDPULM VISIT (OUTPATIENT)
Dept: CARDIAC REHAB | Facility: HOSPITAL | Age: 78
End: 2023-06-14
Attending: INTERNAL MEDICINE
Payer: MEDICARE

## 2023-06-14 LAB
ATRIAL RATE: 53 BPM
Q-T INTERVAL: 450 MS
QRS DURATION: 112 MS
QTC CALCULATION (BEZET): 478 MS
R AXIS: -37 DEGREES
T AXIS: 4 DEGREES
VENTRICULAR RATE: 68 BPM

## 2023-06-14 PROCEDURE — 93798 PHYS/QHP OP CAR RHAB W/ECG: CPT

## 2023-06-15 ENCOUNTER — APPOINTMENT (OUTPATIENT)
Dept: CARDIAC REHAB | Facility: HOSPITAL | Age: 78
End: 2023-06-15
Attending: INTERNAL MEDICINE
Payer: MEDICARE

## 2023-06-16 ENCOUNTER — CARDPULM VISIT (OUTPATIENT)
Dept: CARDIAC REHAB | Facility: HOSPITAL | Age: 78
End: 2023-06-16
Attending: INTERNAL MEDICINE
Payer: MEDICARE

## 2023-06-16 PROCEDURE — 93798 PHYS/QHP OP CAR RHAB W/ECG: CPT

## 2023-06-19 ENCOUNTER — APPOINTMENT (OUTPATIENT)
Dept: CARDIAC REHAB | Facility: HOSPITAL | Age: 78
End: 2023-06-19
Attending: INTERNAL MEDICINE
Payer: MEDICARE

## 2023-06-19 PROCEDURE — 93798 PHYS/QHP OP CAR RHAB W/ECG: CPT

## 2023-06-21 ENCOUNTER — APPOINTMENT (OUTPATIENT)
Dept: CARDIAC REHAB | Facility: HOSPITAL | Age: 78
End: 2023-06-21
Attending: INTERNAL MEDICINE
Payer: MEDICARE

## 2023-06-21 PROCEDURE — 93798 PHYS/QHP OP CAR RHAB W/ECG: CPT

## 2023-06-22 ENCOUNTER — APPOINTMENT (OUTPATIENT)
Dept: CARDIAC REHAB | Facility: HOSPITAL | Age: 78
End: 2023-06-22
Attending: INTERNAL MEDICINE
Payer: MEDICARE

## 2023-06-23 ENCOUNTER — APPOINTMENT (OUTPATIENT)
Dept: CARDIAC REHAB | Facility: HOSPITAL | Age: 78
End: 2023-06-23
Attending: INTERNAL MEDICINE
Payer: MEDICARE

## 2023-06-23 PROCEDURE — 93798 PHYS/QHP OP CAR RHAB W/ECG: CPT

## 2023-06-26 ENCOUNTER — APPOINTMENT (OUTPATIENT)
Dept: CARDIAC REHAB | Facility: HOSPITAL | Age: 78
End: 2023-06-26
Attending: INTERNAL MEDICINE
Payer: MEDICARE

## 2023-06-26 PROCEDURE — 93798 PHYS/QHP OP CAR RHAB W/ECG: CPT

## 2023-06-27 ENCOUNTER — APPOINTMENT (OUTPATIENT)
Dept: CARDIAC REHAB | Facility: HOSPITAL | Age: 78
End: 2023-06-27
Attending: INTERNAL MEDICINE
Payer: MEDICARE

## 2023-06-27 ENCOUNTER — TELEPHONE (OUTPATIENT)
Dept: INTERNAL MEDICINE CLINIC | Facility: CLINIC | Age: 78
End: 2023-06-27

## 2023-06-28 ENCOUNTER — APPOINTMENT (OUTPATIENT)
Dept: CARDIAC REHAB | Facility: HOSPITAL | Age: 78
End: 2023-06-28
Attending: INTERNAL MEDICINE
Payer: MEDICARE

## 2023-06-28 PROCEDURE — 93798 PHYS/QHP OP CAR RHAB W/ECG: CPT

## 2023-06-29 ENCOUNTER — APPOINTMENT (OUTPATIENT)
Dept: CARDIAC REHAB | Facility: HOSPITAL | Age: 78
End: 2023-06-29
Attending: INTERNAL MEDICINE
Payer: MEDICARE

## 2023-06-30 ENCOUNTER — APPOINTMENT (OUTPATIENT)
Dept: CARDIAC REHAB | Facility: HOSPITAL | Age: 78
End: 2023-06-30
Attending: INTERNAL MEDICINE
Payer: MEDICARE

## 2023-07-03 ENCOUNTER — CARDPULM VISIT (OUTPATIENT)
Dept: CARDIAC REHAB | Facility: HOSPITAL | Age: 78
End: 2023-07-03
Attending: INTERNAL MEDICINE
Payer: MEDICARE

## 2023-07-03 PROCEDURE — 93798 PHYS/QHP OP CAR RHAB W/ECG: CPT

## 2023-07-05 ENCOUNTER — CARDPULM VISIT (OUTPATIENT)
Dept: CARDIAC REHAB | Facility: HOSPITAL | Age: 78
End: 2023-07-05
Attending: INTERNAL MEDICINE
Payer: MEDICARE

## 2023-07-05 PROCEDURE — 93798 PHYS/QHP OP CAR RHAB W/ECG: CPT

## 2023-07-07 ENCOUNTER — APPOINTMENT (OUTPATIENT)
Dept: CARDIAC REHAB | Facility: HOSPITAL | Age: 78
End: 2023-07-07
Attending: INTERNAL MEDICINE
Payer: MEDICARE

## 2023-07-07 PROCEDURE — 93798 PHYS/QHP OP CAR RHAB W/ECG: CPT

## 2023-07-10 ENCOUNTER — TELEPHONE (OUTPATIENT)
Dept: INTERNAL MEDICINE CLINIC | Facility: CLINIC | Age: 78
End: 2023-07-10

## 2023-07-10 ENCOUNTER — APPOINTMENT (OUTPATIENT)
Dept: CARDIAC REHAB | Facility: HOSPITAL | Age: 78
End: 2023-07-10
Attending: INTERNAL MEDICINE
Payer: MEDICARE

## 2023-07-10 DIAGNOSIS — I25.84 CORONARY ARTERY DISEASE DUE TO CALCIFIED CORONARY LESION: ICD-10-CM

## 2023-07-10 DIAGNOSIS — I10 ESSENTIAL HYPERTENSION: ICD-10-CM

## 2023-07-10 DIAGNOSIS — N18.32 STAGE 3B CHRONIC KIDNEY DISEASE (HCC): ICD-10-CM

## 2023-07-10 DIAGNOSIS — I25.10 CORONARY ARTERY DISEASE DUE TO CALCIFIED CORONARY LESION: ICD-10-CM

## 2023-07-10 DIAGNOSIS — E11.65 TYPE 2 DIABETES MELLITUS WITH HYPERGLYCEMIA, WITHOUT LONG-TERM CURRENT USE OF INSULIN (HCC): Primary | ICD-10-CM

## 2023-07-10 DIAGNOSIS — M17.11 PRIMARY OSTEOARTHRITIS OF RIGHT KNEE: ICD-10-CM

## 2023-07-10 NOTE — TELEPHONE ENCOUNTER
Got some papers from Dr. Jon Mckeon regarding surgery. She does need to come in for a preop exam.  Needs to be done at least minimum 2 weeks before surgery. She will also need a note from cardiologist for preop.

## 2023-07-11 NOTE — TELEPHONE ENCOUNTER
Patient contacted and made aware of Dr. Mychal Ruvalcaba interpretation and recommendation. She states EKG performed at cardiologist at cardiologist visit last week with [facility]. She will have labs drawn on 7/13/23, she has scheduled the lab appointment. She is under the impression that last weeks cardiologist appointment and EKG is sufficient. I made her aware I will convey the above to Dr. Citlaly Christensen. Patient verbalized understanding. No further questions or concerns at this time. Future Appointments   Date Time Provider Jacquie Meyers   7/13/2023 12:30 PM REF [facility] SCHEDULED RESOURCE EMHLOMBREFLB REFLMB   7/13/2023  1:00 PM LMB CT RM1 [facility] MOB.  CT EM [place]   7/24/2023  9:30 AM Guy Valenzuela MD [id number] [place] [id number]   9/12/2023  1:00 PM Guy Valenzuela MD [id number] [place] [address]   10/31/2023  9:15 AM Maddie Wang MD [id number] [facility] [place] [facility]   1/12/2024  8:30 AM MD [address] Ra to advise

## 2023-07-11 NOTE — TELEPHONE ENCOUNTER
Can do 9:30 AM July 24, 2023 at [facility] for preop physical.  She needs to see cardiology way before then. We will put in orders for labs and chest x-ray. If she can do those earlier. We can do the EKG in the urine at the office visit. Or if cardiology is good to do the EKG.

## 2023-07-11 NOTE — TELEPHONE ENCOUNTER
Very need according to the records or clearance from the PCP. So I will need to see her back in. So have her keep that appointment. no

## 2023-07-12 ENCOUNTER — APPOINTMENT (OUTPATIENT)
Dept: CARDIAC REHAB | Facility: HOSPITAL | Age: 78
End: 2023-07-12
Attending: INTERNAL MEDICINE
Payer: MEDICARE

## 2023-07-13 ENCOUNTER — LAB ENCOUNTER (OUTPATIENT)
Dept: LAB | Facility: REFERENCE LAB | Age: 78
End: 2023-07-13
Attending: INTERNAL MEDICINE
Payer: MEDICARE

## 2023-07-13 ENCOUNTER — HOSPITAL ENCOUNTER (OUTPATIENT)
Dept: GENERAL RADIOLOGY | Age: 78
Discharge: HOME | End: 2023-07-13
Attending: INTERNAL MEDICINE
Payer: MEDICARE

## 2023-07-13 ENCOUNTER — HOSPITAL ENCOUNTER (OUTPATIENT)
Dept: CT IMAGING | Age: 78
Discharge: HOME | End: 2023-07-13
Attending: INTERNAL MEDICINE
Payer: MEDICARE

## 2023-07-13 DIAGNOSIS — R91.1 LUNG NODULE: ICD-10-CM

## 2023-07-13 DIAGNOSIS — M17.11 PRIMARY OSTEOARTHRITIS OF RIGHT KNEE: ICD-10-CM

## 2023-07-13 DIAGNOSIS — I10 ESSENTIAL HYPERTENSION: ICD-10-CM

## 2023-07-13 DIAGNOSIS — I25.10 CORONARY ARTERY DISEASE DUE TO CALCIFIED CORONARY LESION: ICD-10-CM

## 2023-07-13 DIAGNOSIS — E11.65 TYPE 2 DIABETES MELLITUS WITH HYPERGLYCEMIA, WITHOUT LONG-TERM CURRENT USE OF INSULIN (HCC): ICD-10-CM

## 2023-07-13 DIAGNOSIS — E03.9 ACQUIRED HYPOTHYROIDISM: ICD-10-CM

## 2023-07-13 DIAGNOSIS — N18.32 STAGE 3B CHRONIC KIDNEY DISEASE (HCC): ICD-10-CM

## 2023-07-13 DIAGNOSIS — I25.84 CORONARY ARTERY DISEASE DUE TO CALCIFIED CORONARY LESION: ICD-10-CM

## 2023-07-13 LAB
ALBUMIN SERPL-MCNC: 3.7 G/DL (ref 3.4–5)
ALBUMIN/GLOB SERPL: 1.3 {RATIO} (ref 1–2)
ALP LIVER SERPL-CCNC: 28 U/L
ALT SERPL-CCNC: 24 U/L
ANION GAP SERPL CALC-SCNC: 6 MMOL/L (ref 0–18)
ANTIBODY SCREEN: NEGATIVE
APTT PPP: 24 SECONDS (ref 23.3–35.6)
AST SERPL-CCNC: 23 U/L (ref 15–37)
BASOPHILS # BLD AUTO: 0.06 X10(3) UL (ref 0–0.2)
BASOPHILS NFR BLD AUTO: 1.1 %
BILIRUB SERPL-MCNC: 0.5 MG/DL (ref 0.1–2)
BUN BLD-MCNC: 36 MG/DL (ref 7–18)
BUN/CREAT SERPL: 32.7 (ref 10–20)
CALCIUM BLD-MCNC: 9.5 MG/DL (ref 8.5–10.1)
CHLORIDE SERPL-SCNC: 106 MMOL/L (ref 98–112)
CO2 SERPL-SCNC: 26 MMOL/L (ref 21–32)
CREAT BLD-MCNC: 1.1 MG/DL
DEPRECATED RDW RBC AUTO: 47.6 FL (ref 35.1–46.3)
EOSINOPHIL # BLD AUTO: 0.21 X10(3) UL (ref 0–0.7)
EOSINOPHIL NFR BLD AUTO: 3.9 %
ERYTHROCYTE [DISTWIDTH] IN BLOOD BY AUTOMATED COUNT: 12.8 % (ref 11–15)
EST. AVERAGE GLUCOSE BLD GHB EST-MCNC: 117 MG/DL (ref 68–126)
FASTING STATUS PATIENT QL REPORTED: YES
GFR SERPLBLD BASED ON 1.73 SQ M-ARVRAT: 51 ML/MIN/1.73M2 (ref 60–?)
GLOBULIN PLAS-MCNC: 2.9 G/DL (ref 2.8–4.4)
GLUCOSE BLD-MCNC: 130 MG/DL (ref 70–99)
HBA1C MFR BLD: 5.7 % (ref ?–5.7)
HCT VFR BLD AUTO: 40.6 %
HGB BLD-MCNC: 13.1 G/DL
IMM GRANULOCYTES # BLD AUTO: 0.01 X10(3) UL (ref 0–1)
IMM GRANULOCYTES NFR BLD: 0.2 %
INR BLD: 0.98 (ref 0.85–1.16)
LYMPHOCYTES # BLD AUTO: 2.04 X10(3) UL (ref 1–4)
LYMPHOCYTES NFR BLD AUTO: 38 %
MCH RBC QN AUTO: 32.5 PG (ref 26–34)
MCHC RBC AUTO-ENTMCNC: 32.3 G/DL (ref 31–37)
MCV RBC AUTO: 100.7 FL
MONOCYTES # BLD AUTO: 0.57 X10(3) UL (ref 0.1–1)
MONOCYTES NFR BLD AUTO: 10.6 %
NEUTROPHILS # BLD AUTO: 2.48 X10 (3) UL (ref 1.5–7.7)
NEUTROPHILS # BLD AUTO: 2.48 X10(3) UL (ref 1.5–7.7)
NEUTROPHILS NFR BLD AUTO: 46.2 %
OSMOLALITY SERPL CALC.SUM OF ELEC: 296 MOSM/KG (ref 275–295)
PLATELET # BLD AUTO: 251 10(3)UL (ref 150–450)
POTASSIUM SERPL-SCNC: 4.7 MMOL/L (ref 3.5–5.1)
PROT SERPL-MCNC: 6.6 G/DL (ref 6.4–8.2)
PROTHROMBIN TIME: 12.9 SECONDS (ref 11.6–14.8)
RBC # BLD AUTO: 4.03 X10(6)UL
RH BLOOD TYPE: POSITIVE
RH BLOOD TYPE: POSITIVE
SODIUM SERPL-SCNC: 138 MMOL/L (ref 136–145)
TSI SER-ACNC: 1.25 MIU/ML (ref 0.36–3.74)
WBC # BLD AUTO: 5.4 X10(3) UL (ref 4–11)

## 2023-07-13 PROCEDURE — 85730 THROMBOPLASTIN TIME PARTIAL: CPT

## 2023-07-13 PROCEDURE — 87641 MR-STAPH DNA AMP PROBE: CPT

## 2023-07-13 PROCEDURE — 84443 ASSAY THYROID STIM HORMONE: CPT

## 2023-07-13 PROCEDURE — 86850 RBC ANTIBODY SCREEN: CPT

## 2023-07-13 PROCEDURE — 71046 X-RAY EXAM CHEST 2 VIEWS: CPT | Performed by: INTERNAL MEDICINE

## 2023-07-13 PROCEDURE — 36415 COLL VENOUS BLD VENIPUNCTURE: CPT | Performed by: INTERNAL MEDICINE

## 2023-07-13 PROCEDURE — 86901 BLOOD TYPING SEROLOGIC RH(D): CPT

## 2023-07-13 PROCEDURE — 86900 BLOOD TYPING SEROLOGIC ABO: CPT

## 2023-07-13 PROCEDURE — 80053 COMPREHEN METABOLIC PANEL: CPT | Performed by: INTERNAL MEDICINE

## 2023-07-13 PROCEDURE — 83036 HEMOGLOBIN GLYCOSYLATED A1C: CPT

## 2023-07-13 PROCEDURE — 85025 COMPLETE CBC W/AUTO DIFF WBC: CPT | Performed by: INTERNAL MEDICINE

## 2023-07-13 PROCEDURE — 85610 PROTHROMBIN TIME: CPT | Performed by: INTERNAL MEDICINE

## 2023-07-13 PROCEDURE — 71250 CT THORAX DX C-: CPT | Performed by: INTERNAL MEDICINE

## 2023-07-13 NOTE — TELEPHONE ENCOUNTER
He has an appointment December 3 at 330. We can see her at 4:00 for a follow-up on the same day 12-3-21 . Was squeezing her in after hours. She will also need to schedule a physical 1 May 6, 2022 or after. Tranexamic Acid Counseling:  Patient advised of the small risk of bleeding problems with tranexamic acid. They were also instructed to call if they developed any nausea, vomiting or diarrhea. All of the patient's questions and concerns were addressed.

## 2023-07-14 ENCOUNTER — APPOINTMENT (OUTPATIENT)
Dept: CARDIAC REHAB | Facility: HOSPITAL | Age: 78
End: 2023-07-14
Attending: INTERNAL MEDICINE
Payer: MEDICARE

## 2023-07-14 LAB — MRSA DNA SPEC QL NAA+PROBE: NEGATIVE

## 2023-07-14 NOTE — PROGRESS NOTES
CBC Normal (white blood cells and red blood cells and platelets),   CMP Normal (electrolytes, kidney and liver functions), declining kidney function is slightly better than prior but still declined. No motrin, ibuprofen, advil, alleve, naprosyn  with these medications.    Clotting factors are normal.

## 2023-07-17 ENCOUNTER — TELEPHONE (OUTPATIENT)
Dept: INTERNAL MEDICINE CLINIC | Facility: CLINIC | Age: 78
End: 2023-07-17

## 2023-07-17 ENCOUNTER — APPOINTMENT (OUTPATIENT)
Dept: CARDIAC REHAB | Facility: HOSPITAL | Age: 78
End: 2023-07-17
Attending: INTERNAL MEDICINE
Payer: MEDICARE

## 2023-07-17 ENCOUNTER — MED REC SCAN ONLY (OUTPATIENT)
Dept: INTERNAL MEDICINE CLINIC | Facility: CLINIC | Age: 78
End: 2023-07-17

## 2023-07-17 NOTE — TELEPHONE ENCOUNTER
Patient calling to inform that she is scheduled for surgery on 7/25/2023. She is scheduled for her pre-op on 7/24/2023, but appointment is too soon.     She also needs to know when to stop taking her baby Asprin and coumadin, please advise

## 2023-07-17 NOTE — TELEPHONE ENCOUNTER
Current Outpatient Medications   Medication Sig Instructions Comments    furosemide 20 MG Oral Tab Take 0.5 tablets (10 mg total) by mouth daily as needed. Hold morning of     fenofibrate 145 MG Oral Tab TAKE 1 TABLET ONCE DAILY Take as usual     levothyroxine (SYNTHROID) 88 MCG Oral Tab Take 1 tablet (88 mcg total) by mouth nightly. Take as usual     venlafaxine ER 37.5 MG Oral Capsule SR 24 Hr Take 1 capsule (37.5 mg total) by mouth nightly. Take as usual     atorvastatin 40 MG Oral Tab Take 1 tablet (40 mg total) by mouth nightly. Take as usual     Semaglutide,0.25 or 0.5MG/DOS, (OZEMPIC, 0.25 OR 0.5 MG/DOSE,) 2 MG/3ML Subcutaneous Solution Pen-injector Inject 0.5 mg into the skin every 7 days. Hold morning of     levocetirizine 5 MG Oral Tab Take 1 tablet (5 mg total) by mouth nightly. Take as usual     Glucose Blood (ONETOUCH ULTRA) In Vitro Strip 1 each by Other route in the morning and 1 each before bedtime. Take as usual     amLODIPine 2.5 MG Oral Tab TAKE 1 TABLET TWICE A DAY Take as usual     clopidogrel 75 MG Oral Tab Take 1 tablet (75 mg total) by mouth daily. Hold 5 days prior if OK with cardiology. lisinopril 20 MG Oral Tab Take 1 tablet (20 mg total) by mouth 2 (two) times daily. Hold morning of     metFORMIN HCl 1000 MG Oral Tab Take 1 tablet (1,000 mg total) by mouth daily with dinner. Hold night before     aspirin 81 MG Oral Tab EC Take 1 tablet (81 mg total) by mouth daily. At night per pt Hold 1 week prior if OK with cards. Selenium 200 MCG Oral Tab Take 1 tablet (200 mcg total) by mouth daily. Hold 1 week prior. FIBER ADULT GUMMIES OR Take 2 tablets by mouth at bedtime. Hold 1 week prior. EPINEPHrine 0.3 MG/0.3ML Injection Solution Auto-injector Use as directed. Hold 1 week prior. Digestive Enzymes (ENZYME DIGEST) Oral Cap Take by mouth. Patient is taking Digest Gold with ATPro one cap in the morning and two at dinner time. Hold 1 week prior.       Glucose Blood (ONETOUCH ULTRA) In Vitro Strip Test blood sugars 3 times daily Take as usual     ALBUTEROL SULFATE  (90 Base) MCG/ACT Inhalation Aero Soln INHALE 2 PUFFS INTO THE LUNGS EVERY 6 HOURS AS NEEDED FOR WHEEZING Take as usual     Coenzyme Q10 (COQ10) 200 MG Oral Cap Take 200 mg by mouth daily. Hold 1 week prior. Vitamin B-12 1000 MCG Oral Tab Take 1 tablet (1,000 mcg total) by mouth daily. Hold 1 week prior. DHA-EPA-Vitamin E (OMEGA-3 COMPLEX OR) Take by mouth. Hold 1 week prior. Multiple Vitamins-Iron (ONCE DAILY/IRON) Oral Tab Take 1 tablet by mouth daily. Hold 1 week prior. Cholecalciferol 50 MCG (2000 UT) Oral Tab Take 1 tablet (2,000 Units total) by mouth daily. Hold 1 week prior. VITAMIN E 400 UNIT OR TABS 1 TABLET DAILY Hold 1 week prior. VITAMIN C 500 MG OR TABS 1 TABLET DAILY Hold 1 week prior. TYLENOL ARTHRITIS PAIN OR Take by mouth. Hold 1 week prior. ZINC 50 MG OR TABS 1 TABLET DAILY Hold 1 week prior. COD LIVER OIL OR 1 DAILY Hold 1 week prior. No motrin, ibuprofen, advil, alleve, naprosyn at least 10 days prior to surgery. Take all other medications with small sips of water on AM of surgery, unless otherwise directed. Don't eat breakfest that AM. All other medications take with a sip of water even on the morning of surgery. Hold all over-the-counter natural herbal supplements and vitamins at least a week before surgery. Anticoagulants: hold plavix for 5 days prior to procedure with cardiology approval, discussed with patient the increased risk of stroke/MI when alterations in therapy occur - patient verbalizes understanding and wishes to proceed. Aspirin 81 mg patient instructed to continue this. Do not have Coumadin in her profile. I am not sure if cardiology added that. I am assuming she is being evaluated by the Coumadin clinic. Can still keep appointment with me for July 24. But she needs to make sure cardiology to be seen sooner.

## 2023-07-19 ENCOUNTER — APPOINTMENT (OUTPATIENT)
Dept: CARDIAC REHAB | Facility: HOSPITAL | Age: 78
End: 2023-07-19
Attending: INTERNAL MEDICINE
Payer: MEDICARE

## 2023-07-20 ENCOUNTER — TELEPHONE (OUTPATIENT)
Dept: INTERNAL MEDICINE CLINIC | Facility: CLINIC | Age: 78
End: 2023-07-20

## 2023-07-20 ENCOUNTER — TELEPHONE (OUTPATIENT)
Dept: PULMONOLOGY | Facility: CLINIC | Age: 78
End: 2023-07-20

## 2023-07-20 NOTE — TELEPHONE ENCOUNTER
----- Message from Michael Ingram MD sent at 7/19/2023  9:05 PM CDT -----  RN, please call the patient to let her know that her CT scan of the chest is stable. The tiny pulmonary nodules have not grown. She does not need further scans.   Lindsay Ramsey

## 2023-07-20 NOTE — TELEPHONE ENCOUNTER
RN spoke with patient, informed them of Dr. Shelby Ramirez message. Patient verbalized understanding.

## 2023-07-20 NOTE — TELEPHONE ENCOUNTER
Patient of Dr Parker Parrish is having knee surgery Tuesday 7-25-23. Could a nurse call her back to advise her whether she needs to stop taking these before surgery and how far in advance:    2 gummies with 1500mg of cbd   Uses cbd sport lotion, 1250mg milligram topical for knee. Please call patient at: [phone]    Anaid Grier to leave a voicemail.

## 2023-07-20 NOTE — TELEPHONE ENCOUNTER
Please review prior message regarding anything natural herbal over-the-counter to stop at least a week before surgery.

## 2023-07-21 ENCOUNTER — APPOINTMENT (OUTPATIENT)
Dept: CARDIAC REHAB | Facility: HOSPITAL | Age: 78
End: 2023-07-21
Attending: INTERNAL MEDICINE
Payer: MEDICARE

## 2023-07-24 ENCOUNTER — OFFICE VISIT (OUTPATIENT)
Dept: INTERNAL MEDICINE CLINIC | Facility: CLINIC | Age: 78
End: 2023-07-24

## 2023-07-24 VITALS
TEMPERATURE: 98 F | RESPIRATION RATE: 18 BRPM | HEART RATE: 55 BPM | WEIGHT: 120 LBS | SYSTOLIC BLOOD PRESSURE: 132 MMHG | OXYGEN SATURATION: 100 % | DIASTOLIC BLOOD PRESSURE: 68 MMHG | BODY MASS INDEX: 22.08 KG/M2 | HEIGHT: 62 IN

## 2023-07-24 DIAGNOSIS — I25.84 CORONARY ARTERY DISEASE DUE TO CALCIFIED CORONARY LESION: ICD-10-CM

## 2023-07-24 DIAGNOSIS — E11.65 TYPE 2 DIABETES MELLITUS WITH HYPERGLYCEMIA, WITHOUT LONG-TERM CURRENT USE OF INSULIN (HCC): ICD-10-CM

## 2023-07-24 DIAGNOSIS — E55.9 VITAMIN D DEFICIENCY: ICD-10-CM

## 2023-07-24 DIAGNOSIS — E83.52 HYPERCALCEMIA: ICD-10-CM

## 2023-07-24 DIAGNOSIS — N18.30 STAGE 3 CHRONIC KIDNEY DISEASE, UNSPECIFIED WHETHER STAGE 3A OR 3B CKD (HCC): ICD-10-CM

## 2023-07-24 DIAGNOSIS — Z71.85 VACCINE COUNSELING: ICD-10-CM

## 2023-07-24 DIAGNOSIS — E03.9 ACQUIRED HYPOTHYROIDISM: ICD-10-CM

## 2023-07-24 DIAGNOSIS — J44.9 ASTHMA WITH COPD (CHRONIC OBSTRUCTIVE PULMONARY DISEASE) (HCC): Primary | Chronic | ICD-10-CM

## 2023-07-24 DIAGNOSIS — I10 ESSENTIAL HYPERTENSION: ICD-10-CM

## 2023-07-24 DIAGNOSIS — I25.10 CORONARY ARTERY DISEASE DUE TO CALCIFIED CORONARY LESION: ICD-10-CM

## 2023-07-24 DIAGNOSIS — Z12.31 SCREENING MAMMOGRAM FOR BREAST CANCER: ICD-10-CM

## 2023-07-24 PROBLEM — H43.821 VITREOMACULAR ADHESION OF RIGHT EYE: Status: ACTIVE | Noted: 2023-07-19

## 2023-07-24 LAB
APPEARANCE: CLEAR
BILIRUBIN: NEGATIVE
GLUCOSE (URINE DIPSTICK): NEGATIVE MG/DL
KETONES (URINE DIPSTICK): NEGATIVE MG/DL
LEUKOCYTES: NEGATIVE
MULTISTIX LOT#: NORMAL NUMERIC
NITRITE, URINE: NEGATIVE
OCCULT BLOOD: NEGATIVE
PH, URINE: 6 (ref 4.5–8)
PROTEIN (URINE DIPSTICK): NEGATIVE MG/DL
SPECIFIC GRAVITY: 1.02 (ref 1–1.03)
URINE-COLOR: YELLOW
UROBILINOGEN,SEMI-QN: 0.2 MG/DL (ref 0–1.9)

## 2023-07-24 RX ORDER — ALBUTEROL SULFATE 90 UG/1
2 AEROSOL, METERED RESPIRATORY (INHALATION) EVERY 4 HOURS PRN
Qty: 1 EACH | Refills: 1 | Status: SHIPPED | OUTPATIENT
Start: 2023-07-24

## 2023-07-24 NOTE — PATIENT INSTRUCTIONS
ASSESSMENT/PLAN:   Asthma with copd (chronic obstructive pulmonary disease) ([facility])  (primary encounter diagnosis) Stable. Acquired hypothyroidism Stable. Stage 3 chronic kidney disease, unspecified whether stage 3a or 3b ckd ([facility]) No motrin, ibuprofen, advil, alleve, naprosyn  with these medications. Type 2 diabetes mellitus with hyperglycemia, without long-term current use of insulin ([facility]) Stable. Careful with diet and excercise at least 30 minutes 3-4 times a week. Check sugars at different times on different dates. Careful with low sugars. Carry something with you and check sugar if can. Can carry adams cracker, etc. Decrease carbohydrates. But also, careful with fruits and natural sugars. One serving a day and no more than 1 handful every day. Check feet  every AM and careful with sores and ulcers on feet bilaterally. Check eyes every year with dilated eye exam.  Check sugars. 2-hour postmeal should be less than 140s. Pre-meal should be 's. Both equally affected A1c. Discussed importance of glycemic control to prevent complications of diabetes  -Discussed complications of diabetes include retinopathy, neuropathy, nephropathy and cardiovascular disease  -Discussed ABCs of DM  -Discussed importance of SBGM  -Discussed importance of low CHO diet, recommend 45gm per meal or 135gm per day  -UTD with optho, received records from pt. Updated chart. Vaccine counseling Check on insurance coverage thru insurance. There's 2 doses of shingrix  by 2-6 months. Vitamin d deficiency Check blood. Coronary artery disease due to calcified coronary lesion No Sx. Stable. On statin, plavix and ASA. Essential hypertension ? White coat. Careful with diet and excercise at least 30 minutes 3-4 times a week. Check blood pressures at different times on different days. Can purchase own blood pressure monitor. If not, check at local pharmacy. Bake foods more and grill occasionally.  Avoid fried foods. No salt. Use other seasonings. Hypercalcemia Stable. Screening mammogram for breast cancer Normal mammogram. Continue self breast exam every month. Current Outpatient Medications   Medication Sig Instructions Comments    furosemide 20 MG Oral Tab Take 0.5 tablets (10 mg total) by mouth daily as needed. Not taking. fenofibrate 145 MG Oral Tab TAKE 1 TABLET ONCE DAILY Take as usual     levothyroxine (SYNTHROID) 88 MCG Oral Tab Take 1 tablet (88 mcg total) by mouth nightly. Take as usual     venlafaxine ER 37.5 MG Oral Capsule SR 24 Hr Take 1 capsule (37.5 mg total) by mouth nightly. Take as usual     atorvastatin 40 MG Oral Tab Take 1 tablet (40 mg total) by mouth nightly. Take as usual     Semaglutide,0.25 or 0.5MG/DOS, (OZEMPIC, 0.25 OR 0.5 MG/DOSE,) 2 MG/3ML Subcutaneous Solution Pen-injector Inject 0.5 mg into the skin every 7 days. Hold morning of     levocetirizine 5 MG Oral Tab Take 1 tablet (5 mg total) by mouth nightly. Take as usual     Glucose Blood (ONETOUCH ULTRA) In Vitro Strip 1 each by Other route in the morning and 1 each before bedtime. Take as usual     amLODIPine 2.5 MG Oral Tab TAKE 1 TABLET TWICE A DAY Take as usual     clopidogrel 75 MG Oral Tab Take 1 tablet (75 mg total) by mouth daily. Hold 7 days prior. lisinopril 20 MG Oral Tab Take 1 tablet (20 mg total) by mouth 2 (two) times daily. Hold morning of     metFORMIN HCl 1000 MG Oral Tab Take 1 tablet (1,000 mg total) by mouth daily with dinner. Hold night before     aspirin 81 MG Oral Tab EC Take 1 tablet (81 mg total) by mouth daily. At night per pt Take as usual     Selenium 200 MCG Oral Tab Take 1 tablet (200 mcg total) by mouth daily. Hold 1 week prior. FIBER ADULT GUMMIES OR Take 2 tablets by mouth at bedtime. Hold 1 week prior. EPINEPHrine 0.3 MG/0.3ML Injection Solution Auto-injector Use as directed. Hold morning of     Digestive Enzymes (ENZYME DIGEST) Oral Cap Take by mouth.  Patient is taking Digest Gold with ATPro one cap in the morning and two at dinner time. Hold 1 week prior. Glucose Blood (ONETOUCH ULTRA) In Vitro Strip Test blood sugars 3 times daily Take as usual     ALBUTEROL SULFATE  (90 Base) MCG/ACT Inhalation Aero Soln INHALE 2 PUFFS INTO THE LUNGS EVERY 6 HOURS AS NEEDED FOR WHEEZING Take as usual     Coenzyme Q10 (COQ10) 200 MG Oral Cap Take 200 mg by mouth daily. Hold 1 week prior. Vitamin B-12 1000 MCG Oral Tab Take 1 tablet (1,000 mcg total) by mouth daily. Hold 1 week prior. DHA-EPA-Vitamin E (OMEGA-3 COMPLEX OR) Take by mouth. Hold 1 week prior. Multiple Vitamins-Iron (ONCE DAILY/IRON) Oral Tab Take 1 tablet by mouth daily. Hold 1 week prior. Cholecalciferol 50 MCG (2000 UT) Oral Tab Take 1 tablet (2,000 Units total) by mouth daily. Hold 1 week prior. VITAMIN E 400 UNIT OR TABS 1 TABLET DAILY Hold 1 week prior. VITAMIN C 500 MG OR TABS 1 TABLET DAILY Hold 1 week prior. TYLENOL ARTHRITIS PAIN OR Take by mouth. Hold 1 week prior. ZINC 50 MG OR TABS 1 TABLET DAILY Hold 1 week prior. COD LIVER OIL OR 1 DAILY Hold 1 week prior. No motrin, ibuprofen, advil, alleve, naprosyn at least 10 days prior to surgery. Take all other medications with small sips of water on AM of surgery, unless otherwise directed. Don't eat breakfest that AM. All other medications take with a sip of water even on the morning of surgery. Hold all over-the-counter natural herbal supplements and vitamins at least a week before surgery. Anticoagulants: hold plavix for 5 days prior to procedure with cardiology approval, discussed with patient the increased risk of stroke/MI when alterations in therapy occur - patient verbalizes understanding and wishes to proceed. Aspirin 81 mg patient instructed to continue this. No orders of the defined types were placed in this encounter.       Meds This Visit:  Requested Prescriptions      No prescriptions requested or ordered in this encounter       Imaging & Referrals:  ELECTROCARDIOGRAM, COMPLETE        RTC 3 months for FU.

## 2023-07-24 NOTE — CM/SW NOTE
SW received message from DIOGO Godwin from Dr. Marcelo Calixto office stating pt is pre ortho arranged with [facility] [address]      Will need orders/F2F entered    [facility] will send referral once avail    [facility] [address] [employer] [address] [facility], [address]  Phone: [phone]  Fax: [phone]      PLAN: [facility] , LSW, MSW ext.  [phone]

## 2023-07-25 ENCOUNTER — HOSPITAL ENCOUNTER (INPATIENT)
Facility: HOSPITAL | Age: 78
LOS: 3 days | Discharge: NURSING, REHAB OR LONG-TERM CARE | DRG: 470 | End: 2023-07-28
Attending: ORTHOPAEDIC SURGERY | Admitting: INTERNAL MEDICINE
Payer: MEDICARE

## 2023-07-25 ENCOUNTER — APPOINTMENT (OUTPATIENT)
Dept: GENERAL RADIOLOGY | Facility: HOSPITAL | Age: 78
DRG: 470 | End: 2023-07-25
Attending: ORTHOPAEDIC SURGERY
Payer: MEDICARE

## 2023-07-25 ENCOUNTER — ANESTHESIA (OUTPATIENT)
Dept: SURGERY | Facility: HOSPITAL | Age: 78
DRG: 470 | End: 2023-07-25
Payer: MEDICARE

## 2023-07-25 ENCOUNTER — ANESTHESIA EVENT (OUTPATIENT)
Dept: SURGERY | Facility: HOSPITAL | Age: 78
DRG: 470 | End: 2023-07-25
Payer: MEDICARE

## 2023-07-25 PROBLEM — M17.9 OA (OSTEOARTHRITIS) OF KNEE: Status: ACTIVE | Noted: 2023-07-25

## 2023-07-25 PROBLEM — M17.11 RIGHT KNEE DJD: Status: ACTIVE | Noted: 2023-07-25

## 2023-07-25 LAB
GLUCOSE BLDC GLUCOMTR-MCNC: 121 MG/DL (ref 70–99)
GLUCOSE BLDC GLUCOMTR-MCNC: 132 MG/DL (ref 70–99)
GLUCOSE BLDC GLUCOMTR-MCNC: 144 MG/DL (ref 70–99)
GLUCOSE BLDC GLUCOMTR-MCNC: 160 MG/DL (ref 70–99)

## 2023-07-25 PROCEDURE — 0SRC0J9 REPLACEMENT OF RIGHT KNEE JOINT WITH SYNTHETIC SUBSTITUTE, CEMENTED, OPEN APPROACH: ICD-10-PCS | Performed by: ORTHOPAEDIC SURGERY

## 2023-07-25 PROCEDURE — 73560 X-RAY EXAM OF KNEE 1 OR 2: CPT | Performed by: ORTHOPAEDIC SURGERY

## 2023-07-25 DEVICE — SCREW HEADFRAME FEM HEX 2.5MM: Type: IMPLANTABLE DEVICE | Site: KNEE

## 2023-07-25 DEVICE — IMPLANTABLE DEVICE
Type: IMPLANTABLE DEVICE | Site: KNEE | Status: FUNCTIONAL
Brand: PERSONA® NATURAL TIBIA®

## 2023-07-25 DEVICE — IMPLANTABLE DEVICE
Type: IMPLANTABLE DEVICE | Site: KNEE | Status: FUNCTIONAL
Brand: PERSONA®

## 2023-07-25 DEVICE — IMPLANTABLE DEVICE
Type: IMPLANTABLE DEVICE | Site: KNEE | Status: FUNCTIONAL
Brand: PERSONA® VIVACIT-E®

## 2023-07-25 DEVICE — CEMENT BONE RADIOPAQ HOWMEDICA: Type: IMPLANTABLE DEVICE | Site: KNEE | Status: FUNCTIONAL

## 2023-07-25 RX ORDER — ENEMA 19; 7 G/133ML; G/133ML
1 ENEMA RECTAL ONCE AS NEEDED
Status: DISCONTINUED | OUTPATIENT
Start: 2023-07-25 | End: 2023-07-28

## 2023-07-25 RX ORDER — BISACODYL 10 MG
10 SUPPOSITORY, RECTAL RECTAL
Status: DISCONTINUED | OUTPATIENT
Start: 2023-07-25 | End: 2023-07-28

## 2023-07-25 RX ORDER — ACETAMINOPHEN 325 MG/1
650 TABLET ORAL EVERY 4 HOURS PRN
Status: DISCONTINUED | OUTPATIENT
Start: 2023-07-25 | End: 2023-07-25

## 2023-07-25 RX ORDER — ALBUTEROL SULFATE 90 UG/1
2 AEROSOL, METERED RESPIRATORY (INHALATION) EVERY 4 HOURS PRN
Status: DISCONTINUED | OUTPATIENT
Start: 2023-07-25 | End: 2023-07-28

## 2023-07-25 RX ORDER — VENLAFAXINE HYDROCHLORIDE 37.5 MG/1
37.5 CAPSULE, EXTENDED RELEASE ORAL NIGHTLY
Status: DISCONTINUED | OUTPATIENT
Start: 2023-07-25 | End: 2023-07-28

## 2023-07-25 RX ORDER — BUPIVACAINE HYDROCHLORIDE 7.5 MG/ML
INJECTION, SOLUTION INTRASPINAL
Status: COMPLETED | OUTPATIENT
Start: 2023-07-25 | End: 2023-07-25

## 2023-07-25 RX ORDER — TRANEXAMIC ACID 10 MG/ML
INJECTION, SOLUTION INTRAVENOUS AS NEEDED
Status: DISCONTINUED | OUTPATIENT
Start: 2023-07-25 | End: 2023-07-25 | Stop reason: SURG

## 2023-07-25 RX ORDER — DEXTROSE MONOHYDRATE 25 G/50ML
50 INJECTION, SOLUTION INTRAVENOUS
Status: DISCONTINUED | OUTPATIENT
Start: 2023-07-25 | End: 2023-07-25 | Stop reason: HOSPADM

## 2023-07-25 RX ORDER — HYDROMORPHONE HYDROCHLORIDE 1 MG/ML
0.6 INJECTION, SOLUTION INTRAMUSCULAR; INTRAVENOUS; SUBCUTANEOUS EVERY 5 MIN PRN
Status: DISCONTINUED | OUTPATIENT
Start: 2023-07-25 | End: 2023-07-25 | Stop reason: HOSPADM

## 2023-07-25 RX ORDER — MORPHINE SULFATE 4 MG/ML
2 INJECTION, SOLUTION INTRAMUSCULAR; INTRAVENOUS EVERY 10 MIN PRN
Status: DISCONTINUED | OUTPATIENT
Start: 2023-07-25 | End: 2023-07-25 | Stop reason: HOSPADM

## 2023-07-25 RX ORDER — ACETAMINOPHEN AND CODEINE PHOSPHATE 300; 30 MG/1; MG/1
1 TABLET ORAL EVERY 4 HOURS PRN
Status: DISCONTINUED | OUTPATIENT
Start: 2023-07-25 | End: 2023-07-28

## 2023-07-25 RX ORDER — OXYCODONE HYDROCHLORIDE 5 MG/1
5 TABLET ORAL EVERY 4 HOURS PRN
Status: DISCONTINUED | OUTPATIENT
Start: 2023-07-25 | End: 2023-07-28

## 2023-07-25 RX ORDER — MORPHINE SULFATE 2 MG/ML
2 INJECTION, SOLUTION INTRAMUSCULAR; INTRAVENOUS EVERY 2 HOUR PRN
Status: DISCONTINUED | OUTPATIENT
Start: 2023-07-25 | End: 2023-07-28

## 2023-07-25 RX ORDER — METOCLOPRAMIDE HYDROCHLORIDE 5 MG/ML
5 INJECTION INTRAMUSCULAR; INTRAVENOUS EVERY 8 HOURS PRN
Status: DISCONTINUED | OUTPATIENT
Start: 2023-07-25 | End: 2023-07-28

## 2023-07-25 RX ORDER — MORPHINE SULFATE 4 MG/ML
4 INJECTION, SOLUTION INTRAMUSCULAR; INTRAVENOUS EVERY 10 MIN PRN
Status: DISCONTINUED | OUTPATIENT
Start: 2023-07-25 | End: 2023-07-25 | Stop reason: HOSPADM

## 2023-07-25 RX ORDER — TRANEXAMIC ACID 10 MG/ML
1000 INJECTION, SOLUTION INTRAVENOUS ONCE
Status: DISCONTINUED | OUTPATIENT
Start: 2023-07-26 | End: 2023-07-25

## 2023-07-25 RX ORDER — GABAPENTIN 100 MG/1
200 CAPSULE ORAL 3 TIMES DAILY
Qty: 180 CAPSULE | Refills: 0 | Status: SHIPPED | OUTPATIENT
Start: 2023-07-25 | End: 2023-08-01

## 2023-07-25 RX ORDER — METOCLOPRAMIDE HYDROCHLORIDE 5 MG/ML
10 INJECTION INTRAMUSCULAR; INTRAVENOUS EVERY 8 HOURS PRN
Status: DISCONTINUED | OUTPATIENT
Start: 2023-07-25 | End: 2023-07-25

## 2023-07-25 RX ORDER — LIDOCAINE HYDROCHLORIDE 10 MG/ML
INJECTION, SOLUTION EPIDURAL; INFILTRATION; INTRACAUDAL; PERINEURAL AS NEEDED
Status: DISCONTINUED | OUTPATIENT
Start: 2023-07-25 | End: 2023-07-25 | Stop reason: SURG

## 2023-07-25 RX ORDER — FENOFIBRATE 67 MG/1
134 CAPSULE ORAL
Status: DISCONTINUED | OUTPATIENT
Start: 2023-07-26 | End: 2023-07-28

## 2023-07-25 RX ORDER — OXYCODONE HYDROCHLORIDE 5 MG/1
5 TABLET ORAL EVERY 4 HOURS PRN
Qty: 40 TABLET | Refills: 0 | Status: SHIPPED | OUTPATIENT
Start: 2023-07-25

## 2023-07-25 RX ORDER — NALOXONE HYDROCHLORIDE 0.4 MG/ML
0.08 INJECTION, SOLUTION INTRAMUSCULAR; INTRAVENOUS; SUBCUTANEOUS
Status: DISCONTINUED | OUTPATIENT
Start: 2023-07-25 | End: 2023-07-28

## 2023-07-25 RX ORDER — ATORVASTATIN CALCIUM 40 MG/1
40 TABLET, FILM COATED ORAL NIGHTLY
Status: DISCONTINUED | OUTPATIENT
Start: 2023-07-25 | End: 2023-07-28

## 2023-07-25 RX ORDER — ACETAMINOPHEN 500 MG
1000 TABLET ORAL ONCE
Status: DISCONTINUED | OUTPATIENT
Start: 2023-07-25 | End: 2023-07-25 | Stop reason: HOSPADM

## 2023-07-25 RX ORDER — LISINOPRIL 20 MG/1
20 TABLET ORAL 2 TIMES DAILY
Status: DISCONTINUED | OUTPATIENT
Start: 2023-07-25 | End: 2023-07-28

## 2023-07-25 RX ORDER — POLYETHYLENE GLYCOL 3350 17 G/17G
17 POWDER, FOR SOLUTION ORAL DAILY PRN
Status: DISCONTINUED | OUTPATIENT
Start: 2023-07-25 | End: 2023-07-28

## 2023-07-25 RX ORDER — LEVOTHYROXINE SODIUM 88 UG/1
88 TABLET ORAL NIGHTLY
Status: DISCONTINUED | OUTPATIENT
Start: 2023-07-25 | End: 2023-07-28

## 2023-07-25 RX ORDER — OXYCODONE HYDROCHLORIDE 5 MG/1
2.5 TABLET ORAL EVERY 4 HOURS PRN
Status: DISCONTINUED | OUTPATIENT
Start: 2023-07-25 | End: 2023-07-28

## 2023-07-25 RX ORDER — EPHEDRINE SULFATE 50 MG/ML
INJECTION INTRAVENOUS AS NEEDED
Status: DISCONTINUED | OUTPATIENT
Start: 2023-07-25 | End: 2023-07-25 | Stop reason: SURG

## 2023-07-25 RX ORDER — NICOTINE POLACRILEX 4 MG
15 LOZENGE BUCCAL
Status: DISCONTINUED | OUTPATIENT
Start: 2023-07-25 | End: 2023-07-28

## 2023-07-25 RX ORDER — PANTOPRAZOLE SODIUM 40 MG/1
40 TABLET, DELAYED RELEASE ORAL
Qty: 30 TABLET | Refills: 0 | Status: SHIPPED | OUTPATIENT
Start: 2023-07-25 | End: 2023-08-24

## 2023-07-25 RX ORDER — SENNOSIDES 8.6 MG
17.2 TABLET ORAL NIGHTLY PRN
Status: DISCONTINUED | OUTPATIENT
Start: 2023-07-25 | End: 2023-07-28

## 2023-07-25 RX ORDER — NICOTINE POLACRILEX 4 MG
15 LOZENGE BUCCAL
Status: DISCONTINUED | OUTPATIENT
Start: 2023-07-25 | End: 2023-07-25 | Stop reason: HOSPADM

## 2023-07-25 RX ORDER — BISACODYL 10 MG
10 SUPPOSITORY, RECTAL RECTAL
Status: DISCONTINUED | OUTPATIENT
Start: 2023-07-25 | End: 2023-07-25

## 2023-07-25 RX ORDER — MORPHINE SULFATE 2 MG/ML
1 INJECTION, SOLUTION INTRAMUSCULAR; INTRAVENOUS EVERY 2 HOUR PRN
Status: DISCONTINUED | OUTPATIENT
Start: 2023-07-25 | End: 2023-07-28

## 2023-07-25 RX ORDER — ONDANSETRON 2 MG/ML
4 INJECTION INTRAMUSCULAR; INTRAVENOUS ONCE
Status: COMPLETED | OUTPATIENT
Start: 2023-07-25 | End: 2023-07-25

## 2023-07-25 RX ORDER — ACETAMINOPHEN 500 MG
1000 TABLET ORAL 3 TIMES DAILY
Qty: 180 TABLET | Refills: 0 | Status: SHIPPED | OUTPATIENT
Start: 2023-07-25 | End: 2023-08-24

## 2023-07-25 RX ORDER — HYDROMORPHONE HYDROCHLORIDE 1 MG/ML
0.2 INJECTION, SOLUTION INTRAMUSCULAR; INTRAVENOUS; SUBCUTANEOUS EVERY 5 MIN PRN
Status: DISCONTINUED | OUTPATIENT
Start: 2023-07-25 | End: 2023-07-25 | Stop reason: HOSPADM

## 2023-07-25 RX ORDER — HYDROMORPHONE HYDROCHLORIDE 1 MG/ML
0.4 INJECTION, SOLUTION INTRAMUSCULAR; INTRAVENOUS; SUBCUTANEOUS EVERY 5 MIN PRN
Status: DISCONTINUED | OUTPATIENT
Start: 2023-07-25 | End: 2023-07-25 | Stop reason: HOSPADM

## 2023-07-25 RX ORDER — ACETAMINOPHEN AND CODEINE PHOSPHATE 300; 30 MG/1; MG/1
2 TABLET ORAL EVERY 4 HOURS PRN
Status: DISCONTINUED | OUTPATIENT
Start: 2023-07-25 | End: 2023-07-28

## 2023-07-25 RX ORDER — OXYCODONE HCL 10 MG/1
10 TABLET, FILM COATED, EXTENDED RELEASE ORAL ONCE
Status: COMPLETED | OUTPATIENT
Start: 2023-07-25 | End: 2023-07-25

## 2023-07-25 RX ORDER — GLYCOPYRROLATE 0.2 MG/ML
INJECTION, SOLUTION INTRAMUSCULAR; INTRAVENOUS AS NEEDED
Status: DISCONTINUED | OUTPATIENT
Start: 2023-07-25 | End: 2023-07-25 | Stop reason: SURG

## 2023-07-25 RX ORDER — ASPIRIN 325 MG
325 TABLET ORAL 2 TIMES DAILY
Status: DISCONTINUED | OUTPATIENT
Start: 2023-07-25 | End: 2023-07-28

## 2023-07-25 RX ORDER — SODIUM CHLORIDE, SODIUM LACTATE, POTASSIUM CHLORIDE, CALCIUM CHLORIDE 600; 310; 30; 20 MG/100ML; MG/100ML; MG/100ML; MG/100ML
INJECTION, SOLUTION INTRAVENOUS CONTINUOUS
Status: DISCONTINUED | OUTPATIENT
Start: 2023-07-25 | End: 2023-07-28

## 2023-07-25 RX ORDER — LIDOCAINE HYDROCHLORIDE 10 MG/ML
INJECTION, SOLUTION INFILTRATION; PERINEURAL
Status: COMPLETED | OUTPATIENT
Start: 2023-07-25 | End: 2023-07-25

## 2023-07-25 RX ORDER — HYDROMORPHONE HYDROCHLORIDE 1 MG/ML
0.2 INJECTION, SOLUTION INTRAMUSCULAR; INTRAVENOUS; SUBCUTANEOUS EVERY 2 HOUR PRN
Status: DISCONTINUED | OUTPATIENT
Start: 2023-07-25 | End: 2023-07-28

## 2023-07-25 RX ORDER — NICOTINE POLACRILEX 4 MG
30 LOZENGE BUCCAL
Status: DISCONTINUED | OUTPATIENT
Start: 2023-07-25 | End: 2023-07-25 | Stop reason: HOSPADM

## 2023-07-25 RX ORDER — DOCUSATE SODIUM 100 MG/1
100 CAPSULE, LIQUID FILLED ORAL 2 TIMES DAILY
Qty: 60 CAPSULE | Refills: 0 | Status: SHIPPED | OUTPATIENT
Start: 2023-07-25 | End: 2023-08-24

## 2023-07-25 RX ORDER — CEFAZOLIN SODIUM/WATER 2 G/20 ML
2 SYRINGE (ML) INTRAVENOUS EVERY 8 HOURS
Status: COMPLETED | OUTPATIENT
Start: 2023-07-25 | End: 2023-07-26

## 2023-07-25 RX ORDER — AMLODIPINE BESYLATE 2.5 MG/1
2.5 TABLET ORAL 2 TIMES DAILY
Status: DISCONTINUED | OUTPATIENT
Start: 2023-07-25 | End: 2023-07-26

## 2023-07-25 RX ORDER — ENEMA 19; 7 G/133ML; G/133ML
1 ENEMA RECTAL ONCE AS NEEDED
Status: DISCONTINUED | OUTPATIENT
Start: 2023-07-25 | End: 2023-07-25

## 2023-07-25 RX ORDER — ONDANSETRON 2 MG/ML
4 INJECTION INTRAMUSCULAR; INTRAVENOUS EVERY 6 HOURS PRN
Status: DISCONTINUED | OUTPATIENT
Start: 2023-07-25 | End: 2023-07-25

## 2023-07-25 RX ORDER — MORPHINE SULFATE 10 MG/ML
6 INJECTION, SOLUTION INTRAMUSCULAR; INTRAVENOUS EVERY 10 MIN PRN
Status: DISCONTINUED | OUTPATIENT
Start: 2023-07-25 | End: 2023-07-25 | Stop reason: HOSPADM

## 2023-07-25 RX ORDER — NALOXONE HYDROCHLORIDE 0.4 MG/ML
80 INJECTION, SOLUTION INTRAMUSCULAR; INTRAVENOUS; SUBCUTANEOUS AS NEEDED
Status: DISCONTINUED | OUTPATIENT
Start: 2023-07-25 | End: 2023-07-25 | Stop reason: HOSPADM

## 2023-07-25 RX ORDER — TRANEXAMIC ACID 10 MG/ML
1000 INJECTION, SOLUTION INTRAVENOUS ONCE
Status: COMPLETED | OUTPATIENT
Start: 2023-07-25 | End: 2023-07-25

## 2023-07-25 RX ORDER — DEXTROSE MONOHYDRATE 25 G/50ML
50 INJECTION, SOLUTION INTRAVENOUS
Status: DISCONTINUED | OUTPATIENT
Start: 2023-07-25 | End: 2023-07-28

## 2023-07-25 RX ORDER — ONDANSETRON 2 MG/ML
4 INJECTION INTRAMUSCULAR; INTRAVENOUS EVERY 6 HOURS PRN
Status: DISCONTINUED | OUTPATIENT
Start: 2023-07-25 | End: 2023-07-28

## 2023-07-25 RX ORDER — SODIUM CHLORIDE, SODIUM LACTATE, POTASSIUM CHLORIDE, CALCIUM CHLORIDE 600; 310; 30; 20 MG/100ML; MG/100ML; MG/100ML; MG/100ML
INJECTION, SOLUTION INTRAVENOUS CONTINUOUS
Status: DISCONTINUED | OUTPATIENT
Start: 2023-07-25 | End: 2023-07-25 | Stop reason: HOSPADM

## 2023-07-25 RX ORDER — ONDANSETRON 4 MG/1
4 TABLET, FILM COATED ORAL EVERY 8 HOURS PRN
Qty: 10 TABLET | Refills: 1 | Status: SHIPPED | OUTPATIENT
Start: 2023-07-25

## 2023-07-25 RX ORDER — DOCUSATE SODIUM 100 MG/1
100 CAPSULE, LIQUID FILLED ORAL 2 TIMES DAILY
Status: DISCONTINUED | OUTPATIENT
Start: 2023-07-25 | End: 2023-07-28

## 2023-07-25 RX ORDER — ACETAMINOPHEN 325 MG/1
650 TABLET ORAL EVERY 6 HOURS PRN
Status: DISCONTINUED | OUTPATIENT
Start: 2023-07-25 | End: 2023-07-28

## 2023-07-25 RX ORDER — TRAMADOL HYDROCHLORIDE 50 MG/1
50 TABLET ORAL EVERY 4 HOURS PRN
Qty: 60 TABLET | Refills: 0 | Status: ON HOLD | OUTPATIENT
Start: 2023-07-25 | End: 2023-08-01

## 2023-07-25 RX ORDER — HYDROMORPHONE HYDROCHLORIDE 1 MG/ML
0.4 INJECTION, SOLUTION INTRAMUSCULAR; INTRAVENOUS; SUBCUTANEOUS EVERY 2 HOUR PRN
Status: DISCONTINUED | OUTPATIENT
Start: 2023-07-25 | End: 2023-07-28

## 2023-07-25 RX ORDER — CEFAZOLIN SODIUM/WATER 2 G/20 ML
2 SYRINGE (ML) INTRAVENOUS ONCE
Status: COMPLETED | OUTPATIENT
Start: 2023-07-25 | End: 2023-07-25

## 2023-07-25 RX ORDER — DIPHENHYDRAMINE HYDROCHLORIDE 50 MG/ML
12.5 INJECTION INTRAMUSCULAR; INTRAVENOUS EVERY 4 HOURS PRN
Status: DISCONTINUED | OUTPATIENT
Start: 2023-07-25 | End: 2023-07-28

## 2023-07-25 RX ORDER — SENNOSIDES 8.6 MG
17.2 TABLET ORAL NIGHTLY
Status: DISCONTINUED | OUTPATIENT
Start: 2023-07-25 | End: 2023-07-28

## 2023-07-25 RX ORDER — NICOTINE POLACRILEX 4 MG
30 LOZENGE BUCCAL
Status: DISCONTINUED | OUTPATIENT
Start: 2023-07-25 | End: 2023-07-28

## 2023-07-25 RX ORDER — POLYETHYLENE GLYCOL 3350 17 G/17G
17 POWDER, FOR SOLUTION ORAL DAILY PRN
Status: DISCONTINUED | OUTPATIENT
Start: 2023-07-25 | End: 2023-07-25

## 2023-07-25 RX ORDER — MELATONIN
3 NIGHTLY PRN
Status: DISCONTINUED | OUTPATIENT
Start: 2023-07-25 | End: 2023-07-28

## 2023-07-25 RX ORDER — MIDAZOLAM HYDROCHLORIDE 1 MG/ML
INJECTION INTRAMUSCULAR; INTRAVENOUS AS NEEDED
Status: DISCONTINUED | OUTPATIENT
Start: 2023-07-25 | End: 2023-07-25 | Stop reason: SURG

## 2023-07-25 RX ORDER — MORPHINE SULFATE 4 MG/ML
4 INJECTION, SOLUTION INTRAMUSCULAR; INTRAVENOUS EVERY 2 HOUR PRN
Status: DISCONTINUED | OUTPATIENT
Start: 2023-07-25 | End: 2023-07-28

## 2023-07-25 RX ORDER — DIPHENHYDRAMINE HCL 25 MG
25 CAPSULE ORAL EVERY 4 HOURS PRN
Status: DISCONTINUED | OUTPATIENT
Start: 2023-07-25 | End: 2023-07-28

## 2023-07-25 RX ORDER — PHENYLEPHRINE HCL 10 MG/ML
VIAL (ML) INJECTION AS NEEDED
Status: DISCONTINUED | OUTPATIENT
Start: 2023-07-25 | End: 2023-07-25 | Stop reason: SURG

## 2023-07-25 RX ORDER — MELOXICAM 15 MG/1
15 TABLET ORAL DAILY
Qty: 30 TABLET | Refills: 0 | Status: SHIPPED | OUTPATIENT
Start: 2023-07-25 | End: 2023-08-01

## 2023-07-25 RX ADMIN — MIDAZOLAM HYDROCHLORIDE 2 MG: 1 INJECTION INTRAMUSCULAR; INTRAVENOUS at 10:44:00

## 2023-07-25 RX ADMIN — PHENYLEPHRINE HCL 50 MCG: 10 MG/ML VIAL (ML) INJECTION at 12:50:00

## 2023-07-25 RX ADMIN — GLYCOPYRROLATE 0.1 MG: 0.2 INJECTION, SOLUTION INTRAMUSCULAR; INTRAVENOUS at 11:36:00

## 2023-07-25 RX ADMIN — EPHEDRINE SULFATE 5 MG: 50 INJECTION INTRAVENOUS at 12:45:00

## 2023-07-25 RX ADMIN — CEFAZOLIN SODIUM/WATER 2 G: 2 G/20 ML SYRINGE (ML) INTRAVENOUS at 11:06:00

## 2023-07-25 RX ADMIN — SODIUM CHLORIDE, SODIUM LACTATE, POTASSIUM CHLORIDE, CALCIUM CHLORIDE: 600; 310; 30; 20 INJECTION, SOLUTION INTRAVENOUS at 10:44:00

## 2023-07-25 RX ADMIN — EPHEDRINE SULFATE 5 MG: 50 INJECTION INTRAVENOUS at 11:33:00

## 2023-07-25 RX ADMIN — SODIUM CHLORIDE, SODIUM LACTATE, POTASSIUM CHLORIDE, CALCIUM CHLORIDE: 600; 310; 30; 20 INJECTION, SOLUTION INTRAVENOUS at 12:28:00

## 2023-07-25 RX ADMIN — TRANEXAMIC ACID 1000 MG: 10 INJECTION, SOLUTION INTRAVENOUS at 11:06:00

## 2023-07-25 RX ADMIN — LIDOCAINE HYDROCHLORIDE 5 ML: 10 INJECTION, SOLUTION INFILTRATION; PERINEURAL at 10:50:00

## 2023-07-25 RX ADMIN — SODIUM CHLORIDE, SODIUM LACTATE, POTASSIUM CHLORIDE, CALCIUM CHLORIDE: 600; 310; 30; 20 INJECTION, SOLUTION INTRAVENOUS at 13:22:00

## 2023-07-25 RX ADMIN — LIDOCAINE HYDROCHLORIDE 50 MG: 10 INJECTION, SOLUTION EPIDURAL; INFILTRATION; INTRACAUDAL; PERINEURAL at 10:44:00

## 2023-07-25 RX ADMIN — EPHEDRINE SULFATE 5 MG: 50 INJECTION INTRAVENOUS at 12:05:00

## 2023-07-25 RX ADMIN — BUPIVACAINE HYDROCHLORIDE 1.5 ML: 7.5 INJECTION, SOLUTION INTRASPINAL at 10:50:00

## 2023-07-25 RX ADMIN — EPHEDRINE SULFATE 5 MG: 50 INJECTION INTRAVENOUS at 11:28:00

## 2023-07-25 NOTE — CM/SW NOTE
Department  notified of request for [facility], an[place] referrals started. Assigned CM/SW to follow up with pt/family on further discharge planning.      Rodolfo Shabazz   July 25, 2023   15:28

## 2023-07-25 NOTE — OPERATIVE REPORT
OPERATIVE REPORT    PROCEDURE DATE  7/25/2023    ATT MD Lauren Van MD Fellow. (No qualified resident was available to assist with this case; we will thus bill for Julia Lopez MD)    HOUSE-STAFF SURGEON  None. ASSISTANT  Néstor Delatorre, surgical assist.    PREOPERATIVE DIAGNOSIS  right  knee degenerative joint disease. POSTOPERATIVE DIAGNOSIS  right knee degenerative joint disease. PROCEDURE  Cemented right total knee arthroplasty. ANESTHESIA  Spinal and epidural.    PROCEDURE POSITION  Supine. PREOPERATIVE ANTIBIOTICS  Ancef 2 g IV within 60 minutes of procedure start. ESTIMATED BLOOD LOSS  100 mL. TOURNIQUET TIME  100 minutes at 250 mmHg to right lower extremity. DRAINS  None     SPECIMEN SENT TO PATHOLOGY  Removed Bone cuts. IMPLANTS  1. Obed Persona Tibia, Size C  2. Obed Persona Cemented All Poly Patella, 32 mm diameter  3. Obed Persona CR, Size 7 Narrow  4. Obed Persona Vivacit-E Highly Crosslinked Polyethylene, Medial Congruent, 11 mm height  5. Obed Persona Stem Extension, 14 mm diameter, +30 mm length   6. [place] Simplex Bone Cement    COMPLICATIONS  None apparent. FINDINGS  Consistent with right knee degenerative joint disease in all three compartments. INDICATIONS  Alberto Olmedo is a [de-identified]year old female who has been followed by the orthopedic surgery service for right -sided knee pain. Alberto Olmedo was previously seen and evaluated in the orthopedic clinic and diagnosed with severe knee degenerative joint disease. After nonoperative treatment measures such as physical therapy, activity modification, weight loss, and intra-articular steroid injections failed to improved the patient's symptoms, Alberto Olmedo wished to proceed with surgery and was therefore scheduled for the above-described procedure on an elective basis.     TECHNIQUE  Alberto Olmedo was identified and greeted in the preoperative [facility] area and was identified using medical record number, name, and date of birth, all of which were confirmed. The operative knee was marked using an indelible marker and informed consent was verbally confirmed. The patient had previously signed a consent in clinic. Once again, all risks and benefits as well as alternatives to surgical intervention were discussed with the patient in detail and all the questions were answered. Risks discussed included but were not limited to pain, infection, bleeding, scarring, stiffness, thromboembolic events, severe limb dysfunction, loss of limb, and loss of life. The patient verbally confirmed the consent and wished to proceed with surgery as scheduled. The anesthesia service then proceeded with spinal and epidural anesthesia. The patient was then taken to the operating room and placed on the operating room table in supine position. Care was taken to pad all bony prominences well. A tourniquet was applied, but not yet inflated to her proximal thigh. The leg was prepped and draped in the standard orthopedic fashion using ChloraPrep. A preprocedural timeout was then performed once again confirmed the patient's correct identity, correct procedure, correct side, and correct site. In addition, allergies and required equipment were reviewed. Three independent members of the operating room team agreed on the aforementioned parameters. After exsanguination using an esmarch wrap the tourniquet was then inflated, and the knee was brought into flexion and a standard anterior midline incision was made centered over the patella extending from just medial to the tibial tubercle to about 2 fingerbreadths proximal to the superior pole of the patella. The incision was carried down sharply through the subcutaneous tissue until the extensor mechanism was identified.  A full-thickness medial and lateral skin flap was then created and a standard medial parapatellar arthrotomy was performed using a Bovie. A medial release was then performed in the usual fashion and the fat pad was removed under careful protection of the patellar tendon using a Bovie. Next, the ACL, PCL as well as the anterior horn of the lateral meniscus were recessed. The distal femur was then opened using the standard drill equipment and the distal femoral cutting guide was placed using an intramedullary alignment guide set at 5 degrees of valgus. It was pinned in place, and the distal femoral cut was made in the usual fashion using an oscillating saw under careful protection of MCL and LCL. Attention was then turned to the tibia and using an extramedullary alignment guide, 10 mm were taken off the high side of the tibial plateau. This was once again done under protection of MCL and LCL. Large osteophytes on the tibia were then carefully removed using a small rongeur, and the tibia was sized. Any meniscal remnants were then carefully removed under direct visualization. The distal femur was then sized using a standard sizing equipment. The distal femoral cutting block was then placed under the distal femur and position was confirmed using [place]'s line and direct visualization of the transepicondylar axis. The anterior, posterior, and chamfer cuts were made in the usual fashion using an oscillating saw once again under careful protection of the MCL and LCL. Trial components were then placed using a Obed Persona components. This was found to achieve full knee extension and 135 degrees of flexion. The knee was found to be stable throughout the range of motion to varus and valgus stress as well as anterior and posterior drawer and flexion. Attention was then turned to the patella. The patella was measured using the caliper. The patella was then resurfaced using an oscillating saw under careful protection of both quadriceps and patellar tendon and was sized.   The patellar lug holes were then drilled in the usual fashion. A patellar trial was placed. With the knee range of motion, the patella was found to track well without liftoff or tilt. Accordingly, all trial components were removed and the tibial preparation was finalized using a drill and punch. All bony cuts were then copiously irrigated using a pulse lavage system and dried in preparation of the cementation. The components were cemented into place starting with the tibia, followed by the femur and application of a polyethylene trial. Throughout the cementation process, any excess cement was carefully removed using a tonsil and the knee was then left in full extension until the cement was cured and the patella was cemented into place and patellar clamp was applied. Again, the entire knee was inspected for any remaining cement parts, which were carefully removed. While the cement cured, the knee was copious irrigated using normal saline and a pulse lavage system. The polyethylene trial was then exchanged for the final polyethylene liner. The knee was one final time inspected for any cement particles left behind, none of which were found. The knee was then closed in a layered fashion using #1 vicryl for the extensor mechanism at the corners and #2 quill, followed by a 2-0 Vicryl in a simple interrupted suture-type fashion for subcutaneous closure and staples for skin. An PREVENA dressing was then applied. The tourniquet was then let down after 100 minutes. The leg was then wrapped in a bulky Vance dressing followed by Ace wrap. At the end of the procedure, all sponge, needle, instrument, and scalpel counts were performed and found to be correct. Attending physician Dr. Michelle Larson was scrubbed and present for all key parts of the procedure. He supervised the entire procedure. Pt was transferred onto a regular bed and brought to the PACU in stable condition.     POSTOPERATIVE PLAN  - Admitted to [facility]  - Medicine co-management  - Multimodal pain regimen  - WBAT RLE  - Aquacel dressing to stay in place x1 week  - ASA 325mg BID chemoppx, with SCDs and ambulation  - PT/OT        Karla Morrison MD  Adult Reconstruction Fellow  07/25/23 1:39 PM  Please use [employer] [facility] for clinical questions  (c) [phone]  07/25/23 1:39 PM

## 2023-07-25 NOTE — H&P
History & Physical Examination    Patient Name: Luly Rojo  MRN: [id number]  CSN: [id number]  YOB: 1945    Diagnosis: R knee DJD    Present Illness: Luly Rojo is a [de-identified]year old yo F with a history of r knee end stage DJD. Cleared by their PMD for a r TKA. There are no contraindications to proceed with surgery today. Informed consent has been obtained and all RBA have been discussed. denosumab (Prolia) 60 MG/ML SUBQ injection 60 mg, 60 mg, Subcutaneous, Q6 Months, Bhavya Monroy MD, 60 mg at 04/25/23 1150    albuterol (PROAIR HFA) 108 (90 Base) MCG/ACT Inhalation Aero Soln, Inhale 2 puffs into the lungs every 4 (four) hours as needed for Wheezing., Disp: 1 each, Rfl: 1, 7/25/2023  furosemide 20 MG Oral Tab, Take 0.5 tablets (10 mg total) by mouth daily as needed. , Disp: , Rfl:   fenofibrate 145 MG Oral Tab, TAKE 1 TABLET ONCE DAILY, Disp: 90 tablet, Rfl: 3, 7/24/2023  levothyroxine (SYNTHROID) 88 MCG Oral Tab, Take 1 tablet (88 mcg total) by mouth nightly., Disp: 90 tablet, Rfl: 3, 7/24/2023  venlafaxine ER 37.5 MG Oral Capsule SR 24 Hr, Take 1 capsule (37.5 mg total) by mouth nightly., Disp: 90 capsule, Rfl: 3, 7/24/2023  atorvastatin 40 MG Oral Tab, Take 1 tablet (40 mg total) by mouth nightly., Disp: 90 tablet, Rfl: 3, 7/24/2023  Semaglutide,0.25 or 0.5MG/DOS, (OZEMPIC, 0.25 OR 0.5 MG/DOSE,) 2 MG/3ML Subcutaneous Solution Pen-injector, Inject 0.5 mg into the skin every 7 days. , Disp: 9 mL, Rfl: 1, Past Week  levocetirizine 5 MG Oral Tab, Take 1 tablet (5 mg total) by mouth nightly., Disp: 90 tablet, Rfl: 3, 7/24/2023  amLODIPine 2.5 MG Oral Tab, TAKE 1 TABLET TWICE A DAY, Disp: 180 tablet, Rfl: 3, 7/25/2023  clopidogrel 75 MG Oral Tab, Take 1 tablet (75 mg total) by mouth daily. , Disp: 90 tablet, Rfl: 3, 7/19/2023  lisinopril 20 MG Oral Tab, Take 1 tablet (20 mg total) by mouth 2 (two) times daily. , Disp: , Rfl: , 7/24/2023  metFORMIN HCl 1000 MG Oral Tab, Take 1 tablet (1,000 mg total) by mouth daily with dinner., Disp: 90 tablet, Rfl: 1, Past Week  aspirin 81 MG Oral Tab EC, Take 1 tablet (81 mg total) by mouth daily. At night per pt, Disp: , Rfl: , 7/24/2023  Selenium 200 MCG Oral Tab, Take 1 tablet (200 mcg total) by mouth daily. , Disp: , Rfl: , Past Week  FIBER ADULT GUMMIES OR, Take 2 tablets by mouth at bedtime. , Disp: , Rfl: , Past Week  EPINEPHrine 0.3 MG/0.3ML Injection Solution Auto-injector, Use as directed., Disp: 1 each, Rfl: 0  Digestive Enzymes (ENZYME DIGEST) Oral Cap, Take by mouth. Patient is taking Digest Gold with ATPro one cap in the morning and two at dinner time. , Disp: , Rfl: , 7/14/2023  ALBUTEROL SULFATE  (90 Base) MCG/ACT Inhalation Aero Soln, INHALE 2 PUFFS INTO THE LUNGS EVERY 6 HOURS AS NEEDED FOR WHEEZING, Disp: 25.5 g, Rfl: 0  Coenzyme Q10 (COQ10) 200 MG Oral Cap, Take 200 mg by mouth daily. , Disp: , Rfl: , Past Week  Vitamin B-12 1000 MCG Oral Tab, Take 1 tablet (1,000 mcg total) by mouth daily. , Disp: , Rfl: , Past Week  DHA-EPA-Vitamin E (OMEGA-3 COMPLEX OR), Take by mouth., Disp: , Rfl: , 7/14/2023  Multiple Vitamins-Iron (ONCE DAILY/IRON) Oral Tab, Take 1 tablet by mouth daily. , Disp: , Rfl: , 7/14/2023  Cholecalciferol 50 MCG (2000 UT) Oral Tab, Take 1 tablet (2,000 Units total) by mouth daily. , Disp: , Rfl: , Past Week  VITAMIN E 400 UNIT OR TABS, 1 TABLET DAILY, Disp: , Rfl: , Past Week  VITAMIN C 500 MG OR TABS, 1 TABLET DAILY, Disp: , Rfl: , Past Week  TYLENOL ARTHRITIS PAIN OR, Take by mouth., Disp: , Rfl: , 7/25/2023  ZINC 50 MG OR TABS, 1 TABLET DAILY, Disp: , Rfl: , Past Week  COD LIVER OIL OR, 1 DAILY, Disp: , Rfl: , 7/14/2023  Glucose Blood (ONETOUCH ULTRA) In Vitro Strip, 1 each by Other route in the morning and 1 each before bedtime. , Disp: 100 each, Rfl: 5  Glucose Blood (ONETOUCH ULTRA) In Vitro Strip, Test blood sugars 3 times daily, Disp: 300 strip, Rfl: 3        glucose (Dex4) 15 GM/59ML oral liquid 15 g, 15 g, Oral, Q15 Min PRN   Or  glucose (Glutose) 40% oral gel 15 g, 15 g, Oral, Q15 Min PRN   Or  glucose-vitamin C (Dex-4) chewable tab 4 tablet, 4 tablet, Oral, Q15 Min PRN   Or  dextrose 50% injection 50 mL, 50 mL, Intravenous, Q15 Min PRN   Or  glucose (Dex4) 15 GM/59ML oral liquid 30 g, 30 g, Oral, Q15 Min PRN   Or  glucose (Glutose) 40% oral gel 30 g, 30 g, Oral, Q15 Min PRN   Or  glucose-vitamin C (Dex-4) chewable tab 8 tablet, 8 tablet, Oral, Q15 Min PRN  lactated ringers infusion, , Intravenous, Continuous  acetaminophen (Tylenol Extra Strength) tab 1,000 mg, 1,000 mg, Oral, Once  ceFAZolin (Ancef) 2 g in 20mL IV syringe premix, 2 g, Intravenous, Once  clonidine-EPINEPHrine-ropivacaine (CERs) (Duraclon-Adrenalin-Naropin) pain cocktain irrigation (for NSAId allergic patients), , Intra-articular, Once  clonidine-EPINEPHrine-ropivacaine-ketorolac (CERTS) (Duraclon-Adrenalin-Naropin-Toradol) pain cocktail irrigation, , Intra-articular, Once (Intra-Op)        Allergies: Bees, Glimepiride, Gluten Meal, Ibuprofen, Nsaids, and Sulfa Antibiotics    Past Medical History:   Diagnosis Date    ANXIETY     Appendicitis     Asthma     Constipation     Coronary atherosclerosis     DEPRESSION     Diabetes ([facility] [address]     Essential hypertension     Generalized OA     Heart disease     HYPERLIPIDEMIA     Hyperlipidemia     Multiple allergies     Osteoarthritis     Ovarian cyst     Pneumonia due to organism     ST segment changes on electrocardiogram 05/31/2018    TWI new in V 3-V6. Cath.: medical Rx. Visual impairment     glasses     Past Surgical History:   Procedure Laterality Date    APPENDECTOMY      CATARACT Bilateral 01/28/2019    Dr. Jose Luis Lopez at [facility].      CATH BARE METAL STENT (BMS)      COLONOSCOPY  2009    nml    ELECTROCARDIOGRAM, COMPLETE  02/07/2014    SCANNED TO MEDIA TAB - 02/07/2014     Family History   Problem Relation Age of Onset    Stroke Father     Heart Disorder Father     Allergies Father     Cancer Mother         ? source- widespread at diagnosis    Other (goiter) Daughter     Other (Celiac) Daughter     Breast Cancer Paternal Aunt 76        age at dx 76     Social History    Tobacco Use      Smoking status: Never      Smokeless tobacco: Never    Alcohol use: Yes      Alcohol/week: 1.0 standard drink of alcohol      Types: 1 Glasses of wine per week      Comment: 1 glass red wine a day      SYSTEM Check if Review is Normal Check if Physical Exam is Normal If not normal, please explain:   HEENT [ x ] [ ]    Chanelle Rile [ x ] [ ]    HEART [ x ] [ ]    LUNGS [ x ] [ ]    ABDOMEN [ x ] [ ]    UROGENITAL [ x ] [ ]    EXTREMITIES [ ] [ x ]    OTHER        [ x ] I have discussed the risks and benefits and alternatives with the patient/family. They understand and agree to proceed with plan of care. [ x ] I have reviewed the History and Physical done within the last 30 days. Any changes noted above.     Wilson Canavan, MD  7/25/2023  9:48 AM

## 2023-07-25 NOTE — ANESTHESIA PROCEDURE NOTES
Spinal Block    Date/Time: 7/25/2023 10:50 AM    Performed by: Otto Mina MD  Authorized by: Otto Mina MD      General Information and Staff    Start Time:  7/25/2023 10:50 AM  End Time:  7/25/2023 11:00 AM  Anesthesiologist:  Otto Mina MD  Performed by: Anesthesiologist  Patient Location:  OR  Site identification: surface landmarks    Preanesthetic Checklist: patient identified, IV checked, risks and benefits discussed, monitors and equipment checked, pre-op evaluation, timeout performed, anesthesia consent and sterile technique used      Procedure Details    Patient Position:  Sitting  Prep: ChloraPrep    Monitoring:  Cardiac monitor, heart rate and continuous pulse ox  Approach:  Midline  Location:  L3-4  Injection Technique:  Single-shot    Needle    Needle Type:  Quincke  Needle Gauge:  22 G  Needle Length:  3.5 in    Assessment    Sensory Level:   Events: clear CSF, CSF aspirated, well tolerated and blood negative      Additional Comments     Difficult placement, ultimately successful left of midline, likely some degree of degenerative scoliosis.  Good CSF flow, slow injection of meds

## 2023-07-25 NOTE — PROGRESS NOTES
[facility] Note:  Renal Dose Adjustment for Metoclopramide (REGLAN)    Kirstie Neal has been prescribed Metoclopramide (REGLAN) 10 mg every 8 hours as needed for nausea/vomiting,. Crcl = 32 ml/min    Calculated creatinine clearance is < 40 ml/min, therefore, the dose of Metoclopramide (REGLAN) has been changed to 5 mg every 8 hours as needed for nausea/vomiting per P&T approved protocol. Pharmacy will continue to follow, and if renal function improves, will resume the original order.        Thank you,  Lucas Lindquist, PharmD  7/25/2023 4:33 PM

## 2023-07-26 PROBLEM — R42 DIZZINESS: Status: ACTIVE | Noted: 2023-07-26

## 2023-07-26 LAB
ALBUMIN SERPL-MCNC: 3.1 G/DL (ref 3.4–5)
ALBUMIN/GLOB SERPL: 1.2 {RATIO} (ref 1–2)
ALP LIVER SERPL-CCNC: 18 U/L
ALT SERPL-CCNC: 17 U/L
ANION GAP SERPL CALC-SCNC: 8 MMOL/L (ref 0–18)
AST SERPL-CCNC: 21 U/L (ref 15–37)
BILIRUB SERPL-MCNC: 0.4 MG/DL (ref 0.1–2)
BUN BLD-MCNC: 24 MG/DL (ref 7–18)
BUN/CREAT SERPL: 26.1 (ref 10–20)
CALCIUM BLD-MCNC: 8.3 MG/DL (ref 8.5–10.1)
CHLORIDE SERPL-SCNC: 100 MMOL/L (ref 98–112)
CO2 SERPL-SCNC: 24 MMOL/L (ref 21–32)
CREAT BLD-MCNC: 0.92 MG/DL
DEPRECATED RDW RBC AUTO: 47.3 FL (ref 35.1–46.3)
EGFRCR SERPLBLD CKD-EPI 2021: 63 ML/MIN/1.73M2 (ref 60–?)
ERYTHROCYTE [DISTWIDTH] IN BLOOD BY AUTOMATED COUNT: 12.9 % (ref 11–15)
GLOBULIN PLAS-MCNC: 2.5 G/DL (ref 2.8–4.4)
GLUCOSE BLD-MCNC: 148 MG/DL (ref 70–99)
GLUCOSE BLDC GLUCOMTR-MCNC: 158 MG/DL (ref 70–99)
GLUCOSE BLDC GLUCOMTR-MCNC: 170 MG/DL (ref 70–99)
GLUCOSE BLDC GLUCOMTR-MCNC: 175 MG/DL (ref 70–99)
GLUCOSE BLDC GLUCOMTR-MCNC: 190 MG/DL (ref 70–99)
HCT VFR BLD AUTO: 24.8 %
HGB BLD-MCNC: 8.4 G/DL
MCH RBC QN AUTO: 33.7 PG (ref 26–34)
MCHC RBC AUTO-ENTMCNC: 33.9 G/DL (ref 31–37)
MCV RBC AUTO: 99.6 FL
OSMOLALITY SERPL CALC.SUM OF ELEC: 281 MOSM/KG (ref 275–295)
PLATELET # BLD AUTO: 199 10(3)UL (ref 150–450)
POTASSIUM SERPL-SCNC: 4 MMOL/L (ref 3.5–5.1)
PROT SERPL-MCNC: 5.6 G/DL (ref 6.4–8.2)
RBC # BLD AUTO: 2.49 X10(6)UL
SODIUM SERPL-SCNC: 132 MMOL/L (ref 136–145)
WBC # BLD AUTO: 9.9 X10(3) UL (ref 4–11)

## 2023-07-26 PROCEDURE — 99223 1ST HOSP IP/OBS HIGH 75: CPT | Performed by: INTERNAL MEDICINE

## 2023-07-26 RX ORDER — AMLODIPINE BESYLATE 5 MG/1
5 TABLET ORAL 2 TIMES DAILY
Status: DISCONTINUED | OUTPATIENT
Start: 2023-07-26 | End: 2023-07-28

## 2023-07-26 RX ORDER — HYDRALAZINE HYDROCHLORIDE 20 MG/ML
10 INJECTION INTRAMUSCULAR; INTRAVENOUS EVERY 6 HOURS PRN
Status: DISCONTINUED | OUTPATIENT
Start: 2023-07-26 | End: 2023-07-28

## 2023-07-26 RX ORDER — PANTOPRAZOLE SODIUM 40 MG/1
40 TABLET, DELAYED RELEASE ORAL
Status: DISCONTINUED | OUTPATIENT
Start: 2023-07-26 | End: 2023-07-28

## 2023-07-26 RX ORDER — ASPIRIN 325 MG
325 TABLET ORAL 2 TIMES DAILY
Qty: 60 TABLET | Refills: 0 | Status: SHIPPED | OUTPATIENT
Start: 2023-07-26 | End: 2023-08-01

## 2023-07-26 NOTE — PHYSICAL THERAPY NOTE
PHYSICAL THERAPY KNEE EVALUATION - INPATIENT       Room Number: 434/434-A  Evaluation Date: 7/26/2023  Type of Evaluation: Initial  Physician Order: PT Eval and Treat    Presenting Problem: R TKR  Co-Morbidities : cardiac history stent  Reason for Therapy: Mobility Dysfunction and Discharge Planning    PHYSICAL THERAPY ASSESSMENT     Patient is a [de-identified]year old female admitted 7/25/2023 for R TKR WBAT. Patient's current functional deficits include decreased bed mobility, transfers, gait, ROM, strength, unsteady balance, stair mobility which are below the patient's pre-admission status. Pt ed with bed mobility with mod a to the EOB pt presenting as assumed, weak and  tremulous. Pt ed with transfers with mod a with RW with c/o dizziness and pain requiring mod a to amb 5 steps shuffling gait and up to chair. Pt asked for a drink of water and immediately presented as syncopal with very little warning while seated. LE's were elevated and pt started answering questions A/O x 4 after ~ 3-5 sec. Pt's MD and RN were present in the room to assist with vitals and assessment. Pt reported feeling better with legs elevated at rest and she was handed off to her RN. Pt does not currently demonstrate adequate functional mobility or safety for home D/C. PT will re attempt gait progression in PM as medical progress allows. [facility] PT with significant other assist is recommended as medical progress allows. The patient's Approx Degree of Impairment: 50.57% has been calculated based on documentation in the [facility] '6 clicks' Inpatient Basic Mobility Short Form. Research supports that patients with this level of impairment may benefit from [facility] PT with assist as medical progress allows with a RW upon home D/C. Patient will benefit from continued IP PT services to address these deficits in preparation for discharge.     DISCHARGE RECOMMENDATIONS  PT Discharge Recommendations: Home with home health PT    PLAN  PT Treatment Plan: Bed mobility; Body mechanics; Endurance; Energy conservation;Patient education; Family education;Gait training;Range of motion;Strengthening;Transfer training;Balance training  Rehab Potential : Good  Frequency (Obs): BID       PHYSICAL THERAPY MEDICAL/SOCIAL HISTORY     History related to current admission: R TKR WBAT    Problem List  Active Problems:    Type 2 diabetes mellitus with hyperglycemia, without long-term current use of insulin (HCC)    Coronary artery disease due to calcified coronary lesion    Acquired hypothyroidism    Asthma with COPD (chronic obstructive pulmonary disease) (HCC)    Right knee DJD    OA (osteoarthritis) of knee      HOME SITUATION  Home Situation  Type of Home: House  Home Layout: Multi-level; Able to live on main level  Stairs to Enter : 2  Railing: Yes  Stairs to Bedroom: 15  Railing: Yes  Lives With: Significant other  Drives: No  Patient Owned Equipment: None     Prior Level of [place]: Pt lives with significant other indep with all mobility and Adl's. SUBJECTIVE  I was dizzy yesterday and then again today with standing and walking, I feel so weak and sore with movement it's all very exhausting. I feel better now that I am sitting in the chair. PHYSICAL THERAPY EXAMINATION     OBJECTIVE  Precautions: Bed/chair alarm  Fall Risk: High fall risk    WEIGHT BEARING RESTRICTION        R Lower Extremity: Weight Bearing as Tolerated       PAIN ASSESSMENT  Ratin  Location: RLE knee  Management Techniques: Activity promotion; Body mechanics;Breathing techniques;Relaxation;Repositioning    COGNITION  Overall Cognitive Status:  WFL - within functional limits A/O x 4 in chair 1 episode of a syncopal episode ~3-5 sec not responsive while up to chair     RANGE OF MOTION AND STRENGTH ASSESSMENT  Upper extremity ROM and strength are within functional limits   Lower extremity ROM is within functional limits   Lower extremity strength is within functional limits R knee -10 to 60 degrees    BALANCE  Static Sitting: Fair +  Dynamic Sitting: Fair  Static Standing: Fair -  Dynamic Standing: Poor +                                                                       ACTIVITY TOLERANCE  Pulse: 87  Heart Rate Source: Monitor  Resp: 12  BP: (!) 161/57  BP Location: Right arm  BP Method: Automatic  Patient Position: Sitting    O2 WALK  Oxygen Therapy  SPO2% on Room Air at Rest: 98  SPO2% Ambulation on Room Air: 98    AM-PAC '6-Clicks' INPATIENT SHORT FORM - BASIC MOBILITY  How much difficulty does the patient currently have. .. Patient Difficulty: Turning over in bed (including adjusting bedclothes, sheets and blankets)?: A Little   Patient Difficulty: Sitting down on and standing up from a chair with arms (e.g., wheelchair, bedside commode, etc.): A Little   Patient Difficulty: Moving from lying on back to sitting on the side of the bed?: A Little   How much help from another person does the patient currently need. .. Help from Another: Moving to and from a bed to a chair (including a wheelchair)?: A Little   Help from Another: Need to walk in hospital room?: A Little   Help from Another: Climbing 3-5 steps with a railing?: A Lot     AM-PAC Score:  Raw Score: 17   Approx Degree of Impairment: 50.57%   Standardized Score (AM-PAC Scale): 42.13   CMS Modifier (G-Code): CK    FUNCTIONAL ABILITY STATUS  Functional Mobility/Gait Assessment  Gait Assistance: Moderate assistance  Distance (ft): 5  Assistive Device: Rolling walker  Pattern: R Decreased stance time; Shuffle (syncopal episode assisted to chair RN and PT to assist)    Bed Mobility: Mod A    Transfers: Mod a RW    Exercise/Education Provided:  Bed mobility  Body mechanics  Energy conservation  Functional activity tolerated  Gait training  Posture  ROM  Strengthening  Lower therapeutic exercise: Ankle pumps  Heel raises  LAQ  Transfer training    Patient End of Session: Up in chair; With [address] staff;Needs met;Call light within reach;RN aware of session/findings; All patient questions and concerns addressed; Ice applied; Alarm set    CURRENT GOALS    Goals to be met by: 8/15/2023  Patient Goal Patient's self-stated goal is: Return home    Goal #1 Patient is able to demonstrate supine - sit EOB @ level: modified independent     Goal #1   Current Status    Goal #2 Patient is able to demonstrate transfers Sit to/from Stand at assistance level: modified independent     Goal #2  Current Status    Goal #3 Patient is able to ambulate 300 feet with assistive device at assistance level: modified independent    Goal #3   Current Status    Goal #4 Patient will negotiate 2 stairs/one curb w/ assistive device and supervision   Goal #4   Current Status    Goal #5  AROM 0 degrees extension to 95 degrees flexion     Goal #5   Current Status    Goal #6 Patient independently performs home exercise program for ROM/strengthening per the instructions provided in preparation for discharge.    Goal #6  Current Status    Patient Evaluation Complexity Level:  History Low - no personal factors and/or co-morbidities   Examination of body systems Low - addressing 1-2 elements   Clinical Presentation Low - Stable   Clinical Decision Making Low Complexity       Gait Training: 10 minutes

## 2023-07-26 NOTE — CM/SW NOTE
07/26/23 0900   CM/SW Referral Data   Referral Source Physician   Reason for Referral Discharge planning   Informant Patient   Medical Hx   Does patient have an established PCP? Yes  Albino Kenny)   Patient Info   Patient's Current Mental Status at Time of Assessment Alert;Oriented   Patient's [address]   Patient lives with Spouse/Significant other   Patient Status Prior to Admission   Independent with ADLs and Mobility Yes   Discharge Needs   Anticipated D/C needs Home health care   Choice of Post-Acute Provider   Informed patient of right to choose their preferred provider Yes   Patient/family choice [facility]     Pt discussed during nursing rounds. Sx Right TKA. From home w/SO, independent w/walker at baseline. Pre-ortho w/[facility], orders sent in [facility]. Agency notified pt will dc in 1-2 days. Pt suffered syncopal episode w/PT this morning. Plan: Home w/SO with [facility] pending medical clearance. / to remain available for support and/or discharge planning.      ARRON Balbuena    [phone]

## 2023-07-26 NOTE — OCCUPATIONAL THERAPY NOTE
07/26/23 1123   VISIT TYPE   OT Inpatient Visit Type (Documentation Required) Attempted Evaluation  (Hold OT at this time due to dizziness and nausea.)   OT Follow Up   Charge Other (Comment)  (Missed visit)   Follow Up Needed (Documentation Required) Yes   OT Follow-up Date 07/26/23       Tianna Sheets OTR/L  [address]

## 2023-07-26 NOTE — PROGRESS NOTES
Face to Face Encounter    I (or a non-physician practitioner working in collaboration with me) had a face to face encounter with this patient during which a medical condition was addressed which is the primary reason for home health care on : 7/26/2023    The encounter was in whole, or in part, for the following medical conditions which is the primary reason for home care. Joint replacement surgery    Based on my findings, the following services are medically necessary home health services (Check all that apply): Nursing, because vital signs monitoring, PT/ INR monitoring and assessment for signs and symptoms of bleeding (if pt on coumadin), wound/ incision assessment, pain assessment and instruction related to pain management, and Physical Therapy, because evaluation of environment for safety (pt is at fall risk), gait training and instruction in the use of assistive devices, instruction and monitoring of therapeutic exercise. The following clinical findings and patient's condition support that this patient is homebound, because narcotic usage every 4-6 hours, inability to ambulate greater than 150 feet, inability to drive a vehicle, fatigue due to anemia, increased risk for infection and increased risk for bleeding. Certification for [address]  Based on the above findings, I certify that this patient is confined to the home (meets homebound criteria listed above) and needs intermittent skilled nursing care, physical therapy and /or speech therapy or continues to need occupational therapy. The patient is under my care, and I have initiated the establishment of the plan of care. This patient will be followed by a physician who will periodically review the plan of care.      Noemi Sensing  Nurse Practitioner for Dr. Beronica Layne: [phone]  F: [phone]

## 2023-07-26 NOTE — PLAN OF CARE
Patient alert and oriented X4. Post-op day 1. Prevena dressing in place to R knee. Gel ice in place prn. Not OOB over night. Voiding freely with purewick. SCD's and ASA for DVT prophylaxis. Pain management with tylenol #3 and oxycodone prn. Carb controlled diet, ACHS. Encouraged to cough and deep breathe with incentive spirometer and patient is compliant. Fall precautions in place including bed in lowest position, call light and personal belongings within reach, non-skid socks. Frequent rounding by nursing staff. Plan for [facility] pending PT/OT eval.    Problem: Patient Centered Care  Goal: Patient preferences are identified and integrated in the patient's plan of care  Description: Interventions:  - What would you like us to know as we care for you?  \"I am scared to take narcotics, people get addicted to those things too easily\"  - Provide timely, complete, and accurate information to patient/family  - Incorporate patient and family knowledge, values, beliefs, and cultural backgrounds into the planning and delivery of care  - Encourage patient/family to participate in care and decision-making at the level they choose  - Honor patient and family perspectives and choices  Outcome: Progressing     Problem: PAIN - ADULT  Goal: Verbalizes/displays adequate comfort level or patient's stated pain goal  Description: INTERVENTIONS:  - Encourage pt to monitor pain and request assistance  - Assess pain using appropriate pain scale  - Administer analgesics based on type and severity of pain and evaluate response  - Implement non-pharmacological measures as appropriate and evaluate response  - Consider cultural and social influences on pain and pain management  - Manage/alleviate anxiety  - Utilize distraction and/or relaxation techniques  - Monitor for opioid side effects  - Notify MD/LIP if interventions unsuccessful or patient reports new pain  - Anticipate increased pain with activity and pre-medicate as appropriate  Outcome: Progressing     Problem: RISK FOR INFECTION - ADULT  Goal: Absence of fever/infection during anticipated neutropenic period  Description: INTERVENTIONS  - Monitor WBC  - Administer growth factors as ordered  - Implement neutropenic guidelines  Outcome: Progressing     Problem: SAFETY ADULT - FALL  Goal: Free from fall injury  Description: INTERVENTIONS:  - Assess pt frequently for physical needs  - Identify cognitive and physical deficits and behaviors that affect risk of falls.   - [place] fall precautions as indicated by assessment.  - Educate pt/family on patient safety including physical limitations  - Instruct pt to call for assistance with activity based on assessment  - Modify environment to reduce risk of injury  - Provide assistive devices as appropriate  - Consider OT/PT consult to assist with strengthening/mobility  - Encourage toileting schedule  Outcome: Progressing     Problem: DISCHARGE PLANNING  Goal: Discharge to home or other facility with appropriate resources  Description: INTERVENTIONS:  - Identify barriers to discharge w/pt and caregiver  - Include patient/family/discharge partner in discharge planning  - Arrange for needed discharge resources and transportation as appropriate  - Identify discharge learning needs (meds, wound care, etc)  - Arrange for interpreters to assist at discharge as needed  - Consider post-discharge preferences of patient/family/discharge partner  - Complete POLST form as appropriate  - Assess patient's ability to be responsible for managing their own health  - Refer to Case Management Department for coordinating discharge planning if the patient needs post-hospital services based on physician/LIP order or complex needs related to functional status, cognitive ability or social support system  Outcome: Progressing

## 2023-07-26 NOTE — PROGRESS NOTES
PT PM note: Pt is back to bed with c/o dizziness and nausea after sitting in chair in am for her breakfast and am PT session. PT will continue to follow and progress functional mobility 7/27/2023 as medical progress and pt tolerance allows.

## 2023-07-26 NOTE — PLAN OF CARE
Care assumed from [facility]. BP elevated throughout shift, MD's aware. Hydralazine IV available q6h prn. POD #1 of a R TKA with Dr. Maureen Luna. Pt is aox4, up with PT this AM but had brief syncopal episode with MD, RN, and PT present. Feeling a little bit better after some broth for lunch. Voiding per purewick. SCD's and aspirin for DVT prophylaxis. Dressing Joycrose mary Frees, [address]. Oxy and T3 for pain, though pt refusing pain medication at this time as she feels it makes her nausea worse. Pt plans to d/c home with [facility] pending PT, medical clearance. Disease process discussed with pt. Bed in lowest position, call light and personal possessions within reach. Pt instructed to call for assistance before getting up. Problem: Patient Centered Care  Goal: Patient preferences are identified and integrated in the patient's plan of care  Description: Interventions:  - What would you like us to know as we care for you? From home with spouse  - Provide timely, complete, and accurate information to patient/family  - Incorporate patient and family knowledge, values, beliefs, and cultural backgrounds into the planning and delivery of care  - Encourage patient/family to participate in care and decision-making at the level they choose  - Honor patient and family perspectives and choices  Outcome: Progressing     Problem: Patient/Family Goals  Goal: Patient/Family Long Term Goal  Description: Patient's Long Term Goal: Discharge home with [facility]    Interventions:  - Work with physical/occupational therapy, administer medications as ordered, follow plan of care.   - See additional Care Plan goals for specific interventions  Outcome: Progressing  Goal: Patient/Family Short Term Goal  Description: Patient's Short Term Goal: Work with PT    Interventions:   - See additional Care Plan goals for specific interventions  Outcome: Progressing     Problem: PAIN - ADULT  Goal: Verbalizes/displays adequate comfort level or patient's stated pain goal  Description: INTERVENTIONS:  - Encourage pt to monitor pain and request assistance  - Assess pain using appropriate pain scale  - Administer analgesics based on type and severity of pain and evaluate response  - Implement non-pharmacological measures as appropriate and evaluate response  - Consider cultural and social influences on pain and pain management  - Manage/alleviate anxiety  - Utilize distraction and/or relaxation techniques  - Monitor for opioid side effects  - Notify MD/LIP if interventions unsuccessful or patient reports new pain  - Anticipate increased pain with activity and pre-medicate as appropriate  Outcome: Progressing     Problem: RISK FOR INFECTION - ADULT  Goal: Absence of fever/infection during anticipated neutropenic period  Description: INTERVENTIONS  - Monitor WBC  - Administer growth factors as ordered  - Implement neutropenic guidelines  Outcome: Progressing     Problem: SAFETY ADULT - FALL  Goal: Free from fall injury  Description: INTERVENTIONS:  - Assess pt frequently for physical needs  - Identify cognitive and physical deficits and behaviors that affect risk of falls.   - [place] fall precautions as indicated by assessment.  - Educate pt/family on patient safety including physical limitations  - Instruct pt to call for assistance with activity based on assessment  - Modify environment to reduce risk of injury  - Provide assistive devices as appropriate  - Consider OT/PT consult to assist with strengthening/mobility  - Encourage toileting schedule  Outcome: Progressing     Problem: DISCHARGE PLANNING  Goal: Discharge to home or other facility with appropriate resources  Description: INTERVENTIONS:  - Identify barriers to discharge w/pt and caregiver  - Include patient/family/discharge partner in discharge planning  - Arrange for needed discharge resources and transportation as appropriate  - Identify discharge learning needs (meds, wound care, etc)  - Arrange for interpreters to assist at discharge as needed  - Consider post-discharge preferences of patient/family/discharge partner  - Complete POLST form as appropriate  - Assess patient's ability to be responsible for managing their own health  - Refer to Case Management Department for coordinating discharge planning if the patient needs post-hospital services based on physician/LIP order or complex needs related to functional status, cognitive ability or social support system  Outcome: Progressing

## 2023-07-26 NOTE — OCCUPATIONAL THERAPY NOTE
07/26/23 1501   VISIT TYPE   OT Inpatient Visit Type (Documentation Required) Attempted Evaluation  (Pt is not feeling well and is unable to participate in skilled intervention at this time.  Hold OT, OT will be re-attempted tomorrow, as pt is able.)   OT Follow Up   Charge Other (Comment)   Follow Up Needed (Documentation Required) Yes   OT Follow-up Date 07/27/23         Dhara Malagon OTR/L  Occupational Therapy  [place] [id number]

## 2023-07-26 NOTE — PROGRESS NOTES
7/26/2023  Admission date 7/25/2023      S: Patient doing well but reports dizziness with standing and continued pain and nausea. Denies any numbness/tingling, F/C/S, N/V/D, SOB, Chest Pain, Abdominal Pain. Unable to get up with therapy yesterday. O:     07/26/23  0521   BP: 154/41   Pulse: 76   Resp: 12   Temp:        Data Review: {  Recent Results (from the past 24 hour(s))   POCT Glucose    Collection Time: 07/25/23  8:58 AM   Result Value Ref Range    POC Glucose  132 (H) 70 - 99 mg/dL   POCT Glucose    Collection Time: 07/25/23  1:34 PM   Result Value Ref Range    POC Glucose  121 (H) 70 - 99 mg/dL   POCT Glucose    Collection Time: 07/25/23  4:20 PM   Result Value Ref Range    POC Glucose  160 (H) 70 - 99 mg/dL   POCT Glucose    Collection Time: 07/25/23  9:21 PM   Result Value Ref Range    POC Glucose  144 (H) 70 - 99 mg/dL     Gen:  A&O x 3, VSS, Afebrile    Abd: Soft Nontender    Lower Extremity:   -  Dressing clean, dry, intact, PREVENA  -  2+ Pedal pulses Bilaterally  -  Tibialis Anterior/Gastroc and Soleus/ Extensor Halluses Longus motor in tact  -  Sensory in tact in sural/Saphenous/tibial/superficial peroneal/deep peroneal nerve distributions  -  Calf soft/non-tender with no palpable cords          A/P: S/P TKA 1 Day Post-Op RTKA    1. Weight Bearing : WBAT  2. Deep Venous thrombosis prophylaxis - Compression stocking/ASA  3. Continue Physical Therapy, Mobilize out of bed  4. Pain control- modified as needed  5. Discharge Planning- pending clearance from Physical Therapy/Social work services. Plan for discharge to home with home health, likely tomorrow once pain and nausea reduced and pt is able to tolerate therapy.     Rexanne Schirmer, APRN  Nurse Practitioner for Dr. Jackelyn Justin: [phone]  C: [phone]  F: [phone]

## 2023-07-27 PROBLEM — Z96.651 S/P TOTAL KNEE ARTHROPLASTY, RIGHT: Status: ACTIVE | Noted: 2023-07-27

## 2023-07-27 LAB
BASOPHILS # BLD AUTO: 0.02 X10(3) UL (ref 0–0.2)
BASOPHILS NFR BLD AUTO: 0.2 %
DEPRECATED RDW RBC AUTO: 47.8 FL (ref 35.1–46.3)
EOSINOPHIL # BLD AUTO: 0.04 X10(3) UL (ref 0–0.7)
EOSINOPHIL NFR BLD AUTO: 0.4 %
ERYTHROCYTE [DISTWIDTH] IN BLOOD BY AUTOMATED COUNT: 13.3 % (ref 11–15)
GLUCOSE BLDC GLUCOMTR-MCNC: 141 MG/DL (ref 70–99)
GLUCOSE BLDC GLUCOMTR-MCNC: 165 MG/DL (ref 70–99)
GLUCOSE BLDC GLUCOMTR-MCNC: 199 MG/DL (ref 70–99)
GLUCOSE BLDC GLUCOMTR-MCNC: 204 MG/DL (ref 70–99)
HCT VFR BLD AUTO: 24.2 %
HGB BLD-MCNC: 8.1 G/DL
IMM GRANULOCYTES # BLD AUTO: 0.05 X10(3) UL (ref 0–1)
IMM GRANULOCYTES NFR BLD: 0.5 %
LYMPHOCYTES # BLD AUTO: 1.26 X10(3) UL (ref 1–4)
LYMPHOCYTES NFR BLD AUTO: 12.1 %
MCH RBC QN AUTO: 33.1 PG (ref 26–34)
MCHC RBC AUTO-ENTMCNC: 33.5 G/DL (ref 31–37)
MCV RBC AUTO: 98.8 FL
MONOCYTES # BLD AUTO: 1.87 X10(3) UL (ref 0.1–1)
MONOCYTES NFR BLD AUTO: 17.9 %
NEUTROPHILS # BLD AUTO: 7.21 X10 (3) UL (ref 1.5–7.7)
NEUTROPHILS # BLD AUTO: 7.21 X10(3) UL (ref 1.5–7.7)
NEUTROPHILS NFR BLD AUTO: 68.9 %
PLATELET # BLD AUTO: 192 10(3)UL (ref 150–450)
RBC # BLD AUTO: 2.45 X10(6)UL
WBC # BLD AUTO: 10.5 X10(3) UL (ref 4–11)

## 2023-07-27 PROCEDURE — 99232 SBSQ HOSP IP/OBS MODERATE 35: CPT | Performed by: INTERNAL MEDICINE

## 2023-07-27 NOTE — CM/SW NOTE
Social work met with the patient and her significant other at bedside to discuss the new PT/OT recommendations. Social work advised the patient that PT/OT is now recommending [facility]. The patient is agreeable to the new recommendations. Social work will follow up and provide the patient with a list of [facility] facilities once responses are generated. The patient has no further questions at this time. SW/CM to remain available for support and/or discharge planning.      Damian Lopez MSW, [facility]  Discharge Planner [id number]

## 2023-07-27 NOTE — PHYSICAL THERAPY NOTE
PHYSICAL THERAPY TREATMENT NOTE - INPATIENT     Room Number: 434/434-A       Presenting Problem: R TKR    Problem List  Active Problems:    Type 2 diabetes mellitus with hyperglycemia, without long-term current use of insulin (HCC)    Coronary artery disease due to calcified coronary lesion    Acquired hypothyroidism    Asthma with COPD (chronic obstructive pulmonary disease) ([facility])    Right knee DJD    OA (osteoarthritis) of knee    Dizziness    S/P total knee arthroplasty, right      PHYSICAL THERAPY ASSESSMENT   Chart reviewed. RN Miri Augustin approved participation in physical therapy. PPE worn by therapist: mask and gloves. Patient was not wearing a mask during session. Patient presented in bed with 9/10 pain. Patient with fair  progress towards goals during this session. Education provided on Total knee exercise protocol, Physical therapy plan of care, and physiological benefits of out of bed mobility. Patient with good carryover. Pt is received in the bed and was cleared for therapy session. Pt's BP was monitored throughout the session. Pt is min A with bed mobility and to transfer to the EOB. Pt sat EOB for about 10 minutes close SBA with sitting balance. Pt denied any dizziness and light headedness. Pt is mod A with sit<>stand transfers with the RW. Pt with a heavy posterior lean. Pt was not able to take steps to the chair with the RW. Pt stood for a couple of minutes with max A for standing balance then sat back on the EOB. Pt rested then performed SPT from the bed to the chair while holding onto the therapist max A. Pt reported that she was feeling very weak and was not able to increased her activity. Pt also cued for proper eccentric control  and with proper hand placement to the chair for safety. Pt reported that she was feeling better after a few minutes. Pt is repositioned and left in the chair with all needs within reach. Reported to the RN on the status of the pt.  Discussed dc planning and pt and significant other agreed to [facility] at this time. Bed mobility: Min assist    Bed mobility: Min assist  Transfers: Max assist  Gait Assistance: Maximum assistance  Distance (ft): SPT  Assistive Device: Rolling walker  Pattern: R Decreased stance time          . Patient was left in bedside chair at end of session with all needs in reach. The patient's Approx Degree of Impairment: 72.57% has been calculated based on documentation in the [facility] '6 clicks' Inpatient Basic Mobility Short Form. Research supports that patients with this level of impairment may benefit from Subacute Rehab. . RN aware of patient status post session. DISCHARGE RECOMMENDATIONS  PT Discharge Recommendations: Sub-acute rehabilitation     PLAN  PT Treatment Plan: Bed mobility; Body mechanics; Coordination; Endurance; Patient education;Gait training;Range of motion;Strengthening;Transfer training;Balance training    SUBJECTIVE  Pt was agreeable to therapy session. OBJECTIVE  Precautions: Bed/chair alarm    WEIGHT BEARING RESTRICTION  Weight Bearing Restriction: L lower extremity        R Lower Extremity: Weight Bearing as Tolerated       PAIN ASSESSMENT   Ratin  Location: R LE  Management Techniques: Activity promotion; Body mechanics; Relaxation;Repositioning    BALANCE                                                                                                                       Static Sitting: Fair  Dynamic Sitting: Fair -           Static Standing: Poor  Dynamic Standing: Poor -    ACTIVITY TOLERANCE           BP: 149/45  BP Location: Right arm  BP Method: Automatic  Patient Position: Sitting    O2 WALK  Oxygen Therapy  SPO2% on Room Air at Rest: 035    AM-PAC '6-Clicks' INPATIENT SHORT FORM - BASIC MOBILITY  How much difficulty does the patient currently have. ..   Patient Difficulty: Turning over in bed (including adjusting bedclothes, sheets and blankets)?: A Little   Patient Difficulty: Sitting down on and standing up from a chair with arms (e.g., wheelchair, bedside commode, etc.): A Lot   Patient Difficulty: Moving from lying on back to sitting on the side of the bed?: A Lot   How much help from another person does the patient currently need. .. Help from Another: Moving to and from a bed to a chair (including a wheelchair)?: A Lot   Help from Another: Need to walk in hospital room?: Total   Help from Another: Climbing 3-5 steps with a railing?: Total     AM-PAC Score:  Raw Score: 11   Approx Degree of Impairment: 72.57%   Standardized Score (AM-PAC Scale): 33.86   CMS Modifier (G-Code): CL          Patient End of Session: Up in chair;Needs met;Call light within reach;RN aware of session/findings; All patient questions and concerns addressed; Family present    CURRENT GOALS    Goals to be met by: 8/15/2023  Patient Goal Patient's self-stated goal is: Return home    Goal #1 Patient is able to demonstrate supine - sit EOB @ level: modified independent     Goal #1   Current Status Min A    Goal #2 Patient is able to demonstrate transfers Sit to/from Stand at assistance level: modified independent     Goal #2  Current Status Mod A with the RW   Goal #3 Patient is able to ambulate 300 feet with assistive device at assistance level: modified independent    Goal #3   Current Status Not able to take steps and had to perform SPT max A holding onto the therapist.    Goal #4 Patient will negotiate 2 stairs/one curb w/ assistive device and supervision   Goal #4   Current Status NT   Goal #5  AROM 0 degrees extension to 95 degrees flexion     Goal #5   Current Status IN PROGRESS   Goal #6 Patient independently performs home exercise program for ROM/strengthening per the instructions provided in preparation for discharge.    Goal #6  Current Status IN PROGRESS         Therapeutic Activity: 30 minutes

## 2023-07-27 NOTE — PLAN OF CARE
POD #2. Pt alert & oriented x4. No acute changes noted at this time. Pain managed with tylenol. Pain states does not want to take narcotics. ACHS accu-checks. ASA and SCDs for DVT prophylaxis. Voiding. Dressing clean/dry/intact. Fall precautions maintained. Call light within reach. Bed locked, in lowest position, I-bed active. Problem: Patient Centered Care  Goal: Patient preferences are identified and integrated in the patient's plan of care  Description: Interventions:  - What would you like us to know as we care for you?  From home with spouse  - Provide timely, complete, and accurate information to patient/family  - Incorporate patient and family knowledge, values, beliefs, and cultural backgrounds into the planning and delivery of care  - Encourage patient/family to participate in care and decision-making at the level they choose  - Honor patient and family perspectives and choices  Outcome: Progressing     Problem: PAIN - ADULT  Goal: Verbalizes/displays adequate comfort level or patient's stated pain goal  Description: INTERVENTIONS:  - Encourage pt to monitor pain and request assistance  - Assess pain using appropriate pain scale  - Administer analgesics based on type and severity of pain and evaluate response  - Implement non-pharmacological measures as appropriate and evaluate response  - Consider cultural and social influences on pain and pain management  - Manage/alleviate anxiety  - Utilize distraction and/or relaxation techniques  - Monitor for opioid side effects  - Notify MD/LIP if interventions unsuccessful or patient reports new pain  - Anticipate increased pain with activity and pre-medicate as appropriate  Outcome: Progressing     Problem: RISK FOR INFECTION - ADULT  Goal: Absence of fever/infection during anticipated neutropenic period  Description: INTERVENTIONS  - Monitor WBC  - Administer growth factors as ordered  - Implement neutropenic guidelines  Outcome: Progressing     Problem: SAFETY ADULT - FALL  Goal: Free from fall injury  Description: INTERVENTIONS:  - Assess pt frequently for physical needs  - Identify cognitive and physical deficits and behaviors that affect risk of falls.   - [place] fall precautions as indicated by assessment.  - Educate pt/family on patient safety including physical limitations  - Instruct pt to call for assistance with activity based on assessment  - Modify environment to reduce risk of injury  - Provide assistive devices as appropriate  - Consider OT/PT consult to assist with strengthening/mobility  - Encourage toileting schedule  Outcome: Progressing     Problem: DISCHARGE PLANNING  Goal: Discharge to home or other facility with appropriate resources  Description: INTERVENTIONS:  - Identify barriers to discharge w/pt and caregiver  - Include patient/family/discharge partner in discharge planning  - Arrange for needed discharge resources and transportation as appropriate  - Identify discharge learning needs (meds, wound care, etc)  - Arrange for interpreters to assist at discharge as needed  - Consider post-discharge preferences of patient/family/discharge partner  - Complete POLST form as appropriate  - Assess patient's ability to be responsible for managing their own health  - Refer to Case Management Department for coordinating discharge planning if the patient needs post-hospital services based on physician/LIP order or complex needs related to functional status, cognitive ability or social support system  Outcome: Progressing

## 2023-07-27 NOTE — CM/SW NOTE
07/27/23 1400   Choice of Post-Acute Provider   Informed patient of right to choose their preferred provider Yes   List of appropriate post-acute services provided to patient/family with quality data Yes   Patient/family choice TBD     CM f/u with pt and spouse at bedside. Provided Aidin [facility] list. Pt and spouse requesting time to review and inquire if a rehab bed was available at [facility]. CM updated Aidin and sent rehab ref to [facility]. Plan: [facility] pending choice and ins auth. / to remain available for support and/or discharge planning. Rosita Kahn.  Domonique Aldrich RN, BSN  Nurse   [phone]'

## 2023-07-27 NOTE — PLAN OF CARE
Problem: Patient Centered Care  Goal: Patient preferences are identified and integrated in the patient's plan of care  Description: Interventions:  - What would you like us to know as we care for you? From home with spouse  - Provide timely, complete, and accurate information to patient/family  - Incorporate patient and family knowledge, values, beliefs, and cultural backgrounds into the planning and delivery of care  - Encourage patient/family to participate in care and decision-making at the level they choose  - Honor patient and family perspectives and choices  Outcome: Progressing     Problem: Patient/Family Goals  Goal: Patient/Family Long Term Goal  Description: Patient's Long Term Goal: Discharge home with [facility]    Interventions:  - Work with physical/occupational therapy, administer medications as ordered, follow plan of care.   - See additional Care Plan goals for specific interventions  Outcome: Progressing  Goal: Patient/Family Short Term Goal  Description: Patient's Short Term Goal: Work with PT    Interventions:   - See additional Care Plan goals for specific interventions  Outcome: Progressing     Problem: PAIN - ADULT  Goal: Verbalizes/displays adequate comfort level or patient's stated pain goal  Description: INTERVENTIONS:  - Encourage pt to monitor pain and request assistance  - Assess pain using appropriate pain scale  - Administer analgesics based on type and severity of pain and evaluate response  - Implement non-pharmacological measures as appropriate and evaluate response  - Consider cultural and social influences on pain and pain management  - Manage/alleviate anxiety  - Utilize distraction and/or relaxation techniques  - Monitor for opioid side effects  - Notify MD/LIP if interventions unsuccessful or patient reports new pain  - Anticipate increased pain with activity and pre-medicate as appropriate  Outcome: Progressing     Problem: RISK FOR INFECTION - ADULT  Goal: Absence of fever/infection during anticipated neutropenic period  Description: INTERVENTIONS  - Monitor WBC  - Administer growth factors as ordered  - Implement neutropenic guidelines  Outcome: Progressing     Problem: SAFETY ADULT - FALL  Goal: Free from fall injury  Description: INTERVENTIONS:  - Assess pt frequently for physical needs  - Identify cognitive and physical deficits and behaviors that affect risk of falls. - [place] fall precautions as indicated by assessment.  - Educate pt/family on patient safety including physical limitations  - Instruct pt to call for assistance with activity based on assessment  - Modify environment to reduce risk of injury  - Provide assistive devices as appropriate  - Consider OT/PT consult to assist with strengthening/mobility  - Encourage toileting schedule  Outcome: Progressing     Problem: DISCHARGE PLANNING  Goal: Discharge to home or other facility with appropriate resources  Description: INTERVENTIONS:  - Identify barriers to discharge w/pt and caregiver  - Include patient/family/discharge partner in discharge planning  - Arrange for needed discharge resources and transportation as appropriate  - Identify discharge learning needs (meds, wound care, etc)  - Arrange for interpreters to assist at discharge as needed  - Consider post-discharge preferences of patient/family/discharge partner  - Complete POLST form as appropriate  - Assess patient's ability to be responsible for managing their own health  - Refer to Case Management Department for coordinating discharge planning if the patient needs post-hospital services based on physician/LIP order or complex needs related to functional status, cognitive ability or social support system  Outcome: Progressing   Patient could not pass physical therapy today and ad it was recommended that she goes to rehab. Was up in a chair for most of the day. Pain is under control with oral pain meds.

## 2023-07-27 NOTE — PHYSICAL THERAPY NOTE
PHYSICAL THERAPY TREATMENT NOTE - INPATIENT     Room Number: 434/434-A       Presenting Problem: R TKR    Problem List  Active Problems:    Type 2 diabetes mellitus with hyperglycemia, without long-term current use of insulin ([facility])    Coronary artery disease due to calcified coronary lesion    Acquired hypothyroidism    Asthma with COPD (chronic obstructive pulmonary disease) ([facility])    Right knee DJD    OA (osteoarthritis) of knee    Dizziness    S/P total knee arthroplasty, right      PHYSICAL THERAPY ASSESSMENT   Chart reviewed. RN Maricel Khanna approved participation in physical therapy. PPE worn by therapist: mask and gloves. Patient was not wearing a mask during session. Patient presented in bed with did not rate pain. Patient with fair  progress towards goals during this session. Education provided on Total knee exercise protocol, Physical therapy plan of care, and physiological benefits of out of bed mobility. Patient with good carryover. Pt is received in the bed and was cleared for therapy session. Pt's BP was monitored throughout the session. Pt is min A with bed mobility and to transfer to the EOB. Pt sat EOB for about 10 minutes and BP was 149/67. Pt denied any dizziness and light headedness. Pt is mod A with sit<>stand transfers with the RW. Pt with a heavy posterior lean. Pt was able to take a few shuffling steps to the chair with the RW also heavy mod A. Pt reported that she was feeling very weak and was not able to increased her AMB  distance. Pt also cued for proper eccentric control  and with proper hand placement to the chair for safety. Pt reported some dizziness after getting to the chair and BP was 129/45. Pt reported that she was feeling better after a few minutes. Pt is repositioned and left in the chair with all needs within reach. Reported to the RN on the status of the pt. Bed mobility: Min assist  Transfers: Mod assist  Gait Assistance:  Moderate assistance  Distance (ft): few steps to the chair  Assistive Device: Rolling walker  Pattern: R Decreased stance time; Shuffle          . Patient was left in bedside chair at end of session with all needs in reach. The patient's Approx Degree of Impairment: 68.66% has been calculated based on documentation in the [facility] '6 clicks' Inpatient Basic Mobility Short Form. Research supports that patients with this level of impairment may benefit from Subacute Rehab. However pt expressed her feeling on really wanting to dc to home and will have assist. Will reassess in the afternoon session. Discussed with the pt on therapy recommend [facility] and pt was open to recommendation if needed. RN aware of patient status post session. DISCHARGE RECOMMENDATIONS  PT Discharge Recommendations: Sub-acute rehabilitation     PLAN  PT Treatment Plan: Bed mobility; Body mechanics; Coordination; Endurance; Patient education;Gait training;Range of motion;Strengthening;Stoop training;Stair training;Transfer training;Balance training    SUBJECTIVE  Pt was agreeable to therapy session. OBJECTIVE  Precautions: Bed/chair alarm    WEIGHT BEARING RESTRICTION  Weight Bearing Restriction: R lower extremity        R Lower Extremity: Weight Bearing as Tolerated       PAIN ASSESSMENT   Rating:  (did not rate)  Location: R LE  Management Techniques: Activity promotion; Body mechanics; Relaxation;Repositioning    BALANCE                                                                                                                       Static Sitting: Fair +  Dynamic Sitting: Fair           Static Standing: Poor  Dynamic Standing: Poor    ACTIVITY TOLERANCE           BP: 149/45  BP Location: Right arm  BP Method: Automatic  Patient Position: Sitting    O2 WALK  Oxygen Therapy  SPO2% on Room Air at Rest: 394    AM-PAC '6-Clicks' INPATIENT SHORT FORM - BASIC MOBILITY  How much difficulty does the patient currently have. ..   Patient Difficulty: Turning over in bed (including adjusting bedclothes, sheets and blankets)?: A Little   Patient Difficulty: Sitting down on and standing up from a chair with arms (e.g., wheelchair, bedside commode, etc.): A Lot   Patient Difficulty: Moving from lying on back to sitting on the side of the bed?: A Lot   How much help from another person does the patient currently need. .. Help from Another: Moving to and from a bed to a chair (including a wheelchair)?: A Lot   Help from Another: Need to walk in hospital room?: A Lot   Help from Another: Climbing 3-5 steps with a railing?: Total     AM-PAC Score:  Raw Score: 12   Approx Degree of Impairment: 68.66%   Standardized Score (AM-PAC Scale): 35.33   CMS Modifier (G-Code): CL          Patient End of Session: Up in chair;Needs met;Call light within reach;RN aware of session/findings; All patient questions and concerns addressed    CURRENT GOALS   Goals to be met by: 8/15/2023  Patient Goal Patient's self-stated goal is: Return home    Goal #1 Patient is able to demonstrate supine - sit EOB @ level: modified independent     Goal #1   Current Status Min A    Goal #2 Patient is able to demonstrate transfers Sit to/from Stand at assistance level: modified independent     Goal #2  Current Status Mod A with the RW   Goal #3 Patient is able to ambulate 300 feet with assistive device at assistance level: modified independent    Goal #3   Current Status Few steps to the chair heavy mod A with the RW   Goal #4 Patient will negotiate 2 stairs/one curb w/ assistive device and supervision   Goal #4   Current Status NT   Goal #5  AROM 0 degrees extension to 95 degrees flexion     Goal #5   Current Status IN PROGRESS   Goal #6 Patient independently performs home exercise program for ROM/strengthening per the instructions provided in preparation for discharge.    Goal #6  Current Status IN PROGRESS           Therapeutic Activity: 30 minutes

## 2023-07-27 NOTE — CM/SW NOTE
Department  notified of request for anna MABRY referrals started. Assigned CM/SW to follow up with pt/family on further discharge planning.      Anmol Elliott   July 27, 2023   11:22

## 2023-07-27 NOTE — CM/SW NOTE
Submitted clinical via [employer] portal.  [employer] Case/Auth ID is [id number]. Final insurance authorization is pending at this time. SW/CM assigned to the case will continue to follow auth status. Getachew Duttas   July 27, 2023   11:44     Notified HAYLEE Hernandez  [facility] [id number], approved from 7/27 to 7/31.  PAC pend    Getachew Milks   July 27, 2023   11:55

## 2023-07-28 ENCOUNTER — TELEPHONE (OUTPATIENT)
Dept: INTERNAL MEDICINE CLINIC | Facility: CLINIC | Age: 78
End: 2023-07-28

## 2023-07-28 VITALS
OXYGEN SATURATION: 97 % | DIASTOLIC BLOOD PRESSURE: 44 MMHG | SYSTOLIC BLOOD PRESSURE: 135 MMHG | HEIGHT: 62 IN | BODY MASS INDEX: 21.35 KG/M2 | HEART RATE: 74 BPM | TEMPERATURE: 99 F | WEIGHT: 116 LBS | RESPIRATION RATE: 18 BRPM

## 2023-07-28 LAB
GLUCOSE BLDC GLUCOMTR-MCNC: 110 MG/DL (ref 70–99)
GLUCOSE BLDC GLUCOMTR-MCNC: 112 MG/DL (ref 70–99)

## 2023-07-28 PROCEDURE — 99232 SBSQ HOSP IP/OBS MODERATE 35: CPT | Performed by: INTERNAL MEDICINE

## 2023-07-28 NOTE — PROGRESS NOTES
7/27/2023  Admission date 7/25/2023      S: Pt reports to cont to improve compared to yesterday. States she has not tried to stand up this AM, however has not been feeling dizzy/lightheaded. Denies any numbness/tingling, F/C/S, N/V/D, SOB, Chest Pain, Abdominal Pain. Worked with PT yesterday and was able to transfer to EOB and work on sit-stand transfers. Unable to take steps to the chair    O:     07/28/23  0649   BP: 122/47   Pulse: 68   Resp:    Temp:        Data Review: {  Recent Results (from the past 24 hour(s))   POCT Glucose    Collection Time: 07/27/23  8:44 AM   Result Value Ref Range    POC Glucose  141 (H) 70 - 99 mg/dL   POCT Glucose    Collection Time: 07/27/23 12:10 PM   Result Value Ref Range    POC Glucose  165 (H) 70 - 99 mg/dL   POCT Glucose    Collection Time: 07/27/23  6:18 PM   Result Value Ref Range    POC Glucose  199 (H) 70 - 99 mg/dL   POCT Glucose    Collection Time: 07/27/23  8:52 PM   Result Value Ref Range    POC Glucose  204 (H) 70 - 99 mg/dL     Gen:  A&O x 3, VSS, Afebrile    Abd: Soft Nontender    Lower Extremity:   -  Dressing clean, dry, intact, PREVENA  -  2+ Pedal pulses Bilaterally  -  Tibialis Anterior/Gastroc and Soleus/ Extensor Halluses Longus motor in tact  -  Sensory in tact in sural/Saphenous/tibial/superficial peroneal/deep peroneal nerve distributions  -  Calf soft/non-tender with no palpable cords          A/P: S/P TKA 3 Days Post-Op RTKA    1. Weight Bearing : WBAT  2. Deep Venous thrombosis prophylaxis - Compression stocking/ASA  3. Continue Physical Therapy, Mobilize out of bed  4. Pain control- modified as needed  5. Discharge Planning- pending clearance from Physical Therapy/Social work services. Plan for discharge to home with home health, possibly today if pt is able to tolerate therapy.       Lay Mixon MD  Adult Reconstruction Fellow

## 2023-07-28 NOTE — PLAN OF CARE
Received patient resting in bed alert and oriented x 4 with significant other at bedside. CMS intact. Left foot dorsi/plantar flexion are weak. ASA/SCD therapy. Right foot +1 pitting edema present. Appetite fair, constipated, flatus present. LBM 7/24/23. HS accu check was 204; Insulin given. Suppository given. Purewick in place, voiding without complication. Urine output is within in normal limits. Right knee dressing clean, dry, intact with Provena wound vac on. SL. Pain is managed well with Tylenol as needed and ice therapy. Up with two assist with walker. In chair since 5 am. Fall precautions discussed and in place. Reviewed medication side effects. Vitals stable and afebrile. Bathed. Plan is rehab pending choice and insurance. Problem: Patient Centered Care  Goal: Patient preferences are identified and integrated in the patient's plan of care  Description: Interventions:  - What would you like us to know as we care for you? From home with spouse  - Provide timely, complete, and accurate information to patient/family  - Incorporate patient and family knowledge, values, beliefs, and cultural backgrounds into the planning and delivery of care  - Encourage patient/family to participate in care and decision-making at the level they choose  - Honor patient and family perspectives and choices  Outcome: Progressing     Problem: Patient/Family Goals  Goal: Patient/Family Long Term Goal  Description: Patient's Long Term Goal: Discharge home with [facility]    Interventions:  - Work with physical/occupational therapy, administer medications as ordered, follow plan of care.   - See additional Care Plan goals for specific interventions  Outcome: Progressing  Goal: Patient/Family Short Term Goal  Description: Patient's Short Term Goal: Work with PT    Interventions:   - See additional Care Plan goals for specific interventions  Outcome: Progressing     Problem: PAIN - ADULT  Goal: Verbalizes/displays adequate comfort level or patient's stated pain goal  Description: INTERVENTIONS:  - Encourage pt to monitor pain and request assistance  - Assess pain using appropriate pain scale  - Administer analgesics based on type and severity of pain and evaluate response  - Implement non-pharmacological measures as appropriate and evaluate response  - Consider cultural and social influences on pain and pain management  - Manage/alleviate anxiety  - Utilize distraction and/or relaxation techniques  - Monitor for opioid side effects  - Notify MD/LIP if interventions unsuccessful or patient reports new pain  - Anticipate increased pain with activity and pre-medicate as appropriate  Outcome: Progressing     Problem: RISK FOR INFECTION - ADULT  Goal: Absence of fever/infection during anticipated neutropenic period  Description: INTERVENTIONS  - Monitor WBC  - Administer growth factors as ordered  - Implement neutropenic guidelines  Outcome: Progressing     Problem: SAFETY ADULT - FALL  Goal: Free from fall injury  Description: INTERVENTIONS:  - Assess pt frequently for physical needs  - Identify cognitive and physical deficits and behaviors that affect risk of falls.   - [place] fall precautions as indicated by assessment.  - Educate pt/family on patient safety including physical limitations  - Instruct pt to call for assistance with activity based on assessment  - Modify environment to reduce risk of injury  - Provide assistive devices as appropriate  - Consider OT/PT consult to assist with strengthening/mobility  - Encourage toileting schedule  Outcome: Progressing     Problem: DISCHARGE PLANNING  Goal: Discharge to home or other facility with appropriate resources  Description: INTERVENTIONS:  - Identify barriers to discharge w/pt and caregiver  - Include patient/family/discharge partner in discharge planning  - Arrange for needed discharge resources and transportation as appropriate  - Identify discharge learning needs (meds, wound care, etc)  - Arrange for interpreters to assist at discharge as needed  - Consider post-discharge preferences of patient/family/discharge partner  - Complete POLST form as appropriate  - Assess patient's ability to be responsible for managing their own health  - Refer to Case Management Department for coordinating discharge planning if the patient needs post-hospital services based on physician/LIP order or complex needs related to functional status, cognitive ability or social support system  Outcome: Progressing     Problem: GASTROINTESTINAL - ADULT  Goal: Minimal or absence of nausea and vomiting  Description: INTERVENTIONS:  - Maintain adequate hydration with IV or PO as ordered and tolerated  - Nasogastric tube to low intermittent suction as ordered  - Evaluate effectiveness of ordered antiemetic medications  - Provide nonpharmacologic comfort measures as appropriate  - Advance diet as tolerated, if ordered  - Obtain nutritional consult as needed  - Evaluate fluid balance  Outcome: Progressing  Goal: Maintains or returns to baseline bowel function  Description: INTERVENTIONS:  - Assess bowel function  - Maintain adequate hydration with IV or PO as ordered and tolerated  - Evaluate effectiveness of GI medications  - Encourage mobilization and activity  - Obtain nutritional consult as needed  - Establish a toileting routine/schedule  - Consider collaborating with pharmacy to review patient's medication profile  Outcome: Progressing     Problem: METABOLIC/FLUID AND ELECTROLYTES - ADULT  Goal: Glucose maintained within prescribed range  Description: INTERVENTIONS:  - Monitor Blood Glucose as ordered  - Assess for signs and symptoms of hyperglycemia and hypoglycemia  - Administer ordered medications to maintain glucose within target range  - Assess barriers to adequate nutritional intake and initiate nutrition consult as needed  - Instruct patient on self management of diabetes  Outcome: Progressing  Goal: Electrolytes maintained within normal limits  Description: INTERVENTIONS:  - Monitor labs and rhythm and assess patient for signs and symptoms of electrolyte imbalances  - Administer electrolyte replacement as ordered  - Monitor response to electrolyte replacements, including rhythm and repeat lab results as appropriate  - Fluid restriction as ordered  - Instruct patient on fluid and nutrition restrictions as appropriate  Outcome: Progressing  Goal: Hemodynamic stability and optimal renal function maintained  Description: INTERVENTIONS:  - Monitor labs and assess for signs and symptoms of volume excess or deficit  - Monitor intake, output and patient weight  - Monitor urine specific gravity, serum osmolarity and serum sodium as indicated or ordered  - Monitor response to interventions for patient's volume status, including labs, urine output, blood pressure (other measures as available)  - Encourage oral intake as appropriate  - Instruct patient on fluid and nutrition restrictions as appropriate  Outcome: Progressing     Problem: HEMATOLOGIC - ADULT  Goal: Maintains hematologic stability  Description: INTERVENTIONS  - Assess for signs and symptoms of bleeding or hemorrhage  - Monitor labs and vital signs for trends  - Administer supportive blood products/factors, fluids and medications as ordered and appropriate  - Administer supportive blood products/factors as ordered and appropriate  Outcome: Progressing  Goal: Free from bleeding injury  Description: (Example usage: patient with low platelets)  INTERVENTIONS:  - Avoid intramuscular injections, enemas and rectal medication administration  - Ensure safe mobilization of patient  - Hold pressure on venipuncture sites to achieve adequate hemostasis  - Assess for signs and symptoms of internal bleeding  - Monitor lab trends  - Patient is to report abnormal signs of bleeding to staff  - Avoid use of toothpicks and dental floss  - Use electric shaver for shaving  - Use soft bristle tooth brush  - Limit straining and forceful nose blowing  Outcome: Progressing     Problem: MUSCULOSKELETAL - ADULT  Goal: Return mobility to safest level of function  Description: INTERVENTIONS:  - Assess patient stability and activity tolerance for standing, transferring and ambulating w/ or w/o assistive devices  - Assist with transfers and ambulation using safe patient handling equipment as needed  - Ensure adequate protection for wounds/incisions during mobilization  - Obtain PT/OT consults as needed  - Advance activity as appropriate  - Communicate ordered activity level and limitations with patient/family  Outcome: Progressing

## 2023-07-28 NOTE — PHYSICAL THERAPY NOTE
PHYSICAL THERAPY TREATMENT NOTE - INPATIENT     Room Number: 434/434-A       Presenting Problem: R TKR    Problem List  Active Problems:    Type 2 diabetes mellitus with hyperglycemia, without long-term current use of insulin (HCC)    Coronary artery disease due to calcified coronary lesion    Acquired hypothyroidism    Asthma with COPD (chronic obstructive pulmonary disease) (HCC)    Right knee DJD    OA (osteoarthritis) of knee    Dizziness    S/P total knee arthroplasty, right      PHYSICAL THERAPY ASSESSMENT   Chart reviewed. RN Leonela approved participation in physical therapy. PPE worn by therapist: mask and gloves. Patient was not wearing a mask during session. Patient presented in bedside chair with did not rate pain. Patient with fair  progress towards goals during this session. Education provided on Total knee exercise protocol, Physical therapy plan of care, and physiological benefits of out of bed mobility. Patient with good carryover. Pt is received in the chair and was cleared for therapy session. PCT and co treat with OT. Pt with loose stool and required multiple use of the bed pan in the chair and multiple sit<>stand transfers while holding onto the therapist for isreal care. Pt required max A with sit<>stand transfers while holding onto the therapist and also with the use of the RW. After pt was able to AMB about 15' with the RW min A and with chair follow for safety. Pt with slow gait and decreased step length with narrow CAPRI. Pt with improved mobility this session. Pt's BP was 112/50 while sitting in the chair after AMB. Pt then requested to return back to the bed. Pt is mod A x2 to return back to supine in the bed. Pt is repositioned and left in the bed with all needs within reach. Pt is on track to [facility] once medically cleared. Reported ot the RN on the status of the pt.      Bed mobility: Mod assist x2  Transfers: Max assist  Gait Assistance: Minimum assistance  Distance (ft): 15'  Assistive Device: Rolling walker  Pattern: R Decreased stance time          . Patient was left in bed at end of session with all needs in reach. The patient's Approx Degree of Impairment: 61.29% has been calculated based on documentation in the [facility] '6 clicks' Inpatient Basic Mobility Short Form. Research supports that patients with this level of impairment may benefit from Subacute Rehab. RN aware of patient status post session. DISCHARGE RECOMMENDATIONS  PT Discharge Recommendations: Sub-acute rehabilitation     PLAN  PT Treatment Plan: Bed mobility; Body mechanics; Coordination; Endurance; Patient education;Gait training;Range of motion;Strengthening;Transfer training;Balance training    SUBJECTIVE  Pt was agreeable to therapy session. OBJECTIVE  Precautions: Bed/chair alarm    WEIGHT BEARING RESTRICTION  Weight Bearing Restriction: L lower extremity        R Lower Extremity: Weight Bearing as Tolerated       PAIN ASSESSMENT   Rating:  (did not rate)  Location: R knee  Management Techniques: Activity promotion; Body mechanics; Relaxation;Repositioning    BALANCE                                                                                                                       Static Sitting: Fair +  Dynamic Sitting: Fair           Static Standing: Poor  Dynamic Standing: Poor +    ACTIVITY TOLERANCE                         O2 WALK       AM-PAC '6-Clicks' INPATIENT SHORT FORM - BASIC MOBILITY  How much difficulty does the patient currently have. .. Patient Difficulty: Turning over in bed (including adjusting bedclothes, sheets and blankets)?: A Little   Patient Difficulty: Sitting down on and standing up from a chair with arms (e.g., wheelchair, bedside commode, etc.): A Lot   Patient Difficulty: Moving from lying on back to sitting on the side of the bed?: A Lot   How much help from another person does the patient currently need. ..    Help from Another: Moving to and from a bed to a chair (including a wheelchair)?: A Little   Help from Another: Need to walk in hospital room?: A Little   Help from Another: Climbing 3-5 steps with a railing?: Total     AM-PAC Score:  Raw Score: 14   Approx Degree of Impairment: 61.29%   Standardized Score (AM-PAC Scale): 38.1   CMS Modifier (G-Code): CL        Patient End of Session: In bed;Needs met;Call light within reach;RN aware of session/findings; All patient questions and concerns addressed    CURRENT GOALS    Goals to be met by: 8/15/2023  Patient Goal Patient's self-stated goal is: Return home    Goal #1 Patient is able to demonstrate supine - sit EOB @ level: modified independent     Goal #1   Current Status Min A    Goal #2 Patient is able to demonstrate transfers Sit to/from Stand at assistance level: modified independent     Goal #2  Current Status Max A with the RW and while holding therapist. Pt performed multiple sit<>stand transfers    Goal #3 Patient is able to ambulate 300 feet with assistive device at assistance level: modified independent    Goal #3   Current Status 15' with the RW min A and with chair follow for safety. Goal #4 Patient will negotiate 2 stairs/one curb w/ assistive device and supervision   Goal #4   Current Status NT   Goal #5  AROM 0 degrees extension to 95 degrees flexion     Goal #5   Current Status IN PROGRESS   Goal #6 Patient independently performs home exercise program for ROM/strengthening per the instructions provided in preparation for discharge.    Goal #6  Current Status IN PROGRESS         Therapeutic Activity: 60 minutes

## 2023-07-28 NOTE — CM/SW NOTE
07/28/23 1200   Discharge disposition   Expected discharge disposition subacute   Post Acute Care Provider   (Fede Ordonez)   Home services after discharge None   Discharge transportation [address]     The patient Received an MDO for discharge. The patient will be discharged to [facility] via [facility] at [facility] work confirmed with Trey Swenson from [facility] and she was agreeable to 3pm.  The RN and family have been notified of Discharge. PASRR complete/uploaded. The patient and family have no further questions at this time. SW/CM to remain available for support and/or discharge planning.      Danae Vaz MSW, [facility]  Discharge Planner [id number]

## 2023-07-28 NOTE — CM/SW NOTE
Department  asked to send updates to [facility] referral for [facility]AGH[facility]DRA. Assigned SW/CM to follow up with patient/family on discharge plan.      Jw Number   July 28, 2023   09:04

## 2023-07-28 NOTE — TELEPHONE ENCOUNTER
Patient's ISABELA contact Sherita Knapp contacts clinic reporting patient is being discharged from hospital, recommend rehab. He is not satisfied with the facility inpatient staff wants to send her. I advised Sherita Knapp to discuss options with  and discharge planning staff. He will do so.

## 2023-07-29 ENCOUNTER — TELEPHONE (OUTPATIENT)
Dept: CASE MANAGEMENT | Facility: HOSPITAL | Age: 78
End: 2023-07-29

## 2023-07-30 ENCOUNTER — HOSPITAL ENCOUNTER (OUTPATIENT)
Facility: HOSPITAL | Age: 78
Setting detail: OBSERVATION
Discharge: NURSING, REHAB OR LONG-TERM CARE | End: 2023-08-01
Attending: INTERNAL MEDICINE | Admitting: INTERNAL MEDICINE
Payer: MEDICARE

## 2023-07-30 ENCOUNTER — HOSPITAL ENCOUNTER (INPATIENT)
Facility: HOSPITAL | Age: 78
LOS: 2 days | Discharge: NURSING, REHAB OR LONG-TERM CARE | End: 2023-08-01
Attending: INTERNAL MEDICINE | Admitting: INTERNAL MEDICINE
Payer: MEDICARE

## 2023-07-30 DIAGNOSIS — Z96.651 S/P TOTAL KNEE ARTHROPLASTY, RIGHT: ICD-10-CM

## 2023-07-30 DIAGNOSIS — I10 ESSENTIAL HYPERTENSION: ICD-10-CM

## 2023-07-30 DIAGNOSIS — E11.65 TYPE 2 DIABETES MELLITUS WITH HYPERGLYCEMIA, WITHOUT LONG-TERM CURRENT USE OF INSULIN (HCC): ICD-10-CM

## 2023-07-30 DIAGNOSIS — D62 ACUTE BLOOD LOSS ANEMIA (ABLA): ICD-10-CM

## 2023-07-30 DIAGNOSIS — N17.9 AKI (ACUTE KIDNEY INJURY) (HCC): ICD-10-CM

## 2023-07-30 DIAGNOSIS — M17.11 PRIMARY OSTEOARTHRITIS OF RIGHT KNEE: Primary | ICD-10-CM

## 2023-07-30 DIAGNOSIS — Z88.9 MULTIPLE ALLERGIES: ICD-10-CM

## 2023-07-30 LAB
ALBUMIN SERPL-MCNC: 2.4 G/DL (ref 3.4–5)
ALBUMIN/GLOB SERPL: 0.6 {RATIO} (ref 1–2)
ALP LIVER SERPL-CCNC: 24 U/L
ALT SERPL-CCNC: 70 U/L
ANION GAP SERPL CALC-SCNC: 6 MMOL/L (ref 0–18)
ANION GAP SERPL CALC-SCNC: 6 MMOL/L (ref 0–18)
ANTIBODY SCREEN: NEGATIVE
AST SERPL-CCNC: 105 U/L (ref 15–37)
BASOPHILS # BLD AUTO: 0.04 X10(3) UL (ref 0–0.2)
BASOPHILS NFR BLD AUTO: 0.5 %
BILIRUB SERPL-MCNC: 0.5 MG/DL (ref 0.1–2)
BUN BLD-MCNC: 30 MG/DL (ref 7–18)
BUN BLD-MCNC: 30 MG/DL (ref 7–18)
BUN/CREAT SERPL: 26.5 (ref 10–20)
BUN/CREAT SERPL: 26.5 (ref 10–20)
CALCIUM BLD-MCNC: 8.2 MG/DL (ref 8.5–10.1)
CALCIUM BLD-MCNC: 8.2 MG/DL (ref 8.5–10.1)
CHLORIDE SERPL-SCNC: 107 MMOL/L (ref 98–112)
CHLORIDE SERPL-SCNC: 107 MMOL/L (ref 98–112)
CO2 SERPL-SCNC: 25 MMOL/L (ref 21–32)
CO2 SERPL-SCNC: 25 MMOL/L (ref 21–32)
CREAT BLD-MCNC: 1.13 MG/DL
CREAT BLD-MCNC: 1.13 MG/DL
DEPRECATED RDW RBC AUTO: 49.9 FL (ref 35.1–46.3)
EGFRCR SERPLBLD CKD-EPI 2021: 49 ML/MIN/1.73M2 (ref 60–?)
EGFRCR SERPLBLD CKD-EPI 2021: 49 ML/MIN/1.73M2 (ref 60–?)
EOSINOPHIL # BLD AUTO: 0.45 X10(3) UL (ref 0–0.7)
EOSINOPHIL NFR BLD AUTO: 5.4 %
ERYTHROCYTE [DISTWIDTH] IN BLOOD BY AUTOMATED COUNT: 13.2 % (ref 11–15)
GLOBULIN PLAS-MCNC: 3.9 G/DL (ref 2.8–4.4)
GLUCOSE BLD-MCNC: 148 MG/DL (ref 70–99)
GLUCOSE BLD-MCNC: 148 MG/DL (ref 70–99)
GLUCOSE BLDC GLUCOMTR-MCNC: 129 MG/DL (ref 70–99)
GLUCOSE BLDC GLUCOMTR-MCNC: 136 MG/DL (ref 70–99)
GLUCOSE BLDC GLUCOMTR-MCNC: 142 MG/DL (ref 70–99)
GLUCOSE BLDC GLUCOMTR-MCNC: 168 MG/DL (ref 70–99)
GLUCOSE BLDC GLUCOMTR-MCNC: 221 MG/DL (ref 70–99)
HCT VFR BLD AUTO: 22.7 %
HGB BLD-MCNC: 7.3 G/DL
IMM GRANULOCYTES # BLD AUTO: 0.02 X10(3) UL (ref 0–1)
IMM GRANULOCYTES NFR BLD: 0.2 %
LYMPHOCYTES # BLD AUTO: 1.75 X10(3) UL (ref 1–4)
LYMPHOCYTES NFR BLD AUTO: 21.1 %
MAGNESIUM SERPL-MCNC: 2.1 MG/DL (ref 1.6–2.6)
MCH RBC QN AUTO: 33 PG (ref 26–34)
MCHC RBC AUTO-ENTMCNC: 32.2 G/DL (ref 31–37)
MCV RBC AUTO: 102.7 FL
MONOCYTES # BLD AUTO: 1.2 X10(3) UL (ref 0.1–1)
MONOCYTES NFR BLD AUTO: 14.5 %
NEUTROPHILS # BLD AUTO: 4.82 X10 (3) UL (ref 1.5–7.7)
NEUTROPHILS # BLD AUTO: 4.82 X10(3) UL (ref 1.5–7.7)
NEUTROPHILS NFR BLD AUTO: 58.3 %
OSMOLALITY SERPL CALC.SUM OF ELEC: 295 MOSM/KG (ref 275–295)
OSMOLALITY SERPL CALC.SUM OF ELEC: 295 MOSM/KG (ref 275–295)
PLATELET # BLD AUTO: 276 10(3)UL (ref 150–450)
POTASSIUM SERPL-SCNC: 3.7 MMOL/L (ref 3.5–5.1)
POTASSIUM SERPL-SCNC: 3.7 MMOL/L (ref 3.5–5.1)
PROT SERPL-MCNC: 6.3 G/DL (ref 6.4–8.2)
RBC # BLD AUTO: 2.21 X10(6)UL
RH BLOOD TYPE: POSITIVE
SODIUM SERPL-SCNC: 138 MMOL/L (ref 136–145)
SODIUM SERPL-SCNC: 138 MMOL/L (ref 136–145)
WBC # BLD AUTO: 8.3 X10(3) UL (ref 4–11)

## 2023-07-30 PROCEDURE — 99223 1ST HOSP IP/OBS HIGH 75: CPT | Performed by: INTERNAL MEDICINE

## 2023-07-30 RX ORDER — MULTIVITAMIN
1 TABLET ORAL DAILY
COMMUNITY

## 2023-07-30 RX ORDER — POLYETHYLENE GLYCOL 3350 17 G/17G
17 POWDER, FOR SOLUTION ORAL DAILY PRN
Status: DISCONTINUED | OUTPATIENT
Start: 2023-07-30 | End: 2023-08-01

## 2023-07-30 RX ORDER — HYDROCODONE BITARTRATE AND ACETAMINOPHEN 5; 325 MG/1; MG/1
2 TABLET ORAL EVERY 4 HOURS PRN
Status: DISCONTINUED | OUTPATIENT
Start: 2023-07-30 | End: 2023-08-01

## 2023-07-30 RX ORDER — SODIUM CHLORIDE 9 MG/ML
INJECTION, SOLUTION INTRAVENOUS ONCE
Status: DISCONTINUED | OUTPATIENT
Start: 2023-07-30 | End: 2023-08-01

## 2023-07-30 RX ORDER — ATORVASTATIN CALCIUM 40 MG/1
40 TABLET, FILM COATED ORAL NIGHTLY
Status: DISCONTINUED | OUTPATIENT
Start: 2023-07-30 | End: 2023-08-01

## 2023-07-30 RX ORDER — HYDROCODONE BITARTRATE AND ACETAMINOPHEN 5; 325 MG/1; MG/1
1 TABLET ORAL EVERY 4 HOURS PRN
Status: DISCONTINUED | OUTPATIENT
Start: 2023-07-30 | End: 2023-08-01

## 2023-07-30 RX ORDER — MELATONIN
3 NIGHTLY PRN
Status: DISCONTINUED | OUTPATIENT
Start: 2023-07-30 | End: 2023-08-01

## 2023-07-30 RX ORDER — BISACODYL 10 MG
10 SUPPOSITORY, RECTAL RECTAL
Status: DISCONTINUED | OUTPATIENT
Start: 2023-07-30 | End: 2023-08-01

## 2023-07-30 RX ORDER — DEXTROSE MONOHYDRATE 25 G/50ML
50 INJECTION, SOLUTION INTRAVENOUS
Status: DISCONTINUED | OUTPATIENT
Start: 2023-07-30 | End: 2023-08-01

## 2023-07-30 RX ORDER — SODIUM CHLORIDE 9 MG/ML
INJECTION, SOLUTION INTRAVENOUS CONTINUOUS
Status: DISCONTINUED | OUTPATIENT
Start: 2023-07-30 | End: 2023-07-31

## 2023-07-30 RX ORDER — OMEGA-3 FATTY ACIDS/FISH OIL 300-1000MG
1 CAPSULE ORAL DAILY
Status: ON HOLD | COMMUNITY
End: 2023-07-30

## 2023-07-30 RX ORDER — ACETAMINOPHEN 325 MG/1
650 TABLET ORAL EVERY 4 HOURS PRN
Status: DISCONTINUED | OUTPATIENT
Start: 2023-07-30 | End: 2023-08-01

## 2023-07-30 RX ORDER — ALBUTEROL SULFATE 90 UG/1
2 AEROSOL, METERED RESPIRATORY (INHALATION) EVERY 6 HOURS PRN
Status: DISCONTINUED | OUTPATIENT
Start: 2023-07-30 | End: 2023-08-01

## 2023-07-30 RX ORDER — NICOTINE POLACRILEX 4 MG
15 LOZENGE BUCCAL
Status: DISCONTINUED | OUTPATIENT
Start: 2023-07-30 | End: 2023-08-01

## 2023-07-30 RX ORDER — ENEMA 19; 7 G/133ML; G/133ML
1 ENEMA RECTAL ONCE AS NEEDED
Status: DISCONTINUED | OUTPATIENT
Start: 2023-07-30 | End: 2023-08-01

## 2023-07-30 RX ORDER — SENNOSIDES 8.6 MG
17.2 TABLET ORAL NIGHTLY PRN
Status: DISCONTINUED | OUTPATIENT
Start: 2023-07-30 | End: 2023-08-01

## 2023-07-30 RX ORDER — AMLODIPINE BESYLATE 2.5 MG/1
2.5 TABLET ORAL 2 TIMES DAILY
Status: DISCONTINUED | OUTPATIENT
Start: 2023-07-30 | End: 2023-08-01

## 2023-07-30 RX ORDER — NICOTINE POLACRILEX 4 MG
30 LOZENGE BUCCAL
Status: DISCONTINUED | OUTPATIENT
Start: 2023-07-30 | End: 2023-08-01

## 2023-07-30 RX ORDER — LEVOTHYROXINE SODIUM 88 UG/1
88 TABLET ORAL NIGHTLY
Status: DISCONTINUED | OUTPATIENT
Start: 2023-07-30 | End: 2023-08-01

## 2023-07-30 RX ORDER — ONDANSETRON 2 MG/ML
4 INJECTION INTRAMUSCULAR; INTRAVENOUS EVERY 6 HOURS PRN
Status: DISCONTINUED | OUTPATIENT
Start: 2023-07-30 | End: 2023-08-01

## 2023-07-30 RX ORDER — METOCLOPRAMIDE HYDROCHLORIDE 5 MG/ML
5 INJECTION INTRAMUSCULAR; INTRAVENOUS EVERY 8 HOURS PRN
Status: DISCONTINUED | OUTPATIENT
Start: 2023-07-30 | End: 2023-08-01

## 2023-07-30 RX ORDER — BENZONATATE 100 MG/1
200 CAPSULE ORAL 3 TIMES DAILY PRN
Status: DISCONTINUED | OUTPATIENT
Start: 2023-07-30 | End: 2023-08-01

## 2023-07-30 NOTE — PHYSICAL THERAPY NOTE
PHYSICAL THERAPY EVALUATION - INPATIENT     Room Number: 345/055-O  Evaluation Date: 7/30/2023  Type of Evaluation: Initial   Physician Order: PT Eval and Treat    Presenting Problem: low hemoglobin  Co-Morbidities :  (R tka 7/23)  Reason for Therapy: Mobility Dysfunction and Discharge Planning    PHYSICAL THERAPY ASSESSMENT     Patient is a [de-identified]year old female admitted 7/30/2023 for low hemoglobin. Patient with recent R TKA on 7/25/23. Patient is WBAT RLE with wound vac. Patient's current functional deficits include impaired R knee ROM, strength, bed mobility, transfers, gait, pain management, balance, coordination, and tolerance for activity, which are below the patient's pre-admission status. Patient in bed with family present upon arrival. RN approved activity. Educated patient on POC and benefits of mobilization. Agreeable to participate. Patient reporting R knee pain, not quantified on the pain scale. Bed Mobility: SBA supine>EOB, increased time to complete. Patient tolerated sitting EOB approx 5 minutes, requiring BUE support and SBA in order to maintain static sitting balance. Transfers: Min A sit<>stand with RW from EOB; cues provided for appropriate UE placement during functional transfers. Instruction on activity pacing upon standing to allow body time to acclimate to change in position. Tolerated static standing with BUE support on RW and Min A for approx 1 minute prior to mobilization. Mod A sit<>stand with RW from toilet. Patient with initial posterior lean upon standing, requiring increased cues to correct. Ambulation: Min A with RW for 20 ft and 25 ft; decreased R stance time, decreased iva speed, shuffled steps, step to pattern. Cues for safe management of RW with turns. Cues for proper gait sequencing. Patient with 1 LOB, requiring Min A to correct. Patient sitting in bedside chair at end of session. Needs in reach and alarm activated. Family present. RN aware.       The patient's Approx Degree of Impairment: 54.16% has been calculated based on documentation in the [facility] '6 clicks' Inpatient Basic Mobility Short Form. Research supports that patients with this level of impairment may benefit from subacute rehab in order to optimize functional outcomes. Pending progress, patient may be able to return home with HHPT and initial 24 hour supervision/care. Patient will benefit from continued IP PT services to address these deficits in preparation for discharge. DISCHARGE RECOMMENDATIONS  PT Discharge Recommendations: Sub-acute rehabilitation    PLAN  PT Treatment Plan: Bed mobility; Body mechanics; Coordination; Endurance; Energy conservation;Patient education;Gait training;Range of motion;Strengthening;Transfer training;Balance training  Rehab Potential : Good  Frequency (Obs): Daily       PHYSICAL THERAPY MEDICAL/SOCIAL HISTORY     Problem List  Active Problems:    Anemia      HOME SITUATION  Home Situation  Type of Home:  (pt from rehab at [facility])  Home Layout: One level  Lives With: Staff 24 hours  Patient Regularly Uses: None     Prior Level of [place]: Patient admitted from [place] [address] with ADLs/iADLS, ambulating with RW    SUBJECTIVE  \"It's been a long morning\"    PHYSICAL THERAPY EXAMINATION     OBJECTIVE  Precautions: Limb alert - right  Fall Risk: Standard fall risk    WEIGHT BEARING RESTRICTION  R Lower Extremity: Weight Bearing as Tolerated     PAIN ASSESSMENT  Rating: Unable to rate  Location: R knee  Management Techniques: Activity promotion; Body mechanics;Repositioning    COGNITION  Overall Cognitive Status:  WFL - within functional limits    RANGE OF MOTION AND STRENGTH ASSESSMENT  Upper extremity ROM and strength are within functional limits   Lower extremity ROM is within functional limits; R knee limited due to pain   Lower extremity strength is within functional limits; RLE grossly 3/5     BALANCE  Static Sitting: Fair +  Dynamic Sitting: Fair  Static Standing: Fair -  Dynamic Standing: Poor +    AM-PAC '6-Clicks' INPATIENT SHORT FORM - BASIC MOBILITY  How much difficulty does the patient currently have. .. Patient Difficulty: Turning over in bed (including adjusting bedclothes, sheets and blankets)?: A Little   Patient Difficulty: Sitting down on and standing up from a chair with arms (e.g., wheelchair, bedside commode, etc.): A Lot   Patient Difficulty: Moving from lying on back to sitting on the side of the bed?: A Little   How much help from another person does the patient currently need. .. Help from Another: Moving to and from a bed to a chair (including a wheelchair)?: A Little   Help from Another: Need to walk in hospital room?: A Little   Help from Another: Climbing 3-5 steps with a railing?: A Lot     AM-PAC Score:  Raw Score: 16   Approx Degree of Impairment: 54.16%   Standardized Score (AM-PAC Scale): 40.78   CMS Modifier (G-Code): CK    FUNCTIONAL ABILITY STATUS  Functional Mobility/Gait Assessment  Gait Assistance: Minimum assistance  Distance (ft): 20 ft and 25 ft  Assistive Device: Rolling walker  Pattern: R Decreased stance time; Shuffle (decreased iva speed, step to pattern, 1 LOB Min A to correct)    Exercise/Education Provided:  Bed mobility  Body mechanics  Energy conservation  Functional activity tolerated  Gait training  Posture  Transfer training    Patient End of Session: Up in chair;Needs met;Call light within reach;RN aware of session/findings; All patient questions and concerns addressed; Alarm set; Family present    CURRENT GOALS    Goals to be met by: 8/13/23  Patient Goal Patient's self-stated goal is: none stated   Goal #1 Patient is able to demonstrate supine - sit EOB @ level: supervision     Goal #1   Current Status    Goal #2 Patient is able to demonstrate transfers Sit to/from Stand at assistance level: supervision with walker - rolling     Goal #2  Current Status    Goal #3 Patient is able to ambulate 150 feet with assist device: walker - rolling at assistance level: supervision   Goal #3   Current Status    Goal #4 Patient to demonstrate independence with home activity/exercise instructions provided to patient in preparation for discharge.    Goal #4   Current Status      Patient Evaluation Complexity Level:  History Moderate - 1 or 2 personal factors and/or co-morbidities   Examination of body systems Low - addressing 1-2 elements   Clinical Presentation Low - Stable   Clinical Decision Making Low Complexity       Gait Training: 10 minutes  Therapeutic Activity: 15 minutes

## 2023-07-30 NOTE — PLAN OF CARE
Patient alert and oriented. Up 1 person assist with the walker. Ambulating to the bathroom with staff. PRN tylenol given for pain. Ice packs applied to help with the pain. Vital signs stable. No acute changes. Call light within reach. All safety precautions in place. Frequent rounding by staff. MD paged regarding ortho consult, per MD will consult tomorrow. Problem: Patient Centered Care  Goal: Patient preferences are identified and integrated in the patient's plan of care  Description: Interventions:  - What would you like us to know as we care for you? Patient was recently here on 7/25 for knee replacement. Patient is really looking forward to getting better.    - Provide timely, complete, and accurate information to patient/family  - Incorporate patient and family knowledge, values, beliefs, and cultural backgrounds into the planning and delivery of care  - Encourage patient/family to participate in care and decision-making at the level they choose  - Honor patient and family perspectives and choices  Outcome: Progressing     Problem: Diabetes/Glucose Control  Goal: Glucose maintained within prescribed range  Description: INTERVENTIONS:  - Monitor Blood Glucose as ordered  - Assess for signs and symptoms of hyperglycemia and hypoglycemia  - Administer ordered medications to maintain glucose within target range  - Assess barriers to adequate nutritional intake and initiate nutrition consult as needed  - Instruct patient on self management of diabetes  Outcome: Progressing     Problem: Patient/Family Goals  Goal: Patient/Family Long Term Goal  Description: Patient's Long Term Goal: Be able to live at home alone again     Interventions:  - ambulate frequently  - pain management  - increase mobility   - See additional Care Plan goals for specific interventions  Outcome: Progressing

## 2023-07-31 PROBLEM — N17.9 AKI (ACUTE KIDNEY INJURY) (HCC): Status: ACTIVE | Noted: 2023-07-31

## 2023-07-31 PROBLEM — D62 ACUTE BLOOD LOSS ANEMIA (ABLA): Status: ACTIVE | Noted: 2023-07-31

## 2023-07-31 PROBLEM — N17.9 AKI (ACUTE KIDNEY INJURY): Status: ACTIVE | Noted: 2023-07-31

## 2023-07-31 LAB
ANION GAP SERPL CALC-SCNC: 6 MMOL/L (ref 0–18)
BASOPHILS # BLD AUTO: 0.05 X10(3) UL (ref 0–0.2)
BASOPHILS NFR BLD AUTO: 0.7 %
BUN BLD-MCNC: 26 MG/DL (ref 7–18)
BUN/CREAT SERPL: 33.3 (ref 10–20)
CALCIUM BLD-MCNC: 8.3 MG/DL (ref 8.5–10.1)
CHLORIDE SERPL-SCNC: 112 MMOL/L (ref 98–112)
CO2 SERPL-SCNC: 25 MMOL/L (ref 21–32)
CREAT BLD-MCNC: 0.78 MG/DL
DEPRECATED RDW RBC AUTO: 51.6 FL (ref 35.1–46.3)
EGFRCR SERPLBLD CKD-EPI 2021: 77 ML/MIN/1.73M2 (ref 60–?)
EOSINOPHIL # BLD AUTO: 0.41 X10(3) UL (ref 0–0.7)
EOSINOPHIL NFR BLD AUTO: 6.1 %
ERYTHROCYTE [DISTWIDTH] IN BLOOD BY AUTOMATED COUNT: 13.4 % (ref 11–15)
GLUCOSE BLD-MCNC: 142 MG/DL (ref 70–99)
GLUCOSE BLDC GLUCOMTR-MCNC: 153 MG/DL (ref 70–99)
GLUCOSE BLDC GLUCOMTR-MCNC: 168 MG/DL (ref 70–99)
GLUCOSE BLDC GLUCOMTR-MCNC: 170 MG/DL (ref 70–99)
GLUCOSE BLDC GLUCOMTR-MCNC: 179 MG/DL (ref 70–99)
HCT VFR BLD AUTO: 20.1 %
HCT VFR BLD AUTO: 25 %
HGB BLD-MCNC: 6.3 G/DL
HGB BLD-MCNC: 8.2 G/DL
IMM GRANULOCYTES # BLD AUTO: 0.06 X10(3) UL (ref 0–1)
IMM GRANULOCYTES NFR BLD: 0.9 %
LYMPHOCYTES # BLD AUTO: 1.66 X10(3) UL (ref 1–4)
LYMPHOCYTES NFR BLD AUTO: 24.9 %
MCH RBC QN AUTO: 32.5 PG (ref 26–34)
MCHC RBC AUTO-ENTMCNC: 31.3 G/DL (ref 31–37)
MCV RBC AUTO: 103.6 FL
MONOCYTES # BLD AUTO: 1.01 X10(3) UL (ref 0.1–1)
MONOCYTES NFR BLD AUTO: 15.1 %
NEUTROPHILS # BLD AUTO: 3.48 X10 (3) UL (ref 1.5–7.7)
NEUTROPHILS # BLD AUTO: 3.48 X10(3) UL (ref 1.5–7.7)
NEUTROPHILS NFR BLD AUTO: 52.3 %
OSMOLALITY SERPL CALC.SUM OF ELEC: 303 MOSM/KG (ref 275–295)
PLATELET # BLD AUTO: 295 10(3)UL (ref 150–450)
POTASSIUM SERPL-SCNC: 3.9 MMOL/L (ref 3.5–5.1)
RBC # BLD AUTO: 1.94 X10(6)UL
SODIUM SERPL-SCNC: 143 MMOL/L (ref 136–145)
WBC # BLD AUTO: 6.7 X10(3) UL (ref 4–11)

## 2023-07-31 PROCEDURE — 99233 SBSQ HOSP IP/OBS HIGH 50: CPT | Performed by: INTERNAL MEDICINE

## 2023-07-31 PROCEDURE — 30233N1 TRANSFUSION OF NONAUTOLOGOUS RED BLOOD CELLS INTO PERIPHERAL VEIN, PERCUTANEOUS APPROACH: ICD-10-PCS | Performed by: INTERNAL MEDICINE

## 2023-07-31 RX ORDER — VENLAFAXINE HYDROCHLORIDE 37.5 MG/1
37.5 CAPSULE, EXTENDED RELEASE ORAL NIGHTLY
Status: DISCONTINUED | OUTPATIENT
Start: 2023-07-31 | End: 2023-08-01

## 2023-07-31 RX ORDER — SODIUM CHLORIDE 9 MG/ML
INJECTION, SOLUTION INTRAVENOUS ONCE
Status: COMPLETED | OUTPATIENT
Start: 2023-07-31 | End: 2023-07-31

## 2023-07-31 RX ORDER — TRAMADOL HYDROCHLORIDE 50 MG/1
50 TABLET ORAL EVERY 4 HOURS PRN
Status: DISCONTINUED | OUTPATIENT
Start: 2023-07-31 | End: 2023-08-01

## 2023-07-31 RX ORDER — PANTOPRAZOLE SODIUM 40 MG/1
40 TABLET, DELAYED RELEASE ORAL
Status: DISCONTINUED | OUTPATIENT
Start: 2023-07-31 | End: 2023-08-01

## 2023-07-31 RX ORDER — LISINOPRIL 20 MG/1
20 TABLET ORAL 2 TIMES DAILY
Status: DISCONTINUED | OUTPATIENT
Start: 2023-07-31 | End: 2023-08-01

## 2023-07-31 RX ORDER — FENOFIBRATE 134 MG/1
134 CAPSULE ORAL
Status: DISCONTINUED | OUTPATIENT
Start: 2023-07-31 | End: 2023-08-01

## 2023-07-31 NOTE — CM/SW NOTE
07/31/23 0900   PHILIP/HAYLEE Referral Data   Referral Source    Reason for Referral Discharge planning;Readmission   Informant Patient;Spouse/Significant Other   Readmission Assessment   Factors that patient feels contributed to this readmission Acute/Chronic Clinical Presentation   Pt's living situation prior to admission? Subacute rehab   Pt's level of independence at discharge? No assist/independent (minimal)   Pt. received education on diagnoses at time of discharge? Yes   Did you know who and how to call someone if you felt worse? Yes   Did any new symptoms or issues develop after you were discharged? Yes   ----Post D/C symptoms: Symptoms/issue related to previous hospitalization Yes   Did you understand your discharge instructions? Yes   Were medications taken as indicated on discharge instructions? Yes   Did you have a follow-up appointment scheduled at time of discharge? No   Was full assessment completed by HAYLEE/PHILIP on prior admission? Yes   Was the recommended discharge plan achieved? Yes   Was pt. discharged w/out services? No   Medical Hx   Does patient have an established PCP? Yes   Significant Past Medical/Mental Health Hx Shanae Coon   Patient Info   Patient's Current Mental Status at Time of Assessment Alert;Oriented   Patient's Home Environment House   Patient lives with Spouse/Significant other   Patient Status Prior to Admission   Independent with ADLs and Mobility Yes   Discharge Needs   Anticipated D/C needs Subacute rehab   Services Requested   PASRR Level 1 Submitted No - Current within 90 days     Pt discussed during nursing rounds. Dx anemia, recent right TKA. From [facility], home w/SO of 30 yrs prior. Independent w/o device at baseline. Pt agreeable to return to [facility] [place]. Return referral sent to [facility] in [place].  PHILIP requested that [address] submit for ins auth via [facility] portal.    Plan: [facility] for [facility] pending ins auth and medical clearance. / to remain available for support and/or discharge planning.      EWELINA SaavedraN    [phone]

## 2023-07-31 NOTE — PLAN OF CARE
Tylenol for pain as needed. Right knee with wound vac, drssg clean and intact. Last hgb 7.3, labs in am. Vitals stable. Safety precautions in place. Frequent rounding by staff. Continue to monitor.     Problem: Patient Centered Care  Goal: Patient preferences are identified and integrated in the patient's plan of care  Description: Interventions:  - What would you like us to know as we care for you?  - Provide timely, complete, and accurate information to patient/family  - Incorporate patient and family knowledge, values, beliefs, and cultural backgrounds into the planning and delivery of care  - Encourage patient/family to participate in care and decision-making at the level they choose  - Honor patient and family perspectives and choices  Outcome: Progressing     Problem: Diabetes/Glucose Control  Goal: Glucose maintained within prescribed range  Description: INTERVENTIONS:  - Monitor Blood Glucose as ordered  - Assess for signs and symptoms of hyperglycemia and hypoglycemia  - Administer ordered medications to maintain glucose within target range  - Assess barriers to adequate nutritional intake and initiate nutrition consult as needed  - Instruct patient on self management of diabetes  Outcome: Progressing     Problem: PAIN - ADULT  Goal: Verbalizes/displays adequate comfort level or patient's stated pain goal  Description: INTERVENTIONS:  - Encourage pt to monitor pain and request assistance  - Assess pain using appropriate pain scale  - Administer analgesics based on type and severity of pain and evaluate response  - Implement non-pharmacological measures as appropriate and evaluate response  - Consider cultural and social influences on pain and pain management  - Manage/alleviate anxiety  - Utilize distraction and/or relaxation techniques  - Monitor for opioid side effects  - Notify MD/LIP if interventions unsuccessful or patient reports new pain  - Anticipate increased pain with activity and pre-medicate as appropriate  Outcome: Progressing     Problem: SAFETY ADULT - FALL  Goal: Free from fall injury  Description: INTERVENTIONS:  - Assess pt frequently for physical needs  - Identify cognitive and physical deficits and behaviors that affect risk of falls.   - [place] fall precautions as indicated by assessment.  - Educate pt/family on patient safety including physical limitations  - Instruct pt to call for assistance with activity based on assessment  - Modify environment to reduce risk of injury  - Provide assistive devices as appropriate  - Consider OT/PT consult to assist with strengthening/mobility  - Encourage toileting schedule  Outcome: Progressing     Problem: SKIN/TISSUE INTEGRITY - ADULT  Goal: Skin integrity remains intact  Description: INTERVENTIONS  - Assess and document risk factors for pressure ulcer development  - Assess and document skin integrity  - Monitor for areas of redness and/or skin breakdown  - Initiate interventions, skin care algorithm/standards of care as needed  Outcome: Progressing  Goal: Incision(s), wounds(s) or drain site(s) healing without S/S of infection  Description: INTERVENTIONS:  - Assess and document risk factors for pressure ulcer development  - Assess and document skin integrity  - Assess and document dressing/incision, wound bed, drain sites and surrounding tissue  - Implement wound care per orders  - Initiate isolation precautions as appropriate  - Initiate Pressure Ulcer prevention bundle as indicated  Outcome: Progressing     Problem: HEMATOLOGIC - ADULT  Goal: Maintains hematologic stability  Description: INTERVENTIONS  - Assess for signs and symptoms of bleeding or hemorrhage  - Monitor labs and vital signs for trends  - Administer supportive blood products/factors, fluids and medications as ordered and appropriate  - Administer supportive blood products/factors as ordered and appropriate  Outcome: Progressing  Goal: Free from bleeding injury  Description: (Example usage: patient with low platelets)  INTERVENTIONS:  - Avoid intramuscular injections, enemas and rectal medication administration  - Ensure safe mobilization of patient  - Hold pressure on venipuncture sites to achieve adequate hemostasis  - Assess for signs and symptoms of internal bleeding  - Monitor lab trends  - Patient is to report abnormal signs of bleeding to staff  - Avoid use of toothpicks and dental floss  - Use electric shaver for shaving  - Use soft bristle tooth brush  - Limit straining and forceful nose blowing  Outcome: Progressing

## 2023-07-31 NOTE — PLAN OF CARE
Patient alert and oriented. Ice packs and PRN tylenol used for pain. Patient up to the bathroom throughout the day 1x assist with a walker. Hgb 6.3 this AM, 1 unit PRBCs given, repeat hgb 8.2. Call light within reach. All safety precautions in place. Frequent rounding by staff. Problem: Patient Centered Care  Goal: Patient preferences are identified and integrated in the patient's plan of care  Description: Interventions:  - What would you like us to know as we care for you? Patient really wants to go home.    - Provide timely, complete, and accurate information to patient/family  - Incorporate patient and family knowledge, values, beliefs, and cultural backgrounds into the planning and delivery of care  - Encourage patient/family to participate in care and decision-making at the level they choose  - Honor patient and family perspectives and choices  Outcome: Progressing     Problem: Diabetes/Glucose Control  Goal: Glucose maintained within prescribed range  Description: INTERVENTIONS:  - Monitor Blood Glucose as ordered  - Assess for signs and symptoms of hyperglycemia and hypoglycemia  - Administer ordered medications to maintain glucose within target range  - Assess barriers to adequate nutritional intake and initiate nutrition consult as needed  - Instruct patient on self management of diabetes  Outcome: Progressing     Problem: PAIN - ADULT  Goal: Verbalizes/displays adequate comfort level or patient's stated pain goal  Description: INTERVENTIONS:  - Encourage pt to monitor pain and request assistance  - Assess pain using appropriate pain scale  - Administer analgesics based on type and severity of pain and evaluate response  - Implement non-pharmacological measures as appropriate and evaluate response  - Consider cultural and social influences on pain and pain management  - Manage/alleviate anxiety  - Utilize distraction and/or relaxation techniques  - Monitor for opioid side effects  - Notify MD/LIP if interventions unsuccessful or patient reports new pain  - Anticipate increased pain with activity and pre-medicate as appropriate  Outcome: Progressing     Problem: SAFETY ADULT - FALL  Goal: Free from fall injury  Description: INTERVENTIONS:  - Assess pt frequently for physical needs  - Identify cognitive and physical deficits and behaviors that affect risk of falls.   - [place] fall precautions as indicated by assessment.  - Educate pt/family on patient safety including physical limitations  - Instruct pt to call for assistance with activity based on assessment  - Modify environment to reduce risk of injury  - Provide assistive devices as appropriate  - Consider OT/PT consult to assist with strengthening/mobility  - Encourage toileting schedule  Outcome: Progressing     Problem: SKIN/TISSUE INTEGRITY - ADULT  Goal: Skin integrity remains intact  Description: INTERVENTIONS  - Assess and document risk factors for pressure ulcer development  - Assess and document skin integrity  - Monitor for areas of redness and/or skin breakdown  - Initiate interventions, skin care algorithm/standards of care as needed  Outcome: Progressing  Goal: Incision(s), wounds(s) or drain site(s) healing without S/S of infection  Description: INTERVENTIONS:  - Assess and document risk factors for pressure ulcer development  - Assess and document skin integrity  - Assess and document dressing/incision, wound bed, drain sites and surrounding tissue  - Implement wound care per orders  - Initiate isolation precautions as appropriate  - Initiate Pressure Ulcer prevention bundle as indicated  Outcome: Progressing     Problem: HEMATOLOGIC - ADULT  Goal: Maintains hematologic stability  Description: INTERVENTIONS  - Assess for signs and symptoms of bleeding or hemorrhage  - Monitor labs and vital signs for trends  - Administer supportive blood products/factors, fluids and medications as ordered and appropriate  - Administer supportive blood products/factors as ordered and appropriate  Outcome: Progressing  Goal: Free from bleeding injury  Description: (Example usage: patient with low platelets)  INTERVENTIONS:  - Avoid intramuscular injections, enemas and rectal medication administration  - Ensure safe mobilization of patient  - Hold pressure on venipuncture sites to achieve adequate hemostasis  - Assess for signs and symptoms of internal bleeding  - Monitor lab trends  - Patient is to report abnormal signs of bleeding to staff  - Avoid use of toothpicks and dental floss  - Use electric shaver for shaving  - Use soft bristle tooth brush  - Limit straining and forceful nose blowing  Outcome: Progressing

## 2023-08-01 ENCOUNTER — APPOINTMENT (OUTPATIENT)
Dept: ULTRASOUND IMAGING | Facility: HOSPITAL | Age: 78
End: 2023-08-01
Attending: INTERNAL MEDICINE
Payer: MEDICARE

## 2023-08-01 ENCOUNTER — APPOINTMENT (OUTPATIENT)
Dept: GENERAL RADIOLOGY | Facility: HOSPITAL | Age: 78
End: 2023-08-01
Attending: INTERNAL MEDICINE
Payer: MEDICARE

## 2023-08-01 VITALS
RESPIRATION RATE: 16 BRPM | DIASTOLIC BLOOD PRESSURE: 57 MMHG | OXYGEN SATURATION: 95 % | BODY MASS INDEX: 23.77 KG/M2 | SYSTOLIC BLOOD PRESSURE: 159 MMHG | HEART RATE: 81 BPM | WEIGHT: 129.19 LBS | TEMPERATURE: 98 F | HEIGHT: 62 IN

## 2023-08-01 LAB
ANION GAP SERPL CALC-SCNC: 8 MMOL/L (ref 0–18)
BASOPHILS # BLD AUTO: 0.06 X10(3) UL (ref 0–0.2)
BASOPHILS NFR BLD AUTO: 0.8 %
BILIRUB UR QL: NEGATIVE
BLOOD TYPE BARCODE: 6200
BUN BLD-MCNC: 16 MG/DL (ref 7–18)
BUN/CREAT SERPL: 23.2 (ref 10–20)
CALCIUM BLD-MCNC: 8.7 MG/DL (ref 8.5–10.1)
CHLORIDE SERPL-SCNC: 111 MMOL/L (ref 98–112)
CLARITY UR: CLEAR
CO2 SERPL-SCNC: 23 MMOL/L (ref 21–32)
CREAT BLD-MCNC: 0.69 MG/DL
DEPRECATED RDW RBC AUTO: 53 FL (ref 35.1–46.3)
EGFRCR SERPLBLD CKD-EPI 2021: 88 ML/MIN/1.73M2 (ref 60–?)
EOSINOPHIL # BLD AUTO: 0.35 X10(3) UL (ref 0–0.7)
EOSINOPHIL NFR BLD AUTO: 4.6 %
ERYTHROCYTE [DISTWIDTH] IN BLOOD BY AUTOMATED COUNT: 14.6 % (ref 11–15)
GLUCOSE BLD-MCNC: 152 MG/DL (ref 70–99)
GLUCOSE BLDC GLUCOMTR-MCNC: 129 MG/DL (ref 70–99)
GLUCOSE BLDC GLUCOMTR-MCNC: 144 MG/DL (ref 70–99)
GLUCOSE BLDC GLUCOMTR-MCNC: 164 MG/DL (ref 70–99)
GLUCOSE UR-MCNC: NORMAL MG/DL
HCT VFR BLD AUTO: 27.3 %
HGB BLD-MCNC: 8.9 G/DL
HGB UR QL STRIP.AUTO: NEGATIVE
IMM GRANULOCYTES # BLD AUTO: 0.15 X10(3) UL (ref 0–1)
IMM GRANULOCYTES NFR BLD: 2 %
KETONES UR-MCNC: NEGATIVE MG/DL
LEUKOCYTE ESTERASE UR QL STRIP.AUTO: NEGATIVE
LYMPHOCYTES # BLD AUTO: 1.56 X10(3) UL (ref 1–4)
LYMPHOCYTES NFR BLD AUTO: 20.7 %
MCH RBC QN AUTO: 32.1 PG (ref 26–34)
MCHC RBC AUTO-ENTMCNC: 32.6 G/DL (ref 31–37)
MCV RBC AUTO: 98.6 FL
MONOCYTES # BLD AUTO: 1.2 X10(3) UL (ref 0.1–1)
MONOCYTES NFR BLD AUTO: 15.9 %
NEUTROPHILS # BLD AUTO: 4.22 X10 (3) UL (ref 1.5–7.7)
NEUTROPHILS # BLD AUTO: 4.22 X10(3) UL (ref 1.5–7.7)
NEUTROPHILS NFR BLD AUTO: 56 %
NITRITE UR QL STRIP.AUTO: NEGATIVE
OSMOLALITY SERPL CALC.SUM OF ELEC: 298 MOSM/KG (ref 275–295)
PH UR: 7 [PH] (ref 5–8)
PLATELET # BLD AUTO: 354 10(3)UL (ref 150–450)
POTASSIUM SERPL-SCNC: 3.6 MMOL/L (ref 3.5–5.1)
PROT UR-MCNC: 50 MG/DL
RBC # BLD AUTO: 2.77 X10(6)UL
SODIUM SERPL-SCNC: 142 MMOL/L (ref 136–145)
SP GR UR STRIP: 1.01 (ref 1–1.03)
UNIT VOLUME: 350 ML
UROBILINOGEN UR STRIP-ACNC: NORMAL
WBC # BLD AUTO: 7.5 X10(3) UL (ref 4–11)

## 2023-08-01 PROCEDURE — 71045 X-RAY EXAM CHEST 1 VIEW: CPT | Performed by: INTERNAL MEDICINE

## 2023-08-01 PROCEDURE — 99233 SBSQ HOSP IP/OBS HIGH 50: CPT | Performed by: INTERNAL MEDICINE

## 2023-08-01 PROCEDURE — 93970 EXTREMITY STUDY: CPT | Performed by: INTERNAL MEDICINE

## 2023-08-01 RX ORDER — HYDRALAZINE HYDROCHLORIDE 20 MG/ML
10 INJECTION INTRAMUSCULAR; INTRAVENOUS ONCE
Status: COMPLETED | OUTPATIENT
Start: 2023-08-01 | End: 2023-08-01

## 2023-08-01 RX ORDER — TRAMADOL HYDROCHLORIDE 50 MG/1
50 TABLET ORAL EVERY 4 HOURS PRN
Qty: 60 TABLET | Refills: 0 | Status: SHIPPED | OUTPATIENT
Start: 2023-08-01

## 2023-08-01 RX ORDER — ASPIRIN 81 MG/1
81 TABLET ORAL 2 TIMES DAILY
Qty: 1 TABLET | Refills: 0 | Status: SHIPPED | OUTPATIENT
Start: 2023-08-01

## 2023-08-01 NOTE — PLAN OF CARE
Patient to discharge to [facility]. Report given to [facility]. Order placed for removal of Prevena wound vac. Per Ortho YONNY, ok to remove vac at facility tomorrow. Ortho follow up appointment scheduled. Instructions provided to facility. Patient offered prescription for Tramadol upon discharge. Patient declined, stating she will not take anything more than tylenol for pain. Script discarded. Problem: Patient Centered Care  Goal: Patient preferences are identified and integrated in the patient's plan of care  Description: Interventions:  - What would you like us to know as we care for you?  Patient had recent right knee arthoplasty  - Provide timely, complete, and accurate information to patient/family  - Incorporate patient and family knowledge, values, beliefs, and cultural backgrounds into the planning and delivery of care  - Encourage patient/family to participate in care and decision-making at the level they choose  - Honor patient and family perspectives and choices  Outcome: Progressing     Problem: Diabetes/Glucose Control  Goal: Glucose maintained within prescribed range  Description: INTERVENTIONS:  - Monitor Blood Glucose as ordered  - Assess for signs and symptoms of hyperglycemia and hypoglycemia  - Administer ordered medications to maintain glucose within target range  - Assess barriers to adequate nutritional intake and initiate nutrition consult as needed  - Instruct patient on self management of diabetes  Outcome: Progressing     Problem: PAIN - ADULT  Goal: Verbalizes/displays adequate comfort level or patient's stated pain goal  Description: INTERVENTIONS:  - Encourage pt to monitor pain and request assistance  - Assess pain using appropriate pain scale  - Administer analgesics based on type and severity of pain and evaluate response  - Implement non-pharmacological measures as appropriate and evaluate response  - Consider cultural and social influences on pain and pain management  - Manage/alleviate anxiety  - Utilize distraction and/or relaxation techniques  - Monitor for opioid side effects  - Notify MD/LIP if interventions unsuccessful or patient reports new pain  - Anticipate increased pain with activity and pre-medicate as appropriate  Outcome: Progressing     Problem: SAFETY ADULT - FALL  Goal: Free from fall injury  Description: INTERVENTIONS:  - Assess pt frequently for physical needs  - Identify cognitive and physical deficits and behaviors that affect risk of falls.   - [place] fall precautions as indicated by assessment.  - Educate pt/family on patient safety including physical limitations  - Instruct pt to call for assistance with activity based on assessment  - Modify environment to reduce risk of injury  - Provide assistive devices as appropriate  - Consider OT/PT consult to assist with strengthening/mobility  - Encourage toileting schedule  Outcome: Progressing     Problem: SKIN/TISSUE INTEGRITY - ADULT  Goal: Skin integrity remains intact  Description: INTERVENTIONS  - Assess and document risk factors for pressure ulcer development  - Assess and document skin integrity  - Monitor for areas of redness and/or skin breakdown  - Initiate interventions, skin care algorithm/standards of care as needed  Outcome: Progressing  Goal: Incision(s), wounds(s) or drain site(s) healing without S/S of infection  Description: INTERVENTIONS:  - Assess and document risk factors for pressure ulcer development  - Assess and document skin integrity  - Assess and document dressing/incision, wound bed, drain sites and surrounding tissue  - Implement wound care per orders  - Initiate isolation precautions as appropriate  - Initiate Pressure Ulcer prevention bundle as indicated  Outcome: Progressing     Problem: HEMATOLOGIC - ADULT  Goal: Maintains hematologic stability  Description: INTERVENTIONS  - Assess for signs and symptoms of bleeding or hemorrhage  - Monitor labs and vital signs for trends  - Administer supportive blood products/factors, fluids and medications as ordered and appropriate  - Administer supportive blood products/factors as ordered and appropriate  Outcome: Progressing  Goal: Free from bleeding injury  Description: (Example usage: patient with low platelets)  INTERVENTIONS:  - Avoid intramuscular injections, enemas and rectal medication administration  - Ensure safe mobilization of patient  - Hold pressure on venipuncture sites to achieve adequate hemostasis  - Assess for signs and symptoms of internal bleeding  - Monitor lab trends  - Patient is to report abnormal signs of bleeding to staff  - Avoid use of toothpicks and dental floss  - Use electric shaver for shaving  - Use soft bristle tooth brush  - Limit straining and forceful nose blowing  Outcome: Progressing

## 2023-08-01 NOTE — DISCHARGE INSTRUCTIONS
PLEASE DISCONTINUE AND REMOVE PREVENA DRESSING ON 8/2/23. Apply new gauze dressing to right knee. Daily dressing change as needed. DO NOT remove staples - patient is scheduled for postop appointment on 8/7/2023 at 9:30 at [facility] office.

## 2023-08-01 NOTE — PLAN OF CARE
No changes overnight. Tylenol given for pain. Safety precautions in place, frequent rounding by staff. Continue to monitor.     Problem: Patient Centered Care  Goal: Patient preferences are identified and integrated in the patient's plan of care  Description: Interventions:  - What would you like us to know as we care for you?   - Provide timely, complete, and accurate information to patient/family  - Incorporate patient and family knowledge, values, beliefs, and cultural backgrounds into the planning and delivery of care  - Encourage patient/family to participate in care and decision-making at the level they choose  - Honor patient and family perspectives and choices  Outcome: Progressing     Problem: Diabetes/Glucose Control  Goal: Glucose maintained within prescribed range  Description: INTERVENTIONS:  - Monitor Blood Glucose as ordered  - Assess for signs and symptoms of hyperglycemia and hypoglycemia  - Administer ordered medications to maintain glucose within target range  - Assess barriers to adequate nutritional intake and initiate nutrition consult as needed  - Instruct patient on self management of diabetes  Outcome: Progressing     Problem: PAIN - ADULT  Goal: Verbalizes/displays adequate comfort level or patient's stated pain goal  Description: INTERVENTIONS:  - Encourage pt to monitor pain and request assistance  - Assess pain using appropriate pain scale  - Administer analgesics based on type and severity of pain and evaluate response  - Implement non-pharmacological measures as appropriate and evaluate response  - Consider cultural and social influences on pain and pain management  - Manage/alleviate anxiety  - Utilize distraction and/or relaxation techniques  - Monitor for opioid side effects  - Notify MD/LIP if interventions unsuccessful or patient reports new pain  - Anticipate increased pain with activity and pre-medicate as appropriate  Outcome: Progressing     Problem: SAFETY ADULT - FALL  Goal: Free from fall injury  Description: INTERVENTIONS:  - Assess pt frequently for physical needs  - Identify cognitive and physical deficits and behaviors that affect risk of falls.   - [place] fall precautions as indicated by assessment.  - Educate pt/family on patient safety including physical limitations  - Instruct pt to call for assistance with activity based on assessment  - Modify environment to reduce risk of injury  - Provide assistive devices as appropriate  - Consider OT/PT consult to assist with strengthening/mobility  - Encourage toileting schedule  Outcome: Progressing     Problem: SKIN/TISSUE INTEGRITY - ADULT  Goal: Skin integrity remains intact  Description: INTERVENTIONS  - Assess and document risk factors for pressure ulcer development  - Assess and document skin integrity  - Monitor for areas of redness and/or skin breakdown  - Initiate interventions, skin care algorithm/standards of care as needed  Outcome: Progressing  Goal: Incision(s), wounds(s) or drain site(s) healing without S/S of infection  Description: INTERVENTIONS:  - Assess and document risk factors for pressure ulcer development  - Assess and document skin integrity  - Assess and document dressing/incision, wound bed, drain sites and surrounding tissue  - Implement wound care per orders  - Initiate isolation precautions as appropriate  - Initiate Pressure Ulcer prevention bundle as indicated  Outcome: Progressing     Problem: HEMATOLOGIC - ADULT  Goal: Maintains hematologic stability  Description: INTERVENTIONS  - Assess for signs and symptoms of bleeding or hemorrhage  - Monitor labs and vital signs for trends  - Administer supportive blood products/factors, fluids and medications as ordered and appropriate  - Administer supportive blood products/factors as ordered and appropriate  Outcome: Progressing  Goal: Free from bleeding injury  Description: (Example usage: patient with low platelets)  INTERVENTIONS:  - Avoid intramuscular injections, enemas and rectal medication administration  - Ensure safe mobilization of patient  - Hold pressure on venipuncture sites to achieve adequate hemostasis  - Assess for signs and symptoms of internal bleeding  - Monitor lab trends  - Patient is to report abnormal signs of bleeding to staff  - Avoid use of toothpicks and dental floss  - Use electric shaver for shaving  - Use soft bristle tooth brush  - Limit straining and forceful nose blowing  Outcome: Progressing

## 2023-08-01 NOTE — CM/SW NOTE
08/01/23 1052   Discharge disposition   Expected discharge disposition subacute   Post Acute Care Provider Dennis Colbert   Discharge transportation [facility]     Pt discussed during nursing rounds. Pt is stable for dc today. MD dc order entered. Pt will dc to [facility] for [facility], liaison Sara Brennan confirmed ins Jose Rabago has been obtained and bed is available today. Pt and SO agreeable to transfer today and cost of transport which will be $70. [facility] scheduled for 3pm . Number for nurse report is . Plan: [facility] for [facility]AGH[facility]EN[facility] today. / to remain available for support and/or discharge planning.      EWELINA ByersN    [phone]

## 2023-08-01 NOTE — DIETARY NOTE
NUTRITION EDUCATION NOTE     Received consult for diabetic nutrition education. Verbally reviewed basic diabetic and heart healthy diet restrictions. Provided with Heart- Healthy Consistent Carbohydrate Nutrition Therapy and NCM handout to reinforce. Pt very familiar with reading food labels, staying away from processed foods and reducing salt intake. Receptive to instruction. Would benefit from outpt f/u. Expect good compliance.       Jamila Platt  Dietetic Intern  [phone]

## 2023-08-01 NOTE — PHYSICAL THERAPY NOTE
Patient to discharge this date, therapy treatment deferred. Will follow up if change in discharge plans occurs and pt remains admitted. Thank you.        Hernesto Matt, PT

## 2023-08-02 NOTE — PAYOR COMM NOTE
--------------  DISCHARGE REVIEW    Payor: [facility] [employer] [place] #:  [id number]  Authorization Number: [id number]    Admit date: 7/30/23  Admit time:   3:50 AM  Discharge Date: 8/1/2023  6:03 PM     Admitting Physician: Jules Yung MD  Attending Physician:  No att. providers found  Primary Care Physician: Jules Yung MD       Discharge Summary Notes    No notes of this type exist for this encounter.

## 2023-08-03 LAB
BLOOD TYPE BARCODE: 6200
UNIT VOLUME: 350 ML

## 2023-08-04 ENCOUNTER — TELEPHONE (OUTPATIENT)
Dept: INTERNAL MEDICINE CLINIC | Facility: CLINIC | Age: 78
End: 2023-08-04

## 2023-08-04 NOTE — TELEPHONE ENCOUNTER
Patient wants to inform that she indeed had a mammogram done in [facility]. The Mammogram was negative.  If any questions please call patient [phone]

## 2023-08-05 NOTE — TELEPHONE ENCOUNTER
Results are available in Care Everywhere from 7/20/2023. MPRESSION AND RECOMMENDATION:   1. No mammographic specific evidence of malignancy. If there is no other clinical concern, the patient should return in 1 year   for annual screening   mammography unless otherwise clinically indicated.

## 2023-08-10 NOTE — PROGRESS NOTES
Patient informed urine culture shows no urinary tract infection, also patient states she will be released from rehab on Saturday, also labs show her hemoglobin is low and they will send you her labs, just an FYI from patient.

## 2023-08-12 ENCOUNTER — TELEPHONE (OUTPATIENT)
Dept: INTERNAL MEDICINE CLINIC | Facility: CLINIC | Age: 78
End: 2023-08-12

## 2023-08-12 NOTE — TELEPHONE ENCOUNTER
Patient is calling a from Rehab center. Spoke with Kaelyn the nurse in charge. Maury Vigil is requesting an Order for 2 doses of Iron via IV starting on Monday. [facility] is the [facility]hab facility patient is in. Due to Insurance reasons she will be discharged today 8-12. A Home Health Order has been placed per Kaelyn.  Iron IV can be administered during Home Health visit on Monday, as long as order for IV infusion is placed

## 2023-08-14 NOTE — TELEPHONE ENCOUNTER
Called back [facility], patient has been discharged and they did not have any information for which Sussy Greenfield she is working with. Called patient to get more information about [facility]. Patient states she already spoke with the [facility] and they will not be able to administer infusions. Patient states right now she is doing well and wanted to say Thank you to Dr. Jorge A Pollard. Sending as Boston Huynh.

## 2023-08-16 ENCOUNTER — TELEPHONE (OUTPATIENT)
Dept: INTERNAL MEDICINE CLINIC | Facility: CLINIC | Age: 78
End: 2023-08-16

## 2023-08-16 NOTE — TELEPHONE ENCOUNTER
Per patient she did not do any Iron infusion and now she would like to know if the iron pills she is taking is good enough for her to take. Please advise.

## 2023-08-16 NOTE — TELEPHONE ENCOUNTER
Dr. Parker Parrish    Please see patient message below and advise, thank you      Medication Detail    Medication Quantity Refills Start End   iron polysacch [id number]--0.025-1 MG Oral Cap 30 capsule 1 8/1/2023    Sig:   Take 1 capsule by mouth daily.      Route:   Oral         Most recent  CBC :  CBC W/ DIFFERENTIAL  Order: [id number] - Part of Panel Order [id number]  Collected 8/1/2023  5:21 AM       Status: Final result    0 Result Notes      Component  Ref Range & Units 8/1/23  5:21 AM   WBC  4.0 - 11.0 x10(3) uL 7.5   RBC  3.80 - 5.30 x10(6)uL 2.77 Low    HGB  12.0 - 16.0 g/dL 8.9 Low    HCT  35.0 - 48.0 % 27.3 Low    MCV  80.0 - 100.0 fL 98.6   MCH  26.0 - 34.0 pg 32.1   MCHC  31.0 - 37.0 g/dL 32.6   RDW-SD  35.1 - 46.3 fL 53.0 High    RDW  11.0 - 15.0 % 14.6   PLT  150.0 - 450.0 10(3)uL 354.0   Neutrophil Absolute Prelim  1.50 - 7.70 x10 (3) uL 4.22   Neutrophil Absolute  1.50 - 7.70 x10(3) uL 4.22   Lymphocyte Absolute  1.00 - 4.00 x10(3) uL 1.56   Monocyte Absolute  0.10 - 1.00 x10(3) uL 1.20 High    Eosinophil Absolute  0.00 - 0.70 x10(3) uL 0.35   Basophil Absolute  0.00 - 0.20 x10(3) uL 0.06   Immature Granulocyte Absolute  0.00 - 1.00 x10(3) uL 0.15   Neutrophil %  % 56.0   Lymphocyte %  % 20.7   Monocyte %  % 15.9   Eosinophil %  % 4.6   Basophil %  % 0.8   Immature Granulocyte %  % 2.0

## 2023-08-17 NOTE — TELEPHONE ENCOUNTER
We can arrange iron infusions but if she wants to do the iron tablets that she has those are good. They have the folic acid and the O65 which is what we need to help make the red blood cells.

## 2023-08-17 NOTE — TELEPHONE ENCOUNTER
Spoke with pt,  verified  Pt was informed of MD recommendation, pt stated understanding. Pt stated she preferred to take pills.      FYI    Future Appointments   Date Time Provider Jacquie Meyers   2023  1:00 PM Hima Larson MD [id number] [place] [address]   10/31/2023  9:15 AM [facility] Holcomb [facility]   2024  8:30 AM Addie Hayes MD [facility] [place] [facility] [place]

## 2023-08-29 ENCOUNTER — TELEPHONE (OUTPATIENT)
Dept: INTERNAL MEDICINE CLINIC | Facility: CLINIC | Age: 78
End: 2023-08-29

## 2023-08-29 DIAGNOSIS — D64.9 ANEMIA, UNSPECIFIED TYPE: ICD-10-CM

## 2023-08-29 DIAGNOSIS — D62 ACUTE BLOOD LOSS ANEMIA (ABLA): ICD-10-CM

## 2023-08-29 DIAGNOSIS — N18.30 STAGE 3 CHRONIC KIDNEY DISEASE, UNSPECIFIED WHETHER STAGE 3A OR 3B CKD (HCC): Primary | ICD-10-CM

## 2023-08-29 DIAGNOSIS — D50.8 OTHER IRON DEFICIENCY ANEMIA: ICD-10-CM

## 2023-08-29 PROBLEM — D50.9 IRON DEFICIENCY ANEMIA: Status: ACTIVE | Noted: 2023-08-29

## 2023-08-29 RX ORDER — FOLIC ACID 1 MG/1
1 TABLET ORAL DAILY
Qty: 90 TABLET | Refills: 3 | Status: SHIPPED | OUTPATIENT
Start: 2023-08-29

## 2023-08-30 PROBLEM — D62 ACUTE POSTHEMORRHAGIC ANEMIA: Status: ACTIVE | Noted: 2023-08-30

## 2023-08-30 PROBLEM — N18.30 CHRONIC KIDNEY DISEASE, STAGE III (MODERATE) (HCC): Status: ACTIVE | Noted: 2023-08-30

## 2023-08-30 RX ORDER — SEMAGLUTIDE 0.68 MG/ML
1 INJECTION, SOLUTION SUBCUTANEOUS
Qty: 9 ML | Refills: 1 | Status: SHIPPED | OUTPATIENT
Start: 2023-08-30

## 2023-09-12 ENCOUNTER — OFFICE VISIT (OUTPATIENT)
Dept: INTERNAL MEDICINE CLINIC | Facility: CLINIC | Age: 78
End: 2023-09-12

## 2023-09-12 ENCOUNTER — TELEPHONE (OUTPATIENT)
Dept: HEMATOLOGY/ONCOLOGY | Facility: HOSPITAL | Age: 78
End: 2023-09-12

## 2023-09-12 VITALS
DIASTOLIC BLOOD PRESSURE: 62 MMHG | WEIGHT: 118 LBS | HEART RATE: 62 BPM | BODY MASS INDEX: 21.71 KG/M2 | HEIGHT: 62 IN | TEMPERATURE: 100 F | SYSTOLIC BLOOD PRESSURE: 101 MMHG | RESPIRATION RATE: 16 BRPM

## 2023-09-12 DIAGNOSIS — N18.31 STAGE 3A CHRONIC KIDNEY DISEASE (HCC): ICD-10-CM

## 2023-09-12 DIAGNOSIS — D62 ACUTE POSTHEMORRHAGIC ANEMIA: ICD-10-CM

## 2023-09-12 DIAGNOSIS — N17.9 AKI (ACUTE KIDNEY INJURY) (HCC): ICD-10-CM

## 2023-09-12 DIAGNOSIS — D62 ACUTE BLOOD LOSS ANEMIA (ABLA): ICD-10-CM

## 2023-09-12 DIAGNOSIS — E55.9 VITAMIN D DEFICIENCY: ICD-10-CM

## 2023-09-12 DIAGNOSIS — E11.65 TYPE 2 DIABETES MELLITUS WITH HYPERGLYCEMIA, WITHOUT LONG-TERM CURRENT USE OF INSULIN (HCC): ICD-10-CM

## 2023-09-12 DIAGNOSIS — M81.0 AGE-RELATED OSTEOPOROSIS WITHOUT CURRENT PATHOLOGICAL FRACTURE: ICD-10-CM

## 2023-09-12 DIAGNOSIS — R50.81 FEVER IN OTHER DISEASES: ICD-10-CM

## 2023-09-12 DIAGNOSIS — E03.9 ACQUIRED HYPOTHYROIDISM: Primary | ICD-10-CM

## 2023-09-12 DIAGNOSIS — Z71.85 VACCINE COUNSELING: ICD-10-CM

## 2023-09-12 PROCEDURE — 90662 IIV NO PRSV INCREASED AG IM: CPT | Performed by: INTERNAL MEDICINE

## 2023-09-12 PROCEDURE — 1159F MED LIST DOCD IN RCRD: CPT | Performed by: INTERNAL MEDICINE

## 2023-09-12 PROCEDURE — 3008F BODY MASS INDEX DOCD: CPT | Performed by: INTERNAL MEDICINE

## 2023-09-12 PROCEDURE — 3074F SYST BP LT 130 MM HG: CPT | Performed by: INTERNAL MEDICINE

## 2023-09-12 PROCEDURE — 81003 URINALYSIS AUTO W/O SCOPE: CPT | Performed by: INTERNAL MEDICINE

## 2023-09-12 PROCEDURE — 1160F RVW MEDS BY RX/DR IN RCRD: CPT | Performed by: INTERNAL MEDICINE

## 2023-09-12 PROCEDURE — 99214 OFFICE O/P EST MOD 30 MIN: CPT | Performed by: INTERNAL MEDICINE

## 2023-09-12 PROCEDURE — 3078F DIAST BP <80 MM HG: CPT | Performed by: INTERNAL MEDICINE

## 2023-09-12 PROCEDURE — G0008 ADMIN INFLUENZA VIRUS VAC: HCPCS | Performed by: INTERNAL MEDICINE

## 2023-09-12 RX ORDER — SEMAGLUTIDE 0.68 MG/ML
0.5 INJECTION, SOLUTION SUBCUTANEOUS WEEKLY
Qty: 9 ML | Refills: 1 | Status: SHIPPED | OUTPATIENT
Start: 2023-09-12

## 2023-09-14 ENCOUNTER — TELEPHONE (OUTPATIENT)
Dept: ENDOCRINOLOGY CLINIC | Facility: CLINIC | Age: 78
End: 2023-09-14

## 2023-09-14 NOTE — TELEPHONE ENCOUNTER
Pt's last Prolia injection was on 4/25/23. Pt will be due for next injection on or after 10/25/23. Pt has an upcoming appt on 10/31/23 for Prolia Injection with MD    Received pt's Prolia SOB via [id number] fax    PA Required: Yes  Prolia OOP COST: $20  Facility Fee: NA*  Admin Fee: $20    Fax sent to scanning.    ---------------------------------------------------------    Medication  CGM  pump supply Requested: (Denosumab)  Prolia                                                             CoverMyMeds Used: No    Key: N/a    Sig: Inject 60mg/ mL into the skin every 6 months. DX Code: M81.0                                       Case Number/Pending Ref#: N/a      Routing over to PA Dept for assistance, thanks.

## 2023-09-20 ENCOUNTER — TELEPHONE (OUTPATIENT)
Dept: HEMATOLOGY/ONCOLOGY | Facility: HOSPITAL | Age: 78
End: 2023-09-20

## 2023-09-20 NOTE — TELEPHONE ENCOUNTER
Called patient back after seeing Dr Nikko Irizarry to see if she will be getting iron infusion. Patient stated Dr Nikko Irizarry states she is good and is not needing iron infusion at this time.

## 2023-09-22 NOTE — TELEPHONE ENCOUNTER
Pt's last Prolia injection was on 4/25/23. Pt will be due for next injection on or after 10/25/23. Pt has an upcoming appt on 10/31/23 for Prolia Injection with MD    Medication  CGM  pump supply Requested: (Denosumab)  Prolia                                                             CoverMyMeds Used: No  Key: N/a  Sig: Inject 60mg/ mL into the skin every 6 months. DX Code: M81.0                                         Endo staff, Per  Bhargavi Kaur is not preferred drug,asks if MD would like to select a preferred drug: Please advise. Thank you.     melina

## 2023-09-22 NOTE — TELEPHONE ENCOUNTER
Dr. April Victoria, see message. Prolia is not preferred drug, but we can still continue with Prolia PA if you'd like since she has been on it. See list of preferred drugs below.

## 2023-09-25 NOTE — TELEPHONE ENCOUNTER
Noted authorization below. Pt made aware of approval via [employer].    Future Appointments   Date Time Provider Jacquie Mira   10/31/2023  9:15 AM Deven ePna MD [facility]

## 2023-10-02 ENCOUNTER — TELEPHONE (OUTPATIENT)
Dept: ENDOCRINOLOGY CLINIC | Facility: CLINIC | Age: 78
End: 2023-10-02

## 2023-10-02 NOTE — TELEPHONE ENCOUNTER
Spoke to patient to advise lab orders were placed on 4/19/23  - patient stated understanding and will complete prior to f/u on 10/14/23

## 2023-10-10 ENCOUNTER — TELEPHONE (OUTPATIENT)
Dept: INTERNAL MEDICINE CLINIC | Facility: CLINIC | Age: 78
End: 2023-10-10

## 2023-10-10 NOTE — TELEPHONE ENCOUNTER
Patient is asking PCP if she should get the labs that have been ordered on Saturday, 10/14/23 when she comes in for a scheduled appt for her Prolia shot or is it okay to wait until after November 18th as she will be traveling after 10/21/23.   Please advise.

## 2023-10-12 ENCOUNTER — TELEPHONE (OUTPATIENT)
Dept: ENDOCRINOLOGY CLINIC | Facility: CLINIC | Age: 78
End: 2023-10-12

## 2023-10-12 ENCOUNTER — TELEPHONE (OUTPATIENT)
Dept: INTERNAL MEDICINE CLINIC | Facility: CLINIC | Age: 78
End: 2023-10-12

## 2023-10-12 ENCOUNTER — LAB ENCOUNTER (OUTPATIENT)
Dept: LAB | Age: 78
End: 2023-10-12
Attending: INTERNAL MEDICINE
Payer: MEDICARE

## 2023-10-12 DIAGNOSIS — E55.9 VITAMIN D DEFICIENCY: ICD-10-CM

## 2023-10-12 DIAGNOSIS — D62 ACUTE BLOOD LOSS ANEMIA (ABLA): ICD-10-CM

## 2023-10-12 DIAGNOSIS — M81.0 AGE-RELATED OSTEOPOROSIS WITHOUT CURRENT PATHOLOGICAL FRACTURE: Primary | ICD-10-CM

## 2023-10-12 DIAGNOSIS — M81.0 AGE-RELATED OSTEOPOROSIS WITHOUT CURRENT PATHOLOGICAL FRACTURE: ICD-10-CM

## 2023-10-12 LAB
ALBUMIN SERPL-MCNC: 3.8 G/DL (ref 3.4–5)
ALBUMIN/GLOB SERPL: 1.3 {RATIO} (ref 1–2)
ALP LIVER SERPL-CCNC: 34 U/L
ALT SERPL-CCNC: 25 U/L
ANION GAP SERPL CALC-SCNC: 10 MMOL/L (ref 0–18)
AST SERPL-CCNC: 24 U/L (ref 15–37)
BASOPHILS # BLD AUTO: 0.06 X10(3) UL (ref 0–0.2)
BASOPHILS NFR BLD AUTO: 1.1 %
BILIRUB SERPL-MCNC: 0.4 MG/DL (ref 0.1–2)
BUN BLD-MCNC: 33 MG/DL (ref 7–18)
BUN/CREAT SERPL: 27.7 (ref 10–20)
CALCIUM BLD-MCNC: 9.3 MG/DL (ref 8.5–10.1)
CHLORIDE SERPL-SCNC: 108 MMOL/L (ref 98–112)
CO2 SERPL-SCNC: 22 MMOL/L (ref 21–32)
CREAT BLD-MCNC: 1.19 MG/DL
DEPRECATED HBV CORE AB SER IA-ACNC: 79.3 NG/ML
DEPRECATED RDW RBC AUTO: 47.9 FL (ref 35.1–46.3)
EGFRCR SERPLBLD CKD-EPI 2021: 46 ML/MIN/1.73M2 (ref 60–?)
EOSINOPHIL # BLD AUTO: 0.23 X10(3) UL (ref 0–0.7)
EOSINOPHIL NFR BLD AUTO: 4.2 %
ERYTHROCYTE [DISTWIDTH] IN BLOOD BY AUTOMATED COUNT: 13.6 % (ref 11–15)
FASTING STATUS PATIENT QL REPORTED: YES
GLOBULIN PLAS-MCNC: 3 G/DL (ref 2.8–4.4)
GLUCOSE BLD-MCNC: 122 MG/DL (ref 70–99)
HCT VFR BLD AUTO: 36.8 %
HGB BLD-MCNC: 11.5 G/DL
IMM GRANULOCYTES # BLD AUTO: 0.01 X10(3) UL (ref 0–1)
IMM GRANULOCYTES NFR BLD: 0.2 %
IRON SATN MFR SERPL: 19 %
IRON SERPL-MCNC: 86 UG/DL
LYMPHOCYTES # BLD AUTO: 1.9 X10(3) UL (ref 1–4)
LYMPHOCYTES NFR BLD AUTO: 34.5 %
MCH RBC QN AUTO: 29.9 PG (ref 26–34)
MCHC RBC AUTO-ENTMCNC: 31.3 G/DL (ref 31–37)
MCV RBC AUTO: 95.8 FL
MONOCYTES # BLD AUTO: 0.58 X10(3) UL (ref 0.1–1)
MONOCYTES NFR BLD AUTO: 10.5 %
NEUTROPHILS # BLD AUTO: 2.73 X10 (3) UL (ref 1.5–7.7)
NEUTROPHILS # BLD AUTO: 2.73 X10(3) UL (ref 1.5–7.7)
NEUTROPHILS NFR BLD AUTO: 49.5 %
OSMOLALITY SERPL CALC.SUM OF ELEC: 299 MOSM/KG (ref 275–295)
PLATELET # BLD AUTO: 257 10(3)UL (ref 150–450)
POTASSIUM SERPL-SCNC: 4.3 MMOL/L (ref 3.5–5.1)
PROT SERPL-MCNC: 6.8 G/DL (ref 6.4–8.2)
PTH-INTACT SERPL-MCNC: 27.7 PG/ML (ref 18.5–88)
RBC # BLD AUTO: 3.84 X10(6)UL
SODIUM SERPL-SCNC: 140 MMOL/L (ref 136–145)
TIBC SERPL-MCNC: 450 UG/DL (ref 240–450)
TRANSFERRIN SERPL-MCNC: 302 MG/DL (ref 200–360)
VIT D+METAB SERPL-MCNC: 56.3 NG/ML (ref 30–100)
WBC # BLD AUTO: 5.5 X10(3) UL (ref 4–11)

## 2023-10-12 PROCEDURE — 82306 VITAMIN D 25 HYDROXY: CPT

## 2023-10-12 PROCEDURE — 83540 ASSAY OF IRON: CPT

## 2023-10-12 PROCEDURE — 84466 ASSAY OF TRANSFERRIN: CPT

## 2023-10-12 PROCEDURE — 36415 COLL VENOUS BLD VENIPUNCTURE: CPT

## 2023-10-12 PROCEDURE — 84080 ASSAY ALKALINE PHOSPHATASES: CPT

## 2023-10-12 PROCEDURE — 80053 COMPREHEN METABOLIC PANEL: CPT

## 2023-10-12 PROCEDURE — 82728 ASSAY OF FERRITIN: CPT

## 2023-10-12 PROCEDURE — 85025 COMPLETE CBC W/AUTO DIFF WBC: CPT

## 2023-10-12 PROCEDURE — 83970 ASSAY OF PARATHORMONE: CPT

## 2023-10-12 NOTE — TELEPHONE ENCOUNTER
Pt read result message       CBC Normal (white blood cells and platelets), anemia is much better. Iron levels look better. Iron storage is better. Would continue iron for 1 month more and recheck levels in 3 months. Written by Jocelyn Gonzalez MD on 10/12/2023  2:14 PM CDT  Seen by patient Romaine Thomas on 10/12/2023  5:49 PM

## 2023-10-12 NOTE — TELEPHONE ENCOUNTER
Hi!  Patient had last Prolia shot on 4/25/23, and has appt on 10/14/23. I think that this is too early for next Prolia shot. Please verify and reschedule patient if this is the case or see if she would like to see me and then have a nurse visit later. Thank you!

## 2023-10-12 NOTE — PROGRESS NOTES
CBC Normal (white blood cells and platelets), anemia is much better. Iron levels look better. Iron storage is better. Would continue iron for 1 month more and recheck levels in 3 months.

## 2023-10-17 LAB — ALKALINE PHOSPHATASE BONE SPECIFIC: 9.8 UG/L

## 2023-10-17 NOTE — TELEPHONE ENCOUNTER
Dr. Meliza Langley to patient - she will be due for next prolia on/after 10/25/23 - patient will be going to [place] this Saturday (10/21/23) until November 18th  [place] scheduled for Monday 11/20/23 for next prolia (patient requesting [facility]  Patient completed prolia labs   Please advise on lab results -thanks

## 2023-10-17 NOTE — TELEPHONE ENCOUNTER
Hi1  Please let her know that they are within normal limits. I have placed orders for her to have them completed in 6 months once again. Thank you!

## 2023-11-02 RX ORDER — AMLODIPINE BESYLATE 2.5 MG/1
2.5 TABLET ORAL 2 TIMES DAILY
Qty: 60 TABLET | Refills: 2 | Status: SHIPPED | OUTPATIENT
Start: 2023-11-02

## 2023-11-02 RX ORDER — AMLODIPINE BESYLATE 2.5 MG/1
TABLET ORAL
Qty: 60 TABLET | Refills: 0 | Status: CANCELLED | OUTPATIENT
Start: 2023-11-02

## 2023-11-02 NOTE — TELEPHONE ENCOUNTER
Patient is calling and states that she forgot her blood pressure medication at home and she is currently in [place]. She is asking that the script is sent to the following pharmacy.       amLODIPine 2.5 MG Oral Tab       [id number] [facility]

## 2023-11-02 NOTE — TELEPHONE ENCOUNTER
Please review. Protocol failed / Has no protocol. Requested Prescriptions   Pending Prescriptions Disp Refills    amLODIPine 2.5 MG Oral Tab 60 tablet 2     Sig: Take 1 tablet (2.5 mg total) by mouth 2 (two) times daily.        Hypertensive Medications Protocol Failed - 11/2/2023 12:25 PM        Failed - EGFRCR or GFRNAA > 50     GFR Evaluation  EGFRCR: 46 , resulted on 10/12/2023          Passed - In person appointment in the past 12 or next 3 months     Recent Outpatient Visits              1 month ago Acquired hypothyroidism    [address], [address]    Office Visit    3 months ago Asthma with COPD (chronic obstructive pulmonary disease) [facility])    [address], [address], [facility]    Office Visit    4 months ago Type 2 diabetes mellitus with hyperglycemia, without long-term current use of insulin ([facility]    [address], [address], Martin Ulloa., MD    Office Visit    6 months ago Age-related osteoporosis without current pathological fracture    [facility], Angel Luis [place]    Nurse Only    6 months ago Age-related osteoporosis without current pathological fracture    [address], Hugo Akbar MD    Office Visit          Future Appointments         Provider Department Appt Notes    In 2 weeks [facility] LEONEL ENDO RN [address], [place] prolia injection    In 2 months Radha Arceo MD [address], [address], [place]                       Passed - Last BP reading less than 140/90     BP Readings from Last 1 Encounters:  09/12/23 : 101/62              Passed - CMP or BMP in past 6 months     Recent Results (from the past 4392 hour(s))   Comp Metabolic Panel [E]    Collection Time: 10/12/23 10:18 AM   Result Value Ref Range    Glucose 122 (H) 70 - 99 mg/dL Sodium 140 136 - 145 mmol/L    Potassium 4.3 3.5 - 5.1 mmol/L    Chloride 108 98 - 112 mmol/L    CO2 22.0 21.0 - 32.0 mmol/L    Anion Gap 10 0 - 18 mmol/L    BUN 33 (H) 7 - 18 mg/dL    Creatinine 1.19 (H) 0.55 - 1.02 mg/dL    BUN/CREA Ratio 27.7 (H) 10.0 - 20.0    Calcium, Total 9.3 8.5 - 10.1 mg/dL    Calculated Osmolality 299 (H) 275 - 295 mOsm/kg    eGFR-Cr 46 (L) >=60 mL/min/1.73m2    ALT 25 13 - 56 U/L    AST 24 15 - 37 U/L    Alkaline Phosphatase 34 (L) 55 - 142 U/L    Bilirubin, Total 0.4 0.1 - 2.0 mg/dL    Total Protein 6.8 6.4 - 8.2 g/dL    Albumin 3.8 3.4 - 5.0 g/dL    Globulin  3.0 2.8 - 4.4 g/dL    A/G Ratio 1.3 1.0 - 2.0    Patient Fasting for CMP? Yes      *Note: Due to a large number of results and/or encounters for the requested time period, some results have not been displayed. A complete set of results can be found in Results Review.                Passed - In person appointment or virtual visit in the past 6 months     Recent Outpatient Visits              1 month ago Acquired hypothyroidism    Joseph Pollock [address], Brian Alejo MD    Office Visit    3 months ago Asthma with COPD (chronic obstructive pulmonary disease) [facility])    Joseph Pollock [address] MD    Office Visit    4 months ago Type 2 diabetes mellitus with hyperglycemia, without long-term current use of insulin ([facility]    Florin Armendariz[address] MD    Office Visit    6 months ago Age-related osteoporosis without current pathological fracture    [facility], [address] [facility]    Nurse Only    6 months ago Age-related osteoporosis without current pathological fracture    Radha Armendariz MD    Office Visit          Future Appointments         Provider Department Appt Notes    In 2 weeks Quintin Barragan [facility], [address] [place] [address] prolia injection    In 2 months Geri Constantino.MD Geeta Soloi, [address]                          Future Appointments         Provider Department Appt Notes    In 2 weeks [facility] LEONEL SPEARS RN [facility], [address] [place] [address] prolia injection    In 2 months Geri Constantino.MD Geeta Soloi, [address]            Recent Outpatient Visits              1 month ago Acquired hypothyroidism    Geeta Rodrigez, [address], [place] Pan[place]ian., MD    Office Visit    3 months ago Asthma with COPD (chronic obstructive pulmonary disease) [facility])    Geeta Rodrigez, [address], [place] Pan[place]ian., MD    Office Visit    4 months ago Type 2 diabetes mellitus with hyperglycemia, without long-term current use of insulin ([address]    Geeta Rodrigez, [address], [place]ay Pan[place]ian., MD    Office Visit    6 months ago Age-related osteoporosis without current pathological fracture    [facility], [address] [place] [address], [place]    Nurse Only    6 months ago Age-related osteoporosis without current pathological fracture    Newton Mckeon MD    Office Visit

## 2023-11-03 ENCOUNTER — TELEPHONE (OUTPATIENT)
Facility: CLINIC | Age: 78
End: 2023-11-03

## 2023-11-03 NOTE — TELEPHONE ENCOUNTER
Called [facility] in [facility], verified patient's Name and . Confirmed that they received the prescription. Patient notified. She will follow up with pharmacy. No further questions or concerns at this time.

## 2023-11-03 NOTE — TELEPHONE ENCOUNTER
Patient is asking that the refill for medicine below be resent to [facility] in [place], [place]    Per [facility] they have not received the prescription

## 2023-11-09 ENCOUNTER — NURSE TRIAGE (OUTPATIENT)
Dept: INTERNAL MEDICINE CLINIC | Facility: CLINIC | Age: 78
End: 2023-11-09

## 2023-11-09 NOTE — TELEPHONE ENCOUNTER
Hold lisonpril. HYdrate at least 48 oz. Water a day. Call in 1 week. Any fevers or chills? What is heart rate.

## 2023-11-09 NOTE — TELEPHONE ENCOUNTER
Action Requested: Summary for Provider     []  Critical Lab, Recommendations Needed  [x] Need Additional Advice  []   FYI    []   Need Orders  [x] Need Medications Sent to Pharmacy  []  Other     SUMMARY: Dr. Patel Limb --- she stopped lisinopril yesterday. Because Diastolic BP readings still low, below 50. Feels tired, but no dizziness,, headache, shortness of breath, chest pain, heart flutters. Advised evaluation today, in [place] (current location)   Declines, would like Dr. Debra Tillman recommendation only. Use -----  [id number] - [address], [place]        Reason for call: Blood Pressure  Patient called office. Date of birth and full name both confirmed. Continues Amlodipine 2.5mg, Twice daily  Stopped yesterday: Lisinopril 20mg daily     Low Diastolic readings. Reports struggling with Low Diastolic readings since the end of July, when she came home from Rehab facility. Currently in [place]. Blood Pressure and Heart Rate Readings.:   10/29/23 /56 HR 58  10/30/23 /52  HR70  10/31/23 /51 HR 61  11/1/23   /50 HR 58   11/2/23   /58 HR 65  11/8 Yesterday /49 HR 58  11/8 Bedtime /53 HR 60 ----- decided to HOLD lisinopril dose   11/9 Today /47 HR 66    Just feels Tired - Denies shortness of breath, difficulty breathing, chest pain, chest pressure, headache, dizziness, vision changes. Speaking clearly, no audible shortness of breath, no audible respiratory distress. Patient believes fatigue is NOT related to Glucose levels:   Glucose levels have been okay. 11/4/23 Morning 144 (Preprandial)  11/4/23 Bedtime 121    11/5/23 Morning 149 (Preprandial)  11/5/23 Bedtime 111    11/6/23 Morning 123 (Preprandial)  11/6/23 Bedtime 103    11/8/23 Morning 127 - Yesterday (Preprandial)  11/8/23 Bedtime 106    Today Preprandial morning glucose: 129    Triaged per protocol and advised care advice per protocol.    Evaluation advised evaluation, locally in [place]. She verbalizes understanding. However, just seeks Dr. Leticia Hernandez recommendations on medication adjustments. RN also advised ER or call 911 if patient develops difficulty breathing, chest pressure, chest pain, dizziness, headache, altered speech, vision changes, severe pain. She verbalizes understanding of all information, and agreeable to plan. RN notified Dr. Sydney Woodward, awaiting a response.          Reason for Disposition   Diastolic BP < 50 mm Hg    Protocols used: Blood Pressure - Low-A-[place]

## 2023-11-09 NOTE — TELEPHONE ENCOUNTER
Spoke with patient (name and  verified). Dr. Caridad Clinton recommendations discussed. Denies any fever, chills, dizziness. States she \"feels great\". Heart rates recorded below with the BP's. States she will call back in 1 week.

## 2023-11-16 ENCOUNTER — TELEPHONE (OUTPATIENT)
Dept: INTERNAL MEDICINE CLINIC | Facility: CLINIC | Age: 78
End: 2023-11-16

## 2023-11-16 NOTE — TELEPHONE ENCOUNTER
Patient called and said she had requested refill for her high blood pressure medication she believes she was prescribed the wrong one. Bronson Pettit like to speak to a nurse to clarify. She said it is a different name from what she had before.

## 2023-11-16 NOTE — TELEPHONE ENCOUNTER
Spoke with Pt, verified name and . Pt  calling with vitals. Continues to hold lisinopril. Pt states she is taking amlodipine 2.5mg twice a day and metoprolol 25mg twice day. Pt states pharmacy told her, Yesenia Leiva don't have the medication on file. \"  Informed Pt, pharmacy may have meant they did not receive a script for amlodipine initially. Rx was resent to [facility] in [facility] where Pt is currently. Metoprolol not currently on Pt's active medication list. Pt states this was prescribed by cardiology. Blood pressure readings entered in Pt's chart.   Today 11.16 137/52 71  11.15 132/53 69 am 143/54 57 pm  11.14 140/49 71 am 134/54 62 pm  11.13 142/54 66 am 144/56 61 pm

## 2023-11-20 ENCOUNTER — NURSE ONLY (OUTPATIENT)
Dept: ENDOCRINOLOGY CLINIC | Facility: CLINIC | Age: 78
End: 2023-11-20
Payer: COMMERCIAL

## 2023-11-20 DIAGNOSIS — M81.0 AGE-RELATED OSTEOPOROSIS WITHOUT CURRENT PATHOLOGICAL FRACTURE: Primary | ICD-10-CM

## 2023-11-20 RX ORDER — LISINOPRIL 2.5 MG/1
2.5 TABLET ORAL 2 TIMES DAILY
Qty: 60 TABLET | Refills: 3 | Status: SHIPPED | OUTPATIENT
Start: 2023-11-20

## 2023-11-20 RX ORDER — LISINOPRIL 2.5 MG/1
2.5 TABLET ORAL 2 TIMES DAILY
Refills: 0 | Status: CANCELLED | OUTPATIENT
Start: 2023-11-20

## 2023-11-20 NOTE — TELEPHONE ENCOUNTER
Patient called, verified Name and . Reviewed Dr. Ghada Alvarado messages below. She will call back in 2 weeks for blood pressure readings. She will follow up with pharmacy for refills available medication refills. Patient verbalized understanding and had no further questions at this time.

## 2023-11-20 NOTE — PROGRESS NOTES
Patient presented into clinic for Prolia. Prolia 60mg administered to left upper arm. No side effects or complications    Patient left in stable condition.     PA for prolia approved until 10/24    Patient scheduled for 1 year f/u with Dr. Thelma Ingram

## 2023-11-20 NOTE — TELEPHONE ENCOUNTER
Wonder if we can just try lisinopril 2.5 mg twice a day to see if that will help the blood pressure because we want her blood pressure is 120/70. But also will not interfere with her heart rate which is already low from the metoprolol. And that we will protect the kidneys from the diabetes. If she wants to get sent to the pharmacy.   Please update her medication list with the metoprolol to

## 2023-11-20 NOTE — TELEPHONE ENCOUNTER
I called the patient and she is on her way home. She would like to look at her pill bottles and call us back. She said to me \"I did not know I was already taking 2 b/p pills already\". Patient will call back to review medications and discuss below plan if applicable.

## 2023-11-20 NOTE — TELEPHONE ENCOUNTER
This is in addition to her current regimen. She needs to call us in 2 weeks with some blood pressure readings checking different days different times.

## 2023-11-20 NOTE — TELEPHONE ENCOUNTER
Dr. Chidi Ba, to clarify, is patient to continue    Metoprolol 25mg BID  Amlodipine 2.5mg BID    And add    Lisinopril 2.5mg BID

## 2023-11-20 NOTE — TELEPHONE ENCOUNTER
Spoke with patient and states she agrees to take Lisinopril 2.5 mg twice a day. Please send prescription to Manoj Lombardi in [place] [facility]. Medication pended for review. Patient would like to know if this is an add on to her current medication regimen, or will this replace any of her other medications. Medication list updated to reflect metroprolol 25 mg 2x daily.

## 2023-11-20 NOTE — TELEPHONE ENCOUNTER
Yes.  She has been taking the other 2 and a blood pressures are slightly high. Certainly is adding a lisinopril at a lower dose and she was on.

## 2023-12-11 ENCOUNTER — APPOINTMENT (OUTPATIENT)
Dept: URBAN - METROPOLITAN AREA CLINIC 244 | Age: 78
Setting detail: DERMATOLOGY
End: 2023-12-12

## 2023-12-11 DIAGNOSIS — L72.8 OTHER FOLLICULAR CYSTS OF THE SKIN AND SUBCUTANEOUS TISSUE: ICD-10-CM

## 2023-12-11 DIAGNOSIS — D22 MELANOCYTIC NEVI: ICD-10-CM

## 2023-12-11 DIAGNOSIS — L82.1 OTHER SEBORRHEIC KERATOSIS: ICD-10-CM

## 2023-12-11 DIAGNOSIS — L81.4 OTHER MELANIN HYPERPIGMENTATION: ICD-10-CM

## 2023-12-11 DIAGNOSIS — Z87.2 PERSONAL HISTORY OF DISEASES OF THE SKIN AND SUBCUTANEOUS TISSUE: ICD-10-CM

## 2023-12-11 PROBLEM — D22.5 MELANOCYTIC NEVI OF TRUNK: Status: ACTIVE | Noted: 2023-12-11

## 2023-12-11 PROCEDURE — 99213 OFFICE O/P EST LOW 20 MIN: CPT

## 2023-12-11 PROCEDURE — OTHER COUNSELING: OTHER

## 2023-12-11 ASSESSMENT — LOCATION SIMPLE DESCRIPTION DERM
LOCATION SIMPLE: LEFT UPPER BACK
LOCATION SIMPLE: RIGHT EAR
LOCATION SIMPLE: LEFT LIP
LOCATION SIMPLE: CHEST
LOCATION SIMPLE: RIGHT UPPER BACK
LOCATION SIMPLE: LEFT LOWER BACK

## 2023-12-11 ASSESSMENT — LOCATION DETAILED DESCRIPTION DERM
LOCATION DETAILED: RIGHT SUPERIOR MEDIAL UPPER BACK
LOCATION DETAILED: UPPER STERNUM
LOCATION DETAILED: LEFT INFERIOR VERMILION LIP
LOCATION DETAILED: RIGHT INFERIOR HELIX
LOCATION DETAILED: LEFT MEDIAL UPPER BACK
LOCATION DETAILED: LEFT SUPERIOR MEDIAL MIDBACK
LOCATION DETAILED: LEFT INFERIOR LATERAL MIDBACK

## 2023-12-11 ASSESSMENT — LOCATION ZONE DERM
LOCATION ZONE: LIP
LOCATION ZONE: TRUNK
LOCATION ZONE: EAR

## 2023-12-13 ENCOUNTER — MED REC SCAN ONLY (OUTPATIENT)
Dept: INTERNAL MEDICINE CLINIC | Facility: CLINIC | Age: 78
End: 2023-12-13

## 2023-12-15 RX ORDER — CLOPIDOGREL BISULFATE 75 MG/1
75 TABLET ORAL DAILY
Qty: 90 TABLET | Refills: 3 | Status: SHIPPED | OUTPATIENT
Start: 2023-12-15

## 2023-12-15 NOTE — TELEPHONE ENCOUNTER
Spoke with patient and she is currently taking Clopidigrel 75 mg daily. Patient states it was stopped for a short time after knee surgery and then restarted. No

## 2023-12-15 NOTE — TELEPHONE ENCOUNTER
Medication is not on current list.    Last prescription was Start 4/3/2023  End 8/1/2023  clopidogrel 75 MG Oral Tab (Discontinued) 90 tablet 3 4/3/2023 8/1/2023   Sig:   Take 1 tablet (75 mg total) by mouth daily.      Route:   Oral     Reason for Discontinue:   Stop Taking at Discharge

## 2023-12-15 NOTE — TELEPHONE ENCOUNTER
Spoke with patient and she is currently taking Clopidigrel 75 mg daily. Patient states it was stopped for a short time after knee surgery and then restarted.

## 2023-12-15 NOTE — TELEPHONE ENCOUNTER
Patient requesting refill of ozempic.     semaglutide (OZEMPIC, 0.25 OR 0.5 MG/DOSE,) 2 MG/3ML Subcutaneous Solution Pen-injector9 mL19/12/2023--Sig - Route: Inject 0.5 mg into the skin once a week.  - SubcutaneousSent to pharmacy as: Ozempic (0.25 or 0.5 MG/DOSE) 2 MG/3ML Subcutaneous Solution Pen-injector (semaglutide)E-Prescribing Status: Receipt confirmed by pharmacy (9/12/2023  1:30 PM CDT)

## 2023-12-15 NOTE — TELEPHONE ENCOUNTER
Please review. Protocol failed / No protocol. Requested Prescriptions   Pending Prescriptions Disp Refills    clopidogrel 75 MG Oral Tab 90 tablet 3     Sig: Take 1 tablet (75 mg total) by mouth daily.        There is no refill protocol information for this order          Future Appointments         Provider Department Appt Notes    In 4 weeks Perfecto Goldmann.MD Sara, [facility]     In 5 months Quita Curtis MD [facility], [address], [place] prolia and f/u            Recent Outpatient Visits              3 weeks ago Age-related osteoporosis without current pathological fracture    [facility], [address] [place] [address]    Nurse Only    3 months ago Acquired hypothyroidism    Sara Bernstein, [address], Oneida Escobedo MD    Office Visit    4 months ago Asthma with COPD (chronic obstructive pulmonary disease) [facility])    Sara Bernstein, [address], Oneida Escobedo MD    Office Visit    6 months ago Type 2 diabetes mellitus with hyperglycemia, without long-term current use of insulin [facility])    Sara Bernstein, [address], Oneida Escobedo MD    Office Visit    7 months ago Age-related osteoporosis without current pathological fracture    [facility], [address] [place] [address]    Nurse Only

## 2023-12-18 RX ORDER — SEMAGLUTIDE 0.68 MG/ML
0.5 INJECTION, SOLUTION SUBCUTANEOUS WEEKLY
Qty: 9 ML | Refills: 1 | Status: SHIPPED | OUTPATIENT
Start: 2023-12-18

## 2023-12-18 NOTE — TELEPHONE ENCOUNTER
Please review. Protocol Failed or has No Protocol. Requested Prescriptions   Pending Prescriptions Disp Refills    semaglutide (OZEMPIC, 0.25 OR 0.5 MG/DOSE,) 2 MG/3ML Subcutaneous Solution Pen-injector 9 mL 1     Sig: Inject 0.5 mg into the skin once a week.        Diabetes Medication Protocol Failed - 12/15/2023 12:51 PM        Failed - EGFRCR or GFRNAA > 50     GFR Evaluation  EGFRCR: 46 , resulted on 10/12/2023          Passed - Last A1C < 7.5 and within past 6 months     Lab Results   Component Value Date    A1C 5.7 (H) 07/13/2023             Passed - In person appointment or virtual visit in the past 6 mos or appointment in next 3 mos     Recent Outpatient Visits              4 weeks ago Age-related osteoporosis without current pathological fracture    [facility], [address] [place] [address], [place]    Nurse Only    3 months ago Acquired hypothyroidism    [address], [address], Monalisa Henriquez MD    Office Visit    4 months ago Asthma with COPD (chronic obstructive pulmonary disease) ([facility] [address]    [address], [address], Monalisa Henriquez MD    Office Visit    6 months ago Type 2 diabetes mellitus with hyperglycemia, without long-term current use of insulin ([facility] [address]    [address], [address], Monalisa Henriquez MD    Office Visit    7 months ago Age-related osteoporosis without current pathological fracture    [facility], [address] [place] [address], [facility]    Nurse Only          Future Appointments         Provider Department Appt Notes    In 3 weeks Venkat Mathews MD [address], [address] [facility]     In 5 months Laura Devries MD [facility], [address] [place] [address] prolia and f/u               Passed - GFR in the past 12 months             Future Appointments         Provider Department Appt Notes    In 3 weeks Venkat Mathews MD [facility], [facility]     In 5 months Jignesh Holcomb MD [facility], [place] [address] [facility], [place] prolia and f/u            Recent Outpatient Visits              4 weeks ago Age-related osteoporosis without current pathological fracture    [facility], [address] [facility], [place]    Nurse Only    3 months ago Acquired hypothyroidism    [address], [address], Lashanda ., MD    Office Visit    4 months ago Asthma with COPD (chronic obstructive pulmonary disease) [facility])    [address], [address], Lashanda ., MD    Office Visit    6 months ago Type 2 diabetes mellitus with hyperglycemia, without long-term current use of insulin [facility])    [address], [address], Lashanda ., MD    Office Visit    7 months ago Age-related osteoporosis without current pathological fracture    [facility], [address] [facility], [address]    Nurse Only

## 2023-12-19 ENCOUNTER — TELEPHONE (OUTPATIENT)
Dept: INTERNAL MEDICINE CLINIC | Facility: CLINIC | Age: 78
End: 2023-12-19

## 2023-12-19 DIAGNOSIS — E55.9 VITAMIN D DEFICIENCY: ICD-10-CM

## 2023-12-19 DIAGNOSIS — E03.9 ACQUIRED HYPOTHYROIDISM: ICD-10-CM

## 2023-12-19 DIAGNOSIS — E11.65 TYPE 2 DIABETES MELLITUS WITH HYPERGLYCEMIA, WITHOUT LONG-TERM CURRENT USE OF INSULIN (HCC): Primary | ICD-10-CM

## 2023-12-20 NOTE — TELEPHONE ENCOUNTER
See all the lab orders from today and also there were previous orders in the system from October 17, 2023 that are fasting 6 hours no coffee can have water only prior to testing.

## 2023-12-29 ENCOUNTER — LAB ENCOUNTER (OUTPATIENT)
Dept: LAB | Age: 78
End: 2023-12-29
Attending: INTERNAL MEDICINE
Payer: MEDICARE

## 2023-12-29 DIAGNOSIS — E11.65 TYPE 2 DIABETES MELLITUS WITH HYPERGLYCEMIA, WITHOUT LONG-TERM CURRENT USE OF INSULIN (HCC): ICD-10-CM

## 2023-12-29 DIAGNOSIS — E03.9 ACQUIRED HYPOTHYROIDISM: ICD-10-CM

## 2023-12-29 DIAGNOSIS — E55.9 VITAMIN D DEFICIENCY: ICD-10-CM

## 2023-12-29 LAB
ALBUMIN SERPL-MCNC: 4.4 G/DL (ref 3.2–4.8)
ALBUMIN/GLOB SERPL: 1.9 {RATIO} (ref 1–2)
ALP LIVER SERPL-CCNC: 33 U/L
ALT SERPL-CCNC: 15 U/L
ANION GAP SERPL CALC-SCNC: 4 MMOL/L (ref 0–18)
AST SERPL-CCNC: 24 U/L (ref ?–34)
BILIRUB SERPL-MCNC: 0.4 MG/DL (ref 0.2–1.1)
BUN BLD-MCNC: 21 MG/DL (ref 9–23)
BUN/CREAT SERPL: 20.4 (ref 10–20)
CALCIUM BLD-MCNC: 10 MG/DL (ref 8.7–10.4)
CHLORIDE SERPL-SCNC: 106 MMOL/L (ref 98–112)
CHOLEST SERPL-MCNC: 141 MG/DL (ref ?–200)
CO2 SERPL-SCNC: 28 MMOL/L (ref 21–32)
CREAT BLD-MCNC: 1.03 MG/DL
CREAT UR-SCNC: 74.8 MG/DL
EGFRCR SERPLBLD CKD-EPI 2021: 55 ML/MIN/1.73M2 (ref 60–?)
EST. AVERAGE GLUCOSE BLD GHB EST-MCNC: 126 MG/DL (ref 68–126)
FASTING PATIENT LIPID ANSWER: YES
FASTING STATUS PATIENT QL REPORTED: YES
GLOBULIN PLAS-MCNC: 2.3 G/DL (ref 2.8–4.4)
GLUCOSE BLD-MCNC: 118 MG/DL (ref 70–99)
HBA1C MFR BLD: 6 % (ref ?–5.7)
HDLC SERPL-MCNC: 50 MG/DL (ref 40–59)
LDLC SERPL CALC-MCNC: 71 MG/DL (ref ?–100)
MICROALBUMIN UR-MCNC: 2.7 MG/DL
MICROALBUMIN/CREAT 24H UR-RTO: 36.1 UG/MG (ref ?–30)
NONHDLC SERPL-MCNC: 91 MG/DL (ref ?–130)
OSMOLALITY SERPL CALC.SUM OF ELEC: 290 MOSM/KG (ref 275–295)
POTASSIUM SERPL-SCNC: 4.6 MMOL/L (ref 3.5–5.1)
PROT SERPL-MCNC: 6.7 G/DL (ref 5.7–8.2)
SODIUM SERPL-SCNC: 138 MMOL/L (ref 136–145)
T4 FREE SERPL-MCNC: 1.4 NG/DL (ref 0.8–1.7)
TRIGL SERPL-MCNC: 110 MG/DL (ref 30–149)
TSI SER-ACNC: 1.76 MIU/ML (ref 0.55–4.78)
VIT D+METAB SERPL-MCNC: 58.5 NG/ML (ref 30–100)
VLDLC SERPL CALC-MCNC: 17 MG/DL (ref 0–30)

## 2023-12-29 PROCEDURE — 82043 UR ALBUMIN QUANTITATIVE: CPT

## 2023-12-29 PROCEDURE — 36415 COLL VENOUS BLD VENIPUNCTURE: CPT

## 2023-12-29 PROCEDURE — 84443 ASSAY THYROID STIM HORMONE: CPT

## 2023-12-29 PROCEDURE — 80053 COMPREHEN METABOLIC PANEL: CPT

## 2023-12-29 PROCEDURE — 83036 HEMOGLOBIN GLYCOSYLATED A1C: CPT

## 2023-12-29 PROCEDURE — 80061 LIPID PANEL: CPT

## 2023-12-29 PROCEDURE — 82570 ASSAY OF URINE CREATININE: CPT

## 2023-12-29 PROCEDURE — 82306 VITAMIN D 25 HYDROXY: CPT

## 2023-12-29 PROCEDURE — 84439 ASSAY OF FREE THYROXINE: CPT

## 2024-01-02 ENCOUNTER — TELEPHONE (OUTPATIENT)
Facility: CLINIC | Age: 79
End: 2024-01-02

## 2024-01-02 DIAGNOSIS — D64.9 ANEMIA, UNSPECIFIED TYPE: Primary | ICD-10-CM

## 2024-01-02 NOTE — TELEPHONE ENCOUNTER
Left message to call back on voicemail. Please see test results note below.                 CMP Normal (electrolytes, and liver functions), slight decline in kidney function is improved from prior.No motrin, ibuprofen, advil, alleve, naprosyn  with these medications.    Vitamin D level is okay.  Lipid (choilesterol) is good,  Thyroid is good.  Hemoglobin A1c which is a 3-month average sugar still looks good.  But careful creeping up.  Goal is 6.5 or less.  Urine does show some protein spillage as effect of the diabetes.  If on lisinopril that is what helps protect the kidneys.   Written by Mindy Medina MD on 1/2/2024 11:50 AM CST

## 2024-01-02 NOTE — TELEPHONE ENCOUNTER
Patient is requesting a call to go over lab results.  Patient mentions she saw results on [employer] and concerned with some of the results.   Please advise.

## 2024-01-02 NOTE — PROGRESS NOTES
CMP Normal (electrolytes, and liver functions), slight decline in kidney function is improved from prior.No motrin, ibuprofen, advil, alleve, naprosyn  with these medications.    Vitamin D level is okay.  Lipid (choilesterol) is good,   Thyroid is good.   Hemoglobin A1c which is a 3-month average sugar still looks good.  But careful creeping up.  Goal is 6.5 or less.  Urine does show some protein spillage as effect of the diabetes.  If on lisinopril that is what helps protect the kidneys.

## 2024-01-03 NOTE — TELEPHONE ENCOUNTER
pt stated that she saw your result note on her test results but she does not see that you order her a hemoglobin.Pt stated that she had a blood transfusion  July 27, 23 as he hemoglobin was  down to 6. Pt wants to know if you feel it necessary for her to check her hemoglobin to make sure its fine now. Pt does not mind coming back in to her her blood drawn. Please advise         CMP Normal (electrolytes, and liver functions), slight decline in kidney function is improved from prior.No motrin, ibuprofen, advil, alleve, naprosyn  with these medications.    Vitamin D level is okay.  Lipid (choilesterol) is good,  Thyroid is good.  Hemoglobin A1c which is a 3-month average sugar still looks good.  But careful creeping up.  Goal is 6.5 or less.  Urine does show some protein spillage as effect of the diabetes.  If on lisinopril that is what helps protect the kidneys.   Written by Mindy Medina MD on 1/2/2024 11:50 AM CST  Seen by patient Melonie Pulido Struck on 1/2/2024  3:38 PM

## 2024-01-04 NOTE — TELEPHONE ENCOUNTER
It was not done since October she had blood drawn for the CBC.  But if she drop down we do need to check it again.  So orders were placed.

## 2024-01-04 NOTE — TELEPHONE ENCOUNTER
Spoke with patient (name/ confirmed) and informed patient of Dr. Medina instructions/recommendations.  Patient verbalizes understanding and agrees to the plan of care.  Patient had no further questions at this time

## 2024-01-08 ENCOUNTER — LAB ENCOUNTER (OUTPATIENT)
Dept: LAB | Age: 79
End: 2024-01-08
Attending: INTERNAL MEDICINE
Payer: MEDICARE

## 2024-01-08 DIAGNOSIS — D64.9 ANEMIA, UNSPECIFIED TYPE: ICD-10-CM

## 2024-01-08 LAB
BASOPHILS # BLD AUTO: 0.08 X10(3) UL (ref 0–0.2)
BASOPHILS NFR BLD AUTO: 1.4 %
DEPRECATED HBV CORE AB SER IA-ACNC: 58.6 NG/ML
DEPRECATED RDW RBC AUTO: 51.7 FL (ref 35.1–46.3)
EOSINOPHIL # BLD AUTO: 0.27 X10(3) UL (ref 0–0.7)
EOSINOPHIL NFR BLD AUTO: 4.8 %
ERYTHROCYTE [DISTWIDTH] IN BLOOD BY AUTOMATED COUNT: 14.4 % (ref 11–15)
HCT VFR BLD AUTO: 38.1 %
HGB BLD-MCNC: 12.5 G/DL
IMM GRANULOCYTES # BLD AUTO: 0.01 X10(3) UL (ref 0–1)
IMM GRANULOCYTES NFR BLD: 0.2 %
IRON SATN MFR SERPL: 17 %
IRON SERPL-MCNC: 73 UG/DL
LYMPHOCYTES # BLD AUTO: 2.38 X10(3) UL (ref 1–4)
LYMPHOCYTES NFR BLD AUTO: 42.3 %
MCH RBC QN AUTO: 32.1 PG (ref 26–34)
MCHC RBC AUTO-ENTMCNC: 32.8 G/DL (ref 31–37)
MCV RBC AUTO: 97.7 FL
MONOCYTES # BLD AUTO: 0.58 X10(3) UL (ref 0.1–1)
MONOCYTES NFR BLD AUTO: 10.3 %
NEUTROPHILS # BLD AUTO: 2.3 X10 (3) UL (ref 1.5–7.7)
NEUTROPHILS # BLD AUTO: 2.3 X10(3) UL (ref 1.5–7.7)
NEUTROPHILS NFR BLD AUTO: 41 %
PLATELET # BLD AUTO: 284 10(3)UL (ref 150–450)
RBC # BLD AUTO: 3.9 X10(6)UL
TIBC SERPL-MCNC: 419 UG/DL (ref 250–425)
TRANSFERRIN SERPL-MCNC: 281 MG/DL (ref 250–380)
WBC # BLD AUTO: 5.6 X10(3) UL (ref 4–11)

## 2024-01-08 PROCEDURE — 83540 ASSAY OF IRON: CPT

## 2024-01-08 PROCEDURE — 84466 ASSAY OF TRANSFERRIN: CPT

## 2024-01-08 PROCEDURE — 36415 COLL VENOUS BLD VENIPUNCTURE: CPT

## 2024-01-08 PROCEDURE — 82728 ASSAY OF FERRITIN: CPT

## 2024-01-08 PROCEDURE — 85025 COMPLETE CBC W/AUTO DIFF WBC: CPT

## 2024-01-09 NOTE — PROGRESS NOTES
CBC Normal (white blood cells and red blood cells and platelets), iron storage looks okay.  Iron levels look much better.

## 2024-01-11 PROBLEM — N17.9 AKI (ACUTE KIDNEY INJURY) (HCC): Status: RESOLVED | Noted: 2023-07-31 | Resolved: 2024-01-11

## 2024-01-11 PROBLEM — D62 ACUTE BLOOD LOSS ANEMIA (ABLA): Status: RESOLVED | Noted: 2023-07-31 | Resolved: 2024-01-11

## 2024-01-11 PROBLEM — R42 DIZZINESS: Status: RESOLVED | Noted: 2023-07-26 | Resolved: 2024-01-11

## 2024-01-11 PROBLEM — N17.9 AKI (ACUTE KIDNEY INJURY): Status: RESOLVED | Noted: 2023-07-31 | Resolved: 2024-01-11

## 2024-01-15 ENCOUNTER — TELEPHONE (OUTPATIENT)
Dept: FAMILY MEDICINE CLINIC | Facility: CLINIC | Age: 79
End: 2024-01-15
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
## 2024-02-12 NOTE — TELEPHONE ENCOUNTER
Refill passed per [facility] protocol.      Requested Prescriptions   Pending Prescriptions Disp Refills    metFORMIN HCl 1000 MG Oral Tab 90 tablet 1     Sig: Take 1 tablet (1,000 mg total) by mouth daily with dinner.       Diabetes Medication Protocol Passed - 2/12/2024 10:59 AM        Passed - Last A1C < 7.5 and within past 6 months     Lab Results   Component Value Date    A1C 6.0 (H) 12/29/2023             Passed - In person appointment or virtual visit in the past 6 mos or appointment in next 3 mos     Recent Outpatient Visits              2 months ago Age-related osteoporosis without current pathological fracture    Replaced by [facility]    Nurse Only    5 months ago Acquired hypothyroidism    [facility] Mindy Medina MD    Office Visit    6 months ago Asthma with COPD (chronic obstructive pulmonary disease) ([facility]    Office Visit    8 months ago Type 2 diabetes mellitus with hyperglycemia, without long-term current use of insulin ([facility]    Office Visit    9 months ago Age-related osteoporosis without current pathological fracture    Replaced by [facility]    Nurse Only          Future Appointments         Provider Department Appt Notes    In 3 months Bhavya Liu MD Replaced by [facility] prolia and f/u    In 5 months Mindy Medina MD [facility]                Passed - Microalbumin procedure in past 12 months or taking ACE/ARB        Passed - EGFRCR or GFRNAA > 50     GFR Evaluation  EGFRCR: 55 , resulted on 12/29/2023          Passed - GFR in the past 12 months              Future Appointments         Provider Department Appt Notes    In 3 months  Bhavya Liu MD [facility] [place] prolia and f/u    In 5 months Mindy Medina MD [facility]             Recent Outpatient Visits              2 months ago Age-related osteoporosis without current pathological fracture    [facility]    Nurse Only    5 months ago Acquired hypothyroidism    [facility] Mindy Medina MD    Office Visit    6 months ago Asthma with COPD (chronic obstructive pulmonary disease) ([facility])    [facility] Mindy Medina MD    Office Visit    8 months ago Type 2 diabetes mellitus with hyperglycemia, without long-term current use of insulin ([facility])    [facility]    Office Visit    9 months ago Age-related osteoporosis without current pathological fracture    [facility]    Nurse Only

## 2024-02-12 NOTE — TELEPHONE ENCOUNTER
Pt would like a refill on her metformin medication. Pharmacy: [facility]/[place], [place] (listed)     Current Outpatient Medications   Medication Sig Dispense Refill    metformin HCl 1000 MG Oral Tab Take 1 tablet (1,000 mg total) by mouth daily with dinner. 90 tablet 1

## 2024-03-25 RX ORDER — AMLODIPINE BESYLATE 2.5 MG/1
2.5 TABLET ORAL 2 TIMES DAILY
Qty: 180 TABLET | Refills: 3 | Status: SHIPPED | OUTPATIENT
Start: 2024-03-25

## 2024-03-25 NOTE — TELEPHONE ENCOUNTER
Refill passed per [facility] protocol.    Requested Prescriptions   Pending Prescriptions Disp Refills    AMLODIPINE 2.5 MG Oral Tab [Pharmacy Med Name: AMLODIPINE TAB 2.5MG] 180 tablet 3     Sig: TAKE 1 TABLET TWICE A DAY       Hypertension Medications Protocol Passed - 3/25/2024  7:08 AM        Passed - CMP or BMP in past 12 months        Passed - Last BP reading less than 140/90     BP Readings from Last 1 Encounters:   09/12/23 101/62               Passed - In person appointment or virtual visit in the past 12 mos or appointment in next 3 mos     Recent Outpatient Visits              4 months ago Age-related osteoporosis without current pathological fracture    [facility]    Nurse Only    6 months ago Acquired hypothyroidism    [facility] Mindy Medina MD    Office Visit    8 months ago Asthma with COPD (chronic obstructive [facility] disease) ([facility]    [facility] Mindy Medina MD    Office Visit    9 months ago Type 2 diabetes mellitus with hyperglycemia, without long-term current use of insulin ([facility]    Office Visit    11 months ago Age-related osteoporosis without current pathological fracture    [facility]    Nurse Only          Future Appointments         Provider Department Appt Notes    In 2 months Bhavya Liu MD [facility] prolia and f/u    In 3 months Mindy Medina MD [facility]                Passed - EGFRCR or GFRNAA > 50     GFR Evaluation  EGFRCR: 55 , resulted on 12/29/2023               Future Appointments         Provider Department Appt Notes    In 2 months Bhavya Liu MD [facility]  [place] prolia and f/u    In 3 months Mindy Medina MD [facility]             Recent Outpatient Visits              4 months ago Age-related osteoporosis without current pathological fracture    [facility] [place]    Nurse Only    6 months ago Ac[facility] hyp[facility]    Office Visit    8 months ago Ast[facility] with [facility]chronic [facility] disease[facility]    Office Visit    9 months ago Type 2 diabetes mellitus with hyperglycemia, without long-term current use of insulin [facility])    [facility]    Office Visit    11 months ago Age-related osteoporosis without current pathological fracture    [facility] [place]    Nurse Only

## 2024-04-12 RX ORDER — LEVOCETIRIZINE DIHYDROCHLORIDE 5 MG/1
5 TABLET, FILM COATED ORAL NIGHTLY
Qty: 90 TABLET | Refills: 3 | Status: SHIPPED | OUTPATIENT
Start: 2024-04-12

## 2024-04-12 NOTE — TELEPHONE ENCOUNTER
REFILL PASSED PER [facility] PROTOCOLS    Requested Prescriptions   Pending Prescriptions Disp Refills    LEVOCETIRIZINE 5 MG Oral Tab [Pharmacy Med Name: LEVOCETIRIZI TAB 5MG] 90 tablet 3     Sig: TAKE 1 TABLET NIGHTLY       Allergy Medication Protocol Passed - 4/12/2024  1:10 AM        Passed - In person appointment or virtual visit in the past 12 mos or appointment in next 3 mos     Recent Outpatient Visits              4 months ago Age-related osteoporosis without current pathological fracture    [facility]    Nurse Only    7 months ago Acquired hypothyroidism    [facility] Mindy Medina MD    Office Visit    8 months ago Asthma with COPD (chronic obstructive pulmonary disease) ([facility])    [facility] Mindy Medina MD    Office Visit    10 months ago Type 2 diabetes mellitus with hyperglycemia, without long-term current use of insulin ([facility])    [facility] Mindy Medina MD    Office Visit    11 months ago Age-related osteoporosis without current pathological fracture    [facility]    Nurse Only          Future Appointments         Provider Department Appt Notes    In 1 month Bhavya Liu MD [facility] prolia and f/u    In 3 months Mindy Medina MD [facility]                          Future Appointments         Provider Department Appt Notes    In 1 month [facility]    In 3 months Mindy Medina MD [facility]           Recent Outpatient Visits              4 months ago Age-related osteoporosis without current pathological fracture    [facility]  [facility] [place]    Nurse Only    7 months ago Acquired hypothyroidism    [facility], Mindy Schultz MD    Office Visit    8 months ago Asthma with COPD (chronic obstructive pulmonary disease) ([facility]    Office Visit    10 months ago Type 2 diabetes mellitus with hyperglycemia, without long-term current use of insulin ([facility])    [facility]    Office Visit    11 months ago Age-related osteoporosis without current pathological fracture    [facility] [place]    Nurse Only

## 2024-04-29 RX ORDER — FENOFIBRATE 145 MG/1
TABLET, COATED ORAL
Qty: 90 TABLET | Refills: 3 | Status: SHIPPED | OUTPATIENT
Start: 2024-04-29

## 2024-04-29 RX ORDER — ATORVASTATIN CALCIUM 40 MG/1
40 TABLET, FILM COATED ORAL NIGHTLY
Qty: 90 TABLET | Refills: 3 | Status: SHIPPED | OUTPATIENT
Start: 2024-04-29

## 2024-04-29 RX ORDER — VENLAFAXINE HYDROCHLORIDE 37.5 MG/1
37.5 CAPSULE, EXTENDED RELEASE ORAL NIGHTLY
Qty: 90 CAPSULE | Refills: 3 | Status: SHIPPED | OUTPATIENT
Start: 2024-04-29

## 2024-04-29 NOTE — TELEPHONE ENCOUNTER
Refill passed per [facility] protocol.  Requested Prescriptions   Pending Prescriptions Disp Refills    ATORVASTATIN 40 MG Oral Tab [Pharmacy Med Name: ATORVASTATIN TAB 40MG] 90 tablet 3     Sig: TAKE 1 TABLET NIGHTLY       Cholesterol Medication Protocol Passed - 4/27/2024  7:05 AM        Passed - ALT < 80     Lab Results   Component Value Date    ALT 15 12/29/2023             Passed - ALT resulted within past year        Passed - Lipid panel within past 12 months     Lab Results   Component Value Date    CHOLEST 141 12/29/2023    TRIG 110 12/29/2023    HDL 50 12/29/2023    LDL 71 12/29/2023    VLDL 17 12/29/2023    NONHDLC 91 12/29/2023             Passed - In person appointment or virtual visit in the past 12 mos or appointment in next 3 mos     Recent Outpatient Visits              5 months ago Age-related osteoporosis without current pathological fracture    [facility]    Nurse Only    7 months ago Acquired hypothyroidism    [facility] Mindy Medina MD    Office Visit    9 months ago Asthma with COPD (chronic obstructive pulmonary disease) ([facility])    [facility] Mindy Medina MD    Office Visit    10 months ago Type 2 diabetes mellitus with hyperglycemia, without long-term current use of insulin ([facility])    [facility] Mindy Medina MD    Office Visit    1 year ago Age-related osteoporosis without current pathological fracture    [facility]    Nurse Only          Future Appointments         Provider Department Appt Notes    In 1 month Bhavya Liu MD [facility] prolia and f/u    In 2 months Mindy Medina MD [facility]                       FENOFIBRATE 145 MG Oral Tab [Pharmacy Med  Name: FENOFIBRATE  TAB 145MG] 90 tablet 3     Sig: TAKE 1 TABLET ONCE DAILY       Cholesterol Medication Protocol Passed - 4/27/2024  7:05 AM        Passed - ALT < 80     Lab Results   Component Value Date    ALT 15 12/29/2023             Passed - ALT resulted within past year        Passed - Lipid panel within past 12 months     Lab Results   Component Value Date    CHOLEST 141 12/29/2023    TRIG 110 12/29/2023    HDL 50 12/29/2023    LDL 71 12/29/2023    VLDL 17 12/29/2023    NONHDLC 91 12/29/2023             Passed - In person appointment or virtual visit in the past 12 mos or appointment in next 3 mos     Recent Outpatient Visits              5 months ago Age-related osteoporosis without current pathological fracture    [facility]    Nurse Only    7 months ago Acquired hypothyroidism    [facility] Mindy Medina MD    Office Visit    9 months ago Asthma with COPD (chronic obstructive pulmonary disease) ([facility])    [facility]    Office Visit    10 months ago Type 2 diabetes mellitus with hyperglycemia, without long-term current use of insulin ([facility])    [facility]    Office Visit    1 year ago Age-related osteoporosis without current pathological fracture    [facility]    Nurse Only          Future Appointments         Provider Department Appt Notes    In 1 month Bhavya Liu MD [facility] prolia and f/u    In 2 months Mindy Medina MD [facility]                       VENLAFAXINE ER 37.5 MG Oral Capsule SR 24 Hr [Pharmacy Med Name: VENLAFAXINE  CAP 37.5 ER] 90 capsule 3     Sig: TAKE 1 CAPSULE NIGHTLY       Psychiatric Non-Scheduled (Anti-Anxiety) Passed -  4/27/2024  7:05 AM        Passed - In person appointment or virtual visit in the past 6 mos or appointment in next 3 mos     Recent Outpatient Visits              5 months ago Age-related osteoporosis without current pathological fracture    [facility]    Nurse Only    7 months ago Acquired hypothyroidism    [facility]    Office Visit    9 months ago Asthma with COPD (chronic obstructive pulmonary disease) ([facility]    Office Visit    10 months ago Type 2 diabetes mellitus with hyperglycemia, without long-term current use of insulin ([facility])    [facility] Mindy Schultz MD    Office Visit    1 year ago Age-related osteoporosis without current pathological fracture    [facility]    Nurse Only          Future Appointments         Provider Department Appt Notes    In 1 month Bhavya Liu MD [facility] prolia and f/u    In 2 months Mindy Medina MD [facility]                     Passed - Depression Screening completed within the past 12 months           Recent Outpatient Visits              5 months ago Age-related osteoporosis without current pathological fracture    [facility]    Nurse Only    7 months ago [facility] hyp[facility]    Office Visit    9 months ago Asthma with COPD (chronic obstructive pulmonary disease) ([facility])    [facility]    Office Visit    10 months ago Type 2 diabetes mellitus with hyperglycemia, without long-term current use of insulin ([facility])     [facility] Mindy Medina MD    Office Visit    1 year ago Age-related osteoporosis without current pathological fracture    [facility]    N[facility] Only          Future Appointments         Provider Department Appt Notes    In 1 month Bhavya Liu MD [facility] prolia and f/u    In 2 months Mindy Medina MD [facility]

## 2024-05-15 RX ORDER — BLOOD SUGAR DIAGNOSTIC
STRIP MISCELLANEOUS
Qty: 300 STRIP | Refills: 3 | Status: SHIPPED | OUTPATIENT
Start: 2024-05-15

## 2024-05-15 NOTE — TELEPHONE ENCOUNTER
Refill passed per [facility] protocol.  Requested Prescriptions   Pending Prescriptions Disp Refills    ONETOUCH ULTRA In Vitro Strip [Pharmacy Med Name: ONE TOUCH ULTRA BLUE TESTST(NEW)100] 100 strip 0     Sig: TEST BLOOD SUGAR THREE TIMES DAILY       Diabetic Supplies Protocol Passed - 5/13/2024  3:42 PM        Passed - In person appointment or virtual visit in the past 12 mos or appointment in next 3 mos     Recent Outpatient Visits              5 months ago Age-related osteoporosis without current pathological fracture    [facility]    Nurse Only    8 months ago Acquired hypothyroidism    [facility] Mindy Medina MD    Office Visit    9 months ago Asthma with COPD (chronic obstructive pulmonary disease) ([facility]    Office Visit    11 months ago Type 2 diabetes mellitus with hyperglycemia, without long-term current use of insulin ([facility]    Office Visit    1 year ago Age-related osteoporosis without current pathological fracture    [facility]    Nurse Only          Future Appointments         Provider Department Appt Notes    In 2 weeks Bhavya Liu MD [facility] prolia and f/u    In 2 months Mindy Medina MD [facility]                        Recent Outpatient Visits              5 months ago Age-related osteoporosis without current pathological fracture    [facility]    Nurse Only    8 months ago Acquired hyp[facility]    Office Visit    9 months ago Asthma with COPD (chronic obstructive pulmonary  disease) ([facility])    [facility] Mindy Medina MD    Office Visit    11 months ago Type 2 diabetes mellitus with hyperglycemia, without long-term current use of insulin ([facility])    [facility] Mindy Medina MD    Office Visit    1 year ago Age-related osteoporosis without current pathological fracture    [facility]    Nurse Only          Future Appointments         Provider Department Appt Notes    In 2 weeks Bhavya Liu MD [facility] prolia and f/u    In 2 months Mindy Medina MD [facility]

## 2024-05-28 ENCOUNTER — LAB ENCOUNTER (OUTPATIENT)
Dept: LAB | Age: 79
End: 2024-05-28
Attending: INTERNAL MEDICINE
Payer: MEDICARE

## 2024-05-28 DIAGNOSIS — E55.9 VITAMIN D DEFICIENCY: ICD-10-CM

## 2024-05-28 DIAGNOSIS — M81.0 AGE-RELATED OSTEOPOROSIS WITHOUT CURRENT PATHOLOGICAL FRACTURE: ICD-10-CM

## 2024-05-28 LAB
ALBUMIN SERPL-MCNC: 4.2 G/DL (ref 3.2–4.8)
ALBUMIN/GLOB SERPL: 1.6 {RATIO} (ref 1–2)
ALP LIVER SERPL-CCNC: 31 U/L
ALT SERPL-CCNC: 16 U/L
ANION GAP SERPL CALC-SCNC: 7 MMOL/L (ref 0–18)
AST SERPL-CCNC: 26 U/L (ref ?–34)
BILIRUB SERPL-MCNC: 0.5 MG/DL (ref 0.2–1.1)
BUN BLD-MCNC: 29 MG/DL (ref 9–23)
BUN/CREAT SERPL: 25.9 (ref 10–20)
CALCIUM BLD-MCNC: 10 MG/DL (ref 8.7–10.4)
CHLORIDE SERPL-SCNC: 107 MMOL/L (ref 98–112)
CO2 SERPL-SCNC: 27 MMOL/L (ref 21–32)
CREAT BLD-MCNC: 1.12 MG/DL
EGFRCR SERPLBLD CKD-EPI 2021: 49 ML/MIN/1.73M2 (ref 60–?)
FASTING STATUS PATIENT QL REPORTED: YES
GLOBULIN PLAS-MCNC: 2.6 G/DL (ref 2–3.5)
GLUCOSE BLD-MCNC: 118 MG/DL (ref 70–99)
OSMOLALITY SERPL CALC.SUM OF ELEC: 299 MOSM/KG (ref 275–295)
POTASSIUM SERPL-SCNC: 4.6 MMOL/L (ref 3.5–5.1)
PROT SERPL-MCNC: 6.8 G/DL (ref 5.7–8.2)
PTH-INTACT SERPL-MCNC: 15.7 PG/ML (ref 18.5–88)
SODIUM SERPL-SCNC: 141 MMOL/L (ref 136–145)
VIT D+METAB SERPL-MCNC: 44.7 NG/ML (ref 30–100)

## 2024-05-28 PROCEDURE — 36415 COLL VENOUS BLD VENIPUNCTURE: CPT

## 2024-05-28 PROCEDURE — 84080 ASSAY ALKALINE PHOSPHATASES: CPT

## 2024-05-28 PROCEDURE — 80053 COMPREHEN METABOLIC PANEL: CPT

## 2024-05-28 PROCEDURE — 83970 ASSAY OF PARATHORMONE: CPT

## 2024-05-28 PROCEDURE — 82306 VITAMIN D 25 HYDROXY: CPT

## 2024-05-28 NOTE — PROGRESS NOTES
Follow-up- Reason for Visit:  Osteoporosis.  Requesting Physician: Dr. Medina.    CHIEF COMPLAINT:  No chief complaint on file.       HISTORY OF PRESENT ILLNESS:   Melonie Sams is a 79 year old female who presents with osteoporosis. She had evidence of osteopenia for several years. We had decided to start her on Prolia. She is here to receive her 4th shot.     Shot #1- 9/09/22  Shot #2- 4/25/23  Shot #3- 11/20/23    She had a fall after her stent placement.  She eats healthy and she takes vitamin D as well as supplements.  Labs have been completed.    FRACTURE HISTORY  o Low trauma, atraumatic or high trauma (specifics regarding circumstances of fracture) None    o Fall-related fracture and circumstances of fall    o Prior history of fracture(s)     o Site, age at fracture, interventions    o Prior history of osteoporosis    o Prior history of osteoporosis treatment (medication, age or date used, duration of use, pre or postmenopausal ,when used, and reason for discontinuation); She had been on Fosamax for about 5 years about 10 years ago and then stopped this.     o Prolonged history of steroids, aromatase inhibitors, anticonvulsants, and other meds that may affect skeleton- Received steroid injections last few years 1-2 times a year    o Chronic use vs. intermittent use with dates of usage    o Secondary conditions associated with osteoporosis    o DXA tests in past ( age or date when performed and results) 6/2022  PROCEDURE: XR DEXA BONE DENSITOMETRY (CPT=77080)       COMPARISON: [facility], [facility] DEXA SCAN OSTEOPORSIS EVAL, 1/21/2008, 8:30 AM.       INDICATIONS: Age-related osteoporosis without current pathological fracture       TECHNIQUE: Measurement of bone mineral density of the lumbar spine and hip was performed on a Hologic dual energy x-ray absorptiometry scanner.       FINDINGS:       LEFT FEMORAL NECK   BMD: 0.567 gm/sq. cm. T SCORE: -2.5 Z SCORE: -0.3       % change from  prior:-7.7%       LEFT TOTAL HIP   BMD: 0.727 gm/sq. cm. T SCORE: -1.8 Z SCORE: 0.3       % change from prior:-5.6%       PA LUMBAR SPINE (L1 - L4)   BMD: 1.001 gm/sq. cm. T SCORE: -0.4 Z SCORE: 2.2       % change from prior:  +4.8%           T scores are a comparison to sex-matched patients with mean peak bone mass and are given in standard deviation (s.d.).  Each 1 s.d. corresponds to approximately 10% below peak normal bone density.         WORLD HEALTH ORGANIZATION CRITERIA   NORMAL T SCORE: Above -1 s.d.     OSTEOPENIA T SCORE: Between -1 and -2.5 s.d.     OSTEOPOROSIS T SCORE: -2.5 s.d.       National Osteoporosis Foundation Clinician's Guide to Prevention and Treatment of Osteoporosis recommendations for treatment:   Post menopausal women and men age 50 and older presenting with the following should be considered for treatment:   * A hip or vertebral (clinical or morphometric) fracture   * T score < -2.5 at the femoral neck or spine after appropriate evaluation to exclude secondary causes.   * Low bone mass (T score between -1.0 and -2.5 at the femoral neck or spine) and a 10-year probability of a hip fracture > 3% or a 10-year probability of a major osteoporosis-related fracture > 20% based on the [place]-adapted WHO algorithm       Impression   CONCLUSION:       1. Osteoporosis.       2. Interval decrease in bone mineral density of 7.7% at the femoral neck and 5.6% at the total hip.  Interval increase in bone mineral density of 4.8% at the lumbar spine.  The decrease in density at the femoral neck has moved the patient from the   osteopenic range to the osteoporotic range.       3. FRAX 10 year risk fracture assessment:  Major osteoporotic fracture 18%, hip fracture 6.2%.                  REPRODUCTIVE HISTORY    o Menarche age   7  o Regular/Irregular menses   irregular  o History of amenorrhea  Yes, she would skip period for 3 months  o G P [facility]    o Breastfeeding  none  o Contraceptives?  Infertility?  Hormonal contraceptives and the Copper-T  o Natural menopause age   43  o Hormone therapy (start and stop)  Started premarin for a few years  o Concurrent diseases and medications    PAST MEDICAL HISTORY:   Past Medical History:    ANXIETY    Appendicitis    Asthma (HCC)    Constipation    Coronary atherosclerosis    DEPRESSION    Diabetes (HCC)    Essential hypertension    Generalized OA    Heart disease    HYPERLIPIDEMIA    Hyperlipidemia    Multiple allergies    Osteoarthritis    Ovarian cyst    Pneumonia due to organism    ST segment changes on electrocardiogram    TWI new in V 3-V6. Cath.: medical Rx.     Visual impairment    glasses       SURGICAL HISTORY  Past Surgical History:   Procedure Laterality Date    Appendectomy      Cataract Bilateral 01/28/2019    Dr. Cris Rosario at [facility].     Cath bare metal stent (bms)      Colonoscopy  2009    nml    Electrocardiogram, complete  02/07/2014    SCANNED TO MEDIA TAB - 02/07/2014       SOCIAL HISTORY    o Country of origin     o Tobacco  none  o Alcohol  socially  o Daily calcium intake (food and supplements)  Yes- supplements  o Daily exercise  3 times a week- weights, bike, sitting elliptical  FAMILY HISTORY   Family History   Problem Relation Age of Onset    Stroke Father     Heart Disorder Father     Allergies Father     Cancer Mother         ? source- widespread at diagnosis    Other (goiter) Daughter     Other (Celiac) Daughter     Breast Cancer Paternal Aunt 68        age at dx 68     Family history of fractures, osteoporosis, osteopenia  Mother had osteoporosis, no hip fracture    CURRENT MEDICATIONS:    Current Outpatient Medications   Medication Sig Dispense Refill    Glucose Blood (ONETOUCH ULTRA) In Vitro Strip Use 1 test strip in Vitro route three times daily . 300 strip 3    atorvastatin 40 MG Oral Tab Take 1 tablet (40 mg total) by mouth nightly. 90 tablet 3    fenofibrate 145 MG Oral Tab TAKE 1 TABLET ONCE DAILY 90 tablet 3     venlafaxine ER 37.5 MG Oral Capsule SR 24 Hr Take 1 capsule (37.5 mg total) by mouth nightly. 90 capsule 3    levocetirizine 5 MG Oral Tab Take 1 tablet (5 mg total) by mouth nightly. 90 tablet 3    amLODIPine 2.5 MG Oral Tab Take 1 tablet (2.5 mg total) by mouth 2 (two) times daily. 180 tablet 3    metFORMIN HCl 1000 MG Oral Tab Take 1 tablet (1,000 mg total) by mouth daily with dinner. 90 tablet 1    semaglutide (OZEMPIC, 0.25 OR 0.5 MG/DOSE,) 2 MG/3ML Subcutaneous Solution Pen-injector Inject 0.5 mg into the skin once a week. 9 mL 1    clopidogrel 75 MG Oral Tab Take 1 tablet (75 mg total) by mouth daily. 90 tablet 3    metoprolol tartrate 25 MG Oral Tab Take 1 tablet (25 mg total) by mouth 2 (two) times daily.      folic acid 1 MG Oral Tab Take 1 tablet (1 mg total) by mouth daily. 90 tablet 3    iron polysacch gendg-O20--0.025-1 MG Oral Cap Take 1 capsule by mouth daily. 30 capsule 1    aspirin 81 MG Oral Tab EC Take 1 tablet (81 mg total) by mouth in the morning and 1 tablet (81 mg total) before bedtime. 1 tablet 0    Multiple Vitamin (ONE-DAILY MULTI VITAMINS) Oral Tab Take 1 tablet by mouth daily.      furosemide 20 MG Oral Tab Take 0.5 tablets (10 mg total) by mouth daily as needed.      levothyroxine (SYNTHROID) 88 MCG Oral Tab Take 1 tablet (88 mcg total) by mouth nightly. 90 tablet 3    lisinopril 20 MG Oral Tab Take 1 tablet (20 mg total) by mouth daily.      Selenium 200 MCG Oral Tab Take 1 tablet (200 mcg total) by mouth daily.      FIBER ADULT GUMMIES OR Take 2 tablets by mouth at bedtime.      EPINEPHrine 0.3 MG/0.3ML Injection Solution Auto-injector Use as directed. 1 each 0    Glucose Blood (ONETOUCH ULTRA) In Vitro Strip Test blood sugars 3 times daily 300 strip 3    ALBUTEROL SULFATE  (90 Base) MCG/ACT Inhalation Aero Soln INHALE 2 PUFFS INTO THE LUNGS EVERY 6 HOURS AS NEEDED FOR WHEEZING 25.5 g 0    Coenzyme Q10 (COQ10) 200 MG Oral Cap Take 200 mg by mouth daily.      Vitamin B-12  1000 MCG Oral Tab Take 1 tablet (1,000 mcg total) by mouth daily.      DHA-EPA-Vitamin E (OMEGA-3 COMPLEX OR) Take 1 capsule by mouth daily.      Multiple Vitamins-Iron (ONCE DAILY/IRON) Oral Tab Take 1 tablet by mouth daily.      Cholecalciferol 50 MCG (2000 UT) Oral Tab Take 1 tablet (2,000 Units total) by mouth daily.      VITAMIN E 400 UNIT OR TABS Take 1 tablet by mouth daily.      VITAMIN C 500 MG OR TABS Take 1 tablet (500 mg total) by mouth daily.      TYLENOL ARTHRITIS PAIN OR Take by mouth.      ZINC 50 MG OR TABS Take 1 tablet by mouth daily.         ALLERGIES:  Allergies   Allergen Reactions    Bees SWELLING    Glimepiride HYPOTENSION     Hypoglycemia, bradycardia    Gluten Meal OTHER (SEE COMMENTS)     Causes abdominal pain    Ibuprofen PALPITATIONS     Rapid heart beat per pt     Nsaids PALPITATIONS     Per patient, takes baby aspirin at home with no adverse effects noted    Sulfa Antibiotics SWELLING         ASSESSMENTS:   PAIN ASSESSMENT: (Patient reports pain) lower back pain    PAIN SCALE: (0-10, please indicate if applicable):  4    FALL ASSESSMENT: very steady    FUNCTIONAL ASSESSMENT (Able to perform all ADLs):  yes    REVIEW OF SYSTEMS:  No falls in past 12 months, no loss of height   Constitutional: Negative for: weight change, fever, fatigue, cold/heat intolerance  Eyes: Negative for:  Visual changes, proptosis, blurring  ENT: Negative for:  dysphagia, neck swelling, dysphonia  Respiratory: Negative for:  dyspnea, cough  Cardiovascular: Negative for:  chest pain, palpitations, orthopnea  GI: Negative for:  abdominal pain, nausea, vomiting, diarrhea, constipation, bleeding  Neurology: Negative for: headache, numbness, weakness  Genito-Urinary: Negative for: dysuria, frequency  Psychiatric: Negative for:  depression, anxiety  Hematology/Lymphatics: Negative for: bruising, lower extremity edema  Endocrine: Negative for: polyuria, polydypsia  Skin: Negative for: rash, blister,  cellulitis,      PHYSICAL EXAM:   There were no vitals filed for this visit.    BMI: There is no height or weight on file to calculate BMI.     CONSTITUTIONAL:  awake, alert, cooperative, no apparent distress, and appears stated age  SKIN:  no bruising or bleeding, no rashes and no lesions  EXTREMITIES:normal pulses, no edema      DATA:   CMP:    Lab Results   Component Value Date     05/28/2024    K 4.6 05/28/2024     05/28/2024    CO2 27.0 05/28/2024    BUN 29 (H) 05/28/2024     (H) 05/28/2024    ALB 4.2 05/28/2024    CA 10.0 05/28/2024     FLP (Lipid Profile):    Lab Results   Component Value Date    TRIG 110 12/29/2023    HDL 50 12/29/2023     LDL direct : No components found for: \"LDLDIRECT\"  Microalbumin/Creatinine ratio:  No components found for: \"RUCREAT\", \"RUMICROAL\", \"MCRATIO\"  TSH:    Lab Results   Component Value Date    TSH 1.765 12/29/2023       ASSESSMENT AND PLAN: This is a 79 year-old woman here for follow-up of management of osteoporosis that has been diagnosed. She is now on Prolia. She will receive the 4th dose today.  She will have the following blood tests completed in 6 months and come for her 5th shot.    - PTH  - CMP  - Bone specific alkaline phosphatase  - Vitamin D    Return in 6 months with labs and DEXA scan    Prior to this encounter, I spent over 15 minutes with preparing for the visit, including reviewing documents from other specialties as well as from PCP and going over test results. During the face to face encounter, I spent an additional 15 minutes which were determined for follow-up. Greater than 50% of the time was spent in counseling, anticipatory guidance, and coordination of care. Patient concerns were answered to the best of my knowledge.       5/29/24  Bhavya Liu MD

## 2024-05-29 ENCOUNTER — OFFICE VISIT (OUTPATIENT)
Dept: ENDOCRINOLOGY CLINIC | Facility: CLINIC | Age: 79
End: 2024-05-29

## 2024-05-29 VITALS
HEART RATE: 71 BPM | DIASTOLIC BLOOD PRESSURE: 51 MMHG | SYSTOLIC BLOOD PRESSURE: 115 MMHG | WEIGHT: 119.81 LBS | BODY MASS INDEX: 21.49 KG/M2 | HEIGHT: 62.5 IN

## 2024-05-29 DIAGNOSIS — M81.0 AGE-RELATED OSTEOPOROSIS WITHOUT CURRENT PATHOLOGICAL FRACTURE: Primary | ICD-10-CM

## 2024-05-29 DIAGNOSIS — E55.9 VITAMIN D DEFICIENCY: ICD-10-CM

## 2024-05-29 PROCEDURE — 3078F DIAST BP <80 MM HG: CPT | Performed by: INTERNAL MEDICINE

## 2024-05-29 PROCEDURE — 96372 THER/PROPH/DIAG INJ SC/IM: CPT | Performed by: INTERNAL MEDICINE

## 2024-05-29 PROCEDURE — 1159F MED LIST DOCD IN RCRD: CPT | Performed by: INTERNAL MEDICINE

## 2024-05-29 PROCEDURE — 99214 OFFICE O/P EST MOD 30 MIN: CPT | Performed by: INTERNAL MEDICINE

## 2024-05-29 PROCEDURE — 3008F BODY MASS INDEX DOCD: CPT | Performed by: INTERNAL MEDICINE

## 2024-05-29 PROCEDURE — 3074F SYST BP LT 130 MM HG: CPT | Performed by: INTERNAL MEDICINE

## 2024-06-06 NOTE — TELEPHONE ENCOUNTER
Aim for 12- 15 hours of sleep a day    Keep her well hydrated, push those fluids! She might be coming down with a cold as well.    Patient calling states her insurance will not cover celebrex any longer patient wants to know if there is something else that she can take. Please advise.

## 2024-06-12 LAB — ALKALINE PHOSPHATASE BONE SPECIFIC: 9.1 UG/L

## 2024-07-10 ENCOUNTER — TELEPHONE (OUTPATIENT)
Dept: INTERNAL MEDICINE CLINIC | Facility: CLINIC | Age: 79
End: 2024-07-10

## 2024-07-10 DIAGNOSIS — N18.31 STAGE 3A CHRONIC KIDNEY DISEASE (HCC): ICD-10-CM

## 2024-07-10 DIAGNOSIS — E55.9 VITAMIN D DEFICIENCY: ICD-10-CM

## 2024-07-10 DIAGNOSIS — E11.65 TYPE 2 DIABETES MELLITUS WITH HYPERGLYCEMIA, WITHOUT LONG-TERM CURRENT USE OF INSULIN (HCC): Primary | ICD-10-CM

## 2024-07-10 DIAGNOSIS — I10 ESSENTIAL HYPERTENSION: ICD-10-CM

## 2024-07-10 DIAGNOSIS — E03.9 ACQUIRED HYPOTHYROIDISM: ICD-10-CM

## 2024-07-15 ENCOUNTER — LAB ENCOUNTER (OUTPATIENT)
Dept: LAB | Age: 79
End: 2024-07-15
Attending: INTERNAL MEDICINE
Payer: MEDICARE

## 2024-07-15 DIAGNOSIS — E55.9 VITAMIN D DEFICIENCY: ICD-10-CM

## 2024-07-15 DIAGNOSIS — E03.9 ACQUIRED HYPOTHYROIDISM: ICD-10-CM

## 2024-07-15 DIAGNOSIS — I10 ESSENTIAL HYPERTENSION: ICD-10-CM

## 2024-07-15 DIAGNOSIS — E11.65 TYPE 2 DIABETES MELLITUS WITH HYPERGLYCEMIA, WITHOUT LONG-TERM CURRENT USE OF INSULIN (HCC): ICD-10-CM

## 2024-07-15 LAB
ALBUMIN SERPL-MCNC: 4.4 G/DL (ref 3.2–4.8)
ALBUMIN/GLOB SERPL: 2 {RATIO} (ref 1–2)
ALP LIVER SERPL-CCNC: 30 U/L
ALT SERPL-CCNC: 13 U/L
ANION GAP SERPL CALC-SCNC: 8 MMOL/L (ref 0–18)
AST SERPL-CCNC: 23 U/L (ref ?–34)
BASOPHILS # BLD AUTO: 0.05 X10(3) UL (ref 0–0.2)
BASOPHILS NFR BLD AUTO: 1 %
BILIRUB SERPL-MCNC: 0.5 MG/DL (ref 0.2–1.1)
BUN BLD-MCNC: 29 MG/DL (ref 9–23)
BUN/CREAT SERPL: 26.6 (ref 10–20)
CALCIUM BLD-MCNC: 9.5 MG/DL (ref 8.7–10.4)
CHLORIDE SERPL-SCNC: 106 MMOL/L (ref 98–112)
CHOLEST SERPL-MCNC: 147 MG/DL (ref ?–200)
CO2 SERPL-SCNC: 26 MMOL/L (ref 21–32)
CREAT BLD-MCNC: 1.09 MG/DL
DEPRECATED RDW RBC AUTO: 47.3 FL (ref 35.1–46.3)
EGFRCR SERPLBLD CKD-EPI 2021: 51 ML/MIN/1.73M2 (ref 60–?)
EOSINOPHIL # BLD AUTO: 0.25 X10(3) UL (ref 0–0.7)
EOSINOPHIL NFR BLD AUTO: 4.8 %
ERYTHROCYTE [DISTWIDTH] IN BLOOD BY AUTOMATED COUNT: 12.9 % (ref 11–15)
EST. AVERAGE GLUCOSE BLD GHB EST-MCNC: 120 MG/DL (ref 68–126)
FASTING PATIENT LIPID ANSWER: YES
FASTING STATUS PATIENT QL REPORTED: YES
GLOBULIN PLAS-MCNC: 2.2 G/DL (ref 2–3.5)
GLUCOSE BLD-MCNC: 106 MG/DL (ref 70–99)
HBA1C MFR BLD: 5.8 % (ref ?–5.7)
HCT VFR BLD AUTO: 37.2 %
HDLC SERPL-MCNC: 50 MG/DL (ref 40–59)
HGB BLD-MCNC: 12.4 G/DL
IMM GRANULOCYTES # BLD AUTO: 0.02 X10(3) UL (ref 0–1)
IMM GRANULOCYTES NFR BLD: 0.4 %
LDLC SERPL CALC-MCNC: 77 MG/DL (ref ?–100)
LYMPHOCYTES # BLD AUTO: 1.96 X10(3) UL (ref 1–4)
LYMPHOCYTES NFR BLD AUTO: 37.9 %
MCH RBC QN AUTO: 33.6 PG (ref 26–34)
MCHC RBC AUTO-ENTMCNC: 33.3 G/DL (ref 31–37)
MCV RBC AUTO: 100.8 FL
MONOCYTES # BLD AUTO: 0.6 X10(3) UL (ref 0.1–1)
MONOCYTES NFR BLD AUTO: 11.6 %
NEUTROPHILS # BLD AUTO: 2.29 X10 (3) UL (ref 1.5–7.7)
NEUTROPHILS # BLD AUTO: 2.29 X10(3) UL (ref 1.5–7.7)
NEUTROPHILS NFR BLD AUTO: 44.3 %
NONHDLC SERPL-MCNC: 97 MG/DL (ref ?–130)
OSMOLALITY SERPL CALC.SUM OF ELEC: 296 MOSM/KG (ref 275–295)
PLATELET # BLD AUTO: 251 10(3)UL (ref 150–450)
POTASSIUM SERPL-SCNC: 4.4 MMOL/L (ref 3.5–5.1)
PROT SERPL-MCNC: 6.6 G/DL (ref 5.7–8.2)
RBC # BLD AUTO: 3.69 X10(6)UL
SODIUM SERPL-SCNC: 140 MMOL/L (ref 136–145)
TRIGL SERPL-MCNC: 113 MG/DL (ref 30–149)
TSI SER-ACNC: 1 MIU/ML (ref 0.55–4.78)
VIT D+METAB SERPL-MCNC: 37.9 NG/ML (ref 30–100)
VLDLC SERPL CALC-MCNC: 18 MG/DL (ref 0–30)
WBC # BLD AUTO: 5.2 X10(3) UL (ref 4–11)

## 2024-07-15 PROCEDURE — 36415 COLL VENOUS BLD VENIPUNCTURE: CPT

## 2024-07-15 PROCEDURE — 84443 ASSAY THYROID STIM HORMONE: CPT

## 2024-07-15 PROCEDURE — 83036 HEMOGLOBIN GLYCOSYLATED A1C: CPT

## 2024-07-15 PROCEDURE — 80061 LIPID PANEL: CPT

## 2024-07-15 PROCEDURE — 80053 COMPREHEN METABOLIC PANEL: CPT

## 2024-07-15 PROCEDURE — 85025 COMPLETE CBC W/AUTO DIFF WBC: CPT

## 2024-07-15 PROCEDURE — 82306 VITAMIN D 25 HYDROXY: CPT

## 2024-07-15 NOTE — TELEPHONE ENCOUNTER
Please contact the patient.  She has not read her [employer] message.    July 10, 2024  Mindy Medina MD   to Melonie Gaylee Struck         7/10/24 11:26 PM  Melonie, lab orders are placed for you for fasting labs minimum 6 hours can have black coffee or water to be done before your physical appointment.    This [employer] message has not been read.

## 2024-07-17 NOTE — PROGRESS NOTES
CBC Normal (white blood cells and red blood cells and platelets),   CMP Normal (electrolytes, and liver functions), decline in kidney function is stable but still declined.No motrin, ibuprofen, advil, alleve, naprosyn  with these medications.   Lipid (choilesterol) is good,   Thyroid is good.   Vitamin D level looks good.  Continue the same.  Hemoglobin A1c which is a 3-month average of sugars looks good good job!  Goal is 6.5 or less.

## 2024-07-22 ENCOUNTER — OFFICE VISIT (OUTPATIENT)
Dept: INTERNAL MEDICINE CLINIC | Facility: CLINIC | Age: 79
End: 2024-07-22
Payer: COMMERCIAL

## 2024-07-22 ENCOUNTER — TELEPHONE (OUTPATIENT)
Dept: INTERNAL MEDICINE CLINIC | Facility: CLINIC | Age: 79
End: 2024-07-22

## 2024-07-22 VITALS
OXYGEN SATURATION: 100 % | TEMPERATURE: 98 F | DIASTOLIC BLOOD PRESSURE: 72 MMHG | HEIGHT: 62.5 IN | WEIGHT: 120.19 LBS | BODY MASS INDEX: 21.57 KG/M2 | SYSTOLIC BLOOD PRESSURE: 150 MMHG | HEART RATE: 59 BPM

## 2024-07-22 DIAGNOSIS — E03.9 ACQUIRED HYPOTHYROIDISM: ICD-10-CM

## 2024-07-22 DIAGNOSIS — I10 ESSENTIAL HYPERTENSION: ICD-10-CM

## 2024-07-22 DIAGNOSIS — R76.8 ANA POSITIVE: ICD-10-CM

## 2024-07-22 DIAGNOSIS — I77.1 TORTUOUS AORTA (HCC): ICD-10-CM

## 2024-07-22 DIAGNOSIS — D64.9 ANEMIA, UNSPECIFIED TYPE: ICD-10-CM

## 2024-07-22 DIAGNOSIS — Z96.1 PSEUDOPHAKIA OF BOTH EYES: ICD-10-CM

## 2024-07-22 DIAGNOSIS — R22.41 MASS OF LEG, RIGHT: ICD-10-CM

## 2024-07-22 DIAGNOSIS — R91.1 PULMONARY NODULE: ICD-10-CM

## 2024-07-22 DIAGNOSIS — Z96.651 S/P TOTAL KNEE ARTHROPLASTY, RIGHT: ICD-10-CM

## 2024-07-22 DIAGNOSIS — H43.821 VITREOMACULAR ADHESION OF RIGHT EYE: Primary | ICD-10-CM

## 2024-07-22 DIAGNOSIS — N18.31 STAGE 3A CHRONIC KIDNEY DISEASE (HCC): ICD-10-CM

## 2024-07-22 DIAGNOSIS — E78.2 MIXED HYPERLIPIDEMIA: ICD-10-CM

## 2024-07-22 DIAGNOSIS — R68.89 SUSPECTED GLAUCOMA OF BOTH EYES: ICD-10-CM

## 2024-07-22 DIAGNOSIS — M81.0 AGE-RELATED OSTEOPOROSIS WITHOUT CURRENT PATHOLOGICAL FRACTURE: ICD-10-CM

## 2024-07-22 DIAGNOSIS — M17.11 PRIMARY OSTEOARTHRITIS OF RIGHT KNEE: ICD-10-CM

## 2024-07-22 DIAGNOSIS — I25.84 CORONARY ARTERY DISEASE DUE TO CALCIFIED CORONARY LESION: ICD-10-CM

## 2024-07-22 DIAGNOSIS — Z00.00 ENCOUNTER FOR ANNUAL HEALTH EXAMINATION: ICD-10-CM

## 2024-07-22 DIAGNOSIS — M17.0 PRIMARY OSTEOARTHRITIS OF BOTH KNEES: ICD-10-CM

## 2024-07-22 DIAGNOSIS — Z12.31 SCREENING MAMMOGRAM FOR BREAST CANCER: ICD-10-CM

## 2024-07-22 DIAGNOSIS — R01.1 MURMUR: ICD-10-CM

## 2024-07-22 DIAGNOSIS — Z71.85 VACCINE COUNSELING: ICD-10-CM

## 2024-07-22 DIAGNOSIS — I25.10 CORONARY ARTERY DISEASE DUE TO CALCIFIED CORONARY LESION: ICD-10-CM

## 2024-07-22 DIAGNOSIS — M79.89 LEG SWELLING: ICD-10-CM

## 2024-07-22 DIAGNOSIS — H47.20 OPTIC ATROPHY: ICD-10-CM

## 2024-07-22 DIAGNOSIS — E11.65 TYPE 2 DIABETES MELLITUS WITH HYPERGLYCEMIA, WITHOUT LONG-TERM CURRENT USE OF INSULIN (HCC): ICD-10-CM

## 2024-07-22 LAB
APPEARANCE: CLEAR
BILIRUBIN: NEGATIVE
GLUCOSE (URINE DIPSTICK): NEGATIVE MG/DL
KETONES (URINE DIPSTICK): NEGATIVE MG/DL
LEUKOCYTES: NEGATIVE
MULTISTIX LOT#: NORMAL NUMERIC
NITRITE, URINE: NEGATIVE
OCCULT BLOOD: NEGATIVE
PH, URINE: 6 (ref 4.5–8)
PROTEIN (URINE DIPSTICK): NEGATIVE MG/DL
SPECIFIC GRAVITY: 1.01 (ref 1–1.03)
URINE-COLOR: YELLOW
UROBILINOGEN,SEMI-QN: 0.2 MG/DL (ref 0–1.9)

## 2024-07-22 NOTE — PATIENT INSTRUCTIONS
ASSESSMENT/PLAN:     Encounter Diagnoses   Name Primary?    Vitreomacular adhesion of right eye Stable FU optho. No new vision changes.   Yes    Vaccine counseling. Recommend shingles vaccine.  There is 2 doses of the vaccine  by 2 months 6 months apart.  Check on insurance coverage on shingles vaccine.  May be covered better at the pharmacy.  Separate shingles vaccine from all of the vaccines by at least 1 to 2 weeks.  Discussed about side effects of vaccine.        Type 2 diabetes mellitus with hyperglycemia, without long-term current use of insulin (HCC) higher. Increase ozempic. Call in 2 week. Careful with diet and excercise at least 30 minutes 3-4 times a week. Check sugars at different times on different dates. Careful with low sugars. Carry something with you and check sugar if can. Can carry adams cracker, etc. Decrease carbohydrates. But also, careful with fruits and natural sugars.One serving a day and no more than 1 handful every day. Check feet  every AM and careful with sores and ulcers on feet bilaterally. Check eyes every year with dilated eye exam.  Check sugars.  2-hour postmeal should be less than 140s.  Pre-meal should be 's.  Both equally affected A1c.  Discussed importance of glycemic control to prevent complications of diabetes  -Discussed complications of diabetes include retinopathy, neuropathy, nephropathy and cardiovascular disease  -Discussed ABCs of DM  -Discussed importance of SBGM  -Discussed importance of low CHO diet, recommend 45gm per meal or 135gm per day  -Normal foot exam  -FU optho       Tortuous aorta (HCC) No Sx.        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 No new vision changes.       Stage 3a chronic kidney disease (HCC) No motrin, ibuprofen, advil, alleve, naprosyn  with these medications.         Screening mammogram for breast cancer Normal mammogram. Continue self breast exam every month.         S/P total knee arthroplasty, right     Primary  osteoarthritis of right knee Stable.        Pulmonary nodule Stable.        Pseudophakia of both eyes Stable. No new vision changes.       Age-related osteoporosis without current pathological fracture Awaiting dexa. Take calcium 600 every 12 hrs. With vitamin D 400 IU every  12 hrs. Excerise at least 30 minutes 3-4 times a week. May use calcium citrate as opposed to calcium carbonate which may be better absorbed in the setting of PPI use.  The preferred calcium supplement for people at risk of stone formation is calcium citrate because it helps to increase urinary citrate excretion. We recommend a dose of 200-400 mg if dietary calcium cannot be increased.   lifelong physical activity at all ages is strongly endorsed by the National [employer]opor[employer]. Exercise recommendations generally should include weight-bearing, muscle-strengthening, and balance training exercises for 30 minutes 5 days per week or 75 minutes twice weekly, often consistent with other general health recommendations.   Weight Bearing  There are two types of osteoporosis exercises that are important for building and maintaining bone density: weight-bearing and muscle-strengthening exercises.  Weight-bearing Exercises  These exercises include activities that make you move against gravity while staying upright. Weight-bearing exercises can be high-impact or low-impact.  High-impact weight-bearing exercises help build bones and keep them strong. If you have broken a bone due to osteoporosis or are at risk of breaking a bone, you may need to avoid high-impact exercises. If you’re not sure, you should check with your healthcare provider.  Examples of high-impact weight-bearing exercises are:  Dancing   Doing high-impact aerobics   Hiking   Jogging/running   Jumping Rope   Stair climbing   Tennis  Low-impact weight-bearing exercises can also help keep bones strong and are a safe alternative if you cannot do high-impact exercises. Examples of  low-impact weight-bearing exercises are:  Using elliptical training machines   Doing low-impact aerobics   Using stair-step machines   Fast walking on a treadmill or outside          Optic atrophy Stable FU optho. No new vision changes.       Primary osteoarthritis of both knees Stable. FU ortho.        Murmur Stable.        Mixed hyperlipidemia Stable.        Essential hypertension Higher. RN only visit to check machine in 1 week. Call in 2 weeks. Careful with diet and excercise at least 30 minutes 3-4 times a week. Check blood pressures at different times on different days. Can purchase own blood pressure monitor. If not, check at local pharmacy. Bake foods more and grill occasionally. Avoid fried foods. No salt. Use other seasonings.         Coronary artery disease due to calcified coronary lesion NO Sx. FU cards.        Anemia, unspecified type Stable.        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        Acquired hypothyroidism Stable.        Mass of leg, right ? Hematoma. Improving. Call if no betetr after 2-4 weeks.          Leg swelling Elevate. Hold meds. And compression stockings.         Orders Placed This Encounter   Procedures    URINALYSIS, AUTO, W/O SCOPE       Meds This Visit:  Requested Prescriptions     Signed Prescriptions Disp Refills    semaglutide 4 MG/3ML Subcutaneous Solution Pen-injector 3 each 3     Sig: Inject 1 mg into the skin once a week.       Imaging & Referrals:  None      RTC 3 months for FU HTN.   RN only visit to check blood pressure machine in 1 week.  Stop vitamin C and vitamin E and iron and folic acid and selenium and zinc.        Understanding Advance Care Planning  Advance care planning is the process of deciding one’s own future medical care. It helps assure that if you can’t speak for yourself, your wishes can still be carried out. The plan is a series of legal documents that note a person’s wishes. The documents vary by state. Advance care planning should be discussed at a regular office  visit with your primary care provider before an acute illness. Advance care planning is encouraged when a person has a serious illness that is expected to get worse. It may also be done before major surgery. And it can help you and your family be prepared in case of a major illness or injury. Advance care planning helps with making decisions at these times.     Who will speak on your behalf?  A healthcare proxy is a person who acts as the voice of a patient when the patient can’t speak for himself or herself. The name of this role varies by state. It may be called a Durable Medical Power of  or Durable Power of  for Healthcare. It may be called an agent, surrogate, or advocate. Or it may be called a representative or decision maker. It's an official duty that is identified by a legal document. The document also varies by state.   Why is advance care planning important?   If a person communicates his or her healthcare wishes:   He or she will be given medical care that matches his or her values and goals.  Family members will not be forced to make decisions in a crisis with no guidance.  Creating a plan  Making an advance care plan is often done in 3 steps:   Thinking about one’s wishes. To create an advance care plan, think about what kind of medical treatment you would want if you lose the ability to communicate. Are there any situations in which you would refuse or stop treatment? Are there therapies you would want or not want? And whom do you want to make decisions for you? There are many places to learn more about how to plan for your care. Ask your healthcare provider or  for resources.  Picking a healthcare proxy. This means choosing a trusted person to speak for you only when you can’t speak for yourself. When you can't make medical decisions, your proxy makes sure the instructions in your advance care plan are followed. A proxy doesn't make decisions based on his or her own  opinions. They must put aside those opinions and values if needed, and carry out your wishes.  Filling out the legal documents. There are several kinds of legal documents for advance care planning. Each one tells healthcare providers your wishes. The documents may vary by state. They must be signed and may need to be witnessed or notarized. You can cancel or change them whenever you wish. Depending on your state, the documents may include a Healthcare Proxy form, Living Will, Durable Medical Power of , and Advance Directive.  The family’s role  The best help a family can give is to support their loved one’s wishes. Open and honest communication is vital. Family should express any concerns they have about the patient’s choices while the patient can still make decisions in the event that his or her illness prevents communicating those wishes at a later time.    [employer] last reviewed this educational content on 12/1/2019    © 2467-4561 [employer]. All rights reserved. This information is not intended as a substitute for professional medical care. Always follow your healthcare professional's instructions.          Advance Medical Directive  An advance medical directive is a form that lets you plan ahead for the care you’d want if you could no longer express your wishes. This statement outlines the medical treatment you’d want or names the person you’d wish to make healthcare decisions for you. Be aware that laws vary from state to state, and it may be worthwhile to talk with an .     Writing down your wishes  An advance directive is important whether you’re young or old. Injury or illness can strike at any age.  Decide what is important to you and the kind of treatment you’d want, or not want to have.  Some states allow only one kind of advance directive. Some let you do both a durable power of  for healthcare and a living will. Some states put both kinds on the same form.    A  durable power of  (POA) for healthcare   This form lets you name someone else that you have chosen and trust to speak and make decisions on your behalf.  This person can decide on treatment for you only when you can’t speak for yourself.  You don't need to be at the end of your life. They could speak for you if you were in a coma but were likely to recover.    A living will  This form lets you list the care you want at the end of your life.  A living will applies only if you won’t live without medical treatment. It would apply if you had advanced cancer, a massive stroke, or other serious illness from which you will not recover.  It takes effect only when you can no longer express your wishes yourself.    [employer] last reviewed this educational content on 2/1/2021 © 2000-2021 [employer]. All rights reserved. This information is not intended as a substitute for professional medical care. Always follow your healthcare professional's instructions.          Choosing an Agent  A durable power of  for healthcare is only as good as the person you name to be your agent. Your agent is the person you have chosen to speak and make decisions on your behalf. If this person knows your treatment wishes and is willing to carry them out, you’ll probably be well represented. Be sure to tell your agent what’s important to you.     Who to choose  Here are suggestions for choosing an agent:  You can name a family member, close friend, , , or rabbi.  You should name one person as your agent. Then name one or two alternates. You need a backup person in case your first choice can’t be reached when needed.  Talk with each person you're thinking of naming as your agent or alternate. Do this before you decide who should carry out your wishes.  Your agent should be ...  A competent adult, age 18 or older  Someone you trust and can talk to about the care you want and what's important to  you  Someone who supports your treatment choices    In many states, your agent can’t be ...   Your healthcare provider  An employee of your provider or of a hospital, nursing home, or hospice program where you receive care  Some states have other restrictions on who can be named as an agent for an advance directive.   Be prepared  Tip: It's a good idea to write down your wishes and give a copy to your agent and all others who are involved with your healthcare.   [employer] last reviewed this educational content on 8/1/2021 © 2000-2021 [employer]. All rights reserved. This information is not intended as a substitute for professional medical care. Always follow your healthcare professional's instructions.          Understanding DNR Orders  Do not resuscitate (DNR) orders tell hospital staff not to do potentially life-restoring measures, such as CPR, if you or your loved one's heart and lungs stop working. It allows a natural death, and prevents healthcare staff from artificially reviving a person and placing them on life support. In many states, a DNR order also applies to staff outside the hospital such as in nursing homes and emergency medical services. A DNR order must be written by a healthcare provider. Or in some cases, certain other healthcare workers write it. This can only be done with the person’s or family’s consent. If a person has not written an advance directive, their family will decide on a DNR with the help of the healthcare team.   The person can cancel a DNR order at any time. The healthcare team can answer questions about the DNR form. Have copies of a DNR form are readily available so that your wishes can be faithfully followed.   Writing a DNR order  When might a DNR order be written? When the person’s health condition is such that, in the case of cardiac arrest, CPR and other resuscitation methods are not desired. This could be because the chance of successful resuscitation is  very low. Or it could be because the care plan now focuses on comfort measures instead of life-sustaining measures. Coma and terminal illness are instances when a DNR order might be used.   Irreversible coma  In a coma, a person does not respond to sight, sound, or touch. The heart and lungs could be working, but brain function is damaged due to trauma or disease.   Terminal illness  In the last stages of heart disease, AIDS, cancer, and other illnesses, some people don’t want to prolong their suffering. If recovery isn’t likely and quality of life is poor or getting worse, a person or their family may agree to a DNR order.   DNR orders and hospice care  A hospice program can offer care during the final weeks of life. Hospice programs provide dignity, pain control and comfort care in the home or at special facilities. Hospice does not provide aggressive treatment. In fact, a DNR order will likely be discussed before a person is admitted to hospice. A  or  may be able to help you arrange for hospice support.   Documenting end-of-life wishes  In addition to DNR orders, a person with a serious, life-limiting illness may wish to document their treatment wishes. This is called a POST form or by different names, depending on the state. It's meant to provide a portable medical order to help guide healthcare providers on the specific medical treatments a person wants during a medical emergency. It's meant to complement a DNR order, not replace it. Your healthcare provider can tell you more and help you complete the forms. The form may be called one of these:   MOLST (medical orders for life-sustaining treatment)  POLST (physician orders for life-sustaining treatment)  MOST (medical orders for scope of treatment)  POST (physician orders for scope of treatment)  TPOPP (transportable physician orders for patient preferences)  [employer] last reviewed this educational content on 7/1/2021    © 3359-4212  [employer]. All rights reserved. This information is not intended as a substitute for professional medical care. Always follow your healthcare professional's instructions.          An Agent’s Role for Durable Power of  for Health Care   It’s impossible to know which medical treatment choices you might face in the future. What if you aren't able to make these decisions for yourself? A durable power of  for healthcare lets you name an agent to speak and carry out your wishes on your behalf. This happens only if you can’t express your wishes yourself.     An agent’s duty  Your agent respects your wishes in the following ways:    Your agent’s duty is to see that your wishes are followed.  If your wishes aren’t known, your agent should try to decide what you want.  Your agent’s choices come before anyone else’s wishes for you.  A durable power of  for healthcare doesn't not give your agent control over your money . Your agent also can’t be made to pay your bills.  Find out what your agent can do  Restrictions on what an agent can and can’t do vary by state. Check your state laws. In most states your agent can:   Choose or refuse life-sustaining and other medical treatment on your behalf  Consent to treatment, and then stop treatment if your condition doesn’t improve  Access and release your medical records  Request an autopsy and donate your organs, unless you’ve stated otherwise in your advance directive  Find out whether your state allows your agent to do the following:   Refuse or withdraw life-enhancing care  Refuse or stop tube feeding or other life-sustaining care--even if you haven’t stated on your advance directive that you don’t want these treatments  Order sterilization or   [employer] last reviewed this educational content on 2021    © 2084-4100 [employer]. All rights reserved. This information is not intended as a substitute for professional  medical care. Always follow your healthcare professional's instructions.          Being a Healthcare Proxy  A healthcare proxy is someone who represents a person who can’t speak for themself. The name of this role varies by state. It may be called a Durable Medical Power of . It may be called a Durable Power of  for Healthcare. It may be called an agent, surrogate, or advocate. Or it may be called a representative or decision maker. It's an official duty that is noted by a legal document. The document also varies by state. The person must name you as his or her proxy on the document.      A healthcare proxy speaks for another person when he or she is not able to do so. The proxy helps make sure the person’s healthcare wishes are known and followed.     What it means to be a healthcare proxy   Your role as healthcare proxy starts when the person can’t make medical decisions. This assessment can only be made by a licensed doctor. You then make the healthcare decisions as needed. You do this by carrying out the person’s wishes. These wishes are noted in his or her advance care planning documents. These declare what kind of treatment the person wishes to have or not have. You may need to put aside your own values and opinions to carry out the person’s wishes. This may include refusing or stopping life-sustaining treatments.   Documenting end-of-life wishes   As a healthcare proxy, encourage the person to discuss his or her wishes, while they are able. They can do this with their healthcare provider and then document the wishes as a medical order. The provider can help the person complete the form. The forms are known by different names depending on the state. The form may be called one of these:     MOLST (medical orders for life-sustaining treatment)  POLST (physician orders for life-sustaining treatment)  MOST (medical orders for scope of treatment)  POST (physician orders for scope of  treatment)  TPOPP (transportable physician orders for patient preferences)    The form documents the person’s wishes at the end of life. It's not tied to a certain healthcare provider or facility. It's different than a living will. The form is an order written according to state regulations by a healthcare provider. To complete one, the person must express his or her wishes to an advanced healthcare provider. If the person can’t make his or her own decisions, then this is done by the person’s healthcare proxy.   Carrying out your role  Your duties depend on what the person’s advance care planning documents say. Your duties may also depend on state law. In general:   Before accepting a role as a proxy, talk with the person. Be sure you know his or her wishes. Ask questions. This will help you be his or her voice if and when it is needed.  Be sure that the person’s healthcare team knows that you are his or her proxy. Carry a copy of the document and proof of your identity.  Make sure the healthcare team has a copy of the advance care planning documents.  Talk to the healthcare team. Ask questions as often as you need. Stay informed about the person’s condition.  Ask for any help you need to understand the medical situation. Ask about the person’s condition and prognosis. Ask about risks and benefits of tests and treatments. Find out all the facts and options.  Speak on the person’s behalf with the healthcare team when needed.  Talk with family members and keep them informed.  Know your rights. You have the right to ask for information. You can ask for consultations and second opinions. You have the right to request or refuse treatment for the person. You may be able to review his or her medical chart. You can authorize the person’s transfer to another facility. You can also request a new healthcare provider for him or her. If you are not sure what your rights are at any time, ask a .  When it’s time to  make decisions  If the person’s wishes are clear in the advance care plan documents, ask for them to be carried out as noted. If they are not clear, talk with the healthcare team. Listen to the team’s recommendations. Talk with a  or counselor. It may be hard for you to make a decision at times. You may feel sad or upset about a decision. Being a healthcare proxy is not an easy role. But it's an important one. Remember that the person trusts you to carry out his or her wishes.   If you need help  Ask the healthcare team if you have trouble with a decision. The healthcare team will help you.  Encourage the person you are helping to have a conversation with their provider about their end-of-life wishes. The provider can help them fill out the form.  You may need help in resolving family conflicts. Ask the hospital or clinic , ethics consultant, or a  for help.  If you are having trouble talking with the healthcare team while the person is in the hospital, reach out to the patient relations department. Or ask to speak to the hospital ombudsman or ethics committee.    [employer] last reviewed this educational content on 9/1/2019 © 2000-2021 [employer]. All rights reserved. This information is not intended as a substitute for professional medical care. Always follow your healthcare professional's instructions.          Life Support  If you understand how specific treatments may affect your quality of life, you can decide which ones you’d choose or refuse. You may want to talk to your healthcare provider about the possible benefits and risks of treatments. The chance of good results from these therapies varies based on each individual clinical situation and can be very hard to predict. Medical treatment, if your life is in danger, falls into 3 main categories.     Life supporting  This care keeps your heart and lungs going when they can no longer work on their  own.  CPR restarts your heart and lungs if they stop working.  A respirator (or ventilator) keeps you breathing. Air is pumped into your lungs through a tube that’s put into your windpipe.    Life sustaining  This care keeps you alive longer when you have an illness that can’t be cured.  Tube feeding or TPN (total parenteral nutrition) provides food and fluids through a tube or IV (intravenous). It is given if you can’t chew or swallow on your own.  Dialysis is a kidney machine that cleans your blood when your kidneys can no longer work on their own.    Life enhancing  This care controls pain and discomfort, such as nausea or trouble breathing. This type of care is not designed to prolong your life, but to enhance comfort and quality of life. Nothing is done to keep you alive longer.  Hospice care is comfort care. It might provide food and fluids by mouth or help with bathing. Hospice care is given during the last stages of a terminal illness.  Strong pain medicine can be given to help keep you comfortable.    Do Not Resuscitate (DNR)  Would you want CPR if your heart stops while you’re a patient in a hospital or nursing home? If not, talk to your healthcare provider about issuing a DNR (Do-Not-Resuscitate) order.   DNRs and advance directives may not apply during anesthesia, in emergency rooms, or when emergency medical teams respond to a 911 call. Ask your healthcare provider how you can make sure your wishes will be followed. Also, a DNR will not prevent you from getting other kinds of needed medical care such as treatment for pain, or bleeding.   [employer] last reviewed this educational content on 8/1/2021 © 2000-2021 [employer]. All rights reserved. This information is not intended as a substitute for professional medical care. Always follow your healthcare professional's instructions.          Stopping Life-Sustaining Treatments  Certain treatments can help sustain life when you have a serious  illness. But as your illness progresses, there may come a time when these treatments are no longer a benefit. Decisions must then be made whether to continue or stop these treatments. This can be a hard task for you and your loved ones, but know that you’re not alone. Your healthcare provider and healthcare team will guide you through these treatment decisions. They will also help answer any questions you may have.     What are life-sustaining treatments?  Life-sustaining treatments help keep you alive if a vital body function fails. These treatments can include CPR and the use of machines to help with heart, lung, or kidney function. They can also include the use of tubes to deliver food, fluids, blood, and medicines to the body. Blood transfusions and antibiotics are also types of life-sustaining treatments.   What happens if life-sustaining treatments are continued?  These treatments can help extend your life. But they will not cure your illness. If you are near the end of your life, you may find it hard to handle the side effects and problems that can occur with these treatments. In this case, the treatments may be too much of a burden on your body. They may cause more harm than good. They may also disrupt the natural dying process and prolong suffering.   What happens if life-sustaining treatments are stopped?  If these treatments are stopped, the focus of treatment will shift to comfort care. This involves measures to control pain and other symptoms you may have. These measures are not meant to cure your illness or help you live longer. Instead, they are meant to improve your quality of life during the time you have left. If you are in the end stage of your illness, you may be referred to hospice by your healthcare provider. Hospice provides end-of-life care. This includes emotional, spiritual, and social support for you and your loved ones.   How do I decide whether to stop life-sustaining treatments?  Your  healthcare provider and healthcare team will talk with you about the specific treatments that are part of your care plan. If you want, you may include family and friends in these meetings. Here are some questions to think about or ask your healthcare team:   Is there is a chance that my illness will improve? Or will it continue to get worse?  What are my goals of care? Do I want to extend the time I have left? Or do I want to focus my care on comfort and managing symptoms?  How will stopping or continuing these treatments affect my health? Will they enhance my comfort and quality of life? Or will they cause more problems?  Consider your own values or destiny. Also ask for advice from those who share your values.  How do I state my decisions about life-sustaining treatments?  Once you’ve made your decision to continue or stop specific treatments, you can tell your healthcare provider directly. It's best to also put your treatment wishes in writing with advance directives. These are legal forms related to healthcare decisions. Laws about advance directives vary from state to state. Ask your healthcare provider about what forms are needed to make sure your wishes will be followed. Some common forms include:   A durable power of  for healthcare or a healthcare proxy form.  This form lets you name a person to make treatment decisions for you when you can’t. This person is often called a healthcare proxy, medical or healthcare power of , or agent.  A living will.  This form tells others the kinds of treatment you want or don’t want if you become too ill or injured to speak for yourself.  Orders for life-sustaining treatments.  These are actual healthcare provider's orders that must be followed by other medical providers. The form belongs to you, not to the healthcare provider or hospital.). These are legal forms obtained from your healthcare provider or hospital that document your wishes. The forms are known  by different names depending on the state. Common names include:  MOLST (medical orders for life-sustaining treatment)  POLST (physician orders for life-sustaining treatment)  MOST (medical orders for scope of treatment)  POST (physician orders for scope of treatment)  TPOPP (transportable physician orders for patient preferences)     Keep in mind that you can change or cancel an advance directive at any time. Make it a practice to review your decisions each time there is a change in your health or goals of care. Also be sure to tell your healthcare proxy and loved ones of any changes in your decisions.   Deciding whether to stop life-sustaining treatments for a loved one   The decision to stop treatment ideally is made with the person’s consent. If the person is not capable of making decisions and has no advance directive, the decision falls to the person’s healthcare proxy or other adult. If you need to make a decision about stopping treatment for a loved one, start by talking to his or her healthcare provider. Review the goals of care and the benefits and burdens of specific treatments on your loved one’s health. Also think about your loved one’s wishes and values. If needed, seek advice from other healthcare team members, like a  or .   [employer] last reviewed this educational content on 12/1/2019    © 7534-8118 [employer]. All rights reserved. This information is not intended as a substitute for professional medical care. Always follow your healthcare professional's instructions.

## 2024-07-22 NOTE — PROGRESS NOTES
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 forward as a child.  Stable FU optho. No new vision changes.       Primary osteoarthritis of both knees Stable. FU ortho.        Murmur Stable.        Mixed hyperlipidemia Stable.        Essential hypertension Higher. RN only visit to check machine in 1 week. Call in 2 weeks. Careful with diet and excercise at least 30 minutes 3-4 times a week. Check blood pressures at different times on different days. Can purchase own blood pressure monitor. If not, check at local pharmacy. Bake foods more and grill occasionally. Avoid fried foods. No salt. Use other seasonings.         Coronary artery disease due to calcified coronary lesion NO Sx. FU cards.        Anemia, unspecified type Stable.        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        Acquired hypothyroidism Stable.        Mass of leg, right ? Hematoma. Improving. Call if no betetr after 2-4 weeks.          Leg swelling Elevate. Hold meds. And compression stockings.         Orders Placed This Encounter   Procedures    URINALYSIS, AUTO, W/O SCOPE       Meds This Visit:  Requested Prescriptions     Signed Prescriptions Disp Refills    semaglutide 4 MG/3ML Subcutaneous Solution Pen-injector 3 each 3     Sig: Inject 1 mg into the skin once a week.       Imaging & Referrals:  None      RTC 3  months for FU HTN.   RN only visit to check blood pressure machine in 1 week.  Stop vitamin C and vitamin E and iron and folic acid and selenium and zinc.      1. Vitreomacular adhesion of right eye (Primary)  2. Vaccine counseling  3. Type 2 diabetes mellitus with hyperglycemia, without long-term current use of insulin (HCC)  -     URINALYSIS, AUTO, W/O SCOPE  4. Tortuous aorta (HCC)  Overview:  Per CXR on 9/2/18  5. 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 Stage 3a chronic kidney disease (HCC)  Overview:  Per note on 7/1/19  7. Screening mammogram for breast cancer  8. S/P total knee arthroplasty, right  9. Primary osteoarthritis of right knee  10. Pulmonary nodule  11. Pseudophakia of both eyes  12. Age-related osteoporosis without current pathological fracture  Overview:  Component      Latest Ref Rng & Units 12/7/2017 8/10/2017 1/24/2017 8/12/2016   Vitamin D, 25OH, Total      ng/mL 49.5 54.8 47.5 43.7     Component      Latest Ref Rng & Units 7/27/2015 2/4/2013 5/18/2012   Vitamin D, 25OH, Total      ng/mL 56.4 50.0 46.2     13. 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  Stable.  14. Primary osteoarthritis of both knees  15. Murmur  Overview:  Per note 1/23/20  16. Mixed hyperlipidemia  17. Essential hypertension  18. Coronary artery disease due to calcified coronary lesion  Overview:  CAD / NSTEMI - presents with scapular pain and elevated troponin and abnormal EKG with TWI  - s/p PCI 5/15/2018 to RCA PDA, FRANK X 2.   - Cath 6/1/18 with mod disease throughout, 70% Mid LAD with Diag/septal involvement - insignificant FFR at 0.88, moderate dis. Cx., 80 % Dx., 70% LAD.   - continue asa, statin, plavix, tricor , BB  - bradycardia is stable and old, tolerating her BB, continue  Sees [facility] Dr. Lee.   CAD s/p NSTEMI June 2018 - iFR 0.88 LAD-D bifurcation, with adenosine no changes therefore medically treated  · Echo Feb 2020 - EF normal, mild MR, normal PASP  Myocardial perfusion imaging protocol with  pharmacological stress test-January 16, 2023: Stress EKG is normal.  Small fixed perfusion abnormality apical and apical inf.  And mid inferior lateral segment.  LVEF of 79% global function is normal.  LV wall cavity is normal.   Date Location - Ordered By    Nuclear PET 1.Stress EKG is normal. 1.This is an abnormal perfusion study. 2.Small fixed perfusion abnormality of moderate intensity in the apical segment. Small fixed perfusion abnormality of moderate intensity in the apical inferior segment. Small fixed perfusion abnormality of moderate intensity in the mid infero lateral segment. 3.The left ventricular cavity is noted to be normal on the stress studies. The stress left ventricular ejection fraction was calculated to be 79% and left ventricular global function is normal. The rest left ventricular cavity is noted to be normal. The rest left ventricular ejection fraction was calculated to be 78% and rest left ventricular global function is normal.  1/16/2023 1:00:00 PM  Louis Hinds      Status post PCI of RCA moderate nonobstructive LAD diagonal bifurcation disease also in February 2023.    RESULTS    RESULTS  Name Result Date Location - Ordered By   Nuclear PET 1.Stress EKG is normal. 1.This is an abnormal perfusion study. 2.Small fixed perfusion abnormality of moderate intensity in the apical segment. Small fixed perfusion abnormality of moderate intensity in the apical inferior segment. Small fixed perfusion abnormality of moderate intensity in the mid infero lateral segment. 3.The left ventricular cavity is noted to be normal on the stress studies. The stress left ventricular ejection fraction was calculated to be 79% and left ventricular global function is normal. The rest left ventricular cavity is noted to be normal. The rest left ventricular ejection fraction was calculated to be 78% and rest left ventricular global function is normal. 1/16/2023 1:00:00 PM Louis Hinds MD   Trans Thoracic Echocardiogram  1.The study quality is good. 2.The left ventricle is normal in size, wall thickness, and global left ventricular systolic function. The left ventricular ejection fraction is 60-65%. Left ventricular diastolic function is normal. No regional wall motion abnormalities. 3.The right ventricle is normal in size. Right ventricular systolic function is normal.4.The estimated pulmonary artery systolic pressure is 27 mmHg assuming a right atrial pressure of 3 mmHg. 5.Mild calcification of the aortic valve is noted with adequate cuspal excursion. No significant stenosis or regurgitation. Mean gradient is 4 mmHg. 3/23/2023 8:00:00 AM Louis Hinds MD   Ambulatory Telemetry Monitor 1.This is an excellent quality study. 2.Predominant rhythm is normal sinus rhythm. 3.The minimum heart rate recorded was 48 beats / minute. The maximum heart rate is 93 beats / minute. The mean heart rate is 59 beats / minute. 4.Rare premature atrial contractions noted. 5.0% AFIB burden, occasional AT with aberrancy.6.Rare premature ventricular contractions noted. 7.No evidence of ventricular tachycardia is noted.8.No pauses were noted. 3/20/2023 11:30:00 AM Amisha Roche MD     19. Anemia, unspecified type  20. TAN positive  21. Acquired hypothyroidism  Overview:  Component      Latest Ref Rng & Units 12/7/2017 8/10/2017 1/24/2017 8/12/2016   TSH      0.45 - 5.33 uIU/mL 0.96 1.06 0.65 1.20     Component      Latest Ref Rng & Units 7/27/2015 11/19/2014   TSH      0.45 - 5.33 uIU/mL 1.49 1.60     22. Mass of leg, right  23. Leg swelling  24. Encounter for annual health examination  Other orders  -     Semaglutide (1 MG/DOSE); Inject 1 mg into the skin once a week.  Dispense: 3 each; Refill: 3      The patient indicates understanding of these issues and agrees to the plan.  Further testing ordered.  Lab work ordered.  Reinforced healthy diet, lifestyle, and exercise.      Return in about 3 months (around 10/22/2024), or if symptoms worsen or fail to  improve.    Advanced Directives:   She does have a Living Will but we do NOT have it on file in Epic.    She has a Power of  for Health Care on file in [employer].  Discussed Advance Care Planning with patient (and family/surrogate if present). Standard forms made available to patient in After Visit Summary.  16+ minutes was spent with all Advanced Care Planning elements today (up to 30 minutes for CPT 35976)    MD Melonie Najera's SCREENING SCHEDULE   Tests on this list are recommended by your physician but may not be covered, or covered at this frequency, by your insurer.   Please check with your insurance carrier before scheduling to verify coverage.   PREVENTATIVE SERVICES FREQUENCY &  COVERAGE DETAILS LAST COMPLETION DATE   Diabetes Screening    Fasting Blood Sugar /  Glucose    One screening every 12 months if never tested or if previously tested but not diagnosed with pre-diabetes   One screening every 6 months if diagnosed with pre-diabetes Lab Results   Component Value Date    GLUCOSE 164 (H) 11/16/2010     (H) 07/15/2024        Cardiovascular Disease Screening    Lipid Panel  Cholesterol  Lipoprotein (HDL)  Triglycerides Covered every 5 years for all Medicare beneficiaries without apparent signs or symptoms of cardiovascular disease Lab Results   Component Value Date    CHOLEST 147 07/15/2024    HDL 50 07/15/2024    LDL 77 07/15/2024    TRIG 113 07/15/2024         Electrocardiogram (EKG)   Covered if needed at Welcome to Medicare, and non-screening if indicated for medical reasons 06/30/2023      Ultrasound Screening for Abdominal Aortic Aneurysm (AAA) Covered once in a lifetime for one of the following risk factors    Men who are 65-75 years old and have ever smoked    Anyone with a family history -     Colorectal Cancer Screening  Covered for ages 50-85; only need ONE of the following:    Colonoscopy   Covered every 10 years    Covered every 2 years if patient is at  high risk or previous colonoscopy was abnormal -    No recommendations at this time    Flexible Sigmoidoscopy   Covered every 4 years -    Fecal Occult Blood Test Covered annually -   Bone Density Screening    Bone density screening    Covered every 2 years after age 65 if diagnosed with risk of osteoporosis or estrogen deficiency.    Covered yearly for long-term glucocorticoid medication use (Steroids) Last Dexa Scan:    XR DEXA BONE DENSITOMETRY (CPT=77080) 06/10/2022      No recommendations at this time   Pap and Pelvic    Pap   Covered every 2 years for women at normal risk; Annually if at high risk 02/17/2020  No recommendations at this time    Chlamydia Annually if high risk -  No recommendations at this time   Screening Mammogram    Mammogram     Recommend annually for all female patients aged 40 and older    One baseline mammogram covered for patients aged 35-39 07/20/2023    Health Maintenance   Topic Date Due    Mammogram  07/20/2024       Immunizations    Influenza Covered once per flu season  Please get every year 09/12/2023  No recommendations at this time    Pneumococcal Each vaccine ([id number] & [id number]) covered once after 65 Prevnar 13: 02/01/2019    [id number]: 02/13/2018     No recommendations at this time    Hepatitis B One screening covered for patients with certain risk factors   -  No recommendations at this time    Tetanus Toxoid Not covered by Medicare Part B unless medically necessary (cut with metal); may be covered with your pharmacy prescription benefits -    Tetanus, Diptheria and Pertusis TD and TDaP Not covered by Medicare Part B -  No recommendations at this time    Zoster Not covered by Medicare Part B; may be covered with your pharmacy  prescription benefits -  Zoster Vaccines(1 of 2) Never done     Diabetes      Hemoglobin A1C Annually; if last result is elevated, may be asked to retest more frequently.    Medicare covers every 3 months Lab Results   Component Value Date      07/15/2024    A1C 5.8 (H) 07/15/2024       No recommendations at this time    Creat/alb ratio Annually Lab Results   Component Value Date    MICROALBCREA 36.1 (H) 12/29/2023       LDL Annually Lab Results   Component Value Date    LDL 77 07/15/2024       Dilated Eye Exam Annually Last Diabetic Eye Exam:  Last Dilated Eye Exam: 07/19/23  Eye Exam shows Diabetic Retinopathy?: No       Annual Monitoring of Persistent Medications (ACE/ARB, digoxin diuretics, anticonvulsants)    Potassium Annually Lab Results   Component Value Date    K 4.4 07/15/2024         Creatinine   Annually Lab Results   Component Value Date    CREATSERUM 1.09 (H) 07/15/2024         BUN Annually Lab Results   Component Value Date    BUN 29 (H) 07/15/2024       Drug Serum Conc Annually No results found for: \"DIGOXIN\", \"DIG\", \"VALP\"                     Understanding Advance Care Planning  Advance care planning is the process of deciding one’s own future medical care. It helps assure that if you can’t speak for yourself, your wishes can still be carried out. The plan is a series of legal documents that note a person’s wishes. The documents vary by state. Advance care planning should be discussed at a regular office visit with your primary care provider before an acute illness. Advance care planning is encouraged when a person has a serious illness that is expected to get worse. It may also be done before major surgery. And it can help you and your family be prepared in case of a major illness or injury. Advance care planning helps with making decisions at these times.     Who will speak on your behalf?  A healthcare proxy is a person who acts as the voice of a patient when the patient can’t speak for himself or herself. The name of this role varies by state. It may be called a Durable Medical Power of  or Durable Power of  for Healthcare. It may be called an agent, surrogate, or advocate. Or it may be called a representative  or decision maker. It's an official duty that is identified by a legal document. The document also varies by state.   Why is advance care planning important?   If a person communicates his or her healthcare wishes:   He or she will be given medical care that matches his or her values and goals.  Family members will not be forced to make decisions in a crisis with no guidance.  Creating a plan  Making an advance care plan is often done in 3 steps:   Thinking about one’s wishes. To create an advance care plan, think about what kind of medical treatment you would want if you lose the ability to communicate. Are there any situations in which you would refuse or stop treatment? Are there therapies you would want or not want? And whom do you want to make decisions for you? There are many places to learn more about how to plan for your care. Ask your healthcare provider or  for resources.  Picking a healthcare proxy. This means choosing a trusted person to speak for you only when you can’t speak for yourself. When you can't make medical decisions, your proxy makes sure the instructions in your advance care plan are followed. A proxy doesn't make decisions based on his or her own opinions. They must put aside those opinions and values if needed, and carry out your wishes.  Filling out the legal documents. There are several kinds of legal documents for advance care planning. Each one tells healthcare providers your wishes. The documents may vary by state. They must be signed and may need to be witnessed or notarized. You can cancel or change them whenever you wish. Depending on your state, the documents may include a Healthcare Proxy form, Living Will, Durable Medical Power of , and Advance Directive.  The family’s role  The best help a family can give is to support their loved one’s wishes. Open and honest communication is vital. Family should express any concerns they have about the patient’s choices  while the patient can still make decisions in the event that his or her illness prevents communicating those wishes at a later time.    [employer] last reviewed this educational content on 12/1/2019    © 1070-0536 [employer]. All rights reserved. This information is not intended as a substitute for professional medical care. Always follow your healthcare professional's instructions.          Advance Medical Directive  An advance medical directive is a form that lets you plan ahead for the care you’d want if you could no longer express your wishes. This statement outlines the medical treatment you’d want or names the person you’d wish to make healthcare decisions for you. Be aware that laws vary from state to state, and it may be worthwhile to talk with an .     Writing down your wishes  An advance directive is important whether you’re young or old. Injury or illness can strike at any age.  Decide what is important to you and the kind of treatment you’d want, or not want to have.  Some states allow only one kind of advance directive. Some let you do both a durable power of  for healthcare and a living will. Some states put both kinds on the same form.    A durable power of  (POA) for healthcare   This form lets you name someone else that you have chosen and trust to speak and make decisions on your behalf.  This person can decide on treatment for you only when you can’t speak for yourself.  You don't need to be at the end of your life. They could speak for you if you were in a coma but were likely to recover.    A living will  This form lets you list the care you want at the end of your life.  A living will applies only if you won’t live without medical treatment. It would apply if you had advanced cancer, a massive stroke, or other serious illness from which you will not recover.  It takes effect only when you can no longer express your wishes yourself.    [employer] last reviewed  this educational content on 2/1/2021 © 2000-2021 [employer]. All rights reserved. This information is not intended as a substitute for professional medical care. Always follow your healthcare professional's instructions.          Choosing an Agent  A durable power of  for healthcare is only as good as the person you name to be your agent. Your agent is the person you have chosen to speak and make decisions on your behalf. If this person knows your treatment wishes and is willing to carry them out, you’ll probably be well represented. Be sure to tell your agent what’s important to you.     Who to choose  Here are suggestions for choosing an agent:  You can name a family member, close friend, , , or rabbi.  You should name one person as your agent. Then name one or two alternates. You need a backup person in case your first choice can’t be reached when needed.  Talk with each person you're thinking of naming as your agent or alternate. Do this before you decide who should carry out your wishes.  Your agent should be ...  A competent adult, age 18 or older  Someone you trust and can talk to about the care you want and what's important to you  Someone who supports your treatment choices    In many states, your agent can’t be ...   Your healthcare provider  An employee of your provider or of a hospital, nursing home, or hospice program where you receive care  Some states have other restrictions on who can be named as an agent for an advance directive.   Be prepared  Tip: It's a good idea to write down your wishes and give a copy to your agent and all others who are involved with your healthcare.   [employer] last reviewed this educational content on 8/1/2021 © 2000-2021 [employer]. All rights reserved. This information is not intended as a substitute for professional medical care. Always follow your healthcare professional's instructions.          Understanding DNR  Orders  Do not resuscitate (DNR) orders tell hospital staff not to do potentially life-restoring measures, such as CPR, if you or your loved one's heart and lungs stop working. It allows a natural death, and prevents healthcare staff from artificially reviving a person and placing them on life support. In many states, a DNR order also applies to staff outside the hospital such as in nursing homes and emergency medical services. A DNR order must be written by a healthcare provider. Or in some cases, certain other healthcare workers write it. This can only be done with the person’s or family’s consent. If a person has not written an advance directive, their family will decide on a DNR with the help of the healthcare team.   The person can cancel a DNR order at any time. The healthcare team can answer questions about the DNR form. Have copies of a DNR form are readily available so that your wishes can be faithfully followed.   Writing a DNR order  When might a DNR order be written? When the person’s health condition is such that, in the case of cardiac arrest, CPR and other resuscitation methods are not desired. This could be because the chance of successful resuscitation is very low. Or it could be because the care plan now focuses on comfort measures instead of life-sustaining measures. Coma and terminal illness are instances when a DNR order might be used.   Irreversible coma  In a coma, a person does not respond to sight, sound, or touch. The heart and lungs could be working, but brain function is damaged due to trauma or disease.   Terminal illness  In the last stages of heart disease, AIDS, cancer, and other illnesses, some people don’t want to prolong their suffering. If recovery isn’t likely and quality of life is poor or getting worse, a person or their family may agree to a DNR order.   DNR orders and hospice care  A hospice program can offer care during the final weeks of life. Hospice programs provide  dignity, pain control and comfort care in the home or at special facilities. Hospice does not provide aggressive treatment. In fact, a DNR order will likely be discussed before a person is admitted to hospice. A  or  may be able to help you arrange for hospice support.   Documenting end-of-life wishes  In addition to DNR orders, a person with a serious, life-limiting illness may wish to document their treatment wishes. This is called a POST form or by different names, depending on the state. It's meant to provide a portable medical order to help guide healthcare providers on the specific medical treatments a person wants during a medical emergency. It's meant to complement a DNR order, not replace it. Your healthcare provider can tell you more and help you complete the forms. The form may be called one of these:   MOLST (medical orders for life-sustaining treatment)  POLST (physician orders for life-sustaining treatment)  MOST (medical orders for scope of treatment)  POST (physician orders for scope of treatment)  TPOPP (transportable physician orders for patient preferences)  [employer] last reviewed this educational content on 7/1/2021    © 9104-4281 [employer]. All rights reserved. This information is not intended as a substitute for professional medical care. Always follow your healthcare professional's instructions.          An Agent’s Role for Durable Power of  for Health Care   It’s impossible to know which medical treatment choices you might face in the future. What if you aren't able to make these decisions for yourself? A durable power of  for healthcare lets you name an agent to speak and carry out your wishes on your behalf. This happens only if you can’t express your wishes yourself.     An agent’s duty  Your agent respects your wishes in the following ways:    Your agent’s duty is to see that your wishes are followed.  If your wishes aren’t known,  your agent should try to decide what you want.  Your agent’s choices come before anyone else’s wishes for you.  A durable power of  for healthcare doesn't not give your agent control over your money . Your agent also can’t be made to pay your bills.  Find out what your agent can do  Restrictions on what an agent can and can’t do vary by state. Check your state laws. In most states your agent can:   Choose or refuse life-sustaining and other medical treatment on your behalf  Consent to treatment, and then stop treatment if your condition doesn’t improve  Access and release your medical records  Request an autopsy and donate your organs, unless you’ve stated otherwise in your advance directive  Find out whether your state allows your agent to do the following:   Refuse or withdraw life-enhancing care  Refuse or stop tube feeding or other life-sustaining care--even if you haven’t stated on your advance directive that you don’t want these treatments  Order sterilization or   [employer] last reviewed this educational content on 2021 [employer]. All rights reserved. This information is not intended as a substitute for professional medical care. Always follow your healthcare professional's instructions.          Being a Healthcare Proxy  A healthcare proxy is someone who represents a person who can’t speak for themself. The name of this role varies by state. It may be called a Durable Medical Power of . It may be called a Durable Power of  for Healthcare. It may be called an agent, surrogate, or advocate. Or it may be called a representative or decision maker. It's an official duty that is noted by a legal document. The document also varies by state. The person must name you as his or her proxy on the document.      A healthcare proxy speaks for another person when he or she is not able to do so. The proxy helps make sure the person’s healthcare wishes are  known and followed.     What it means to be a healthcare proxy   Your role as healthcare proxy starts when the person can’t make medical decisions. This assessment can only be made by a licensed doctor. You then make the healthcare decisions as needed. You do this by carrying out the person’s wishes. These wishes are noted in his or her advance care planning documents. These declare what kind of treatment the person wishes to have or not have. You may need to put aside your own values and opinions to carry out the person’s wishes. This may include refusing or stopping life-sustaining treatments.   Documenting end-of-life wishes   As a healthcare proxy, encourage the person to discuss his or her wishes, while they are able. They can do this with their healthcare provider and then document the wishes as a medical order. The provider can help the person complete the form. The forms are known by different names depending on the state. The form may be called one of these:     MOLST (medical orders for life-sustaining treatment)  POLST (physician orders for life-sustaining treatment)  MOST (medical orders for scope of treatment)  POST (physician orders for scope of treatment)  TPOPP (transportable physician orders for patient preferences)    The form documents the person’s wishes at the end of life. It's not tied to a certain healthcare provider or facility. It's different than a living will. The form is an order written according to state regulations by a healthcare provider. To complete one, the person must express his or her wishes to an advanced healthcare provider. If the person can’t make his or her own decisions, then this is done by the person’s healthcare proxy.   Carrying out your role  Your duties depend on what the person’s advance care planning documents say. Your duties may also depend on state law. In general:   Before accepting a role as a proxy, talk with the person. Be sure you know his or her wishes.  Ask questions. This will help you be his or her voice if and when it is needed.  Be sure that the person’s healthcare team knows that you are his or her proxy. Carry a copy of the document and proof of your identity.  Make sure the healthcare team has a copy of the advance care planning documents.  Talk to the healthcare team. Ask questions as often as you need. Stay informed about the person’s condition.  Ask for any help you need to understand the medical situation. Ask about the person’s condition and prognosis. Ask about risks and benefits of tests and treatments. Find out all the facts and options.  Speak on the person’s behalf with the healthcare team when needed.  Talk with family members and keep them informed.  Know your rights. You have the right to ask for information. You can ask for consultations and second opinions. You have the right to request or refuse treatment for the person. You may be able to review his or her medical chart. You can authorize the person’s transfer to another facility. You can also request a new healthcare provider for him or her. If you are not sure what your rights are at any time, ask a .  When it’s time to make decisions  If the person’s wishes are clear in the advance care plan documents, ask for them to be carried out as noted. If they are not clear, talk with the healthcare team. Listen to the team’s recommendations. Talk with a  or counselor. It may be hard for you to make a decision at times. You may feel sad or upset about a decision. Being a healthcare proxy is not an easy role. But it's an important one. Remember that the person trusts you to carry out his or her wishes.   If you need help  Ask the healthcare team if you have trouble with a decision. The healthcare team will help you.  Encourage the person you are helping to have a conversation with their provider about their end-of-life wishes. The provider can help them fill out the  form.  You may need help in resolving family conflicts. Ask the hospital or clinic , ethics consultant, or a  for help.  If you are having trouble talking with the healthcare team while the person is in the hospital, reach out to the patient relations department. Or ask to speak to the hospital ombudsman or ethics committee.    Jazmin last reviewed this educational content on 9/1/2019    © 2511-8610 [employer]. All rights reserved. This information is not intended as a substitute for professional medical care. Always follow your healthcare professional's instructions.          Life Support  If you understand how specific treatments may affect your quality of life, you can decide which ones you’d choose or refuse. You may want to talk to your healthcare provider about the possible benefits and risks of treatments. The chance of good results from these therapies varies based on each individual clinical situation and can be very hard to predict. Medical treatment, if your life is in danger, falls into 3 main categories.     Life supporting  This care keeps your heart and lungs going when they can no longer work on their own.  CPR restarts your heart and lungs if they stop working.  A respirator (or ventilator) keeps you breathing. Air is pumped into your lungs through a tube that’s put into your windpipe.    Life sustaining  This care keeps you alive longer when you have an illness that can’t be cured.  Tube feeding or TPN (total parenteral nutrition) provides food and fluids through a tube or IV (intravenous). It is given if you can’t chew or swallow on your own.  Dialysis is a kidney machine that cleans your blood when your kidneys can no longer work on their own.    Life enhancing  This care controls pain and discomfort, such as nausea or trouble breathing. This type of care is not designed to prolong your life, but to enhance comfort and quality of life. Nothing is  done to keep you alive longer.  Hospice care is comfort care. It might provide food and fluids by mouth or help with bathing. Hospice care is given during the last stages of a terminal illness.  Strong pain medicine can be given to help keep you comfortable.    Do Not Resuscitate (DNR)  Would you want CPR if your heart stops while you’re a patient in a hospital or nursing home? If not, talk to your healthcare provider about issuing a DNR (Do-Not-Resuscitate) order.   DNRs and advance directives may not apply during anesthesia, in emergency rooms, or when emergency medical teams respond to a 911 call. Ask your healthcare provider how you can make sure your wishes will be followed. Also, a DNR will not prevent you from getting other kinds of needed medical care such as treatment for pain, or bleeding.   Jazmin last reviewed this educational content on 8/1/2021    © 6754-3033 [employer]. All rights reserved. This information is not intended as a substitute for professional medical care. Always follow your healthcare professional's instructions.          Stopping Life-Sustaining Treatments  Certain treatments can help sustain life when you have a serious illness. But as your illness progresses, there may come a time when these treatments are no longer a benefit. Decisions must then be made whether to continue or stop these treatments. This can be a hard task for you and your loved ones, but know that you’re not alone. Your healthcare provider and healthcare team will guide you through these treatment decisions. They will also help answer any questions you may have.     What are life-sustaining treatments?  Life-sustaining treatments help keep you alive if a vital body function fails. These treatments can include CPR and the use of machines to help with heart, lung, or kidney function. They can also include the use of tubes to deliver food, fluids, blood, and medicines to the body. Blood transfusions and  antibiotics are also types of life-sustaining treatments.   What happens if life-sustaining treatments are continued?  These treatments can help extend your life. But they will not cure your illness. If you are near the end of your life, you may find it hard to handle the side effects and problems that can occur with these treatments. In this case, the treatments may be too much of a burden on your body. They may cause more harm than good. They may also disrupt the natural dying process and prolong suffering.   What happens if life-sustaining treatments are stopped?  If these treatments are stopped, the focus of treatment will shift to comfort care. This involves measures to control pain and other symptoms you may have. These measures are not meant to cure your illness or help you live longer. Instead, they are meant to improve your quality of life during the time you have left. If you are in the end stage of your illness, you may be referred to hospice by your healthcare provider. Hospice provides end-of-life care. This includes emotional, spiritual, and social support for you and your loved ones.   How do I decide whether to stop life-sustaining treatments?  Your healthcare provider and healthcare team will talk with you about the specific treatments that are part of your care plan. If you want, you may include family and friends in these meetings. Here are some questions to think about or ask your healthcare team:   Is there is a chance that my illness will improve? Or will it continue to get worse?  What are my goals of care? Do I want to extend the time I have left? Or do I want to focus my care on comfort and managing symptoms?  How will stopping or continuing these treatments affect my health? Will they enhance my comfort and quality of life? Or will they cause more problems?  Consider your own values or destiny. Also ask for advice from those who share your values.  How do I state my decisions about  life-sustaining treatments?  Once you’ve made your decision to continue or stop specific treatments, you can tell your healthcare provider directly. It's best to also put your treatment wishes in writing with advance directives. These are legal forms related to healthcare decisions. Laws about advance directives vary from state to state. Ask your healthcare provider about what forms are needed to make sure your wishes will be followed. Some common forms include:   A durable power of  for healthcare or a healthcare proxy form.  This form lets you name a person to make treatment decisions for you when you can’t. This person is often called a healthcare proxy, medical or healthcare power of , or agent.  A living will.  This form tells others the kinds of treatment you want or don’t want if you become too ill or injured to speak for yourself.  Orders for life-sustaining treatments.  These are actual healthcare provider's orders that must be followed by other medical providers. The form belongs to you, not to the healthcare provider or hospital.). These are legal forms obtained from your healthcare provider or hospital that document your wishes. The forms are known by different names depending on the state. Common names include:  MOLST (medical orders for life-sustaining treatment)  POLST (physician orders for life-sustaining treatment)  MOST (medical orders for scope of treatment)  POST (physician orders for scope of treatment)  TPOPP (transportable physician orders for patient preferences)     Keep in mind that you can change or cancel an advance directive at any time. Make it a practice to review your decisions each time there is a change in your health or goals of care. Also be sure to tell your healthcare proxy and loved ones of any changes in your decisions.   Deciding whether to stop life-sustaining treatments for a loved one   The decision to stop treatment ideally is made with the person’s  consent. If the person is not capable of making decisions and has no advance directive, the decision falls to the person’s healthcare proxy or other adult. If you need to make a decision about stopping treatment for a loved one, start by talking to his or her healthcare provider. Review the goals of care and the benefits and burdens of specific treatments on your loved one’s health. Also think about your loved one’s wishes and values. If needed, seek advice from other healthcare team members, like a  or .   Jazmin last reviewed this educational content on 12/1/2019    © 1038-7917 [employer]. All rights reserved. This information is not intended as a substitute for professional medical care. Always follow your healthcare professional's instructions.

## 2024-07-23 ENCOUNTER — TELEPHONE (OUTPATIENT)
Dept: INTERNAL MEDICINE CLINIC | Facility: CLINIC | Age: 79
End: 2024-07-23

## 2024-07-23 NOTE — TELEPHONE ENCOUNTER
Patient wanted to let you know she did not take her blood pressure medication yesterday morning that is probably the reason why her blood pressure was elevated in the office

## 2024-07-24 ENCOUNTER — HOSPITAL ENCOUNTER (OUTPATIENT)
Dept: BONE DENSITY | Age: 79
Discharge: HOME | End: 2024-07-24
Attending: INTERNAL MEDICINE
Payer: MEDICARE

## 2024-07-24 DIAGNOSIS — M81.0 AGE-RELATED OSTEOPOROSIS WITHOUT CURRENT PATHOLOGICAL FRACTURE: ICD-10-CM

## 2024-07-24 PROCEDURE — 77080 DXA BONE DENSITY AXIAL: CPT | Performed by: INTERNAL MEDICINE

## 2024-07-25 ENCOUNTER — TELEPHONE (OUTPATIENT)
Dept: INTERNAL MEDICINE CLINIC | Facility: CLINIC | Age: 79
End: 2024-07-25

## 2024-07-25 NOTE — TELEPHONE ENCOUNTER
Office staff, please print 7/22/24 after visit summary for patient to  at nurse visit on 7/30. Thank you!    Spoke to patient, full name and date of birth verified.  Patient called to report that she cannot see the doctor's note or after visit summary from her appointment on 7/22/24.     Offered [place] help number, patient declined.     Patient has nurse visit on 7/30 and would like a printed copy of her after visit summary.

## 2024-07-30 ENCOUNTER — TELEPHONE (OUTPATIENT)
Dept: ENDOCRINOLOGY CLINIC | Facility: CLINIC | Age: 79
End: 2024-07-30

## 2024-07-30 ENCOUNTER — NURSE ONLY (OUTPATIENT)
Dept: INTERNAL MEDICINE CLINIC | Facility: CLINIC | Age: 79
End: 2024-07-30
Payer: COMMERCIAL

## 2024-07-30 VITALS
HEART RATE: 56 BPM | HEIGHT: 61.5 IN | SYSTOLIC BLOOD PRESSURE: 160 MMHG | WEIGHT: 119.19 LBS | RESPIRATION RATE: 18 BRPM | BODY MASS INDEX: 22.21 KG/M2 | DIASTOLIC BLOOD PRESSURE: 69 MMHG

## 2024-08-02 ENCOUNTER — TELEPHONE (OUTPATIENT)
Dept: INTERNAL MEDICINE CLINIC | Facility: CLINIC | Age: 79
End: 2024-08-02

## 2024-08-02 NOTE — TELEPHONE ENCOUNTER
Please review patient question and advise - Per patient she would like to know if she can wait another week before taking the Ozempic 1MG. Per patient she has 3 syringes left of the Ozempic 0.5MG. Per patient she is afraid her levels may get too low with the 1MG. (See sugar readings below from patient)    Per 7/22/24 visit notes with you for DM management.     Type 2 diabetes mellitus with hyperglycemia, without long-term current use of insulin (HCC) higher. Increase ozempic. Call in 2 week. Careful with diet and excercise at least 30 minutes 3-4 times a week. Check sugars at different times on different dates. Careful with low sugars. Carry something with you and check sugar if can. Can carry adams cracker, etc. Decrease carbohydrates. But also, careful with fruits and natural sugars.One serving a day and no more than 1 handful every day. Check feet  every AM and careful with sores and ulcers on feet bilaterally. Check eyes every year with dilated eye exam.  Check sugars.  2-hour postmeal should be less than 140s.  Pre-meal should be 's.  Both equally affected A1c.  Discussed importance of glycemic control to prevent complications of diabetes  -Discussed complications of diabetes include retinopathy, neuropathy, nephropathy and cardiovascular disease  -Discussed ABCs of DM  -Discussed importance of SBGM  -Discussed importance of low CHO diet, recommend 45gm per meal or 135gm per day  -Normal foot exam  -FU optho

## 2024-08-02 NOTE — TELEPHONE ENCOUNTER
Patient notified with understanding.  She goes on to question DEXA results per Dr. Liu.  Nurse reviewed results with patient.  Advised that treatment recommendations will come from Dr. Liu.  She has left messages for her and will await her interpretation.

## 2024-08-02 NOTE — TELEPHONE ENCOUNTER
Patient called stating she was seen on 2024 by  and her Ozempic Dose was changed. Per patient she now gets food delivered to her from [employer] and she would like to provide the following blood surgar readings:         Evenin     144          131     Evenin    - 140     Evenin     149     Evenin     151     Evenin     151     Evenin     129     Evenin    - 162          153     Evenin     137 ( Started eating [employer] meals) Evenin     135     Evenin      139    Evenin     136    Per patient she would like to know if she can wait another week before taking the Ozempic 1MG. Per patient she has 3 syringes left of the Ozempic 0.5MG. Per patient she is afraid her levels may get too low with the 1MG.

## 2024-08-02 NOTE — TELEPHONE ENCOUNTER
Lets try watching for another 2 weeks before making any changes we need to make changes.  She is starting to get the lower numbers.  I do not want her bottoming out also.

## 2024-08-03 NOTE — TELEPHONE ENCOUNTER
Hi!  Please let patient know that I have reviewed her DEXA scan and we see improvement at the lumbar spine as well as stability at the femoral neck. There appears to be some decrease in BMD at the total hip but I do not think that this is significant. I would like to continue her on the Prolia shots. Thank you!

## 2024-08-04 DIAGNOSIS — E11.65 TYPE 2 DIABETES MELLITUS WITH HYPERGLYCEMIA, WITHOUT LONG-TERM CURRENT USE OF INSULIN (HCC): Primary | ICD-10-CM

## 2024-08-05 NOTE — TELEPHONE ENCOUNTER
Spoke to pt. Provided results as written below by Dr. Liu. Pt verbalized understanding. No other questions or concerns at this time.

## 2024-08-08 RX ORDER — BLOOD SUGAR DIAGNOSTIC
STRIP MISCELLANEOUS
Qty: 300 STRIP | Refills: 3 | Status: SHIPPED | OUTPATIENT
Start: 2024-08-08

## 2024-08-08 RX ORDER — LANCING DEVICE/LANCETS
1 KIT MISCELLANEOUS 3 TIMES DAILY
Qty: 1 EACH | Refills: 1 | Status: SHIPPED | OUTPATIENT
Start: 2024-08-08

## 2024-08-08 RX ORDER — LANCETS 30 GAUGE
1 EACH MISCELLANEOUS 3 TIMES DAILY
Qty: 300 EACH | Refills: 3 | Status: SHIPPED | OUTPATIENT
Start: 2024-08-08

## 2024-08-08 NOTE — TELEPHONE ENCOUNTER
Dr. Medina - please review. New brand needed for supplies.    Refill passed per [facility] protocols.    Requested Prescriptions   Pending Prescriptions Disp Refills    Glucose Blood (ONETOUCH ULTRA) In Vitro Strip [Pharmacy Med Name: ONE TOUCH ULTRA BLUE TESTST(NEW)100] 300 strip 3     Sig: Use 1 new strip by in vitro route 3 (three) times daily for blood glucose monitoring       Diabetic Supplies Protocol Passed - 2024  2:36 PM        Passed - In person appointment or virtual visit in the past 12 mos or appointment in next 3 mos     Recent Outpatient Visits              1 week ago     [facility]    Nurse Only    2 weeks ago Vitreomacular adhesion of right eye    [facility] Mindy Medina MD    Office Visit    2 months ago Age-related osteoporosis without current pathological fracture    [facility] Bhavya Liu MD    Office Visit    8 months ago Age-related osteoporosis without current pathological fracture    [facility]    Nurse Only    11 months ago Acquired hypothyroidism    [facility] Mindy Medina MD    Office Visit          Future Appointments         Provider Department Appt Notes    In 2 months Mindy Medina MD [facility]     In 3 months Bhavya Liu MD [facility] 6 months/prolia                      Lancet Devices (ONETOUCH DELICA PLUS LANCING) Does not apply Misc 1 each 1     Si Lancet by Finger stick route 3 (three) times daily.       Diabetic Supplies Protocol Passed - 2024  2:36 PM        Passed - In person appointment or virtual visit in the past 12 mos or appointment in next 3 mos     Recent Outpatient Visits              1 week ago     [facility]  [facility]    Nurse Only    2 weeks ago Vitreomacular adhesion of right eye    [facility] Mindy Medina MD    Office Visit    2 months ago Age-related osteoporosis without current pathological fracture    [facility] Bhavya Liu MD    Office Visit    8 months ago Age-related osteoporosis without current pathological fracture    [facility]    Nurse Only    11 months ago Acquired hypothyroidism    [facility] Mindy Medina MD    Office Visit          Future Appointments         Provider Department Appt Notes    In 2 months Mindy Medina MD [facility]     In 3 months Bhavya Liu MD [facility] 6 months/prolia                      Lancets (ONETOUCH DELICA PLUS [id number]) Does not apply Misc 300 each 3     Si Lancet by Finger stick route 3 (three) times daily.       Diabetic Supplies Protocol Passed - 2024  2:36 PM        Passed - In person appointment or virtual visit in the past 12 mos or appointment in next 3 mos     Recent Outpatient Visits              1 week ago     [facility]    Nurse Only    2 weeks ago Vitreomacular adhesion of right eye    [facility] Mindy Medina MD    Office Visit    2 months ago Age-related osteoporosis without current pathological fracture    [facility] Bhavya Liu MD    Office Visit    8 months ago Age-related osteoporosis without current pathological fracture    [facility]    Nurse Only    11 months ago Acquired hypothyroidism    [facility]  [place] Mindy Median MD    Office Visit          Future Appointments         Provider Department Appt Notes    In 2 months Mindy Medina MD [facility], [facility], [place]     In 3 months Bhavya Liu MD [facility], [facility], [place] 6 months/prolia

## 2024-08-09 NOTE — TELEPHONE ENCOUNTER
REFILL PASSED PER [facility] PROTOCOLS    Requested Prescriptions   Pending Prescriptions Disp Refills    METFORMIN HCL 1000 MG Oral Tab [Pharmacy Med Name: METFORMIN 1000MG TABLETS] 90 tablet 1     Sig: TAKE 1 TABLET(1000 MG) BY MOUTH DAILY WITH DINNER       Diabetes Medication Protocol Passed - 8/9/2024 10:46 AM        Passed - Last A1C < 7.5 and within past 6 months     Lab Results   Component Value Date    A1C 5.8 (H) 07/15/2024             Passed - In person appointment or virtual visit in the past 6 mos or appointment in next 3 mos     Recent Outpatient Visits              1 week ago     [facility]    Nurse Only    2 weeks ago Vitreomacular adhesion of right eye    [facility] Mindy Medina MD    Office Visit    2 months ago Age-related osteoporosis without current pathological fracture    [facility] Bhavya Liu MD    Office Visit    8 months ago Age-related osteoporosis without current pathological fracture    [facility] to [facility]    Nurse Only    11 months ago Acquired hypothyroidism    [facility] Mindy Medina MD    Office Visit          Future Appointments         Provider Department Appt Notes    In 2 months Mindy Medina MD [facility]     In 3 months Bhavya Liu MD [facility] 6 months/prolia                    Passed - Microalbumin procedure in past 12 months or taking ACE/ARB        Passed - EGFRCR or GFRNAA > 50     GFR Evaluation  EGFRCR: 51 , resulted on 7/15/2024          Passed - GFR in the past 12 months             Future Appointments         Provider Department Appt Notes    In 2 months Mindy Medina MD [facility]     In 3  months Bhavya Liu MD [facility] 6 months/prolia          Recent Outpatient Visits              1 week ago     [facility]    Nurse Only    2 weeks ago Vitreomacular adhesion of right eye    [facility] Mindy Medina MD    Office Visit    2 months ago Age-related osteoporosis without current pathological fracture    [facility] Bhavya Liu MD    Office Visit    8 months ago Age-related osteoporosis without current pathological fracture    [facility]    Nurse Only    11 months ago Acquired hypothyroidism    [facility]    Office Visit

## 2024-08-13 ENCOUNTER — TELEPHONE (OUTPATIENT)
Dept: INTERNAL MEDICINE CLINIC | Facility: CLINIC | Age: 79
End: 2024-08-13

## 2024-08-13 RX ORDER — FOLIC ACID 1 MG/1
1 TABLET ORAL DAILY
Qty: 90 TABLET | Refills: 3 | Status: SHIPPED | OUTPATIENT
Start: 2024-08-13

## 2024-08-13 NOTE — TELEPHONE ENCOUNTER
Please review. Protocol Failed; No Protocol    Requested Prescriptions   Pending Prescriptions Disp Refills    FOLIC ACID 1 MG Oral Tab [Pharmacy Med Name: FOLIC ACID 1MG TABLETS] 90 tablet 3     Sig: TAKE 1 TABLET(1 MG) BY MOUTH DAILY       There is no refill protocol information for this order            Future Appointments         Provider Department Appt Notes    In 2 months Mindy Medina MD [facility]     In 3 months Bhavya Liu MD [facility] 6 months/prolia          Recent Outpatient Visits              2 weeks ago     [facility]    Nurse Only    3 weeks ago Vitreomacular adhesion of right eye    [facility] Mindy Medina MD    Office Visit    2 months ago Age-related osteoporosis without current pathological fracture    [facility] Bhavya Liu MD    Office Visit    8 months ago Age-related osteoporosis without current pathological fracture    [facility]    Nurse Only    11 months ago Acquired hypothyroidism    [facility] Mindy Medina MD    Office Visit

## 2024-08-14 NOTE — TELEPHONE ENCOUNTER
So blood pressure readings overall look much better.  No changes with medications no changes.  If they are stable.  Occasionally mildly high but not consistent we are looking more at the consistencies.  Sugars overall are better.  Will monitor for now.

## 2024-08-14 NOTE — TELEPHONE ENCOUNTER
Verified name and .    Patient was advised of Dr. Medina's message.     Patient asks if she should ocntinue with 0.5 mg of Ozempic or increase to 1 mg as seen in most recent prescription sent:     Disp Refills Start End    semaglutide 4 MG/3ML Subcutaneous Solution Pen-injector 3 each 3 2024 --    Sig - Route: Inject 1 mg into the skin once a week. - Subcutaneous    Sent to pharmacy as: Semaglutide (1 MG/DOSE) 4 MG/3ML Subcutaneous Solution Pen-injector (Ozempic)    E-Prescribing Status: Receipt confirmed by pharmacy (2024 11:07 AM CDT)

## 2024-08-14 NOTE — TELEPHONE ENCOUNTER
We talked about increasing the Ozempic to 1.0 mg just finished off which she has not increased to 1.0 when she runs out.

## 2024-08-15 ENCOUNTER — MED REC SCAN ONLY (OUTPATIENT)
Dept: INTERNAL MEDICINE CLINIC | Facility: CLINIC | Age: 79
End: 2024-08-15

## 2024-08-17 ENCOUNTER — TELEPHONE (OUTPATIENT)
Dept: INTERNAL MEDICINE CLINIC | Facility: CLINIC | Age: 79
End: 2024-08-17

## 2024-08-19 NOTE — TELEPHONE ENCOUNTER
Patient contacted. She wants to make sure how many doses are in a pen of ozempic 1mg.     Explained patient has 4 doses in each pen. She was sent 3 pens (3 month supply).    Patient verbalized understanding.

## 2024-09-06 DIAGNOSIS — E03.9 ACQUIRED HYPOTHYROIDISM: ICD-10-CM

## 2024-09-10 RX ORDER — LEVOTHYROXINE SODIUM 88 UG/1
88 TABLET ORAL NIGHTLY
Qty: 90 TABLET | Refills: 3 | Status: SHIPPED | OUTPATIENT
Start: 2024-09-10

## 2024-09-10 NOTE — TELEPHONE ENCOUNTER
Refill Per Protocol     Requested Prescriptions   Pending Prescriptions Disp Refills    SYNTHROID 88 MCG Oral Tab [Pharmacy Med Name: SYNTHROID TAB 88MCG] 90 tablet 3     Sig: TAKE 1 TABLET NIGHTLY       Thyroid Medication Protocol Passed - 9/6/2024  6:59 PM        Passed - TSH in past 12 months        Passed - Last TSH value is normal     Lab Results   Component Value Date    TSH 0.998 07/15/2024    THYROIDFUNC 1.20 08/12/2016                 Passed - In person appointment or virtual visit in the past 12 mos or appointment in next 3 mos     Recent Outpatient Visits              1 month ago BP check    [facility]    Nurse Only    1 month ago Vitreomacular adhesion of right eye    [facility] Mindy Medina MD    Office Visit    3 months ago Age-related osteoporosis without current pathological fracture    [facility] Bhav[facility]    Office Visit    9 months ago Age-related osteoporosis without current pathological fracture    [facility]    Nurse Only    12 months ago Acquired hypothyroidism    [facility] Mindy Medina MD    Office Visit          Future Appointments         Provider Department Appt Notes    In 1 month Mindy Medina MD [facility]     In 2 months B[facility]ya [facility] MD [facility] 6 months/prolia                           Future Appointments         Provider Department Appt Notes    In 1 month Mindy Medina MD [facility]     In 2 months Bhavya Liu MD [facility] 6 months/prolia          Recent Outpatient Visits              1 month ago BP check    [facility],  [address], [place]    Nurse Only    1 month ago Vitreomacular adhesion of right eye    [facility], [facility] Mindy Medina MD    Office Visit    3 months ago Age-related osteoporosis without current pathological fracture    [facility], [facility], [place] Bhavya Liu MD    Office Visit    9 months ago Age-related osteoporosis without current pathological fracture    [facility], [facility], [place]    Nurse Only    12 months ago Acquired hypothyroidism    [facility] Mind[facility] Med[facility]    Office Visit

## 2024-09-18 NOTE — TELEPHONE ENCOUNTER
[place] and will run out of her medication while there.   She is looking for a refill for:    semaglutide 4 MG/3ML Subcutaneous Solution Pen-injector 3 each 3 7/22/2024 --   Sig:   Inject 1 mg into the skin once a week.     Route:   Subcutaneous       Please send to:    [facility]  [address] [place], [place] [id number]  Phone : [phone].    Please call the patient once the refill is sent.

## 2024-09-19 NOTE — TELEPHONE ENCOUNTER
REFILL PASSED PER [facility] PROTOCOLS    Requested Prescriptions   Pending Prescriptions Disp Refills    semaglutide 4 MG/3ML Subcutaneous Solution Pen-injector 9 mL 0     Sig: Inject 1 mg into the skin once a week.       Diabetes Medication Protocol Passed - 9/19/2024 11:42 AM        Passed - Last A1C < 7.5 and within past 6 months     Lab Results   Component Value Date    A1C 5.8 (H) 07/15/2024             Passed - In person appointment or virtual visit in the past 6 mos or appointment in next 3 mos     Recent Outpatient Visits              1 month ago BP check    [facility]    Nurse Only    1 month ago Vitreomacular adhesion of right eye    [facility] Mindy Medina MD    Office Visit    3 months ago Age-related osteoporosis without current pathological fracture    [facility] Bhavya Liu MD    Office Visit    10 months ago Age-related osteoporosis without current pathological fracture    [facility]    Nurse Only    1 year ago Acquired hypothyroidism    [facility] Mindy Medina MD    Office Visit          Future Appointments         Provider Department Appt Notes    In 1 month Mindy Medina MD [facility]     In 2 months Bhavya Liu MD [facility] 6 months/prolia                    Passed - Microalbumin procedure in past 12 months or taking ACE/ARB        Passed - EGFRCR or GFRNAA > 50     GFR Evaluation  EGFRCR: 51 , resulted on 7/15/2024          Passed - GFR in the past 12 months             Future Appointments         Provider Department Appt Notes    In 1 month Mindy Medina MD [facility]     In 2 months Bhavya Liu MD  [facility], [facility] 6 months/prolia          Recent Outpatient Visits              1 month ago BP check    [facility]    Nurse Only    1 month ago Vitreomacular adhesion of right eye    [facility] Mindy Medina MD    Office Visit    3 months ago Age-related osteoporosis without current pathological fracture    [facility] Bhavya Liu MD    Office Visit    10 months ago Age-related osteoporosis without current pathological fracture    [facility]    Nurse Only    1 year ago Acquired hypothyroidism    [facility] Mindy Medina MD    Office Visit

## 2024-09-20 ENCOUNTER — TELEPHONE (OUTPATIENT)
Dept: INTERNAL MEDICINE CLINIC | Facility: CLINIC | Age: 79
End: 2024-09-20

## 2024-09-20 NOTE — TELEPHONE ENCOUNTER
[place] stated [place] , on a new dose semaglutide 4 MG/3ML  1 mg  but can't pick til 9/25/24 per Ins , she's due tomorrow     Asking if ok to inject the last 2 of her old dosage of last dosage to equal 1 mg       Dr Medina out of office today

## 2024-09-20 NOTE — TELEPHONE ENCOUNTER
Verified name and .    Patient was advised of Dr. Medina's message.    Patient verbalizes understanding and agrees with plan.

## 2024-09-30 ENCOUNTER — TELEPHONE (OUTPATIENT)
Dept: ENDOCRINOLOGY CLINIC | Facility: CLINIC | Age: 79
End: 2024-09-30

## 2024-10-03 NOTE — TELEPHONE ENCOUNTER
RECEIVED SOB VIA FAX DATED ON 9/30/24     PA IS REQUIRED    OOPCOST;20$    FACILITY FEE;NA    ADMIN FEE;20$

## 2024-10-04 NOTE — TELEPHONE ENCOUNTER
PA for Prolia submitted and approved via [facility] online, authorization number [id number], valid 10/4/24-10/5/25.

## 2024-10-04 NOTE — TELEPHONE ENCOUNTER
Prolia labs in chart. Appt is in appropriate time frame for next prolia injection. Closing encounter.

## 2024-10-11 ENCOUNTER — TELEPHONE (OUTPATIENT)
Dept: ENDOCRINOLOGY CLINIC | Facility: CLINIC | Age: 79
End: 2024-10-11

## 2024-10-11 NOTE — TELEPHONE ENCOUNTER
----- Message from Valery ORTA sent at 5/29/2024 11:06 AM CDT -----  Regarding: Prolia IV  Pts. Last prolia given 5/29/24 with SY.  Pts Next Prolia is due 11/30/24 or later with SY     Please initiate Prolia IV

## 2024-10-22 ENCOUNTER — LAB ENCOUNTER (OUTPATIENT)
Dept: LAB | Age: 79
End: 2024-10-22
Attending: INTERNAL MEDICINE
Payer: MEDICARE

## 2024-10-24 ENCOUNTER — OFFICE VISIT (OUTPATIENT)
Dept: INTERNAL MEDICINE CLINIC | Facility: CLINIC | Age: 79
End: 2024-10-24
Payer: COMMERCIAL

## 2024-10-24 DIAGNOSIS — N18.31 STAGE 3A CHRONIC KIDNEY DISEASE (HCC): ICD-10-CM

## 2024-10-24 DIAGNOSIS — Z71.85 VACCINE COUNSELING: ICD-10-CM

## 2024-10-24 DIAGNOSIS — E55.9 VITAMIN D DEFICIENCY: ICD-10-CM

## 2024-10-24 DIAGNOSIS — M81.0 AGE-RELATED OSTEOPOROSIS WITHOUT CURRENT PATHOLOGICAL FRACTURE: ICD-10-CM

## 2024-10-24 DIAGNOSIS — E03.9 ACQUIRED HYPOTHYROIDISM: Primary | ICD-10-CM

## 2024-10-24 DIAGNOSIS — D64.9 ANEMIA, UNSPECIFIED TYPE: ICD-10-CM

## 2024-10-24 DIAGNOSIS — E11.65 TYPE 2 DIABETES MELLITUS WITH HYPERGLYCEMIA, WITHOUT LONG-TERM CURRENT USE OF INSULIN (HCC): ICD-10-CM

## 2024-10-24 DIAGNOSIS — L60.8 CHANGE IN NAIL APPEARANCE: ICD-10-CM

## 2024-10-24 PROCEDURE — 3078F DIAST BP <80 MM HG: CPT | Performed by: INTERNAL MEDICINE

## 2024-10-24 PROCEDURE — 1126F AMNT PAIN NOTED NONE PRSNT: CPT | Performed by: INTERNAL MEDICINE

## 2024-10-24 PROCEDURE — 1160F RVW MEDS BY RX/DR IN RCRD: CPT | Performed by: INTERNAL MEDICINE

## 2024-10-24 PROCEDURE — G0008 ADMIN INFLUENZA VIRUS VAC: HCPCS | Performed by: INTERNAL MEDICINE

## 2024-10-24 PROCEDURE — 3074F SYST BP LT 130 MM HG: CPT | Performed by: INTERNAL MEDICINE

## 2024-10-24 PROCEDURE — 3008F BODY MASS INDEX DOCD: CPT | Performed by: INTERNAL MEDICINE

## 2024-10-24 PROCEDURE — 99214 OFFICE O/P EST MOD 30 MIN: CPT | Performed by: INTERNAL MEDICINE

## 2024-10-24 PROCEDURE — 1159F MED LIST DOCD IN RCRD: CPT | Performed by: INTERNAL MEDICINE

## 2024-10-24 PROCEDURE — 90662 IIV NO PRSV INCREASED AG IM: CPT | Performed by: INTERNAL MEDICINE
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
## 2024-10-24 NOTE — PATIENT INSTRUCTIONS
ASSESSMENT/PLAN:     Encounter Diagnoses   Name Primary?    Acquired hypothyroidism Stable.    Yes    Anemia, unspecified type Stable.        Stage 3a chronic kidney disease (HCC)No motrin, ibuprofen, advil, alleve, naprosyn  with these medications.         Age-related osteoporosis without current pathological fracture Take calcium 600 every 12 hrs. With vitamin D 400 IU every  12 hrs. Excerise at least 30 minutes 3-4 times a week. May use calcium citrate as opposed to calcium carbonate which may be better absorbed in the setting of PPI use.  The preferred calcium supplement for people at risk of stone formation is calcium citrate because it helps to increase urinary citrate excretion. We recommend a dose of 200-400 mg if dietary calcium cannot be increased.   lifelong physical activity at all ages is strongly endorsed by the National Osteoporosis Foundation. Exercise recommendations generally should include weight-bearing, muscle-strengthening, and balance training exercises for 30 minutes 5 days per week or 75 minutes twice weekly, often consistent with other general health recommendations.   Weight Bearing  There are two types of osteoporosis exercises that are important for building and maintaining bone density: weight-bearing and muscle-strengthening exercises.  Weight-bearing Exercises  These exercises include activities that make you move against gravity while staying upright. Weight-bearing exercises can be high-impact or low-impact.  High-impact weight-bearing exercises help build bones and keep them strong. If you have broken a bone due to osteoporosis or are at risk of breaking a bone, you may need to avoid high-impact exercises. If you’re not sure, you should check with your healthcare provider.  Examples of high-impact weight-bearing exercises are:  Dancing   Doing high-impact aerobics   Hiking   Jogging/running   Jumping Rope   Stair climbing   Tennis  Low-impact weight-bearing exercises can  also help keep bones strong and are a safe alternative if you cannot do high-impact exercises. Examples of low-impact weight-bearing exercises are:  Using elliptical training machines   Doing low-impact aerobics   Using stair-step machines   Fast walking on a treadmill or outside          Type 2 diabetes mellitus with hyperglycemia, without long-term current use of insulin (HCC) Higher. Call in 2 weeks. Careful with diet and excercise at least 30 minutes 3-4 times a week. Check sugars at different times on different dates. Careful with low sugars. Carry something with you and check sugar if can. Can carry adams cracker, etc. Decrease carbohydrates. But also, careful with fruits and natural sugars.One serving a day and no more than 1 handful every day. Check feet  every AM and careful with sores and ulcers on feet bilaterally. Check eyes every year with dilated eye exam.  Check sugars.  2-hour postmeal should be less than 140s.  Pre-meal should be 's.  Both equally affected A1c.  Discussed importance of glycemic control to prevent complications of diabetes  -Discussed complications of diabetes include retinopathy, neuropathy, nephropathy and cardiovascular disease  -Discussed ABCs of DM  -Discussed importance of SBGM  -Discussed importance of low CHO diet, recommend 45gm per meal or 135gm per day  -UTD with optho, check records.        Vaccine counseling  Flu shot today. Recc. Covid booster after 2 weeks. Recommend shingles vaccine.  There is 2 doses of the vaccine  by 2 months 6 months apart.  Check on insurance coverage on shingles vaccine.  May be covered better at the pharmacy.  Separate shingles vaccine from all of the vaccines by at least 1 to 2 weeks.  Discussed about side effects of vaccine.        Vitamin D deficiency Stable.       Hypertension.  Slowly improving.  Hold medication changes.  Back settled into home here.  Call in a week or 2.Careful with diet and excercise at least 30 minutes  3-4 times a week. Check blood pressures at different times on different days. Can purchase own blood pressure monitor. If not, check at local pharmacy. Bake foods more and grill occasionally. Avoid fried foods. No salt. Use other seasonings.      Orders Placed This Encounter   Procedures    INFLUENZA VAC HIGH DOSE PRSV FREE       Meds This Visit:  Requested Prescriptions      No prescriptions requested or ordered in this encounter       Imaging & Referrals:  INFLUENZA VAC HIGH DOSE PRSV FREE        RTC 3-25 for FU.   RTc after 7-22-25 for physical.

## 2024-10-25 ENCOUNTER — MED REC SCAN ONLY (OUTPATIENT)
Dept: INTERNAL MEDICINE CLINIC | Facility: CLINIC | Age: 79
End: 2024-10-25

## 2024-10-25 VITALS
SYSTOLIC BLOOD PRESSURE: 118 MMHG | HEIGHT: 62 IN | OXYGEN SATURATION: 100 % | WEIGHT: 115 LBS | BODY MASS INDEX: 21.16 KG/M2 | DIASTOLIC BLOOD PRESSURE: 70 MMHG | TEMPERATURE: 97 F | HEART RATE: 62 BPM

## 2024-11-21 RX ORDER — LISINOPRIL 20 MG/1
20 TABLET ORAL DAILY
Qty: 60 TABLET | Refills: 0 | OUTPATIENT
Start: 2024-11-21

## 2024-11-21 RX ORDER — METOPROLOL TARTRATE 25 MG/1
25 TABLET, FILM COATED ORAL 2 TIMES DAILY
Qty: 180 TABLET | Refills: 3 | Status: SHIPPED | OUTPATIENT
Start: 2024-11-21

## 2024-11-21 RX ORDER — CLOPIDOGREL BISULFATE 75 MG/1
75 TABLET ORAL DAILY
Qty: 90 TABLET | Refills: 3 | Status: SHIPPED | OUTPATIENT
Start: 2024-11-21

## 2024-11-21 NOTE — TELEPHONE ENCOUNTER
Please review; protocol failed/No Protocol    Last Office Visit: 10/24/2024    Metoprolol listed as patient reported- no evidence in office visit notes if refill is appropriate-please advise.     Requested Prescriptions   Pending Prescriptions Disp Refills    metoprolol tartrate 25 MG Oral Tab  0     Sig: Take 1 tablet (25 mg total) by mouth 2 (two) times daily.       Hypertension Medications Protocol Passed - 11/21/2024  4:52 PM        Passed - CMP or BMP in past 12 months        Passed - Last BP reading less than 140/90     BP Readings from Last 1 Encounters:   10/24/24 118/70               Passed - In person appointment or virtual visit in the past 12 mos or appointment in next 3 mos     Recent Outpatient Visits              4 weeks ago Acquired hypothyroidism    [facility] Mindy Medina MD    Office Visit    3 months ago BP check    [facility]    Nurse Only    4 months ago Vitreomacular adhesion of right eye    [facility] Mindy Medina MD    Office Visit    5 months ago Age-related osteoporosis without current pathological fracture    [facility] Bhavya Liu MD    Office Visit    1 year ago Age-related osteoporosis without current pathological fracture    [facility]    [facility]          Future Appointments         Provider Department Appt Notes    In 1 week B[facility]ya Liu MD [facility] 6 months/prolia    In 4 months Mindy Medina MD [facility]     In 8 months Mindy Medina MD [facility]                     Passed - EGFRCR or GFRNAA > 50     GFR Evaluation  EGFRCR: 51 , resulted on 7/15/2024            clopidogrel 75 MG Oral Tab 90  tablet 3     Sig: Take 1 tablet (75 mg total) by mouth daily.       There is no refill protocol information for this order      Signed Prescriptions Disp Refills    metFORMIN HCl 1000 MG Oral Tab 90 tablet 3     Sig: Take 1 tablet (1,000 mg total) by mouth daily with dinner.       Diabetes Medication Protocol Passed - 11/21/2024  4:52 PM        Passed - Last A1C < 7.5 and within past 6 months     Lab Results   Component Value Date    A1C 5.8 (H) 07/15/2024             Passed - In person appointment or virtual visit in the past 6 mos or appointment in next 3 mos     Recent Outpatient Visits              4 weeks ago Acquired hypothyroidism    [facility] Mindy Medina MD    Office Visit    3 months ago BP check    [facility]    Nurse Only    4 months ago Vitreomacular adhesion of right eye    [facility] Mindy Medina MD    Office Visit    5 months ago Age-related osteoporosis without current pathological fracture    [facility] Bhavya Liu MD    Office Visit    1 year ago Age-related osteoporosis without current pathological fracture    [facility]    Nurse Only          Future Appointments         Provider Department Appt Notes    In 1 week Bhavya Liu MD [facility] 6 months/prolia    In 4 months Mindy Medina MD [facility]     In 8 months Mindy Medina MD [facility]                     Passed - Microalbumin procedure in past 12 months or taking ACE/ARB        Passed - EGFRCR or GFRNAA > 50     GFR Evaluation  EGFRCR: 51 , resulted on 7/15/2024          Passed - GFR in the past 12 months         Refused Prescriptions Disp Refills     lisinopril 20 MG Oral Tab 60 tablet 0     Sig: Take 1 tablet (20 mg total) by mouth daily.       Hypertension Medications Protocol Passed - 11/21/2024  4:52 PM        Passed - CMP or BMP in past 12 months        Passed - Last BP reading less than 140/90     BP Readings from Last 1 Encounters:   10/24/24 118/70               Passed - In person appointment or virtual visit in the past 12 mos or appointment in next 3 mos     Recent Outpatient Visits              4 weeks ago Acquired hypothyroidism    [facility] Mindy Medina MD    Office Visit    3 months ago BP check    [facility]    Nurse Only    4 months ago Vitreomacular adhesion of right eye    [facility] Mindy Medina MD    Office Visit    5 months ago Age-related osteoporosis without current pathological fracture    [facility] Bhavya Liu MD    Office Visit    1 year ago Age-related osteoporosis without current pathological fracture    [facility]    Nurse Only          Future Appointments         Provider Department Appt Notes    In 1 week Bhavya Liu MD [facility] 6 months/prolia    In 4 months Mindy Medina MD [facility]     In 8 months Mindy Medina MD [facility]                     Passed - EGFRCR or GFRNAA > 50     GFR Evaluation  EGFRCR: 51 , resulted on 7/15/2024             Future Appointments         Provider Department Appt Notes    In 1 week Bhavya Liu MD [facility] 6 months/prolia    In 4 months Mindy Medina MD [facility]     In 8 months Mindy Medina MD [place]  [facility] [place]           Recent Outpatient Visits              4 weeks ago Acquired hypothyroidism    [facility] Mindy Medina MD    Office Visit    3 months ago BP check    [facility]    Nurse Only    4 months ago Vitreomacular adhesion of right eye    [facility] Mindy Medina MD    Office Visit    5 months ago Age-related osteoporosis without current pathological fracture    [facility] Bhavya Liu MD    Office Visit    1 year ago Age-related osteoporosis without current pathological fracture    [facility]    Nurse Only

## 2024-11-21 NOTE — TELEPHONE ENCOUNTER
Refill passed per [facility] protocol.  Requested Prescriptions   Pending Prescriptions Disp Refills    metFORMIN HCl 1000 MG Oral Tab 90 tablet 3     Sig: Take 1 tablet (1,000 mg total) by mouth daily with dinner.       Diabetes Medication Protocol Passed - 11/21/2024  4:48 PM        Passed - Last A1C < 7.5 and within past 6 months     Lab Results   Component Value Date    A1C 5.8 (H) 07/15/2024             Passed - In person appointment or virtual visit in the past 6 mos or appointment in next 3 mos     Recent Outpatient Visits              4 weeks ago Acquired hypothyroidism    [facility] Mindy Medina MD    Office Visit    3 months ago BP check    [facility]    Nurse Only    4 months ago Vitreomacular adhesion of right eye    [facility] Mindy Medina MD    Office Visit    5 months ago Age-related osteoporosis without current pathological fracture    [facility] Bhavya Liu MD    Office Visit    1 year ago Age-related osteoporosis without current pathological fracture    [facility]    Nurse Only          Future Appointments         Provider Department Appt Notes    In 1 week Bhavya Liu MD [facility] 6 months/prolia    In 4 months Mindy Medina MD [facility]     In 8 months Mindy Medina MD [facility]                     Passed - Microalbumin procedure in past 12 months or taking ACE/ARB        Passed - EGFRCR or GFRNAA > 50     GFR Evaluation  EGFRCR: 51 , resulted on 7/15/2024          Passed - GFR in the past 12 months          lisinopril 20 MG Oral Tab 60 tablet 0     Sig: Take 1 tablet (20 mg total) by mouth daily.        Hypertension Medications Protocol Passed - 11/21/2024  4:48 PM        Passed - CMP or BMP in past 12 months        Passed - Last BP reading less than 140/90     BP Readings from Last 1 Encounters:   10/24/24 118/70               Passed - In person appointment or virtual visit in the past 12 mos or appointment in next 3 mos     Recent Outpatient Visits              4 weeks ago Acquired hypothyroidism    [facility] Mindy Medina MD    Office Visit    3 months ago BP check    [facility]    Nurse Only    4 months ago Vitreomacular adhesion of right eye    [facility] Mindy Medina MD    Office Visit    5 months ago Age-related osteoporosis without current pathological fracture    [facility] Bhavya Liu MD    Office Visit    1 year ago Age-related osteoporosis without current pathological fracture    [facility]    Nurse Only          Future Appointments         Provider Department Appt Notes    In 1 week Bhavya Liu MD [facility] 6 months/prolia    In 4 months Mindy Medina MD [facility]     In 8 months Mindy Medina MD [facility]                     Passed - EGFRCR or GFRNAA > 50     GFR Evaluation  EGFRCR: 51 , resulted on 7/15/2024            metoprolol tartrate 25 MG Oral Tab  0     Sig: Take 1 tablet (25 mg total) by mouth 2 (two) times daily.       Hypertension Medications Protocol Passed - 11/21/2024  4:48 PM        Passed - CMP or BMP in past 12 months        Passed - Last BP reading less than 140/90     BP Readings from Last 1 Encounters:   10/24/24 118/70               Passed - In person appointment or virtual visit in the past 12  mos or appointment in next 3 mos     Recent Outpatient Visits              4 weeks ago Acquired hypothyroidism    [facility] Mindy Medina MD    Office Visit    3 months ago BP check    [facility]    Nurse Only    4 months ago Vitreomacular adhesion of right eye    [facility] Mindy Medina MD    Office Visit    5 months ago Age-related osteoporosis without current pathological fracture    [facility] Bhavya Liu MD    Office Visit    1 year ago Age-related osteoporosis without current pathological fracture    [facility]    Nurse Only          Future Appointments         Provider Department Appt Notes    In 1 week Bhavya Liu MD [facility] 6 months/prolia    In 4 months Mindy Medina MD [facility]     In 8 months Mindy Medina MD [facility]                     Passed - EGFRCR or GFRNAA > 50     GFR Evaluation  EGFRCR: 51 , resulted on 7/15/2024            clopidogrel 75 MG Oral Tab 90 tablet 3     Sig: Take 1 tablet (75 mg total) by mouth daily.       There is no refill protocol information for this order        Future Appointments         Provider Department Appt Notes    In 1 week Bhavya Liu MD [facility] 6 months/prolia    In 4 months Mindy Medina MD [facility]     In 8 months Mindy Medina MD [facility]           Recent Outpatient Visits              4 weeks ago Acquired hypothyroidism    [facility] Mindy Medina MD    Office Visit    3  months ago BP check    [facility]    Nurse Only    4 months ago Vitreomacular adhesion of right eye    [facility] Mindy Medina MD    Office Visit    5 months ago Age-related osteoporosis without current pathological fracture    [facility] Bhavya Liu MD    Office Visit    1 year ago Age-related osteoporosis without current pathological fracture    [facility]    Nurse Only

## 2024-11-21 NOTE — TELEPHONE ENCOUNTER
Metformin 1000m year supply sent to [facility] in [place] on 2024    Lisinopril 20mg: written by cardiology Dr. Louis Hinds.

## 2024-12-04 ENCOUNTER — TELEPHONE (OUTPATIENT)
Dept: INTERNAL MEDICINE CLINIC | Facility: CLINIC | Age: 79
End: 2024-12-04

## 2024-12-05 ENCOUNTER — TELEPHONE (OUTPATIENT)
Dept: ENDOCRINOLOGY CLINIC | Facility: CLINIC | Age: 79
End: 2024-12-05

## 2024-12-05 ENCOUNTER — LAB ENCOUNTER (OUTPATIENT)
Dept: LAB | Age: 79
End: 2024-12-05
Attending: INTERNAL MEDICINE
Payer: MEDICARE

## 2024-12-05 ENCOUNTER — MED REC SCAN ONLY (OUTPATIENT)
Dept: INTERNAL MEDICINE CLINIC | Facility: CLINIC | Age: 79
End: 2024-12-05

## 2024-12-05 DIAGNOSIS — M81.0 AGE-RELATED OSTEOPOROSIS WITHOUT CURRENT PATHOLOGICAL FRACTURE: ICD-10-CM

## 2024-12-05 DIAGNOSIS — E55.9 VITAMIN D DEFICIENCY: ICD-10-CM

## 2024-12-05 LAB
ALBUMIN SERPL-MCNC: 4.7 G/DL (ref 3.2–4.8)
ALBUMIN/GLOB SERPL: 2.9 {RATIO} (ref 1–2)
ALP LIVER SERPL-CCNC: 28 U/L
ALT SERPL-CCNC: 19 U/L
ANION GAP SERPL CALC-SCNC: 8 MMOL/L (ref 0–18)
AST SERPL-CCNC: 26 U/L (ref ?–34)
BILIRUB SERPL-MCNC: 0.3 MG/DL (ref 0.2–1.1)
BUN BLD-MCNC: 34 MG/DL (ref 9–23)
BUN/CREAT SERPL: 32.1 (ref 10–20)
CALCIUM BLD-MCNC: 11.1 MG/DL (ref 8.7–10.4)
CHLORIDE SERPL-SCNC: 104 MMOL/L (ref 98–112)
CO2 SERPL-SCNC: 28 MMOL/L (ref 21–32)
CREAT BLD-MCNC: 1.06 MG/DL
EGFRCR SERPLBLD CKD-EPI 2021: 53 ML/MIN/1.73M2 (ref 60–?)
FASTING STATUS PATIENT QL REPORTED: NO
GLOBULIN PLAS-MCNC: 1.6 G/DL (ref 2–3.5)
GLUCOSE BLD-MCNC: 93 MG/DL (ref 70–99)
OSMOLALITY SERPL CALC.SUM OF ELEC: 297 MOSM/KG (ref 275–295)
POTASSIUM SERPL-SCNC: 4 MMOL/L (ref 3.5–5.1)
PROT SERPL-MCNC: 6.3 G/DL (ref 5.7–8.2)
PTH-INTACT SERPL-MCNC: 11.7 PG/ML (ref 18.5–88)
SODIUM SERPL-SCNC: 140 MMOL/L (ref 136–145)
VIT D+METAB SERPL-MCNC: 45.2 NG/ML (ref 30–100)

## 2024-12-05 PROCEDURE — 84080 ASSAY ALKALINE PHOSPHATASES: CPT

## 2024-12-05 PROCEDURE — 80053 COMPREHEN METABOLIC PANEL: CPT

## 2024-12-05 PROCEDURE — 82306 VITAMIN D 25 HYDROXY: CPT

## 2024-12-05 PROCEDURE — 36415 COLL VENOUS BLD VENIPUNCTURE: CPT

## 2024-12-05 PROCEDURE — 83970 ASSAY OF PARATHORMONE: CPT

## 2024-12-05 NOTE — TELEPHONE ENCOUNTER
Patient had toast today and asking if fasting is required for labs. Please call at [phone],thanks.

## 2024-12-05 NOTE — TELEPHONE ENCOUNTER
Spoke to pt. Pt notified she doesn't need to fast. Glucose is tested in CMP but pt sees MD for osteoporosis, CMP to monitor pt's kidney and liver function. Pt verbalized understanding. Denies further questions or concerns.

## 2024-12-10 ENCOUNTER — NURSE ONLY (OUTPATIENT)
Dept: ENDOCRINOLOGY CLINIC | Facility: CLINIC | Age: 79
End: 2024-12-10

## 2024-12-10 ENCOUNTER — TELEPHONE (OUTPATIENT)
Dept: ENDOCRINOLOGY CLINIC | Facility: CLINIC | Age: 79
End: 2024-12-10

## 2024-12-10 DIAGNOSIS — M81.0 AGE-RELATED OSTEOPOROSIS WITHOUT CURRENT PATHOLOGICAL FRACTURE: Primary | ICD-10-CM

## 2024-12-10 DIAGNOSIS — E83.52 HYPERCALCEMIA: ICD-10-CM

## 2024-12-10 DIAGNOSIS — E55.9 VITAMIN D DEFICIENCY: Primary | ICD-10-CM

## 2024-12-10 PROCEDURE — 96372 THER/PROPH/DIAG INJ SC/IM: CPT | Performed by: INTERNAL MEDICINE

## 2024-12-10 NOTE — TELEPHONE ENCOUNTER
Dr. Liu,     Patient is very concerned about abnormal lab results. Patient was rescheduled from 12/3 to 1/13 and does not feel comfortable waiting until appt to discuss. Please advise, thanks!    Component      Latest Ref Rng 12/5/2024   Glucose      70 - 99 mg/dL 93    Sodium      136 - 145 mmol/L 140    Potassium      3.5 - 5.1 mmol/L 4.0    Chloride      98 - 112 mmol/L 104    Carbon Dioxide, Total      21.0 - 32.0 mmol/L 28.0    ANION GAP      0 - 18 mmol/L 8    BUN      9 - 23 mg/dL 34 (H)    CREATININE      0.55 - 1.02 mg/dL 1.06 (H)    BUN/CREATININE RATIO      10.0 - 20.0  32.1 (H)    CALCIUM      8.7 - 10.4 mg/dL 11.1 (H)    CALCULATED OSMOLALITY      275 - 295 mOsm/kg 297 (H)    EGFR      >=60 mL/min/1.73m2 53 (L)    ALT (SGPT)      10 - 49 U/L 19    AST (SGOT)      <34 U/L 26    ALKALINE PHOSPHATASE      55 - 142 U/L 28 (L)    Total Bilirubin      0.2 - 1.1 mg/dL 0.3    PROTEIN, TOTAL      5.7 - 8.2 g/dL 6.3    Albumin      3.2 - 4.8 g/dL 4.7    Globulin      2.0 - 3.5 g/dL 1.6 (L)    A/G Ratio      1.0 - 2.0  2.9 (H)    Patient Fasting for CMP? No    PTH INTACT      18.5 - 88.0 pg/mL 11.7 (L)

## 2024-12-10 NOTE — PROGRESS NOTES
Patient presents for her Prolia injection.  Prior to administration RN discussed purpose of medication, it is subcutaneous injection given once every 6 months, common side effects that typically go away 48-72 hours post injection that could last longer depending on body's response such as generalized myalgia/bone pain.  It was also advised to let dentist know about being on prolia therapy as it can affect jaw bone/increased risk of infection in case there is major dental work done (drilling/implants/tooth extraction).      Patient received prolia 60 mg subcutaneous to the left upper arm.  Pt tolerated it well and did not exhibit any immediate adverse reactions >5 minutes post injection.  Pt was advised to RTC in 6 months for the next dose.  Pt was also advised to complete prolia labs 1 week prior to next appointment.  Pt voiced understanding and denied further questions at this time. Patient left the clinic in stable condition.       Staff message sent to do insurance verification 1 month prior to next visit.    Home

## 2024-12-11 ENCOUNTER — APPOINTMENT (OUTPATIENT)
Dept: URBAN - METROPOLITAN AREA CLINIC 244 | Age: 79
Setting detail: DERMATOLOGY
End: 2024-12-11

## 2024-12-11 ENCOUNTER — TELEPHONE (OUTPATIENT)
Dept: INTERNAL MEDICINE CLINIC | Facility: CLINIC | Age: 79
End: 2024-12-11

## 2024-12-11 DIAGNOSIS — L82.1 OTHER SEBORRHEIC KERATOSIS: ICD-10-CM

## 2024-12-11 DIAGNOSIS — E83.52 HYPERCALCEMIA: Primary | ICD-10-CM

## 2024-12-11 DIAGNOSIS — L81.4 OTHER MELANIN HYPERPIGMENTATION: ICD-10-CM

## 2024-12-11 DIAGNOSIS — D22 MELANOCYTIC NEVI: ICD-10-CM

## 2024-12-11 DIAGNOSIS — Z87.2 PERSONAL HISTORY OF DISEASES OF THE SKIN AND SUBCUTANEOUS TISSUE: ICD-10-CM

## 2024-12-11 PROBLEM — D22.9 MELANOCYTIC NEVI, UNSPECIFIED: Status: ACTIVE | Noted: 2024-12-11

## 2024-12-11 LAB — ALKALINE PHOSPHATASE BONE SPECIFIC: 7.6 UG/L

## 2024-12-11 PROCEDURE — OTHER COUNSELING: OTHER

## 2024-12-11 PROCEDURE — OTHER MIPS QUALITY: OTHER

## 2024-12-11 PROCEDURE — 99213 OFFICE O/P EST LOW 20 MIN: CPT

## 2024-12-11 ASSESSMENT — LOCATION SIMPLE DESCRIPTION DERM
LOCATION SIMPLE: LEFT LOWER BACK
LOCATION SIMPLE: RIGHT EAR

## 2024-12-11 ASSESSMENT — LOCATION ZONE DERM
LOCATION ZONE: EAR
LOCATION ZONE: TRUNK

## 2024-12-11 ASSESSMENT — LOCATION DETAILED DESCRIPTION DERM
LOCATION DETAILED: RIGHT INFERIOR HELIX
LOCATION DETAILED: LEFT INFERIOR LATERAL MIDBACK
LOCATION DETAILED: LEFT SUPERIOR MEDIAL MIDBACK

## 2024-12-11 NOTE — TELEPHONE ENCOUNTER
Patient called us back and stated that she had blood work that was ordered by . Patient had the blood work done on 12/5/24. Patient will like for you to review them as she believes Dr. Liu is out of the office. Patient is concern due to some abnormal numbers. Please advise .

## 2024-12-11 NOTE — TELEPHONE ENCOUNTER
Patient came into office requesting doctor look over her most recent blood pressure with provider Noe. I advised patient it would be helpful if she brought readings with her. Patient said dr. Medina should be able to pull them up. Please advise

## 2024-12-11 NOTE — TELEPHONE ENCOUNTER
I am confused?  She had seen me October 24th 2024 blood pressure readings were fine we discussed it at that visit.  Prior to her blood pressure readings have been slightly elevated.  I do not see any readings after which time her appointment with me.  I know she has an upcoming appointment with Dr. Aquino.  What were her readings running?

## 2024-12-12 NOTE — TELEPHONE ENCOUNTER
Can see Dr. Dugan who works with Dr. Aquino or there is also some new endocrine at [place] office were very good or if she wants closer to [place] Dr. Trimble.

## 2024-12-12 NOTE — TELEPHONE ENCOUNTER
Spoke to patient (verified Name and ) and relayed Dr. Medina's message below. Given contact information for Dr. Trimble. Patient verbalized understanding and had no further questions or concerns at this time.

## 2024-12-12 NOTE — TELEPHONE ENCOUNTER
Dr. Medina - new endo referral requested, please advise    Spoke to patient, full name and date of birth verified.  Provided lab results review message as below.   Patient asked for this to be sent in [place] message as her handwriting is not the best for taking notes.     Patient grateful for Dr. Medina's assistance.   Patient stated she just has not \"clicked\" with Dr. Liu, it is hard to get in to see her and she does not talk much with patient.     Patient would like to get a new recommendation from Dr. Medina for javon talley provider.

## 2024-12-12 NOTE — TELEPHONE ENCOUNTER
Alkaline phosphatase specific for bone is normal.  Vitamin D level looks good.  CMP which is sugar electrolytes and liver enzymes look okay.  Slight decline in kidney function which is stable from prior and also calcium is elevated.No motrin, ibuprofen, advil, alleve, naprosyn  with these medications.   Hydrate at least 48 ounces of water a day.  Parathyroid hormone levels are low.  Usually Prolia causes an elevation of the PTH levels.  Would recommend following up with endocrine regarding both of these values.  She may want some more blood work regarding this.

## 2024-12-17 NOTE — TELEPHONE ENCOUNTER
"Patient: Robert Henry    Procedure Summary       Date: 12/17/24 Room / Location: [facility] ENDOSCOPY 5 / [facility]    Anesthesia Start: 0933 Anesthesia Stop: 1008    Procedure: COLONOSCOPY WITH ANESTHESIA Diagnosis:       Colitis      (Colitis [K52.9])    Surgeons: Sukumar Soria MD Provider: Xavi Olvera CRNA    Anesthesia Type: MAC ASA Status: 2            Anesthesia Type: MAC    Vitals  Vitals Value Taken Time   BP 80/56 12/17/24 1006   Temp     Pulse 92 12/17/24 1008   Resp 16 12/17/24 1003   SpO2 95 % 12/17/24 1008   Vitals shown include unfiled device data.        Post Anesthesia Care and Evaluation    Patient location during evaluation: PHASE II  Patient participation: complete - patient participated  Level of consciousness: awake and alert  Pain score: 0  Pain management: adequate    Airway patency: patent  Anesthetic complications: No anesthetic complications  PONV Status: none  Cardiovascular status: acceptable  Respiratory status: acceptable  Hydration status: acceptable    Comments: Blood pressure (!) 82/56, pulse 100, temperature 97 °F (36.1 °C), temperature source Temporal, resp. rate 16, height 172.7 cm (68\"), weight 91.6 kg (202 lb), SpO2 95%.    Pt discharged from PACU based on zain score >8    " Possible blader infect stared Sunday night,  bladder infection feels like i have to pee all the time pt said.   Denies chills, body aches, or fever

## 2024-12-18 NOTE — TELEPHONE ENCOUNTER
Dr. Liu,  Spoke to patient to relay message below - patient stated understanding  Patient stated she takes 600mg calcium (1 tablet) daily and 2000 units vitamin D daily  Please advise -thanks

## 2024-12-18 NOTE — TELEPHONE ENCOUNTER
Hi!    Please let patient know that I have been following her labs tests for the last couple of years and I am posting the pattern here:    Date PTH Vit D Corrected Ca Creatinine Alk Phos  12/24 11.7 45.2 10.54  1.06  7.6  07/24  37.9 9.18  1.09  05/24 15.7 44.7 9.84  1.12  9.1   10/23 27.7 56.3 9.46  1.19  9.8    Please ask patient if she is taking calcium pills? Also if she is taking vitamin D? Please reassure her that all labs are stable and her kidney function is actually improving.     Thank you!

## 2024-12-19 NOTE — TELEPHONE ENCOUNTER
Hi!  Please let her know that she may continue this, and if she does not mind, I would like to recheck labs before she sees me again. Ask her to keep well-hydrated. Thank you!

## 2024-12-19 NOTE — TELEPHONE ENCOUNTER
Spoke to pt. Provided recommendations written below by Dr. Liu. Pt verbalized understanding. Pt asked if dental cleanings are okay for prolia, RN states that dental cleanings are fine but nothing invasive. In that instance, our office would have to be notified.

## 2025-01-07 ENCOUNTER — LAB ENCOUNTER (OUTPATIENT)
Dept: LAB | Age: 80
End: 2025-01-07
Attending: INTERNAL MEDICINE
Payer: MEDICARE

## 2025-01-07 DIAGNOSIS — E55.9 VITAMIN D DEFICIENCY: ICD-10-CM

## 2025-01-07 DIAGNOSIS — L60.8 CHANGE IN NAIL APPEARANCE: ICD-10-CM

## 2025-01-07 DIAGNOSIS — E83.52 HYPERCALCEMIA: ICD-10-CM

## 2025-01-07 LAB
ALBUMIN SERPL-MCNC: 4.7 G/DL (ref 3.2–4.8)
ALBUMIN/GLOB SERPL: 2.1 {RATIO} (ref 1–2)
ALP LIVER SERPL-CCNC: 31 U/L
ALT SERPL-CCNC: 19 U/L
ANION GAP SERPL CALC-SCNC: 9 MMOL/L (ref 0–18)
AST SERPL-CCNC: 28 U/L (ref ?–34)
BILIRUB SERPL-MCNC: 0.4 MG/DL (ref 0.2–1.1)
BUN BLD-MCNC: 23 MG/DL (ref 9–23)
BUN/CREAT SERPL: 20.2 (ref 10–20)
CALCIUM BLD-MCNC: 10.2 MG/DL (ref 8.7–10.4)
CHLORIDE SERPL-SCNC: 103 MMOL/L (ref 98–112)
CO2 SERPL-SCNC: 28 MMOL/L (ref 21–32)
CREAT BLD-MCNC: 1.14 MG/DL
EGFRCR SERPLBLD CKD-EPI 2021: 48 ML/MIN/1.73M2 (ref 60–?)
FASTING STATUS PATIENT QL REPORTED: YES
GLOBULIN PLAS-MCNC: 2.2 G/DL (ref 2–3.5)
GLUCOSE BLD-MCNC: 119 MG/DL (ref 70–99)
OSMOLALITY SERPL CALC.SUM OF ELEC: 295 MOSM/KG (ref 275–295)
POTASSIUM SERPL-SCNC: 4.5 MMOL/L (ref 3.5–5.1)
PROT SERPL-MCNC: 6.9 G/DL (ref 5.7–8.2)
SODIUM SERPL-SCNC: 140 MMOL/L (ref 136–145)
VIT B12 SERPL-MCNC: 1319 PG/ML (ref 211–911)
VIT D+METAB SERPL-MCNC: 49.3 NG/ML (ref 30–100)

## 2025-01-07 PROCEDURE — 36415 COLL VENOUS BLD VENIPUNCTURE: CPT

## 2025-01-07 PROCEDURE — 80053 COMPREHEN METABOLIC PANEL: CPT

## 2025-01-07 PROCEDURE — 82306 VITAMIN D 25 HYDROXY: CPT

## 2025-01-07 PROCEDURE — 82330 ASSAY OF CALCIUM: CPT

## 2025-01-07 PROCEDURE — 82607 VITAMIN B-12: CPT

## 2025-01-08 LAB — LC CALCIUM, IONIZED: 5.3 MG/DL

## 2025-01-13 ENCOUNTER — OFFICE VISIT (OUTPATIENT)
Dept: ENDOCRINOLOGY CLINIC | Facility: CLINIC | Age: 80
End: 2025-01-13

## 2025-01-13 VITALS
HEART RATE: 55 BPM | BODY MASS INDEX: 20.8 KG/M2 | WEIGHT: 113 LBS | SYSTOLIC BLOOD PRESSURE: 134 MMHG | HEIGHT: 62 IN | DIASTOLIC BLOOD PRESSURE: 71 MMHG

## 2025-01-13 DIAGNOSIS — M81.0 AGE-RELATED OSTEOPOROSIS WITHOUT CURRENT PATHOLOGICAL FRACTURE: ICD-10-CM

## 2025-01-13 DIAGNOSIS — E55.9 VITAMIN D DEFICIENCY: Primary | ICD-10-CM

## 2025-01-13 PROCEDURE — 3008F BODY MASS INDEX DOCD: CPT | Performed by: INTERNAL MEDICINE

## 2025-01-13 PROCEDURE — 1159F MED LIST DOCD IN RCRD: CPT | Performed by: INTERNAL MEDICINE

## 2025-01-13 PROCEDURE — 3075F SYST BP GE 130 - 139MM HG: CPT | Performed by: INTERNAL MEDICINE

## 2025-01-13 PROCEDURE — 3078F DIAST BP <80 MM HG: CPT | Performed by: INTERNAL MEDICINE

## 2025-01-13 PROCEDURE — 99214 OFFICE O/P EST MOD 30 MIN: CPT | Performed by: INTERNAL MEDICINE

## 2025-01-13 NOTE — PROGRESS NOTES
Follow-up- Reason for Visit:  Osteoporosis.  Requesting Physician: Dr. Medina.    CHIEF COMPLAINT:    Chief Complaint   Patient presents with    Osteoporosis     Pt denies any recent falls/fractures/ or dental work          HISTORY OF PRESENT ILLNESS:   Melonie Sams is a 79 year old female who presents with osteoporosis. She had evidence of osteopenia for several years. We had decided to start her on Prolia. She has had 5 shots so far and is here to go over the test results that she had in December.  Her calcium level was elevated when we checked it a month ago. The calcium level is now normal.     Shot #1- 9/09/22  Shot #2- 4/25/23  Shot #3- 11/20/23  Shot #4- 05/29/24  Shot #5- 12/10/24    She had a fall after her stent placement.  She eats healthy and she takes vitamin D as well as supplements.  Labs have been completed.    FRACTURE HISTORY  o Low trauma, atraumatic or high trauma (specifics regarding circumstances of fracture) None    o Fall-related fracture and circumstances of fall    o Prior history of fracture(s)     o Site, age at fracture, interventions    o Prior history of osteoporosis    o Prior history of osteoporosis treatment (medication, age or date used, duration of use, pre or postmenopausal ,when used, and reason for discontinuation); She had been on Fosamax for about 5 years about 10 years ago and then stopped this.     o Prolonged history of steroids, aromatase inhibitors, anticonvulsants, and other meds that may affect skeleton- Received steroid injections last few years 1-2 times a year    o Chronic use vs. intermittent use with dates of usage    o Secondary conditions associated with osteoporosis    o DXA tests in past ( age or date when performed and results) 7/24/24  PROCEDURE: XR DEXA BONE DENSITOMETRY (CPT=77080)     COMPARISON: [facility], XR DEXA BONE DENSITOMETRY (CPT=77080), 6/10/2022, 7:43 AM.     INDICATIONS: M79.0 Age-related osteoporosis without  current pathological fracture     TECHNIQUE: Measurement of bone mineral density of the lumbar spine and hip was performed on a Hologic dual energy x-ray absorptiometry scanner.     FINDINGS:     LEFT FEMORAL NECK  BMD: 0.568 gm/sq. cm. T SCORE: -2.5 Z SCORE: -0.2     LEFT TOTAL HIP  BMD: 0.693 gm/sq. cm. T SCORE: -2.0 Z SCORE: 0.1     PA LUMBAR SPINE (L1 - L4)  BMD: 1.067 gm/sq. cm. T SCORE: 0.2 Z SCORE: 2.9        T scores are a comparison to sex-matched patients with mean peak bone mass and are given in standard deviation (s.d.).  Each 1 s.d. corresponds to approximately 10% below peak normal bone density.       WORLD HEALTH ORGANIZATION CRITERIA  NORMAL T SCORE: Above -1 s.d.    OSTEOPENIA T SCORE: Between -1 and -2.5 s.d.    OSTEOPOROSIS T SCORE: -2.5 s.d.     National Osteoporosis Foundation Clinician's Guide to Prevention and Treatment of Osteoporosis recommendations for treatment:  Post menopausal women and men age 50 and older presenting with the following should be considered for treatment:  * A hip or vertebral (clinical or morphometric) fracture  * T score < -2.5 at the femoral neck or spine after appropriate evaluation to exclude secondary causes.  * Low bone mass (T score between -1.0 and -2.5 at the femoral neck or spine) and a 10-year probability of a hip fracture > 3% or a 10-year probability of a major osteoporosis-related fracture > 20% based on the [place]-adapted WHO algorithm               Impression   CONCLUSION:  1. Findings in the left femoral neck suggest osteoporosis, and there is increased fracture risk.  There has been increase in the BMD by 0.3% from previous study.  Findings in the total left hip suggest osteopenia, there may be increased fracture risk.    There has been decrease in the BMD by 4.8% from previous study.  The 10 year fracture risk for major osteoporotic fracture is 18%, and for hip fracture is 6.3%.  2. Findings in the total AP lumbar spine are in the normal range, and there  is no increased fracture risk.  There has been increase in the BMD by 6.6% from previous study.           Dictated by (CST): Kar Amaya MD on 2024 at 6:11 PM      Finalized by (CST): Kar Amaya MD on 2024 at 6:13 PM             REPRODUCTIVE HISTORY    o Menarche age   7  o Regular/Irregular menses   irregular  o History of amenorrhea  Yes, she would skip period for 3 months  o [facility]    o Breastfeeding  none  o Contraceptives? Infertility?  Hormonal contraceptives and the Copper-T  o Natural menopause age   43  o Hormone therapy (start and stop)  Started premarin for a few years  o Concurrent diseases and medications    PAST MEDICAL HISTORY:   Past Medical History:    ANXIETY    Appendicitis    Asthma (HCC)    Constipation    Coronary atherosclerosis    DEPRESSION    Diabetes (HCC)    Essential hypertension    Generalized OA    Heart disease    HYPERLIPIDEMIA    Hyperlipidemia    Multiple allergies    Osteoarthritis    Ovarian cyst    Pneumonia due to organism    ST segment changes on electrocardiogram    TWI new in V 3-V6. Cath.: medical Rx.     Visual impairment    glasses       SURGICAL HISTORY  Past Surgical History:   Procedure Laterality Date    Appendectomy      Cataract Bilateral 2019    Dr. Cris Rosario at [facility].     Cath bare metal stent (bms)      Colonoscopy      nml    Electrocardiogram, complete  2014    SCANNED TO MEDIA TAB - 2014       SOCIAL HISTORY    o Country of origin     o Tobacco  none  o Alcohol  socially  o Daily calcium intake (food and supplements)  Yes- supplements  o Daily exercise  3 times a week- weights, bike, sitting elliptical  FAMILY HISTORY   Family History   Problem Relation Age of Onset    Stroke Father     Heart Disorder Father     Allergies Father     Cancer Mother         ? source- widespread at diagnosis    Other (goiter) Daughter     Other (Celiac) Daughter     Breast Cancer Paternal Aunt 68        age at dx 68     Family  history of fractures, osteoporosis, osteopenia  Mother had osteoporosis, no hip fracture    CURRENT MEDICATIONS:    Current Outpatient Medications   Medication Sig Dispense Refill    metFORMIN HCl 1000 MG Oral Tab Take 1 tablet (1,000 mg total) by mouth daily with dinner. 90 tablet 3    metoprolol tartrate 25 MG Oral Tab Take 1 tablet (25 mg total) by mouth 2 (two) times daily. 180 tablet 3    clopidogrel 75 MG Oral Tab Take 1 tablet (75 mg total) by mouth daily. 90 tablet 3    semaglutide 4 MG/3ML Subcutaneous Solution Pen-injector Inject 1 mg into the skin once a week. 9 mL 0    levothyroxine (SYNTHROID) 88 MCG Oral Tab Take 1 tablet (88 mcg total) by mouth nightly. 90 tablet 3    folic acid 1 MG Oral Tab Take 1 tablet (1 mg total) by mouth daily. 90 tablet 3    Glucose Blood (ONETOUCH ULTRA) In Vitro Strip Use 1 new strip by in vitro route 3 (three) times daily for blood glucose monitoring 300 strip 3    Lancet Devices (GlovicoUCH DELICA PLUS LANCING) Does not apply Misc 1 Lancet by Finger stick route 3 (three) times daily. 1 each 1    Lancets (ONETOUCH DELICA PLUS [id number]) Does not apply Misc 1 Lancet by Finger stick route 3 (three) times daily. 300 each 3    atorvastatin 40 MG Oral Tab Take 1 tablet (40 mg total) by mouth nightly. 90 tablet 3    fenofibrate 145 MG Oral Tab TAKE 1 TABLET ONCE DAILY 90 tablet 3    venlafaxine ER 37.5 MG Oral Capsule SR 24 Hr Take 1 capsule (37.5 mg total) by mouth nightly. 90 capsule 3    levocetirizine 5 MG Oral Tab Take 1 tablet (5 mg total) by mouth nightly. 90 tablet 3    amLODIPine 2.5 MG Oral Tab Take 1 tablet (2.5 mg total) by mouth 2 (two) times daily. 180 tablet 3    iron polysacch dprmc-P01--0.025-1 MG Oral Cap Take 1 capsule by mouth daily. 30 capsule 1    aspirin 81 MG Oral Tab EC Take 1 tablet (81 mg total) by mouth in the morning and 1 tablet (81 mg total) before bedtime. 1 tablet 0    Multiple Vitamin (ONE-DAILY MULTI VITAMINS) Oral Tab Take 1 tablet by mouth  daily.      furosemide 20 MG Oral Tab Take 0.5 tablets (10 mg total) by mouth daily as needed.      lisinopril 20 MG Oral Tab Take 1 tablet (20 mg total) by mouth daily.      Selenium 200 MCG Oral Tab Take 1 tablet (200 mcg total) by mouth daily.      FIBER ADULT GUMMIES OR Take 2 tablets by mouth at bedtime.      EPINEPHrine 0.3 MG/0.3ML Injection Solution Auto-injector Use as directed. 1 each 0    Glucose Blood (ONETOUCH ULTRA) In Vitro Strip Test blood sugars 3 times daily 300 strip 3    ALBUTEROL SULFATE  (90 Base) MCG/ACT Inhalation Aero Soln INHALE 2 PUFFS INTO THE LUNGS EVERY 6 HOURS AS NEEDED FOR WHEEZING 25.5 g 0    Coenzyme Q10 (COQ10) 200 MG Oral Cap Take 200 mg by mouth daily.      Vitamin B-12 1000 MCG Oral Tab Take 1 tablet (1,000 mcg total) by mouth daily.      DHA-EPA-Vitamin E (OMEGA-3 COMPLEX OR) Take 1 capsule by mouth daily.      Multiple Vitamins-Iron (ONCE DAILY/IRON) Oral Tab Take 1 tablet by mouth daily.      Cholecalciferol 50 MCG (2000 UT) Oral Tab Take 1 tablet (2,000 Units total) by mouth daily.      VITAMIN E 400 UNIT OR TABS Take 1 tablet by mouth daily.      VITAMIN C 500 MG OR TABS Take 1 tablet (500 mg total) by mouth daily.      TYLENOL ARTHRITIS PAIN OR Take by mouth.      ZINC 50 MG OR TABS Take 1 tablet by mouth daily.         ALLERGIES:  Allergies   Allergen Reactions    Bees SWELLING    Glimepiride HYPOTENSION     Hypoglycemia, bradycardia    Gluten Meal OTHER (SEE COMMENTS)     Causes abdominal pain    Ibuprofen PALPITATIONS     Rapid heart beat per pt     Nsaids PALPITATIONS and OTHER (SEE COMMENTS)     Per patient, takes baby aspirin at home with no adverse effects noted    Sulfa Antibiotics SWELLING         ASSESSMENTS:   PAIN ASSESSMENT: (Patient reports pain) lower back pain    PAIN SCALE: (0-10, please indicate if applicable):  4    FALL ASSESSMENT: very steady    FUNCTIONAL ASSESSMENT (Able to perform all ADLs):  yes    REVIEW OF SYSTEMS:  No falls in past 12  months, no loss of height   Constitutional: Negative for: weight change, fever, fatigue, cold/heat intolerance  Eyes: Negative for:  Visual changes, proptosis, blurring  ENT: Negative for:  dysphagia, neck swelling, dysphonia  Respiratory: Negative for:  dyspnea, cough  Cardiovascular: Negative for:  chest pain, palpitations, orthopnea  GI: Negative for:  abdominal pain, nausea, vomiting, diarrhea, constipation, bleeding  Neurology: Negative for: headache, numbness, weakness  Genito-Urinary: Negative for: dysuria, frequency  Psychiatric: Negative for:  depression, anxiety  Hematology/Lymphatics: Negative for: bruising, lower extremity edema  Endocrine: Negative for: polyuria, polydypsia  Skin: Negative for: rash, blister, cellulitis,      PHYSICAL EXAM:   Vitals:    01/13/25 0940 01/13/25 0955   BP: (!) 172/44 134/71   Pulse: 58 55   Weight: 113 lb (51.3 kg)    Height: 5' 2\" (1.575 m)        BMI: Body mass index is 20.67 kg/m².     CONSTITUTIONAL:  awake, alert, cooperative, no apparent distress, and appears stated age  SKIN:  no bruising or bleeding, no rashes and no lesions  EXTREMITIES:normal pulses, no edema      DATA:   CMP:    Lab Results   Component Value Date     01/07/2025    K 4.5 01/07/2025     01/07/2025    CO2 28.0 01/07/2025    BUN 23 01/07/2025     (H) 01/07/2025    ALB 4.7 01/07/2025    CA 10.2 01/07/2025     FLP (Lipid Profile):    Lab Results   Component Value Date    TRIG 113 07/15/2024    HDL 50 07/15/2024     LDL direct : No components found for: \"LDLDIRECT\"  Microalbumin/Creatinine ratio:  No components found for: \"RUCREAT\", \"RUMICROAL\", \"MCRATIO\"  TSH:    Lab Results   Component Value Date    TSH 0.998 07/15/2024       ASSESSMENT AND PLAN: This is a 79 year-old woman here for follow-up of management of osteoporosis that has been diagnosed. She is now on Prolia. She will receive the 6th dose in 5 months.  She will have the following blood tests completed in 5 months.      -  PTH  - CMP  - Bone specific alkaline phosphatase  - Vitamin D        Prior to this encounter, I spent over 15 minutes with preparing for the visit, including reviewing documents from other specialties as well as from PCP and going over test results. During the face to face encounter, I spent an additional 15 minutes which were determined for follow-up. Greater than 50% of the time was spent in counseling, anticipatory guidance, and coordination of care. Patient concerns were answered to the best of my knowledge.       1/13/25  Bhavya Liu MD

## 2025-01-24 ENCOUNTER — TELEPHONE (OUTPATIENT)
Dept: INTERNAL MEDICINE CLINIC | Facility: CLINIC | Age: 80
End: 2025-01-24

## 2025-01-24 NOTE — TELEPHONE ENCOUNTER
Upon review of chart, folic acid is still an active medication on patient's chart.    Verified name and .    Patient was advised that medication has not been discontinued by Dr. Medina- she states that she will continue the medication.       Disp Refills Start End    folic acid 1 MG Oral Tab 90 tablet 3 2024 --    Sig - Route: Take 1 tablet (1 mg total) by mouth daily. - Oral    Sent to pharmacy as: Folic Acid 1 MG Oral Tablet (Folvite)    E-Prescribing Status: Receipt confirmed by pharmacy (2024  9:40 AM CDT)      Pharmacy    [id number] - [place], [place] - [address], [phone], [phone]

## 2025-02-24 ENCOUNTER — TELEPHONE (OUTPATIENT)
Dept: INTERNAL MEDICINE CLINIC | Facility: CLINIC | Age: 80
End: 2025-02-24

## 2025-02-24 NOTE — TELEPHONE ENCOUNTER
Pt called she's in [place] , stated misplaced Rx Lisinopril 20 mg - take BID, not on current med list, ordered by Cardiology, Dr Hinds, relayed cardiology number [phone]

## 2025-03-19 RX ORDER — AMLODIPINE BESYLATE 2.5 MG/1
2.5 TABLET ORAL 2 TIMES DAILY
Qty: 180 TABLET | Refills: 3 | Status: SHIPPED | OUTPATIENT
Start: 2025-03-19

## 2025-04-02 RX ORDER — LEVOCETIRIZINE DIHYDROCHLORIDE 5 MG/1
5 TABLET, FILM COATED ORAL NIGHTLY
Qty: 90 TABLET | Refills: 3 | Status: SHIPPED | OUTPATIENT
Start: 2025-04-02

## 2025-04-02 NOTE — TELEPHONE ENCOUNTER
Refill Per Protocol     Requested Prescriptions   Pending Prescriptions Disp Refills    LEVOCETIRIZINE 5 MG Oral Tab [Pharmacy Med Name: LEVOCETIRIZI TAB 5MG] 90 tablet 3     Sig: TAKE 1 TABLET NIGHTLY       Allergy Medication Protocol Passed - 4/2/2025 12:01 PM        Passed - In person appointment or virtual visit in the past 12 mos or appointment in next 3 mos     Recent Outpatient Visits              2 months ago Vitamin D deficiency    [facility], Matthew Ontiveros Sudha K, MD    Office Visit    3 months ago Age-related osteoporosis without current pathological fracture    [facility]    Nurse Only    5 months ago Acquired hypothyroidism    [facility] Mindy Medina MD    Office Visit    8 months ago BP check    [facility]    Nurse Only    8 months ago Vitreomacular adhesion of right eye    [facility] Mindy Medina MD    Office Visit          Future Appointments         Provider Department Appt Notes    In 2 months [facility] [address], [place] Patient requested we scan her order in on file because she will lose it by June. This is an order from [facility] and she needs her lab done in June. So I created a placeholding appointment. ~Armin    In 2 months GENO SPEARS RN [facility]    In 3 months Mindy Medina MD [facility]                     Passed - Medication is active on med list

## 2025-04-22 RX ORDER — ATORVASTATIN CALCIUM 40 MG/1
40 TABLET, FILM COATED ORAL NIGHTLY
Qty: 90 TABLET | Refills: 3 | Status: SHIPPED | OUTPATIENT
Start: 2025-04-22

## 2025-04-22 RX ORDER — VENLAFAXINE HYDROCHLORIDE 37.5 MG/1
37.5 CAPSULE, EXTENDED RELEASE ORAL NIGHTLY
Qty: 90 CAPSULE | Refills: 3 | Status: SHIPPED | OUTPATIENT
Start: 2025-04-22

## 2025-05-15 ENCOUNTER — TELEPHONE (OUTPATIENT)
Facility: LOCATION | Age: 80
End: 2025-05-15

## 2025-05-15 NOTE — TELEPHONE ENCOUNTER
Submitted prolia IV via [employer]. Awaiting 3-5 business days. Patient has an appointment on 6/12/25 with RN.

## 2025-05-16 NOTE — TELEPHONE ENCOUNTER
Received pt's Prolia SOB via [employer]     PA Required:YES   Prolia OOP COST: $20  Facility Fee:NA  Admin Fee: $20        PA needed will submit through the [facility] portal.

## 2025-05-27 RX ORDER — FENOFIBRATE 145 MG/1
145 TABLET, FILM COATED ORAL DAILY
Qty: 90 TABLET | Refills: 3 | Status: SHIPPED | OUTPATIENT
Start: 2025-05-27

## 2025-05-27 NOTE — TELEPHONE ENCOUNTER
Refill passes per [facility] protocol.    Future Appointments   Date Time Provider Department Center   7/24/2025  2:30 PM Mindy Medina MD [id number]  [place]

## 2025-06-05 ENCOUNTER — LAB ENCOUNTER (OUTPATIENT)
Dept: LAB | Age: 80
End: 2025-06-05
Attending: NURSE PRACTITIONER
Payer: MEDICARE

## 2025-06-05 DIAGNOSIS — E55.9 VITAMIN D DEFICIENCY: ICD-10-CM

## 2025-06-05 DIAGNOSIS — I10 ESSENTIAL HYPERTENSION: Primary | ICD-10-CM

## 2025-06-05 DIAGNOSIS — M81.0 AGE-RELATED OSTEOPOROSIS WITHOUT CURRENT PATHOLOGICAL FRACTURE: ICD-10-CM

## 2025-06-05 DIAGNOSIS — D64.9 ANEMIA, UNSPECIFIED TYPE: ICD-10-CM

## 2025-06-05 LAB
ALBUMIN SERPL-MCNC: 4.7 G/DL (ref 3.2–4.8)
ALBUMIN/GLOB SERPL: 2.2 {RATIO} (ref 1–2)
ALP LIVER SERPL-CCNC: 26 U/L (ref 55–142)
ALT SERPL-CCNC: 30 U/L (ref 10–49)
ANION GAP SERPL CALC-SCNC: 8 MMOL/L (ref 0–18)
AST SERPL-CCNC: 35 U/L (ref ?–34)
BILIRUB SERPL-MCNC: 0.3 MG/DL (ref 0.2–1.1)
BUN BLD-MCNC: 25 MG/DL (ref 9–23)
BUN/CREAT SERPL: 20.8 (ref 10–20)
CALCIUM BLD-MCNC: 10.1 MG/DL (ref 8.7–10.4)
CHLORIDE SERPL-SCNC: 104 MMOL/L (ref 98–112)
CHOLEST SERPL-MCNC: 140 MG/DL (ref ?–200)
CO2 SERPL-SCNC: 28 MMOL/L (ref 21–32)
CREAT BLD-MCNC: 1.2 MG/DL (ref 0.55–1.02)
EGFRCR SERPLBLD CKD-EPI 2021: 45 ML/MIN/1.73M2 (ref 60–?)
FASTING PATIENT LIPID ANSWER: YES
FASTING STATUS PATIENT QL REPORTED: YES
GLOBULIN PLAS-MCNC: 2.1 G/DL (ref 2–3.5)
GLUCOSE BLD-MCNC: 101 MG/DL (ref 70–99)
HDLC SERPL-MCNC: 54 MG/DL (ref 40–59)
LDLC SERPL CALC-MCNC: 68 MG/DL (ref ?–100)
NONHDLC SERPL-MCNC: 86 MG/DL (ref ?–130)
OSMOLALITY SERPL CALC.SUM OF ELEC: 295 MOSM/KG (ref 275–295)
POTASSIUM SERPL-SCNC: 4.2 MMOL/L (ref 3.5–5.1)
PROT SERPL-MCNC: 6.8 G/DL (ref 5.7–8.2)
PTH-INTACT SERPL-MCNC: 16.2 PG/ML (ref 18.5–88)
SODIUM SERPL-SCNC: 140 MMOL/L (ref 136–145)
TRIGL SERPL-MCNC: 100 MG/DL (ref 30–149)
VIT B12 SERPL-MCNC: 1826 PG/ML (ref 211–911)
VIT D+METAB SERPL-MCNC: 51.6 NG/ML (ref 30–100)
VLDLC SERPL CALC-MCNC: 15 MG/DL (ref 0–30)

## 2025-06-05 PROCEDURE — 83970 ASSAY OF PARATHORMONE: CPT

## 2025-06-05 PROCEDURE — 80053 COMPREHEN METABOLIC PANEL: CPT

## 2025-06-05 PROCEDURE — 82607 VITAMIN B-12: CPT

## 2025-06-05 PROCEDURE — 80061 LIPID PANEL: CPT

## 2025-06-05 PROCEDURE — 82306 VITAMIN D 25 HYDROXY: CPT

## 2025-06-05 PROCEDURE — 36415 COLL VENOUS BLD VENIPUNCTURE: CPT

## 2025-06-05 PROCEDURE — 84080 ASSAY ALKALINE PHOSPHATASES: CPT

## 2025-06-10 ENCOUNTER — TELEPHONE (OUTPATIENT)
Dept: ENDOCRINOLOGY CLINIC | Facility: CLINIC | Age: 80
End: 2025-06-10

## 2025-06-10 DIAGNOSIS — M81.0 AGE-RELATED OSTEOPOROSIS WITHOUT CURRENT PATHOLOGICAL FRACTURE: Primary | ICD-10-CM

## 2025-06-10 DIAGNOSIS — E55.9 VITAMIN D DEFICIENCY: ICD-10-CM

## 2025-06-10 DIAGNOSIS — N28.9 RENAL INSUFFICIENCY: Primary | ICD-10-CM

## 2025-06-10 LAB — ALKALINE PHOSPHATASE BONE SPECIFIC: 7.4 UG/L

## 2025-06-11 NOTE — TELEPHONE ENCOUNTER
Called patient in regards to message below ( identified name and date of birth )     She is out and asking for the information to be sent to her MyChart     Sent as requested

## 2025-06-11 NOTE — TELEPHONE ENCOUNTER
Andrew Medina!    I am seeing Ms. Strjoyce for osteoporosis treatment, and as a result, am checking her kidney function regularly. I have noted that it has deteriorated over the past year. I wanted to bring this to your notice.     Bhavya

## 2025-06-11 NOTE — TELEPHONE ENCOUNTER
Hi!    Please let patient know that I have reviewed her blood tests, and they are all within normal limits, except that her creatinine level is going up progressively.     I will be ordering labs for her in 6 months. I will also be communicating with Dr. Medina about her deteriorating kidney function. Thank you!

## 2025-06-11 NOTE — TELEPHONE ENCOUNTER
Kidney function is worsening.No motrin, ibuprofen, advil, alleve, naprosyn  with these medications.  Hydrate at least 48 ounces of water a day.  I would recommend following up with the kidney doctor.  Would also recommend checking renal ultrasound.

## 2025-06-12 ENCOUNTER — NURSE ONLY (OUTPATIENT)
Dept: ENDOCRINOLOGY CLINIC | Facility: CLINIC | Age: 80
End: 2025-06-12
Payer: COMMERCIAL

## 2025-06-12 DIAGNOSIS — M81.0 AGE-RELATED OSTEOPOROSIS WITHOUT CURRENT PATHOLOGICAL FRACTURE: Primary | ICD-10-CM

## 2025-06-12 PROCEDURE — 96372 THER/PROPH/DIAG INJ SC/IM: CPT | Performed by: INTERNAL MEDICINE

## 2025-06-12 NOTE — PROGRESS NOTES
Patient seen today for prolia injection. Injection given to the Left Upper Arm. Patient tolerated well.    Discussed with the patient if he/she plans on having any major dental work done to make sure their dentist and endocrinology provider is aware that they are taking prolia prior to treatment. This is due to increased risk of osteonecrosis or infection of the jaw while on this medication.     Today this is the 6th injection.  Last prolia injection on: 12/10/2024  Summary of benefits / PA required/completed: see Telephone Encounter 5/15/2025  Last Prolia protocol labs: 6/5/2025  Last Dexa scan: 7/24/2024     Next Prolia injection in 6 month follow up patient chose to schedule with .   Next labs due: 1 week before next Prolia - patient aware  Future labs ordered: already in place.      Staff message placed to MA pool ([facility] Medical Assistant) to perform repeat prolia insurance check in 4 months.     Patient states understanding of all shared information above - All questions answered - denied having any additional questions.

## 2025-06-24 ENCOUNTER — HOSPITAL ENCOUNTER (OUTPATIENT)
Age: 80
Discharge: HOME | End: 2025-06-24
Attending: EMERGENCY MEDICINE
Payer: MEDICARE

## 2025-06-24 ENCOUNTER — HOSPITAL ENCOUNTER (OUTPATIENT)
Dept: ULTRASOUND IMAGING | Age: 80
Discharge: HOME | End: 2025-06-24
Attending: INTERNAL MEDICINE
Payer: MEDICARE

## 2025-06-24 ENCOUNTER — APPOINTMENT (OUTPATIENT)
Dept: GENERAL RADIOLOGY | Age: 80
End: 2025-06-24
Attending: EMERGENCY MEDICINE
Payer: MEDICARE

## 2025-06-24 VITALS
OXYGEN SATURATION: 100 % | DIASTOLIC BLOOD PRESSURE: 44 MMHG | TEMPERATURE: 98 F | RESPIRATION RATE: 20 BRPM | HEART RATE: 60 BPM | SYSTOLIC BLOOD PRESSURE: 148 MMHG

## 2025-06-24 DIAGNOSIS — N28.9 RENAL INSUFFICIENCY: ICD-10-CM

## 2025-06-24 DIAGNOSIS — J06.9 VIRAL URI WITH COUGH: Primary | ICD-10-CM

## 2025-06-24 LAB
S PYO AG THROAT QL IA.RAPID: NEGATIVE
SARS-COV-2 RNA RESP QL NAA+PROBE: NOT DETECTED

## 2025-06-24 PROCEDURE — 87651 STREP A DNA AMP PROBE: CPT | Performed by: EMERGENCY MEDICINE

## 2025-06-24 PROCEDURE — 76775 US EXAM ABDO BACK WALL LIM: CPT | Performed by: STUDENT IN AN ORGANIZED HEALTH CARE EDUCATION/TRAINING PROGRAM

## 2025-06-24 PROCEDURE — 99204 OFFICE O/P NEW MOD 45 MIN: CPT

## 2025-06-24 PROCEDURE — 99213 OFFICE O/P EST LOW 20 MIN: CPT

## 2025-06-24 PROCEDURE — 71046 X-RAY EXAM CHEST 2 VIEWS: CPT | Performed by: STUDENT IN AN ORGANIZED HEALTH CARE EDUCATION/TRAINING PROGRAM

## 2025-06-24 NOTE — ED PROVIDER NOTES
Patient Seen in: [facility] [place]        History  Chief Complaint   Patient presents with    Sore Throat     Stated Complaint: congestion    Subjective:   HPI    The patient is an 80-year-old female with past history of hypertension, diabetes, asthma who presents now with sore throat, congestion, cough.  The patient states the symptoms started last Friday.  Patient states she initially developed a sore throat.  The patient subsequently developed some nasal congestion and cough.  The patient denies any fever.  Patient denies any chest pain.  The patient denies any shortness of breath.  Patient was with her grandchildren the last few weekends who are \"always sick\".      Objective:     Past Medical History:    ANXIETY    Appendicitis    Asthma (HCC)    Constipation    Coronary atherosclerosis    DEPRESSION    Diabetes (HCC)    Essential hypertension    Generalized OA    Heart disease    HYPERLIPIDEMIA    Hyperlipidemia    Multiple allergies    Osteoarthritis    Ovarian cyst    Pneumonia due to organism    ST segment changes on electrocardiogram    TWI new in V 3-V6. Cath.: medical Rx.     Visual impairment    glasses              Past Surgical History:   Procedure Laterality Date    Appendectomy      Cataract Bilateral 01/28/2019    Dr. Cris Rosario at [facility].     Cath bare metal stent (bms)      Colonoscopy  2009    nml    Electrocardiogram, complete  02/07/2014    SCANNED TO MEDIA TAB - 02/07/2014                Social History     Socioeconomic History    Marital status: Single   Tobacco Use    Smoking status: Never    Smokeless tobacco: Never   Vaping Use    Vaping status: Never Used   Substance and Sexual Activity    Alcohol use: Yes     Alcohol/week: 1.0 standard drink of alcohol     Types: 1 Glasses of wine per week     Comment: 1 glass red wine a day    Drug use: No   Other Topics Concern    Caffeine Concern Yes     Comment: Coffee, 2 cups per day      Social Drivers of Health      Received from [facility]     [facility]              Review of Systems    Positive for stated complaint: congestion  Other systems are as noted in HPI.  Constitutional and vital signs reviewed.      All other systems reviewed and negative except as noted above.                  Physical Exam    ED Triage Vitals [06/24/25 1046]   /44   Pulse 60   Resp 20   Temp 97.8 °F (36.6 °C)   Temp src Oral   SpO2 100 %   O2 Device None (Room air)       Current Vitals:   Vital Signs  BP: 148/44  Pulse: 60  Resp: 20  Temp: 97.8 °F (36.6 °C)  Temp src: Oral    Oxygen Therapy  SpO2: 100 %  O2 Device: None (Room air)            Physical Exam    Constitutional: Well-developed well-nourished in no acute distress  Head: Normocephalic, no swelling or tenderness  Eyes: Nonicteric sclera, no conjunctival injection  ENT: TMs are clear and flat bilaterally.  There is mild posterior pharyngeal erythema  Chest: Clear to auscultation, no tenderness  Cardiovascular: Regular rate and rhythm without murmur  Abdomen: Soft, nontender and nondistended  Neurologic: Patient is awake, alert and oriented ×3.  The patient's motor strength is 5 out of 5 and symmetric in the upper and lower extremities bilaterally  Extremities: No focal swelling or tenderness  Skin: No pallor, no redness or warmth to the touch          ED Course  Labs Reviewed   RAPID STREP A - Normal   RAPID SARS-COV-2 BY PCR - Normal     Patient's chest x-ray was independently reviewed by myself.  There are no acute findings noted   Patient's negative COVID, negative strep, unremarkable chest x-ray were reviewed with the patient.  She has had symptoms for approximately 4 to 5 days.  Discussed that more likely to be viral than bacterial.  The patient is agreeable.  She declines any cough suppression stating she has some cough medicine at home.  Pulse ox is 100% on room air, normal.                  MDM     Viral URI with cough versus sore throat versus pneumonia        Medical Decision  Making      Disposition and Plan     Clinical Impression:  1. Viral URI with cough         Disposition:  Discharge  6/24/2025 11:33 am    Follow-up:  Mindy Medina MD  [address] [facility] [address]  [phone]      As needed          Medications Prescribed:  Discharge Medication List as of 6/24/2025 11:34 AM                Supplementary Documentation:

## 2025-06-25 ENCOUNTER — NURSE TRIAGE (OUTPATIENT)
Dept: INTERNAL MEDICINE CLINIC | Facility: CLINIC | Age: 80
End: 2025-06-25

## 2025-06-25 NOTE — TELEPHONE ENCOUNTER
Action Requested: Summary for Provider     []  Critical Lab, Recommendations Needed  [] Need Additional Advice  []   FYI    []   Need Orders  [] Need Medications Sent to Pharmacy  [x]  Other     SUMMARY: Verified name and .    Patient was seen in the urgent care 25 for cough and congestion symptoms that have been present since last Friday.    She is requesting home care recommendations for ongoing symptoms of cough and congestion.     Patient was advised per protocol to push fluids- water and sports drinks, use Tylenol or Ibuprofen as directed, use cough medicine with DM as directed, use cough drops, drink warm liquids with honey, use humidifier, and inhale hot steam from the shower, use nasal saline or Afrin as directed.    She was advised to call back if symptoms do not improve or worsen. Patient verbalizes understanding and agrees with plan.    Reason for call: Acute  Onset: Data Unavailable      Reason for Disposition   Sinus congestion as part of a cold, present < 10 days    Protocols used: Sinus Pain or Congestion-A-OH

## 2025-06-29 ENCOUNTER — HOSPITAL ENCOUNTER (OUTPATIENT)
Dept: CT IMAGING | Age: 80
End: 2025-06-29
Attending: INTERNAL MEDICINE
Payer: MEDICARE

## 2025-06-29 ENCOUNTER — HOSPITAL ENCOUNTER (OUTPATIENT)
Age: 80
Discharge: HOME | End: 2025-06-29
Payer: MEDICARE

## 2025-06-29 ENCOUNTER — HOSPITAL ENCOUNTER (OUTPATIENT)
Dept: CT IMAGING | Age: 80
Discharge: HOME | End: 2025-06-29
Attending: INTERNAL MEDICINE
Payer: MEDICARE

## 2025-06-29 DIAGNOSIS — R93.429 ABNORMAL RENAL ULTRASOUND: ICD-10-CM

## 2025-06-29 DIAGNOSIS — J01.90 ACUTE SINUSITIS, RECURRENCE NOT SPECIFIED, UNSPECIFIED LOCATION: Primary | ICD-10-CM

## 2025-06-29 PROCEDURE — 74176 CT ABD & PELVIS W/O CONTRAST: CPT | Performed by: INTERNAL MEDICINE

## 2025-06-29 PROCEDURE — 99214 OFFICE O/P EST MOD 30 MIN: CPT

## 2025-06-29 PROCEDURE — 99213 OFFICE O/P EST LOW 20 MIN: CPT

## 2025-06-29 RX ORDER — AZITHROMYCIN 250 MG/1
TABLET, FILM COATED ORAL
Qty: 6 TABLET | Refills: 0 | Status: SHIPPED | OUTPATIENT
Start: 2025-06-29 | End: 2025-07-04

## 2025-06-29 NOTE — ED PROVIDER NOTES
Patient Seen in: [facility]        History  Chief Complaint   Patient presents with    Cough/URI     Stated Complaint: cough, congestion    Subjective:   HPI            80-year-old female presents to immediate care with complaints of persistent cough and congestion.  She was seen in immediate care 5 days ago for the same symptoms.  She was diagnosed with a viral illness.  Patient states she was here for an ultrasound so decided she come to immediate care to get rechecked.      Objective:     Past Medical History:    ANXIETY    Appendicitis    Asthma (HCC)    Constipation    Coronary atherosclerosis    DEPRESSION    Diabetes (HCC)    Essential hypertension    Generalized OA    Heart disease    HYPERLIPIDEMIA    Hyperlipidemia    Multiple allergies    Osteoarthritis    Ovarian cyst    Pneumonia due to organism    ST segment changes on electrocardiogram    TWI new in V 3-V6. Cath.: medical Rx.     Visual impairment    glasses              Past Surgical History:   Procedure Laterality Date    Appendectomy      Cataract Bilateral 01/28/2019    Dr. Cris Rosario at [facility].     Cath bare metal stent (bms)      Colonoscopy  2009    nml    Electrocardiogram, complete  02/07/2014    SCANNED TO MEDIA TAB - 02/07/2014                No pertinent social history.            Review of Systems    Positive for stated complaint: cough, congestion  Other systems are as noted in HPI.  Constitutional and vital signs reviewed.      All other systems reviewed and negative except as noted above.                  Physical Exam    ED Triage Vitals   BP    Pulse    Resp    Temp    Temp src    SpO2    O2 Device        Current Vitals:   No data recorded        Physical Exam  Vitals reviewed.   Constitutional:       General: She is not in acute distress.  HENT:      Right Ear: Tympanic membrane, ear canal and external ear normal.      Left Ear: Tympanic membrane, ear canal and external ear normal.      Nose: Congestion present.       Mouth/Throat:      Mouth: Mucous membranes are moist.   Cardiovascular:      Rate and Rhythm: Normal rate and regular rhythm.   Pulmonary:      Effort: Pulmonary effort is normal.      Breath sounds: Normal breath sounds.   Skin:     General: Skin is warm and dry.   Neurological:      General: No focal deficit present.      Mental Status: She is alert and oriented to person, place, and time.   Psychiatric:         Mood and Affect: Mood normal.         Behavior: Behavior normal.                 ED Course  Labs Reviewed - No data to display                         MDM             Medical Decision Making  80-year-old female presents with cough and congestion.  Differential diagnosis includes viral upper respiratory infection, reactive airway disease, sinusitis.  Patient was seen in immediate care 5 days ago for the same complaints.  She was diagnosed with a viral upper respiratory infection.  She has been taking over-the-counter Robitussin but feels like her sinuses are \"blocked up.  \"We will treat for a sinus infection.  Prescription for Zithromax was sent to her pharmacy.  Patient was given verbal instructions she declined printed will follow on MyChart.    Risk  OTC drugs.  Prescription drug management.        Disposition and Plan     Clinical Impression:  1. Acute sinusitis, recurrence not specified, unspecified location         Disposition:  Discharge  6/29/2025  1:30 pm    Follow-up:  Mindy Medina [facility]  [address] [facility] [address]  [phone]      If symptoms worsen          Medications Prescribed:  Current Discharge Medication List        START taking these medications    Details   azithromycin (ZITHROMAX Z-TERI) 250 MG Oral Tab 500 mg once followed by 250 mg daily x 4 days  Qty: 6 tablet, Refills: 0                   Supplementary Documentation:

## 2025-06-29 NOTE — ED INITIAL ASSESSMENT (HPI)
Patient seen in the ic on 6/24. Cxr done at that time.  Also reports negative flu, strep and covid.  Reports persistent cough.  Denies fevers.  Hx of asthma.

## 2025-07-08 ENCOUNTER — OFFICE VISIT (OUTPATIENT)
Dept: INTERNAL MEDICINE CLINIC | Facility: CLINIC | Age: 80
End: 2025-07-08

## 2025-07-08 ENCOUNTER — TELEPHONE (OUTPATIENT)
Dept: INTERNAL MEDICINE CLINIC | Facility: CLINIC | Age: 80
End: 2025-07-08

## 2025-07-08 VITALS
BODY MASS INDEX: 18.66 KG/M2 | DIASTOLIC BLOOD PRESSURE: 73 MMHG | TEMPERATURE: 98 F | SYSTOLIC BLOOD PRESSURE: 150 MMHG | WEIGHT: 104 LBS | HEART RATE: 65 BPM | OXYGEN SATURATION: 100 % | HEIGHT: 62.5 IN

## 2025-07-08 DIAGNOSIS — R05.3 CHRONIC COUGH: ICD-10-CM

## 2025-07-08 DIAGNOSIS — D64.9 ANEMIA, UNSPECIFIED TYPE: ICD-10-CM

## 2025-07-08 DIAGNOSIS — E03.9 ACQUIRED HYPOTHYROIDISM: Primary | ICD-10-CM

## 2025-07-08 DIAGNOSIS — N18.31 STAGE 3A CHRONIC KIDNEY DISEASE (HCC): ICD-10-CM

## 2025-07-08 DIAGNOSIS — Z71.85 VACCINE COUNSELING: ICD-10-CM

## 2025-07-08 DIAGNOSIS — E55.9 VITAMIN D DEFICIENCY: ICD-10-CM

## 2025-07-08 DIAGNOSIS — I10 ESSENTIAL HYPERTENSION: ICD-10-CM

## 2025-07-08 DIAGNOSIS — E11.65 TYPE 2 DIABETES MELLITUS WITH HYPERGLYCEMIA, WITHOUT LONG-TERM CURRENT USE OF INSULIN (HCC): ICD-10-CM

## 2025-07-08 LAB
CREAT UR-SCNC: 102 MG/DL
MICROALBUMIN UR-MCNC: 0.3 MG/DL
MICROALBUMIN/CREAT 24H UR-RTO: 2.9 UG/MG (ref ?–30)

## 2025-07-08 PROCEDURE — 3008F BODY MASS INDEX DOCD: CPT | Performed by: INTERNAL MEDICINE

## 2025-07-08 PROCEDURE — 3077F SYST BP >= 140 MM HG: CPT | Performed by: INTERNAL MEDICINE

## 2025-07-08 PROCEDURE — 1160F RVW MEDS BY RX/DR IN RCRD: CPT | Performed by: INTERNAL MEDICINE

## 2025-07-08 PROCEDURE — 99215 OFFICE O/P EST HI 40 MIN: CPT | Performed by: INTERNAL MEDICINE

## 2025-07-08 PROCEDURE — 1126F AMNT PAIN NOTED NONE PRSNT: CPT | Performed by: INTERNAL MEDICINE

## 2025-07-08 PROCEDURE — 1159F MED LIST DOCD IN RCRD: CPT | Performed by: INTERNAL MEDICINE

## 2025-07-08 PROCEDURE — 3078F DIAST BP <80 MM HG: CPT | Performed by: INTERNAL MEDICINE

## 2025-07-08 PROCEDURE — G2211 COMPLEX E/M VISIT ADD ON: HCPCS | Performed by: INTERNAL MEDICINE

## 2025-07-08 RX ORDER — FLUTICASONE PROPIONATE AND SALMETEROL 250; 50 UG/1; UG/1
1 POWDER RESPIRATORY (INHALATION) 2 TIMES DAILY
Qty: 1 EACH | Refills: 0 | Status: SHIPPED | OUTPATIENT
Start: 2025-07-08

## 2025-07-08 RX ORDER — ALBUTEROL SULFATE 90 UG/1
2 INHALANT RESPIRATORY (INHALATION) EVERY 4 HOURS PRN
Qty: 1 EACH | Refills: 0 | Status: SHIPPED | OUTPATIENT
Start: 2025-07-08

## 2025-07-08 RX ORDER — MONTELUKAST SODIUM 10 MG/1
10 TABLET ORAL NIGHTLY
Qty: 5 TABLET | Refills: 0 | Status: SHIPPED | OUTPATIENT
Start: 2025-07-08 | End: 2025-07-13

## 2025-07-08 RX ORDER — BENZONATATE 200 MG/1
200 CAPSULE ORAL 3 TIMES DAILY PRN
Qty: 30 CAPSULE | Refills: 0 | Status: SHIPPED | OUTPATIENT
Start: 2025-07-08

## 2025-07-08 NOTE — PROGRESS NOTES
HPI:    Patient ID: Melonie Sams is a 80 year old female.    Chief Complaint   Patient presents with    Cough        Patient here for follow up of Diabetes.  Has been taking medications regularly.    Checks sugars 1 times daily.  Fasting sugars average 120's. No  lows.    Watches diabetic diet, low salt.  Diabetic Flow sheet      7/8/2025     2:21 PM 7/8/2025     1:25 PM 7/8/2025     1:23 PM 7/8/2025     1:22 PM   DIABETIC FLOWSHEET ([facility])   BMI   18.72 kg/m2    Lisinopril 20 mg Daily OR    20 mg Daily OR     20 mg Daily OR     20 mg Daily OR       Weight (enc vitals)   104 lb    BP (enc vitals)  150/73 147/74    PHQ2 Score    0       Patient-reported      HISTORY OF DIABETES COMPLICATIONS: :  History of Retinopathy: N  History of Neuropathy: N  History of Nephropathy: Y     ASSOCIATED COMPLICATIONS:   HTN: Y  Hyperlipidemia: Y  Coronary Artery Disease:  CAD,  HF, PAD  Cerebrovascular Disease: Y    MEALS:  2 meals a day, last meal: 6-8 PM. No lunch except fruit or toast.   Cooks at home, hello fresh for dinner and  cooks.     Wt Readings from Last 3 Encounters:   07/08/25 104 lb (47.2 kg)   01/13/25 113 lb (51.3 kg)   10/24/24 115 lb (52.2 kg)     BP Readings from Last 3 Encounters:   07/08/25 150/73   06/24/25 148/44   01/13/25 134/71     Labs:   Lab Results   Component Value Date/Time     (H) 06/05/2025 08:41 AM     06/05/2025 08:41 AM    K 4.2 06/05/2025 08:41 AM     06/05/2025 08:41 AM    CO2 28.0 06/05/2025 08:41 AM    CREATSERUM 1.20 (H) 06/05/2025 08:41 AM    CA 10.1 06/05/2025 08:41 AM    AST 35 (H) 06/05/2025 08:41 AM    ALT 30 06/05/2025 08:41 AM    TSH 0.998 07/15/2024 11:21 AM    T4F 1.4 12/29/2023 09:40 AM        Lab Results   Component Value Date/Time    CHOLEST 140 06/05/2025 08:41 AM    HDL 54 06/05/2025 08:41 AM    TRIG 100 06/05/2025 08:41 AM    LDL 68 06/05/2025 08:41 AM    NONHDLC 86 06/05/2025 08:41 AM       Lab Results   Component Value Date/Time    A1C 5.8 (H)  07/15/2024 11:21 AM      Lab Results   Component Value Date    VITD 51.6 06/05/2025 7/8/2025     2:21 PM 7/8/2025     1:25 PM 7/8/2025     1:23 PM 7/8/2025     1:22 PM   DIABETIC FLOWSHEET ([facility])   BMI   18.72 kg/m2    Lisinopril 20 mg Daily OR    20 mg Daily OR     20 mg Daily OR     20 mg Daily OR       Weight (enc vitals)   104 lb    BP (enc vitals)  150/73 147/74    PHQ2 Score    0       Patient-reported     -Re: potential DM medication contraindication (if positive, checkbox selected):  [] History of pancreatitis  [] Personal/fam hx of medullary thyroid cancer/MEN2 Paternal grandmother had thyroid removed, lived to be 80 years old, was secretive about their health conditions, does not know if they had thyroid cancer dx  [] History of recent/frequent UTI/yeast infxn  [] Previous amputation related to diabetes  [] Hx of [id number] euglycemic DKA     -Presence of associated DM complications (if positive, checkbox selected):  [x] Macrovascular complications (CAD/CVA/PAD)  [] Neuropathy  [] Retinopathy  [x] Nephropathy  [x] HTN  [x] Hyperlipidemia  [] Stroke/TIA  [] Gastroparesis  [] Wound, ulcer or amputations     Hypertension  Patient is here for follow up of hypertension. BP at home: 120/50's.   Has been compliant with medications.  Exercise level: trying to do more and has been following low salt diet.  Weight has been stable.  Wt Readings from Last 3 Encounters:   07/08/25 104 lb (47.2 kg)   01/13/25 113 lb (51.3 kg)   10/24/24 115 lb (52.2 kg)     BP Readings from Last 3 Encounters:   07/08/25 150/73   06/24/25 148/44   01/13/25 134/71     Labs:   Lab Results   Component Value Date/Time     (H) 06/05/2025 08:41 AM     06/05/2025 08:41 AM    K 4.2 06/05/2025 08:41 AM     06/05/2025 08:41 AM    CO2 28.0 06/05/2025 08:41 AM    CREATSERUM 1.20 (H) 06/05/2025 08:41 AM    CA 10.1 06/05/2025 08:41 AM    AST 35 (H) 06/05/2025 08:41 AM    ALT 30 06/05/2025 08:41 AM    TSH 0.998  07/15/2024 11:21 AM    T4F 1.4 12/29/2023 09:40 AM        Lab Results   Component Value Date/Time    CHOLEST 140 06/05/2025 08:41 AM    HDL 54 06/05/2025 08:41 AM    TRIG 100 06/05/2025 08:41 AM    LDL 68 06/05/2025 08:41 AM    NONHDLC 86 06/05/2025 08:41 AM            Wt Readings from Last 3 Encounters:   07/08/25 104 lb (47.2 kg)   01/13/25 113 lb (51.3 kg)   10/24/24 115 lb (52.2 kg)     BP Readings from Last 3 Encounters:   07/08/25 150/73   06/24/25 148/44   01/13/25 134/71     Labs:   Lab Results   Component Value Date/Time     (H) 06/05/2025 08:41 AM     06/05/2025 08:41 AM    K 4.2 06/05/2025 08:41 AM     06/05/2025 08:41 AM    CO2 28.0 06/05/2025 08:41 AM    CREATSERUM 1.20 (H) 06/05/2025 08:41 AM    CA 10.1 06/05/2025 08:41 AM    AST 35 (H) 06/05/2025 08:41 AM    ALT 30 06/05/2025 08:41 AM    TSH 0.998 07/15/2024 11:21 AM    T4F 1.4 12/29/2023 09:40 AM        Lab Results   Component Value Date/Time    CHOLEST 140 06/05/2025 08:41 AM    HDL 54 06/05/2025 08:41 AM    TRIG 100 06/05/2025 08:41 AM    LDL 68 06/05/2025 08:41 AM    NONHDLC 86 06/05/2025 08:41 AM          ER and CXR and covid testing and rapid strep. negative. Back to ER zpack.     Cough  This is a new problem. The current episode started 1 to 4 weeks ago (Began as ST.). The problem has been unchanged. The problem occurs constantly. The cough is Productive of sputum. Associated symptoms include myalgias, postnasal drip and wheezing. Pertinent negatives include no chest pain, chills, ear congestion, ear pain, eye redness, fever, headaches, heartburn, hemoptysis, nasal congestion, rash, rhinorrhea, sore throat, shortness of breath, sweats or weight loss. Nothing aggravates the symptoms. She has tried nothing for the symptoms. There is no history of environmental allergies.         Review of Systems   Constitutional:  Positive for activity change. Negative for appetite change, chills, diaphoresis, fatigue, fever, unexpected weight  change and weight loss.   HENT:  Positive for postnasal drip. Negative for congestion, dental problem, drooling, ear discharge, ear pain, facial swelling, hearing loss, mouth sores, nosebleeds, rhinorrhea, sinus pressure, sinus pain, sneezing, sore throat, tinnitus, trouble swallowing and voice change.    Eyes:  Negative for photophobia, pain, discharge, redness, itching and visual disturbance.   Respiratory:  Positive for cough and wheezing. Negative for apnea, hemoptysis, choking, chest tightness, shortness of breath and stridor.    Cardiovascular:  Negative for chest pain, palpitations and leg swelling.   Gastrointestinal:  Negative for abdominal distention, abdominal pain and heartburn.   Endocrine: Negative for cold intolerance, heat intolerance, polydipsia, polyphagia and polyuria.   Genitourinary:  Negative for dysuria.   Musculoskeletal:  Positive for myalgias.   Skin:  Negative for rash.   Allergic/Immunologic: Negative for environmental allergies.   Neurological:  Negative for dizziness, tremors, seizures, syncope, facial asymmetry, speech difficulty, weakness, light-headedness, numbness and headaches.   All other systems reviewed and are negative.        Current Outpatient Medications   Medication Sig Dispense Refill    montelukast (SINGULAIR) 10 MG Oral Tab Take 1 tablet (10 mg total) by mouth nightly for 5 days. 5 tablet 0    albuterol (PROAIR HFA) 108 (90 Base) MCG/ACT Inhalation Aero Soln Inhale 2 puffs into the lungs every 4 (four) hours as needed for Wheezing. 1 each 0    fluticasone-salmeterol 250-50 MCG/ACT Inhalation Aerosol Powder, Breath Activated Inhale 1 puff into the lungs 2 (two) times daily. 1 each 0    fenofibrate 145 MG Oral Tab Take 1 tablet (145 mg total) by mouth daily. 90 tablet 3    venlafaxine ER 37.5 MG Oral Capsule SR 24 Hr Take 1 capsule (37.5 mg total) by mouth nightly. 90 capsule 3    atorvastatin 40 MG Oral Tab Take 1 tablet (40 mg total) by mouth nightly. 90 tablet 3     levocetirizine 5 MG Oral Tab Take 1 tablet (5 mg total) by mouth nightly. 90 tablet 3    amLODIPine 2.5 MG Oral Tab Take 1 tablet (2.5 mg total) by mouth 2 (two) times daily. 180 tablet 3    metFORMIN HCl 1000 MG Oral Tab Take 1 tablet (1,000 mg total) by mouth daily with dinner. 90 tablet 3    metoprolol tartrate 25 MG Oral Tab Take 1 tablet (25 mg total) by mouth 2 (two) times daily. 180 tablet 3    clopidogrel 75 MG Oral Tab Take 1 tablet (75 mg total) by mouth daily. 90 tablet 3    semaglutide 4 MG/3ML Subcutaneous Solution Pen-injector Inject 1 mg into the skin once a week. 9 mL 0    levothyroxine (SYNTHROID) 88 MCG Oral Tab Take 1 tablet (88 mcg total) by mouth nightly. 90 tablet 3    folic acid 1 MG Oral Tab Take 1 tablet (1 mg total) by mouth daily. 90 tablet 3    Lancet Devices (ONETOUCH DELICA PLUS LANCING) Does not apply Misc 1 Lancet by Finger stick route 3 (three) times daily. 1 each 1    Lancets (ONETOUCH DELICA PLUS [id number]) Does not apply Misc 1 Lancet by Finger stick route 3 (three) times daily. 300 each 3    iron polysacch olvng-V62--0.025-1 MG Oral Cap Take 1 capsule by mouth daily. 30 capsule 1    aspirin 81 MG Oral Tab EC Take 1 tablet (81 mg total) by mouth in the morning and 1 tablet (81 mg total) before bedtime. 1 tablet 0    Multiple Vitamin (ONE-DAILY MULTI VITAMINS) Oral Tab Take 1 tablet by mouth daily.      furosemide 20 MG Oral Tab Take 0.5 tablets (10 mg total) by mouth daily as needed.      lisinopril 20 MG Oral Tab Take 1 tablet (20 mg total) by mouth daily.      Selenium 200 MCG Oral Tab Take 1 tablet (200 mcg total) by mouth daily.      FIBER ADULT GUMMIES OR Take 2 tablets by mouth at bedtime.      EPINEPHrine 0.3 MG/0.3ML Injection Solution Auto-injector Use as directed. 1 each 0    ALBUTEROL SULFATE  (90 Base) MCG/ACT Inhalation Aero Soln INHALE 2 PUFFS INTO THE LUNGS EVERY 6 HOURS AS NEEDED FOR WHEEZING 25.5 g 0    Coenzyme Q10 (COQ10) 200 MG Oral Cap Take 200 mg  by mouth daily.      DHA-EPA-Vitamin E (OMEGA-3 COMPLEX OR) Take 1 capsule by mouth daily.      Multiple Vitamins-Iron (ONCE DAILY/IRON) Oral Tab Take 1 tablet by mouth daily.      Cholecalciferol 50 MCG (2000 UT) Oral Tab Take 1 tablet (2,000 Units total) by mouth daily.      VITAMIN E 400 UNIT OR TABS Take 1 tablet by mouth daily.      VITAMIN C 500 MG OR TABS Take 1 tablet (500 mg total) by mouth daily.      TYLENOL ARTHRITIS PAIN OR Take by mouth.      ZINC 50 MG OR TABS Take 1 tablet by mouth daily.      Glucose Blood (ONETOUCH ULTRA) In Vitro Strip Use 1 new strip by in vitro route 3 (three) times daily for blood glucose monitoring 300 strip 3    Glucose Blood (ONETOUCH ULTRA) In Vitro Strip Test blood sugars 3 times daily (Patient not taking: Reported on 7/8/2025) 300 strip 3    Vitamin B-12 1000 MCG Oral Tab Take 1 tablet (1,000 mcg total) by mouth daily.       Allergies:  Allergies   Allergen Reactions    Bees SWELLING    Glimepiride HYPOTENSION     Hypoglycemia, bradycardia    Gluten Meal OTHER (SEE COMMENTS)     Causes abdominal pain    Ibuprofen PALPITATIONS     Rapid heart beat per pt     Nsaids PALPITATIONS and OTHER (SEE COMMENTS)     Per patient, takes baby aspirin at home with no adverse effects noted    Sulfa Antibiotics SWELLING       HISTORY:  Past Medical History:    ANXIETY    Appendicitis    Asthma (HCC)    Constipation    Coronary atherosclerosis    DEPRESSION    Diabetes (HCC)    Essential hypertension    Generalized OA    Heart disease    HYPERLIPIDEMIA    Hyperlipidemia    Multiple allergies    Osteoarthritis    Ovarian cyst    Pneumonia due to organism    ST segment changes on electrocardiogram    TWI new in V 3-V6. Cath.: medical Rx.     Visual impairment    glasses      Past Surgical History:   Procedure Laterality Date    Appendectomy      Cataract Bilateral 01/28/2019    Dr. Cris Rosario at [facility].     Cath bare metal stent (bms)      Colonoscopy  2009    nml    Electrocardiogram, complete   02/07/2014    SCANNED TO MEDIA TAB - 02/07/2014      Family History   Problem Relation Age of Onset    Stroke Father     Heart Disorder Father     Allergies Father     Cancer Mother         ? source- widespread at diagnosis    Other (goiter) Daughter     Other (Celiac) Daughter     Breast Cancer Paternal Aunt 68        age at dx 68      Social History:   Social History     Socioeconomic History    Marital status: Single   Tobacco Use    Smoking status: Never    Smokeless tobacco: Never   Vaping Use    Vaping status: Never Used   Substance and Sexual Activity    Alcohol use: Yes     Alcohol/week: 1.0 standard drink of alcohol     Types: 1 Glasses of wine per week     Comment: 1 glass red wine a day    Drug use: No   Other Topics Concern    Caffeine Concern Yes     Comment: Coffee, 2 cups per day      Social Drivers of Health      Received from [place]        PHYSICAL EXAM:   /73 (BP Location: Left arm, Patient Position: Sitting, Cuff Size: adult)   Pulse 65   Temp 97.6 °F (36.4 °C) (Temporal)   Ht 5' 2.5\" (1.588 m)   Wt 104 lb (47.2 kg)   SpO2 100%   BMI 18.72 kg/m²   BP Readings from Last 3 Encounters:   07/08/25 150/73   06/24/25 148/44   01/13/25 134/71     Wt Readings from Last 3 Encounters:   07/08/25 104 lb (47.2 kg)   01/13/25 113 lb (51.3 kg)   10/24/24 115 lb (52.2 kg)       Physical Exam  Vitals and nursing note reviewed.   Constitutional:       General: She is not in acute distress.     Appearance: Normal appearance. She is well-developed. She is not ill-appearing, toxic-appearing or diaphoretic.      Interventions: She is not intubated.  HENT:      Head:      Jaw: No trismus.      Right Ear: Tympanic membrane, ear canal and external ear normal. No decreased hearing noted. No laceration, drainage, swelling or tenderness. No middle ear effusion. No foreign body. No mastoid tenderness. No hemotympanum. Tympanic membrane is not injected, scarred, perforated, erythematous,  retracted or bulging. Tympanic membrane has normal mobility.      Left Ear: Tympanic membrane, ear canal and external ear normal. No decreased hearing noted. No laceration, drainage, swelling or tenderness.  No middle ear effusion. No foreign body. No mastoid tenderness. No hemotympanum. Tympanic membrane is not injected, scarred, perforated, erythematous, retracted or bulging. Tympanic membrane has normal mobility.      Nose: Nose normal. No nasal deformity, laceration, mucosal edema or rhinorrhea.      Right Sinus: No maxillary sinus tenderness or frontal sinus tenderness.      Left Sinus: No maxillary sinus tenderness or frontal sinus tenderness.      Mouth/Throat:      Lips: Pink. No lesions.      Mouth: Mucous membranes are not pale, not dry and not cyanotic. No injury, lacerations or oral lesions.      Dentition: No dental tenderness, gingival swelling, dental abscesses or gum lesions.      Tongue: No lesions. Tongue does not deviate from midline.      Palate: No mass and lesions.      Pharynx: Oropharynx is clear. Uvula midline. Postnasal drip present. No pharyngeal swelling, oropharyngeal exudate, posterior oropharyngeal erythema or uvula swelling.      Tonsils: No tonsillar exudate or tonsillar abscesses.   Neck:      Thyroid: No thyroid mass or thyromegaly.      Trachea: Trachea and phonation normal.   Cardiovascular:      Rate and Rhythm: Normal rate and regular rhythm.      Pulses: Normal pulses. No decreased pulses.           Carotid pulses are 2+ on the right side and 2+ on the left side.       Radial pulses are 2+ on the right side and 2+ on the left side.        Dorsalis pedis pulses are 2+ on the right side and 2+ on the left side.        Posterior tibial pulses are 2+ on the right side and 2+ on the left side.      Heart sounds: Normal heart sounds, S1 normal and S2 normal.   Pulmonary:      Effort: Pulmonary effort is normal. No tachypnea, bradypnea, accessory muscle usage, prolonged expiration,  respiratory distress or retractions. She is not intubated.      Breath sounds: Normal breath sounds and air entry. No stridor, decreased air movement or transmitted upper airway sounds. No decreased breath sounds, wheezing, rhonchi or rales.   Chest:      Chest wall: No tenderness.   Abdominal:      General: There is no distension.      Palpations: Abdomen is soft.      Tenderness: There is no abdominal tenderness.   Musculoskeletal:      Cervical back: Neck supple.      Right lower leg: No edema.      Left lower leg: No edema.   Lymphadenopathy:      Head:      Right side of head: No submental, submandibular, preauricular, posterior auricular or occipital adenopathy.      Left side of head: No submental, submandibular, preauricular, posterior auricular or occipital adenopathy.      Cervical: No cervical adenopathy.      Right cervical: No superficial, deep or posterior cervical adenopathy.     Left cervical: No superficial, deep or posterior cervical adenopathy.      Upper Body:      Right upper body: No supraclavicular adenopathy.      Left upper body: No supraclavicular adenopathy.   Skin:     General: Skin is warm and dry.   Neurological:      Mental Status: She is alert and oriented to person, place, and time.   Psychiatric:         Behavior: Behavior normal. Behavior is cooperative.         Thought Content: Thought content normal.              ASSESSMENT/PLAN:     Encounter Diagnoses   Name Primary?    Acquired hypothyroidism Check blood.    Yes    Type 2 diabetes mellitus with hyperglycemia, without long-term current use of insulin (HCC) Higher.  Not sure if related to acute infection.  Prior readings were better.  Call in 2 weeks. Check blood after 9-5-25. Careful with diet and excercise at least 30 minutes 3-4 times a week. Check sugars at different times on different dates. Careful with low sugars. Carry something with you and check sugar if can. Can carry adams cracker, etc. Decrease carbohydrates. But  also, careful with fruits and natural sugars.One serving a day and no more than 1 handful every day. Check feet  every AM and careful with sores and ulcers on feet bilaterally. Check eyes every year with dilated eye exam.  Check sugars.  2-hour postmeal should be less than 140s.  Pre-meal should be 's.  Both equally affected A1c.  Discussed importance of glycemic control to prevent complications of diabetes  -Discussed complications of diabetes include retinopathy, neuropathy, nephropathy and cardiovascular disease  -Discussed ABCs of DM  -Discussed importance of SBGM  -Discussed importance of low CHO diet, recommend 45gm per meal or 135gm per day  -UTD with optho       Anemia, unspecified type Check blood.        Vitamin D deficiency Check blood.        Vaccine counseling Tdap recc. Check on insurnace coverage. Recc. PCV20.        Stage 3a chronic kidney disease (HCC) No motrin, ibuprofen, advil, alleve, naprosyn  with these medications.         Essential hypertension Higher. Careful with diet and excercise at least 30 minutes 3-4 times a week. Check blood pressures at different times on different days. Can purchase own blood pressure monitor. If not, check at local pharmacy. Bake foods more and grill occasionally. Avoid fried foods. No salt. Use other seasonings.         Chronic cough ? RAD.  Use albuterol every 3 hrs. For 2 days and as needed and then, as needed. If using advair, take 1 puff very 12 hrs. And rinse mouth with water and spit out.  Take montelukast or singular 10 mg at night for 5 nights.  Take Tessalon as needed.  Call if no better after 1 week. Discussed with patient of side effects and use of these medications.          Orders Placed This Encounter   Procedures    Microalb/Creat Ratio, Random Urine    Hemoglobin A1C    TSH W Reflex To Free T4    Ferritin    Iron And Tibc    CBC With Differential With Platelet       Meds This Visit:  Requested Prescriptions     Signed Prescriptions Disp  Refills    montelukast (SINGULAIR) 10 MG Oral Tab 5 tablet 0     Sig: Take 1 tablet (10 mg total) by mouth nightly for 5 days.    albuterol (PROAIR HFA) 108 (90 Base) MCG/ACT Inhalation Aero Soln 1 each 0     Sig: Inhale 2 puffs into the lungs every 4 (four) hours as needed for Wheezing.    fluticasone-salmeterol 250-50 MCG/ACT Inhalation Aerosol Powder, Breath Activated 1 each 0     Sig: Inhale 1 puff into the lungs 2 (two) times daily.       Imaging & Referrals:  None      Has set blayne't.     Full H&P done with lifelong partner present Fer Canchola, with patient permission.,

## 2025-07-08 NOTE — TELEPHONE ENCOUNTER
Can do 5 PM today 7-8-25 at [place].  But make her aware that this is only for the cough.  Cannot evaluate any other things of squeezing her in between patients.

## 2025-07-08 NOTE — PATIENT INSTRUCTIONS
ASSESSMENT/PLAN:     Encounter Diagnoses   Name Primary?    Acquired hypothyroidism Check blood.    Yes    Type 2 diabetes mellitus with hyperglycemia, without long-term current use of insulin (HCC) Higher. Not sure if related to acute infection.  Prior readings were better.  Call in 2 weeks. Check blood after 9-5-25. Careful with diet and excercise at least 30 minutes 3-4 times a week. Check sugars at different times on different dates. Careful with low sugars. Carry something with you and check sugar if can. Can carry adams cracker, etc. Decrease carbohydrates. But also, careful with fruits and natural sugars.One serving a day and no more than 1 handful every day. Check feet  every AM and careful with sores and ulcers on feet bilaterally. Check eyes every year with dilated eye exam.  Check sugars.  2-hour postmeal should be less than 140s.  Pre-meal should be 's.  Both equally affected A1c.  Discussed importance of glycemic control to prevent complications of diabetes  -Discussed complications of diabetes include retinopathy, neuropathy, nephropathy and cardiovascular disease  -Discussed ABCs of DM  -Discussed importance of SBGM  -Discussed importance of low CHO diet, recommend 45gm per meal or 135gm per day  -UTD with optho       Anemia, unspecified type Check blood.        Vitamin D deficiency Check blood.        Vaccine counseling Tdap recc. Check on insurnace coverage. Recc. PCV20.        Stage 3a chronic kidney disease (HCC) No motrin, ibuprofen, advil, alleve, naprosyn  with these medications.         Essential hypertension HIgher. Careful with diet and excercise at least 30 minutes 3-4 times a week. Check blood pressures at different times on different days. Can purchase own blood pressure monitor. If not, check at local pharmacy. Bake foods more and grill occasionally. Avoid fried foods. No salt. Use other seasonings.         Chronic cough ? RAD.  Use albuterol every 3 hrs. For 2 days and as  needed and then, as needed. If using advair, take 1 puff very 12 hrs. And rinse mouth with water and spit out.  Take montelukast or singular 10 mg at night for 5 nights.  Take Tessalon as needed.  Call if no better after 1 week.  Discussed with patient of side effects and use of these medications.          Orders Placed This Encounter   Procedures    Microalb/Creat Ratio, Random Urine    Hemoglobin A1C    TSH W Reflex To Free T4    Ferritin    Iron And Tibc    CBC With Differential With Platelet       Meds This Visit:  Requested Prescriptions     Signed Prescriptions Disp Refills    montelukast (SINGULAIR) 10 MG Oral Tab 5 tablet 0     Sig: Take 1 tablet (10 mg total) by mouth nightly for 5 days.    albuterol (PROAIR HFA) 108 (90 Base) MCG/ACT Inhalation Aero Soln 1 each 0     Sig: Inhale 2 puffs into the lungs every 4 (four) hours as needed for Wheezing.    fluticasone-salmeterol 250-50 MCG/ACT Inhalation Aerosol Powder, Breath Activated 1 each 0     Sig: Inhale 1 puff into the lungs 2 (two) times daily.       Imaging & Referrals:  None      Has set blayne't.

## 2025-07-09 ENCOUNTER — TELEPHONE (OUTPATIENT)
Dept: INTERNAL MEDICINE CLINIC | Facility: CLINIC | Age: 80
End: 2025-07-09

## 2025-07-09 NOTE — TELEPHONE ENCOUNTER
Pharmacy left a message with the answering service. Please see below.     Message # [id number]         2025 04:50p   [KIMBERLY]  Primary MD:  [facility]  From:  JONEL GÓMEZ  :  1945  Phone#:  [phone]  ----------------------------------------------------------------------  OFFICE LOCATION:  [facility] [place] [facility] [place] NEEDS CLARIFICATION. PLEASE CB

## 2025-07-09 NOTE — TELEPHONE ENCOUNTER
I called patient on this, she said pharmacy was to be reaching out to us about clarification on a medication. Patient did not know what medication was a concern.     I called pharmacy back, they were able to figure concern out, it was for Advair and they were able to run it differently in system with plan and going through now    No other questions or concerns at this time for us on this.   Closing encounter.

## 2025-07-10 RX ORDER — FLUTICASONE PROPIONATE AND SALMETEROL 250; 50 UG/1; UG/1
1 POWDER RESPIRATORY (INHALATION) 2 TIMES DAILY
Qty: 180 EACH | Refills: 0 | OUTPATIENT
Start: 2025-07-10

## 2025-07-10 NOTE — TELEPHONE ENCOUNTER
Trial of med    Per visit    Chronic cough ? RAD.  Use albuterol every 3 hrs. For 2 days and as needed and then, as needed. If using advair, take 1 puff very 12 hrs. And rinse mouth with water and spit out.  Take montelukast or singular 10 mg at night for 5 nights.  Take Tessalon as needed.  Call if no better after 1 week. Discussed with patient of side effects and use of these medications.

## 2025-07-15 RX ORDER — SEMAGLUTIDE 1.34 MG/ML
1 INJECTION, SOLUTION SUBCUTANEOUS WEEKLY
Qty: 9 ML | Refills: 3 | Status: SHIPPED | OUTPATIENT
Start: 2025-07-15

## 2025-07-22 ENCOUNTER — MED REC SCAN ONLY (OUTPATIENT)
Dept: INTERNAL MEDICINE CLINIC | Facility: CLINIC | Age: 80
End: 2025-07-22

## 2025-07-24 ENCOUNTER — OFFICE VISIT (OUTPATIENT)
Dept: INTERNAL MEDICINE CLINIC | Facility: CLINIC | Age: 80
End: 2025-07-24
Payer: COMMERCIAL

## 2025-07-24 VITALS
BODY MASS INDEX: 19.69 KG/M2 | TEMPERATURE: 97 F | HEART RATE: 65 BPM | SYSTOLIC BLOOD PRESSURE: 132 MMHG | WEIGHT: 107 LBS | HEIGHT: 62 IN | OXYGEN SATURATION: 100 % | DIASTOLIC BLOOD PRESSURE: 62 MMHG

## 2025-07-24 DIAGNOSIS — J44.89 ASTHMA WITH COPD (CHRONIC OBSTRUCTIVE PULMONARY DISEASE) (HCC): Chronic | ICD-10-CM

## 2025-07-24 DIAGNOSIS — I25.10 CORONARY ARTERY DISEASE DUE TO CALCIFIED CORONARY LESION: ICD-10-CM

## 2025-07-24 DIAGNOSIS — M81.0 AGE-RELATED OSTEOPOROSIS WITHOUT CURRENT PATHOLOGICAL FRACTURE: ICD-10-CM

## 2025-07-24 DIAGNOSIS — R68.89 SUSPECTED GLAUCOMA OF BOTH EYES: ICD-10-CM

## 2025-07-24 DIAGNOSIS — H47.20 OPTIC ATROPHY: ICD-10-CM

## 2025-07-24 DIAGNOSIS — I25.84 CORONARY ARTERY DISEASE DUE TO CALCIFIED CORONARY LESION: ICD-10-CM

## 2025-07-24 DIAGNOSIS — E11.65 TYPE 2 DIABETES MELLITUS WITH HYPERGLYCEMIA, WITHOUT LONG-TERM CURRENT USE OF INSULIN (HCC): ICD-10-CM

## 2025-07-24 DIAGNOSIS — Z88.9 MULTIPLE ALLERGIES: ICD-10-CM

## 2025-07-24 DIAGNOSIS — I10 ESSENTIAL HYPERTENSION: ICD-10-CM

## 2025-07-24 DIAGNOSIS — I77.1 TORTUOUS AORTA: ICD-10-CM

## 2025-07-24 DIAGNOSIS — R91.1 PULMONARY NODULE: ICD-10-CM

## 2025-07-24 DIAGNOSIS — E55.9 VITAMIN D DEFICIENCY: ICD-10-CM

## 2025-07-24 DIAGNOSIS — E03.9 ACQUIRED HYPOTHYROIDISM: ICD-10-CM

## 2025-07-24 DIAGNOSIS — H43.821 VITREOMACULAR ADHESION OF RIGHT EYE: Primary | ICD-10-CM

## 2025-07-24 DIAGNOSIS — M17.11 PRIMARY OSTEOARTHRITIS OF RIGHT KNEE: ICD-10-CM

## 2025-07-24 DIAGNOSIS — R01.1 MURMUR: ICD-10-CM

## 2025-07-24 DIAGNOSIS — Z96.651 S/P TOTAL KNEE ARTHROPLASTY, RIGHT: ICD-10-CM

## 2025-07-24 DIAGNOSIS — D50.8 OTHER IRON DEFICIENCY ANEMIA: ICD-10-CM

## 2025-07-24 DIAGNOSIS — Z12.31 SCREENING MAMMOGRAM FOR BREAST CANCER: ICD-10-CM

## 2025-07-24 DIAGNOSIS — D64.9 ANEMIA, UNSPECIFIED TYPE: ICD-10-CM

## 2025-07-24 DIAGNOSIS — Z71.85 VACCINE COUNSELING: ICD-10-CM

## 2025-07-24 DIAGNOSIS — Z96.1 PSEUDOPHAKIA OF BOTH EYES: ICD-10-CM

## 2025-07-24 DIAGNOSIS — R76.8 ANA POSITIVE: ICD-10-CM

## 2025-07-24 DIAGNOSIS — E78.2 MIXED HYPERLIPIDEMIA: ICD-10-CM

## 2025-07-24 DIAGNOSIS — Z00.00 ENCOUNTER FOR ANNUAL HEALTH EXAMINATION: ICD-10-CM

## 2025-07-24 DIAGNOSIS — E83.52 HYPERCALCEMIA: ICD-10-CM

## 2025-07-24 PROBLEM — M17.9 OA (OSTEOARTHRITIS) OF KNEE: Status: RESOLVED | Noted: 2023-07-25 | Resolved: 2025-07-24

## 2025-07-24 PROBLEM — D62 ACUTE POSTHEMORRHAGIC ANEMIA: Status: RESOLVED | Noted: 2023-08-30 | Resolved: 2025-07-24

## 2025-07-24 PROBLEM — N18.30 STAGE 3 CHRONIC KIDNEY DISEASE (HCC): Status: RESOLVED | Noted: 2020-02-06 | Resolved: 2025-07-24

## 2025-07-24 PROBLEM — N18.30 CHRONIC KIDNEY DISEASE, STAGE III (MODERATE) (HCC): Status: RESOLVED | Noted: 2023-08-30 | Resolved: 2025-07-24

## 2025-07-24 PROCEDURE — 3008F BODY MASS INDEX DOCD: CPT | Performed by: INTERNAL MEDICINE

## 2025-07-24 PROCEDURE — 99499 UNLISTED E&M SERVICE: CPT | Performed by: INTERNAL MEDICINE

## 2025-07-24 PROCEDURE — 96160 PT-FOCUSED HLTH RISK ASSMT: CPT | Performed by: INTERNAL MEDICINE

## 2025-07-24 PROCEDURE — G0009 ADMIN PNEUMOCOCCAL VACCINE: HCPCS | Performed by: INTERNAL MEDICINE

## 2025-07-24 PROCEDURE — 1125F AMNT PAIN NOTED PAIN PRSNT: CPT | Performed by: INTERNAL MEDICINE

## 2025-07-24 PROCEDURE — 81003 URINALYSIS AUTO W/O SCOPE: CPT | Performed by: INTERNAL MEDICINE

## 2025-07-24 PROCEDURE — G0439 PPPS, SUBSEQ VISIT: HCPCS | Performed by: INTERNAL MEDICINE

## 2025-07-24 PROCEDURE — 1160F RVW MEDS BY RX/DR IN RCRD: CPT | Performed by: INTERNAL MEDICINE

## 2025-07-24 PROCEDURE — 3075F SYST BP GE 130 - 139MM HG: CPT | Performed by: INTERNAL MEDICINE

## 2025-07-24 PROCEDURE — 3078F DIAST BP <80 MM HG: CPT | Performed by: INTERNAL MEDICINE

## 2025-07-24 PROCEDURE — 1159F MED LIST DOCD IN RCRD: CPT | Performed by: INTERNAL MEDICINE

## 2025-07-24 PROCEDURE — 90677 PCV20 VACCINE IM: CPT | Performed by: INTERNAL MEDICINE

## 2025-07-24 RX ORDER — LANCING DEVICE/LANCETS
1 KIT MISCELLANEOUS 3 TIMES DAILY
Qty: 200 EACH | Refills: 3 | Status: SHIPPED | OUTPATIENT
Start: 2025-07-24

## 2025-07-24 RX ORDER — LANCETS 30 GAUGE
1 EACH MISCELLANEOUS 3 TIMES DAILY
Qty: 300 EACH | Refills: 3 | Status: SHIPPED | OUTPATIENT
Start: 2025-07-24

## 2025-07-24 NOTE — PROGRESS NOTES
HPI:    Patient ID: Melonie Sams is a 80 year old female.    Melonie Sams is a 80 year old female who presents for a complete physical exam.   Melonie Sams is a 80 year old female who presents for a Medicare Assessment.     Chief Complaint   Patient presents with    Wellness Visit     Patient state she is here for an Medicare Wellness Visit.         Patient here for follow up of Diabetes.  Has been taking medications regularly.    Checks sugars 1 times daily.  Fasting sugars average  110-130's. No lows.   Watches diabetic diet, low salt.  Diabetic Flow sheet      7/24/2025     9:55 PM 7/24/2025     4:36 PM 7/24/2025     3:49 PM 7/24/2025     2:54 PM   DIABETIC FLOWSHEET ([facility])   Lisinopril 20 mg Daily OR    20 mg Daily OR     20 mg Daily OR     20 mg Daily OR       BP (enc vitals)   132/62    Last Foot Exam    7/24/2025   PHQ2 Score  0         Patient-reported        Hypertension  Patient is here for follow up of hypertension. BP at home: 120-130/60's.   Has been compliant with medications.  Exercise level: trying to do more (started excercises  X 1 hr. elliptical and PT due to balance issues due to turning feet R TKR [facility] orthoped[facility] [place] [facility]) and has been following low salt diet.  Weight has been stable.  Wt Readings from Last 3 Encounters:   07/24/25 107 lb (48.5 kg)   07/08/25 104 lb (47.2 kg)   01/13/25 113 lb (51.3 kg)     BP Readings from Last 3 Encounters:   07/24/25 132/62   07/08/25 150/73   06/24/25 148/44     Labs:   Lab Results   Component Value Date/Time     (H) 06/05/2025 08:41 AM     06/05/2025 08:41 AM    K 4.2 06/05/2025 08:41 AM     06/05/2025 08:41 AM    CO2 28.0 06/05/2025 08:41 AM    CREATSERUM 1.20 (H) 06/05/2025 08:41 AM    CA 10.1 06/05/2025 08:41 AM    AST 35 (H) 06/05/2025 08:41 AM    ALT 30 06/05/2025 08:41 AM    TSH 0.998 07/15/2024 11:21 AM    T4F 1.4 12/29/2023 09:40 AM        Lab Results   Component Value Date/Time    CHOLEST 140 06/05/2025 08:41 AM     HDL 54 06/05/2025 08:41 AM    TRIG 100 06/05/2025 08:41 AM    LDL 68 06/05/2025 08:41 AM    NONHDLC 86 06/05/2025 08:41 AM          Labs:   Lab Results   Component Value Date/Time    WBC 5.2 07/15/2024 11:21 AM    HGB 12.4 07/15/2024 11:21 AM    .0 07/15/2024 11:21 AM      Lab Results   Component Value Date/Time     (H) 06/05/2025 08:41 AM     06/05/2025 08:41 AM    K 4.2 06/05/2025 08:41 AM     06/05/2025 08:41 AM    CO2 28.0 06/05/2025 08:41 AM    CREATSERUM 1.20 (H) 06/05/2025 08:41 AM    CA 10.1 06/05/2025 08:41 AM    ALB 4.7 06/05/2025 08:41 AM    TP 6.8 06/05/2025 08:41 AM    ALKPHO 26 (L) 06/05/2025 08:41 AM    AST 35 (H) 06/05/2025 08:41 AM    ALT 30 06/05/2025 08:41 AM    BILT 0.3 06/05/2025 08:41 AM    TSH 0.998 07/15/2024 11:21 AM    T4F 1.4 12/29/2023 09:40 AM        Lab Results   Component Value Date/Time    CHOLEST 140 06/05/2025 08:41 AM    HDL 54 06/05/2025 08:41 AM    TRIG 100 06/05/2025 08:41 AM    LDL 68 06/05/2025 08:41 AM    NONHDLC 86 06/05/2025 08:41 AM       Lab Results   Component Value Date/Time    A1C 5.8 (H) 07/15/2024 11:21 AM      Lab Results   Component Value Date    VITD 51.6 06/05/2025         Health Maintenance   Topic Date Due    Mammogram  07/22/2025       Allergies:  Allergies   Allergen Reactions    Bees SWELLING    Glimepiride HYPOTENSION     Hypoglycemia, bradycardia    Gluten Meal OTHER (SEE COMMENTS)     Causes abdominal pain    Ibuprofen PALPITATIONS     Rapid heart beat per pt     Nsaids PALPITATIONS and OTHER (SEE COMMENTS)     Per patient, takes baby aspirin at home with no adverse effects noted    Sulfa Antibiotics SWELLING     Current Outpatient Medications   Medication Sig Dispense Refill    Lancet Devices (ONETOUCH DELICA PLUS LANCING) Does not apply Misc 1 Lancet by Finger stick route 3 (three) times daily. 200 each 3    Lancets (ONETOUCH DELICA PLUS [id number]) Does not apply Misc 1 Lancet by Finger stick route 3 (three) times daily. 300  each 3    semaglutide (OZEMPIC, 1 MG/DOSE,) 4 MG/3ML Subcutaneous Solution Pen-injector Inject 1 mg into the skin once a week. 9 mL 3    albuterol (PROAIR HFA) 108 (90 Base) MCG/ACT Inhalation Aero Soln Inhale 2 puffs into the lungs every 4 (four) hours as needed for Wheezing. 1 each 0    fluticasone-salmeterol 250-50 MCG/ACT Inhalation Aerosol Powder, Breath Activated Inhale 1 puff into the lungs 2 (two) times daily. 1 each 0    benzonatate 200 MG Oral Cap Take 1 capsule (200 mg total) by mouth 3 (three) times daily as needed for cough. 30 capsule 0    fenofibrate 145 MG Oral Tab Take 1 tablet (145 mg total) by mouth daily. 90 tablet 3    venlafaxine ER 37.5 MG Oral Capsule SR 24 Hr Take 1 capsule (37.5 mg total) by mouth nightly. 90 capsule 3    atorvastatin 40 MG Oral Tab Take 1 tablet (40 mg total) by mouth nightly. 90 tablet 3    levocetirizine 5 MG Oral Tab Take 1 tablet (5 mg total) by mouth nightly. 90 tablet 3    amLODIPine 2.5 MG Oral Tab Take 1 tablet (2.5 mg total) by mouth 2 (two) times daily. 180 tablet 3    metFORMIN HCl 1000 MG Oral Tab Take 1 tablet (1,000 mg total) by mouth daily with dinner. 90 tablet 3    metoprolol tartrate 25 MG Oral Tab Take 1 tablet (25 mg total) by mouth 2 (two) times daily. 180 tablet 3    clopidogrel 75 MG Oral Tab Take 1 tablet (75 mg total) by mouth daily. 90 tablet 3    levothyroxine (SYNTHROID) 88 MCG Oral Tab Take 1 tablet (88 mcg total) by mouth nightly. 90 tablet 3    folic acid 1 MG Oral Tab Take 1 tablet (1 mg total) by mouth daily. 90 tablet 3    iron polysacch [id number]--0.025-1 MG Oral Cap Take 1 capsule by mouth daily. 30 capsule 1    aspirin 81 MG Oral Tab EC Take 1 tablet (81 mg total) by mouth in the morning and 1 tablet (81 mg total) before bedtime. 1 tablet 0    Multiple Vitamin (ONE-DAILY MULTI VITAMINS) Oral Tab Take 1 tablet by mouth daily.      furosemide 20 MG Oral Tab Take 0.5 tablets (10 mg total) by mouth daily as needed.      lisinopril 20  MG Oral Tab Take 1 tablet (20 mg total) by mouth daily.      Selenium 200 MCG Oral Tab Take 1 tablet (200 mcg total) by mouth daily.      FIBER ADULT GUMMIES OR Take 2 tablets by mouth at bedtime.      EPINEPHrine 0.3 MG/0.3ML Injection Solution Auto-injector Use as directed. 1 each 0    ALBUTEROL SULFATE  (90 Base) MCG/ACT Inhalation Aero Soln INHALE 2 PUFFS INTO THE LUNGS EVERY 6 HOURS AS NEEDED FOR WHEEZING 25.5 g 0    Coenzyme Q10 (COQ10) 200 MG Oral Cap Take 200 mg by mouth daily.      DHA-EPA-Vitamin E (OMEGA-3 COMPLEX OR) Take 1 capsule by mouth daily.      Multiple Vitamins-Iron (ONCE DAILY/IRON) Oral Tab Take 1 tablet by mouth daily.      Cholecalciferol 50 MCG (2000 UT) Oral Tab Take 1 tablet (2,000 Units total) by mouth daily.      VITAMIN E 400 UNIT OR TABS Take 1 tablet by mouth daily.      VITAMIN C 500 MG OR TABS Take 1 tablet (500 mg total) by mouth daily.      TYLENOL ARTHRITIS PAIN OR Take by mouth.      ZINC 50 MG OR TABS Take 1 tablet by mouth daily.        Past Medical History:    Acute posthemorrhagic anemia    ANXIETY    Appendicitis    Asthma (HCC)    Constipation    Coronary atherosclerosis    DEPRESSION    Diabetes (HCC)    Essential hypertension    Generalized OA    Heart disease    HYPERLIPIDEMIA    Hyperlipidemia    Multiple allergies    Osteoarthritis    Ovarian cyst    Pneumonia due to organism    ST segment changes on electrocardiogram    TWI new in V 3-V6. Cath.: medical Rx.     Visual impairment    glasses      Past Surgical History:   Procedure Laterality Date    Appendectomy      Cataract Bilateral 01/28/2019    Dr. Cris Rosario at [facility].     Cath bare metal stent (bms)      Colonoscopy  2009    nml    Electrocardiogram, complete  02/07/2014    SCANNED TO MEDIA TAB - 02/07/2014      Family History   Problem Relation Age of Onset    Stroke Father     Heart Disorder Father     Allergies Father     Cancer Mother         ? source- widespread at diagnosis    Other (goiter) Daughter      Other (Celiac) Daughter     Breast Cancer Paternal Aunt 68        age at dx 68      Social History:  Social History     Socioeconomic History    Marital status: Single   Tobacco Use    Smoking status: Never    Smokeless tobacco: Never   Vaping Use    Vaping status: Never Used   Substance and Sexual Activity    Alcohol use: Yes     Alcohol/week: 1.0 standard drink of alcohol     Types: 1 Glasses of wine per week     Comment: 1 glass red wine a day    Drug use: No   Other Topics Concern    Caffeine Concern Yes     Comment: Coffee, 2 cups per day        History/Other:     Patient Active Problem List   Diagnosis    Type 2 diabetes mellitus with hyperglycemia, without long-term current use of insulin (HCC)    Hypercalcemia    Osteoporosis    Pulmonary nodule    Coronary artery disease due to calcified coronary lesion    Essential hypertension    Acquired hypothyroidism    Multiple allergies    Mixed hyperlipidemia    TAN positive    Vaccine counseling    Murmur    Tortuous aorta    Optic atrophy    Asthma with COPD (chronic obstructive pulmonary disease) (HCC)    Anemia    Pseudophakia of both eyes    Suspected glaucoma of both eyes    Vitamin D deficiency    Screening mammogram for breast cancer    Vitreomacular adhesion of right eye    S/P total knee arthroplasty, right    Iron deficiency anemia       GYNE HISTORY:  OB History    Para Term  AB Living   2 2 2 0 0 2   SAB IAB Ectopic Multiple Live Births   0 0 0 0 2        No LMP recorded (lmp unknown). Patient is postmenopausal.    Hx of Pap: :  She has never smoked tobacco.all Paps normal            Tobacco    CAGE Alcohol Screen:   CAGE screening score of 0 on 2025, showing low risk of alcohol abuse.        Patient Care Team:  Mindy Medina MD as PCP - General (Internal Medicine)  Louis Hinds MD as Consulting Physician (Interventional, Cardiology)  Crsi Rosario as Consulting Physician (OPHTHALMOLOGY)  Jamie Nice MD as Consulting  Physician (GASTROENTEROLOGY)  Cris Rosario as Consulting Physician (OPHTHALMOLOGY)  Morgan Soares MD as Consulting Physician (SURGERY, ORTHOPEDIC)  Kelly Hercules as Consulting Physician (OPHTHALMOLOGY)  Brown Bull MD as Consulting Physician (DERMATOLOGY)  HPI    Review of Systems   Constitutional:  Negative for activity change, appetite change, chills, diaphoresis, fatigue, fever and unexpected weight change.   HENT:  Negative for congestion, dental problem, drooling, ear discharge, ear pain, facial swelling, hearing loss, mouth sores, nosebleeds, postnasal drip, rhinorrhea, sinus pressure, sinus pain, sneezing, sore throat, tinnitus, trouble swallowing and voice change.    Eyes:  Negative for photophobia, pain, discharge, redness, itching and visual disturbance.   Respiratory:  Negative for apnea, cough, choking, chest tightness, shortness of breath, wheezing and stridor.    Cardiovascular:  Negative for chest pain, palpitations and leg swelling.   Gastrointestinal:  Negative for abdominal distention, abdominal pain, anal bleeding, blood in stool, constipation, diarrhea, nausea, rectal pain and vomiting.   Endocrine: Negative for cold intolerance, heat intolerance, polydipsia, polyphagia and polyuria.   Genitourinary:  Negative for decreased urine volume, difficulty urinating, dysuria, flank pain, frequency, hematuria, menstrual problem, pelvic pain, urgency, vaginal bleeding, vaginal discharge and vaginal pain.   Skin:  Negative for rash.   Neurological:  Negative for dizziness, tremors, seizures, syncope, facial asymmetry, speech difficulty, weakness, light-headedness, numbness and headaches.   Psychiatric/Behavioral:  Negative for agitation, behavioral problems, confusion, decreased concentration, dysphoric mood, hallucinations, self-injury, sleep disturbance and suicidal ideas. The patient is not nervous/anxious and is not hyperactive.    All other systems reviewed and are  negative.          Functional Ability/Status:   Melonie Sams has a completely normal functional assessment. See flowsheet for details.        Fall Risk Assessment:   She has been screened for Falls and is low risk.        Medicare Hearing Assessment:   Hearing Screening    Time taken: 7/24/2025  2:47 PM  Entry User: Donna Palafox  Screening Method: Finger Rub  Finger Rub Result: Pass         Depression Screening (PHQ-2/PHQ-9): PHQ-2 SCORE: 0  , done 7/24/2025          Cognitive Assessment:   She had a completely normal cognitive assessment - see flowsheet entries       Supplementary Documentation:     In the past six months, have you lost more than 10 pounds without trying?: 2 - No  Has your appetite been poor?: No  Type of Diet: Diabetic, Low Salt, Low Carb  How does the patient maintain a good energy level?: Daily Walks  How would you describe your daily physical activity?: Light  How would you describe your current health state?: Good  How do you maintain positive mental well-being?: Social Interaction  On a scale of 0 to 10, with 0 being no pain and 10 being severe pain, what is your pain level?: 2 - (Mild)  In the past six months, have you experienced urine leakage?: 0-No  At any time do you feel concerned for the safety/well-being of yourself and/or your children, in your home or elsewhere?: No  Have you had any immunizations at another office such as Influenza, Hepatitis B, Tetanus, or Pneumococcal?: No    Visual Acuity:   Right Eye Visual Acuity: Uncorrected Right Eye Chart Acuity: 20/100   Left Eye Visual Acuity: Uncorrected Left Eye Chart Acuity: 20/100   Both Eyes Visual Acuity: Uncorrected Both Eyes Chart Acuity: 20/100   Able To Tolerate Visual Acuity: No        PHYSICAL EXAM:   /62 (BP Location: Left arm, Patient Position: Sitting, Cuff Size: adult)   Pulse 65   Temp 97.4 °F (36.3 °C) (Temporal)   Ht 5' 2\" (1.575 m)   Wt 107 lb (48.5 kg)   LMP  (LMP Unknown)   SpO2 100%   BMI 19.57  kg/m²   BP Readings from Last 3 Encounters:   07/24/25 132/62   07/08/25 150/73   06/24/25 148/44     Wt Readings from Last 3 Encounters:   07/24/25 107 lb (48.5 kg)   07/08/25 104 lb (47.2 kg)   01/13/25 113 lb (51.3 kg)      Body mass index is 19.57 kg/m².   Physical Exam  Vitals and nursing note reviewed.   Constitutional:       General: She is not in acute distress.     Appearance: Normal appearance. She is well-developed and well-groomed. She is not ill-appearing, toxic-appearing or diaphoretic.      Interventions: She is not intubated.  HENT:      Head: Normocephalic and atraumatic.      Right Ear: Tympanic membrane, ear canal and external ear normal. No decreased hearing noted. No laceration, drainage, swelling or tenderness. No middle ear effusion. There is no impacted cerumen. No foreign body. No mastoid tenderness. No PE tube. No hemotympanum. Tympanic membrane is not injected, scarred, perforated, erythematous, retracted or bulging. Tympanic membrane has normal mobility.      Left Ear: Tympanic membrane, ear canal and external ear normal. No decreased hearing noted. No laceration, drainage, swelling or tenderness.  No middle ear effusion. There is no impacted cerumen. No foreign body. No mastoid tenderness. No PE tube. No hemotympanum. Tympanic membrane is not injected, scarred, perforated, erythematous, retracted or bulging. Tympanic membrane has normal mobility.      Nose:      Right Sinus: No maxillary sinus tenderness or frontal sinus tenderness.      Left Sinus: No maxillary sinus tenderness or frontal sinus tenderness.      Mouth/Throat:      Lips: Pink. No lesions.      Mouth: Mucous membranes are moist. No injury, lacerations, oral lesions or angioedema.      Dentition: No dental tenderness, gingival swelling, dental abscesses or gum lesions.      Tongue: No lesions. Tongue does not deviate from midline.      Palate: No mass and lesions.      Pharynx: Oropharynx is clear. Uvula midline. No  pharyngeal swelling, oropharyngeal exudate, posterior oropharyngeal erythema or uvula swelling.      Tonsils: No tonsillar exudate or tonsillar abscesses.   Eyes:      General: Lids are normal. Gaze aligned appropriately. No scleral icterus.        Right eye: No foreign body, discharge or hordeolum.         Left eye: No foreign body, discharge or hordeolum.      Extraocular Movements: Extraocular movements intact.      Right eye: Normal extraocular motion and no nystagmus.      Left eye: Normal extraocular motion and no nystagmus.      Conjunctiva/sclera: Conjunctivae normal.      Right eye: Right conjunctiva is not injected. No chemosis, exudate or hemorrhage.     Left eye: Left conjunctiva is not injected. No chemosis, exudate or hemorrhage.     Pupils: Pupils are equal, round, and reactive to light.   Neck:      Thyroid: No thyroid mass, thyromegaly or thyroid tenderness.      Vascular: Normal carotid pulses. No carotid bruit, hepatojugular reflux or JVD.      Trachea: Trachea and phonation normal. No tracheal tenderness, tracheostomy, abnormal tracheal secretions or tracheal deviation.   Cardiovascular:      Rate and Rhythm: Normal rate and regular rhythm.      Pulses: Normal pulses.           Carotid pulses are 2+ on the right side and 2+ on the left side.       Radial pulses are 2+ on the right side and 2+ on the left side.        Dorsalis pedis pulses are 2+ on the right side and 2+ on the left side.        Posterior tibial pulses are 2+ on the right side and 2+ on the left side.      Heart sounds: Normal heart sounds, S1 normal and S2 normal.   Pulmonary:      Effort: Pulmonary effort is normal. No tachypnea, bradypnea, accessory muscle usage, prolonged expiration, respiratory distress or retractions. She is not intubated.      Breath sounds: Normal breath sounds and air entry. No stridor, decreased air movement or transmitted upper airway sounds. No decreased breath sounds, wheezing, rhonchi or rales.    Chest:      Chest wall: No tenderness.   Breasts:     Breasts are symmetrical.      Right: No swelling, bleeding, inverted nipple, mass, nipple discharge, skin change or tenderness.      Left: No swelling, bleeding, inverted nipple, mass, nipple discharge, skin change or tenderness.   Abdominal:      General: Bowel sounds are normal. There is no distension.      Palpations: Abdomen is soft. Abdomen is not rigid. There is no fluid wave, hepatomegaly, splenomegaly or mass.      Tenderness: There is no abdominal tenderness. There is no right CVA tenderness, left CVA tenderness, guarding or rebound.   Genitourinary:     Exam position: Supine.   Musculoskeletal:      Cervical back: Neck supple. No tenderness.      Right lower leg: No edema.      Left lower leg: No edema.   Lymphadenopathy:      Head:      Right side of head: No submental, submandibular, preauricular, posterior auricular or occipital adenopathy.      Left side of head: No submental, submandibular, preauricular, posterior auricular or occipital adenopathy.      Cervical: No cervical adenopathy.      Right cervical: No superficial, deep or posterior cervical adenopathy.     Left cervical: No superficial, deep or posterior cervical adenopathy.      Upper Body:      Right upper body: No supraclavicular, axillary or pectoral adenopathy.      Left upper body: No supraclavicular, axillary or pectoral adenopathy.   Skin:     General: Skin is warm and dry.      Coloration: Skin is not pale.      Findings: No erythema or rash.   Neurological:      Mental Status: She is alert and oriented to person, place, and time.   Psychiatric:         Mood and Affect: Mood normal.         Speech: Speech normal.         Behavior: Behavior normal. Behavior is cooperative.     Bilateral barefoot skin diabetic exam is normal, visualized feet and the appearance is normal.  Bilateral monofilament sensation of both feet is normal.  Pulsation pedal pulse exam of both lower legs/feet  is normal as well.          ASSESSMENT/PLAN:     Encounter Diagnoses   Name Primary?    Vitreomacular adhesion of right eye Stable.  No new vision changes.  Follow-up with ophthalmology.   Yes    Vitamin D deficiency Stable.        Vaccine counseling Recommend shingles vaccine.  There is 2 doses of the vaccine  by 2 months 6 months apart.  Check on insurance coverage on shingles vaccine.  May be covered better at the pharmacy.  Separate shingles vaccine from all of the vaccines by at least 1 to 2 weeks.  Discussed about side effects of vaccine. Recc. Covid booster.  Get closer to September the local pharmacy. PCV 20 today.        Type 2 diabetes mellitus with hyperglycemia, without long-term current use of insulin (HCC) Slowly improving.  Check blood in 3 months..  Hold medication changes for now.Careful with diet and excercise at least 30 minutes 3-4 times a week. Check sugars at different times on different dates. Careful with low sugars. Carry something with you and check sugar if can. Can carry adams cracker, etc. Decrease carbohydrates. But also, careful with fruits and natural sugars.One serving a day and no more than 1 handful every day. Check feet  every AM and careful with sores and ulcers on feet bilaterally. Check eyes every year with dilated eye exam.  Check sugars.  2-hour postmeal should be less than 140s.  Pre-meal should be 's.  Both equally affected A1c.  Discussed importance of glycemic control to prevent complications of diabetes  -Discussed complications of diabetes include retinopathy, neuropathy, nephropathy and cardiovascular disease  -Discussed ABCs of DM  -Discussed importance of SBGM  -Discussed importance of low CHO diet, recommend 45gm per meal or 135gm per day  -Normal foot exam. Wear shoes or sandals in the house at all times.  Good comfortable shoes or sandals that have a strap so stay on the feet.  Check feet every morning watch for signs and symptoms of infection i.e.  sores drainage redness pain etc.  If lotion feet, lotion on the top and bottom and not between the toes.   -UTD with optho  -Normotensive        Tortuous aorta No sx.        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  No new vision changes.  Follow-up with ophthalmology.       Screening mammogram for breast cancer Check mammogram. Continue self breast exam every month.         S/P total knee arthroplasty, right     Primary osteoarthritis of right knee Stable.        Pulmonary nodule CT scan of the chest is stable.  The tiny pulmonary nodules have not grown.  She does not need further scans.          Pseudophakia of both eyes Stable.  No new vision changes.  Follow-up with ophthalmology.        Age-related osteoporosis without current pathological fracture. Take calcium 600 every 12 hrs. With vitamin D 400 IU every  12 hrs. Excerise at least 30 minutes 3-4 times a week. May use calcium citrate as opposed to calcium carbonate which may be better absorbed in the setting of PPI use.  The preferred calcium supplement for people at risk of stone formation is calcium citrate because it helps to increase urinary citrate excretion. We recommend a dose of 200-400 mg if dietary calcium cannot be increased.   lifelong physical activity at all ages is strongly endorsed by the National Osteopor[employer] Foundation. Exercise recommendations generally should include weight-bearing, muscle-strengthening, and balance training exercises for 30 minutes 5 days per week or 75 minutes twice weekly, often consistent with other general health recommendations.   Weight Bearing  There are two types of osteoporosis exercises that are important for building and maintaining bone density: weight-bearing and muscle-strengthening exercises.  Weight-bearing Exercises  These exercises include activities that make you move against gravity while staying upright. Weight-bearing exercises can be high-impact or low-impact.  High-impact weight-bearing exercises  help build bones and keep them strong. If you have broken a bone due to osteoporosis or are at risk of breaking a bone, you may need to avoid high-impact exercises. If you’re not sure, you should check with your healthcare provider.  Examples of high-impact weight-bearing exercises are:  Dancing   Doing high-impact aerobics   Hiking   Jogging/running   Jumping Rope   Stair climbing   Tennis  Low-impact weight-bearing exercises can also help keep bones strong and are a safe alternative if you cannot do high-impact exercises. Examples of low-impact weight-bearing exercises are:  Using elliptical training machines   Doing low-impact aerobics   Using stair-step machines   Fast walking on a treadmill or outside            Optic atrophy Stable.  No new vision changes.  Follow-up with ophthalmology.        Murmur        Multiple allergies Stable.        Mixed hyperlipidemia Stable.        Other iron deficiency anemia Check blood 10-8-25.        Acquired hypothyroidism Check blood 10-8-25.        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           Asthma with COPD (chronic obstructive pulmonary disease) (HCC) Stable.        Coronary artery disease due to calcified coronary lesion no symptoms.  No symptoms.  On statin and Plavix and aspirin.       Essential hypertension stable.Careful with diet and excercise at least 30 minutes 3-4 times a week. Check blood pressures at different times on different days. Can purchase own blood pressure monitor. If not, check at local pharmacy. Bake foods more and grill occasionally. Avoid fried foods. No salt. Use other seasonings.         Hypercalcemia Improved.        Encounter for annual health examination Check urine.         Orders Placed This Encounter   Procedures    URINALYSIS, AUTO, W/O SCOPE    Prevnar 20 (PCV20) [34700]       Meds This Visit:  Requested Prescriptions     Signed Prescriptions Disp Refills    Lancet Devices (ONETOUCH DELICA PLUS LANCING) Does not apply Misc 200 each 3     Si Lancet  by Finger stick route 3 (three) times daily.    Lancets (ONETOUCH DELICA PLUS [id number]) Does not apply Misc 300 each 3     Si Lancet by Finger stick route 3 (three) times daily.       Imaging & Referrals:  PCV20 VACCINE FOR INTRAMUSCULAR USE  [facility] JAX 2D+3D SCREENING BILAT (CPT=77067/29030)      RTC 6 months for FU.     1. Vitreomacular adhesion of right eye (Primary)  2. Vitamin D deficiency  3. Vaccine counseling  4. Type 2 diabetes mellitus with hyperglycemia, without long-term current use of insulin (HCC)  -     URINALYSIS, AUTO, W/O SCOPE  -     OneTouch Delica Plus Lancing; 1 Lancet by Finger stick route 3 (three) times daily.  Dispense: 200 each; Refill: 3  -     OneTouch Delica Plus [id number]; 1 Lancet by Finger stick route 3 (three) times daily.  Dispense: 300 each; Refill: 3  5. Tortuous aorta  Overview:  Per CXR on 18  6. Suspected glaucoma of both eyes  7. Screening mammogram for breast cancer  -     [facility] JAX 2D+3D SCREENING BILAT (CPT=77067/54832); Future; Expected date: 2025  8. S/P total knee arthroplasty, right  9. Primary osteoarthritis of right knee  10. Pulmonary nodule  11. Pseudophakia of both eyes  12. Age-related osteoporosis without current pathological fracture  Overview:  Component      Latest Ref Rng & Units 2017 8/10/2017 2017 2016   Vitamin D, 25OH, Total      ng/mL 49.5 54.8 47.5 43.7     Component      Latest Ref Rng & Units 2015   Vitamin D, 25OH, Total      ng/mL 56.4 50.0 46.2     13. 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  Stable.  14. Murmur  Overview:  Per note 20  15. Multiple allergies  16. Mixed hyperlipidemia  17. Other iron deficiency anemia  18. Acquired hypothyroidism  Overview:  Component      Latest Ref Rng & Units 2017 8/10/2017 2017 2016   TSH      0.45 - 5.33 uIU/mL 0.96 1.06 0.65 1.20     Component      Latest Ref Rng & Units 2014   TSH      0.45 - 5.33  uIU/mL 1.49 1.60     19. TAN positive  20. Anemia, unspecified type  21. Asthma with COPD (chronic obstructive pulmonary disease) (HCC)  22. Coronary artery disease due to calcified coronary lesion  Overview:  CAD / NSTEMI - presents with scapular pain and elevated troponin and abnormal EKG with TWI  - s/p PCI 5/15/2018 to RCA PDA, FRANK X 2.   - Cath 6/1/18 with mod disease throughout, 70% Mid LAD with Diag/septal involvement - insignificant FFR at 0.88, moderate dis. Cx., 80 % Dx., 70% LAD.   - continue asa, statin, plavix, tricor , BB  - bradycardia is stable and old, tolerating her BB, continue  Sees [facility] Dr. Lee.   CAD s/p NSTEMI June 2018 - iFR 0.88 LAD-D bifurcation, with adenosine no changes therefore medically treated  · Echo Feb 2020 - EF normal, mild MR, normal PASP  Myocardial perfusion imaging protocol with pharmacological stress test-January 16, 2023: Stress EKG is normal.  Small fixed perfusion abnormality apical and apical inf.  And mid inferior lateral segment.  LVEF of 79% global function is normal.  LV wall cavity is normal.   Date Location - Ordered By    Nuclear PET 1.Stress EKG is normal. 1.This is an abnormal perfusion study. 2.Small fixed perfusion abnormality of moderate intensity in the apical segment. Small fixed perfusion abnormality of moderate intensity in the apical inferior segment. Small fixed perfusion abnormality of moderate intensity in the mid infero lateral segment. 3.The left ventricular cavity is noted to be normal on the stress studies. The stress left ventricular ejection fraction was calculated to be 79% and left ventricular global function is normal. The rest left ventricular cavity is noted to be normal. The rest left ventricular ejection fraction was calculated to be 78% and rest left ventricular global function is normal.  1/16/2023 1:00:00 PM  Louis Hinds      Status post PCI of RCA moderate nonobstructive LAD diagonal bifurcation disease also in February  2023.    RESULTS    RESULTS  Name Result Date Location - Ordered By   Nuclear PET 1.Stress EKG is normal. 1.This is an abnormal perfusion study. 2.Small fixed perfusion abnormality of moderate intensity in the apical segment. Small fixed perfusion abnormality of moderate intensity in the apical inferior segment. Small fixed perfusion abnormality of moderate intensity in the mid infero lateral segment. 3.The left ventricular cavity is noted to be normal on the stress studies. The stress left ventricular ejection fraction was calculated to be 79% and left ventricular global function is normal. The rest left ventricular cavity is noted to be normal. The rest left ventricular ejection fraction was calculated to be 78% and rest left ventricular global function is normal. 1/16/2023 1:00:00 PM Louis Hinds MD   Trans Thoracic Echocardiogram 1.The study quality is good. 2.The left ventricle is normal in size, wall thickness, and global left ventricular systolic function. The left ventricular ejection fraction is 60-65%. Left ventricular diastolic function is normal. No regional wall motion abnormalities. 3.The right ventricle is normal in size. Right ventricular systolic function is normal.4.The estimated pulmonary artery systolic pressure is 27 mmHg assuming a right atrial pressure of 3 mmHg. 5.Mild calcification of the aortic valve is noted with adequate cuspal excursion. No significant stenosis or regurgitation. Mean gradient is 4 mmHg. 3/23/2023 8:00:00 AM Louis Hinds MD   Ambulatory Telemetry Monitor 1.This is an excellent quality study. 2.Predominant rhythm is normal sinus rhythm. 3.The minimum heart rate recorded was 48 beats / minute. The maximum heart rate is 93 beats / minute. The mean heart rate is 59 beats / minute. 4.Rare premature atrial contractions noted. 5.0% AFIB burden, occasional AT with aberrancy.6.Rare premature ventricular contractions noted. 7.No evidence of ventricular tachycardia is noted.8.No pauses  were noted. 3/20/2023 11:30:00 AM Amisha Roche MD     23. Essential hypertension  24. Hypercalcemia  25. Encounter for annual health examination  Other orders  -     Prevnar 20 (PCV20) [78034]      The patient indicates understanding of these issues and agrees to the plan.  Further testing ordered.  Imaging studies ordered.  Lab work ordered.  Reinforced healthy diet, lifestyle, and exercise.      Return in about 6 months (around 1/24/2026), or if symptoms worsen or fail to improve.    Advanced Directives:   She does have a Living Will but we do NOT have it on file in Epic.    She has a Power of  for Health Care on file in [employer].  Discussed Advance Care Planning with patient (and family/surrogate if present). Standard forms made available to patient in After Visit Summary.  16+ minutes was spent with all Advanced Care Planning elements today (up to 30 minutes for CPT 47576)    MD Melonie Najera's SCREENING SCHEDULE   Tests on this list are recommended by your physician but may not be covered, or covered at this frequency, by your insurer.   Please check with your insurance carrier before scheduling to verify coverage.   PREVENTATIVE SERVICES FREQUENCY &  COVERAGE DETAILS LAST COMPLETION DATE   Diabetes Screening    Fasting Blood Sugar /  Glucose    One screening every 12 months if never tested or if previously tested but not diagnosed with pre-diabetes   One screening every 6 months if diagnosed with pre-diabetes Lab Results   Component Value Date    GLUCOSE 164 (H) 11/16/2010     (H) 06/05/2025        Cardiovascular Disease Screening    Lipid Panel  Cholesterol  Lipoprotein (HDL)  Triglycerides Covered every 5 years for all Medicare beneficiaries without apparent signs or symptoms of cardiovascular disease Lab Results   Component Value Date    CHOLEST 140 06/05/2025    HDL 54 06/05/2025    LDL 68 06/05/2025    TRIG 100 06/05/2025         Electrocardiogram (EKG)   Covered if  needed at Welcome to Medicare, and non-screening if indicated for medical reasons 06/30/2023      Ultrasound Screening for Abdominal Aortic Aneurysm (AAA) Covered once in a lifetime for one of the following risk factors    Men who are 65-75 years old and have ever smoked    Anyone with a family history -     Colorectal Cancer Screening  Covered for ages 50-85; only need ONE of the following:    Colonoscopy   Covered every 10 years    Covered every 2 years if patient is at high risk or previous colonoscopy was abnormal -    No recommendations at this time    Flexible Sigmoidoscopy   Covered every 4 years -    Fecal Occult Blood Test Covered annually -   Bone Density Screening    Bone density screening    Covered every 2 years after age 65 if diagnosed with risk of osteoporosis or estrogen deficiency.    Covered yearly for long-term glucocorticoid medication use (Steroids) Last Dexa Scan:    XR DEXA BONE DENSITOMETRY (CPT=77080) 07/24/2024      No recommendations at this time   Pap and Pelvic    Pap   Covered every 2 years for women at normal risk; Annually if at high risk -  No recommendations at this time    Chlamydia Annually if high risk -  No recommendations at this time   Screening Mammogram    Mammogram     Recommend annually for all female patients aged 40 and older    One baseline mammogram covered for patients aged 35-39 07/22/2024    Health Maintenance   Topic Date Due    Mammogram  07/22/2025       Immunizations    Influenza Covered once per flu season  Please get every year 10/24/2024  No recommendations at this time    Pneumococcal Each vaccine ([id number] & [id number]) covered once after 65 Prevnar 13: 02/01/2019    Oqnjdwzws22: 02/13/2018     No recommendations at this time    Hepatitis B One screening covered for patients with certain risk factors   -  No recommendations at this time    Tetanus Toxoid Not covered by Medicare Part B unless medically necessary (cut with metal); may be covered with your  pharmacy prescription benefits -    Tetanus, Diptheria and Pertusis TD and TDaP Not covered by Medicare Part B -  No recommendations at this time    Zoster Not covered by Medicare Part B; may be covered with your pharmacy  prescription benefits -  Zoster Vaccines(1 of 2) Never done     Diabetes      Hemoglobin A1C Annually; if last result is elevated, may be asked to retest more frequently.    Medicare covers every 3 months Lab Results   Component Value Date     07/15/2024    A1C 5.8 (H) 07/15/2024       Hemoglobin A1C Test due on 01/15/2025    Creat/alb ratio Annually Lab Results   Component Value Date    MICROALBCREA 2.9 07/08/2025       LDL Annually Lab Results   Component Value Date    LDL 68 06/05/2025       Dilated Eye Exam Annually Last Diabetic Eye Exam:  Last Dilated Eye Exam: 11/30/24  Eye Exam shows Diabetic Retinopathy?: No       Annual Monitoring of Persistent Medications (ACE/ARB, digoxin diuretics, anticonvulsants)    Potassium Annually Lab Results   Component Value Date    K 4.2 06/05/2025         Creatinine   Annually Lab Results   Component Value Date    CREATSERUM 1.20 (H) 06/05/2025         BUN Annually Lab Results   Component Value Date    BUN 25 (H) 06/05/2025       Drug Serum Conc Annually No results found for: \"DIGOXIN\", \"DIG\", \"VALP\"                     Understanding Advance Care Planning  Advance care planning is the process of deciding one’s own future medical care. It helps assure that if you can’t speak for yourself, your wishes can still be carried out. The plan is a series of legal documents that note a person’s wishes. The documents vary by state. Advance care planning should be discussed at a regular office visit with your primary care provider before an acute illness. Advance care planning is encouraged when a person has a serious illness that is expected to get worse. It may also be done before major surgery. And it can help you and your family be prepared in case of a major  illness or injury. Advance care planning helps with making decisions at these times.     Who will speak on your behalf?  A healthcare proxy is a person who acts as the voice of a patient when the patient can’t speak for himself or herself. The name of this role varies by state. It may be called a Durable Medical Power of  or Durable Power of  for Healthcare. It may be called an agent, surrogate, or advocate. Or it may be called a representative or decision maker. It's an official duty that is identified by a legal document. The document also varies by state.   Why is advance care planning important?   If a person communicates his or her healthcare wishes:   He or she will be given medical care that matches his or her values and goals.  Family members will not be forced to make decisions in a crisis with no guidance.  Creating a plan  Making an advance care plan is often done in 3 steps:   Thinking about one’s wishes. To create an advance care plan, think about what kind of medical treatment you would want if you lose the ability to communicate. Are there any situations in which you would refuse or stop treatment? Are there therapies you would want or not want? And whom do you want to make decisions for you? There are many places to learn more about how to plan for your care. Ask your healthcare provider or  for resources.  Picking a healthcare proxy. This means choosing a trusted person to speak for you only when you can’t speak for yourself. When you can't make medical decisions, your proxy makes sure the instructions in your advance care plan are followed. A proxy doesn't make decisions based on his or her own opinions. They must put aside those opinions and values if needed, and carry out your wishes.  Filling out the legal documents. There are several kinds of legal documents for advance care planning. Each one tells healthcare providers your wishes. The documents may vary by state.  They must be signed and may need to be witnessed or notarized. You can cancel or change them whenever you wish. Depending on your state, the documents may include a Healthcare Proxy form, Living Will, Durable Medical Power of , and Advance Directive.  The family’s role  The best help a family can give is to support their loved one’s wishes. Open and honest communication is vital. Family should express any concerns they have about the patient’s choices while the patient can still make decisions in the event that his or her illness prevents communicating those wishes at a later time.    [employer] last reviewed this educational content on 12/1/2019 © 2000-2021 [employer]. All rights reserved. This information is not intended as a substitute for professional medical care. Always follow your healthcare professional's instructions.          Advance Medical Directive  An advance medical directive is a form that lets you plan ahead for the care you’d want if you could no longer express your wishes. This statement outlines the medical treatment you’d want or names the person you’d wish to make healthcare decisions for you. Be aware that laws vary from state to state, and it may be worthwhile to talk with an .     Writing down your wishes  An advance directive is important whether you’re young or old. Injury or illness can strike at any age.  Decide what is important to you and the kind of treatment you’d want, or not want to have.  Some states allow only one kind of advance directive. Some let you do both a durable power of  for healthcare and a living will. Some states put both kinds on the same form.    A durable power of  (POA) for healthcare   This form lets you name someone else that you have chosen and trust to speak and make decisions on your behalf.  This person can decide on treatment for you only when you can’t speak for yourself.  You don't need to be at the end of  your life. They could speak for you if you were in a coma but were likely to recover.    A living will  This form lets you list the care you want at the end of your life.  A living will applies only if you won’t live without medical treatment. It would apply if you had advanced cancer, a massive stroke, or other serious illness from which you will not recover.  It takes effect only when you can no longer express your wishes yourself.    [employer] last reviewed this educational content on 2/1/2021 © 2000-2021 [employer]. All rights reserved. This information is not intended as a substitute for professional medical care. Always follow your healthcare professional's instructions.          Choosing an Agent  A durable power of  for healthcare is only as good as the person you name to be your agent. Your agent is the person you have chosen to speak and make decisions on your behalf. If this person knows your treatment wishes and is willing to carry them out, you’ll probably be well represented. Be sure to tell your agent what’s important to you.     Who to choose  Here are suggestions for choosing an agent:  You can name a family member, close friend, , , or rabbi.  You should name one person as your agent. Then name one or two alternates. You need a backup person in case your first choice can’t be reached when needed.  Talk with each person you're thinking of naming as your agent or alternate. Do this before you decide who should carry out your wishes.  Your agent should be ...  A competent adult, age 18 or older  Someone you trust and can talk to about the care you want and what's important to you  Someone who supports your treatment choices    In many states, your agent can’t be ...   Your healthcare provider  An employee of your provider or of a hospital, nursing home, or hospice program where you receive care  Some states have other restrictions on who can be named as an agent  for an advance directive.   Be prepared  Tip: It's a good idea to write down your wishes and give a copy to your agent and all others who are involved with your healthcare.   [employer] last reviewed this educational content on 8/1/2021 © 2000-2021 [employer]. All rights reserved. This information is not intended as a substitute for professional medical care. Always follow your healthcare professional's instructions.          Understanding DNR Orders  Do not resuscitate (DNR) orders tell hospital staff not to do potentially life-restoring measures, such as CPR, if you or your loved one's heart and lungs stop working. It allows a natural death, and prevents healthcare staff from artificially reviving a person and placing them on life support. In many states, a DNR order also applies to staff outside the hospital such as in nursing homes and emergency medical services. A DNR order must be written by a healthcare provider. Or in some cases, certain other healthcare workers write it. This can only be done with the person’s or family’s consent. If a person has not written an advance directive, their family will decide on a DNR with the help of the healthcare team.   The person can cancel a DNR order at any time. The healthcare team can answer questions about the DNR form. Have copies of a DNR form are readily available so that your wishes can be faithfully followed.   Writing a DNR order  When might a DNR order be written? When the person’s health condition is such that, in the case of cardiac arrest, CPR and other resuscitation methods are not desired. This could be because the chance of successful resuscitation is very low. Or it could be because the care plan now focuses on comfort measures instead of life-sustaining measures. Coma and terminal illness are instances when a DNR order might be used.   Irreversible coma  In a coma, a person does not respond to sight, sound, or touch. The heart and lungs  could be working, but brain function is damaged due to trauma or disease.   Terminal illness  In the last stages of heart disease, AIDS, cancer, and other illnesses, some people don’t want to prolong their suffering. If recovery isn’t likely and quality of life is poor or getting worse, a person or their family may agree to a DNR order.   DNR orders and hospice care  A hospice program can offer care during the final weeks of life. Hospice programs provide dignity, pain control and comfort care in the home or at special facilities. Hospice does not provide aggressive treatment. In fact, a DNR order will likely be discussed before a person is admitted to hospice. A  or  may be able to help you arrange for hospice support.   Documenting end-of-life wishes  In addition to DNR orders, a person with a serious, life-limiting illness may wish to document their treatment wishes. This is called a POST form or by different names, depending on the state. It's meant to provide a portable medical order to help guide healthcare providers on the specific medical treatments a person wants during a medical emergency. It's meant to complement a DNR order, not replace it. Your healthcare provider can tell you more and help you complete the forms. The form may be called one of these:   MOLST (medical orders for life-sustaining treatment)  POLST (physician orders for life-sustaining treatment)  MOST (medical orders for scope of treatment)  POST (physician orders for scope of treatment)  TPOPP (transportable physician orders for patient preferences)  [employer] last reviewed this educational content on 7/1/2021 © 2000-2021 [employer]. All rights reserved. This information is not intended as a substitute for professional medical care. Always follow your healthcare professional's instructions.          An Agent’s Role for Durable Power of  for Health Care   It’s impossible to know which  medical treatment choices you might face in the future. What if you aren't able to make these decisions for yourself? A durable power of  for healthcare lets you name an agent to speak and carry out your wishes on your behalf. This happens only if you can’t express your wishes yourself.     An agent’s duty  Your agent respects your wishes in the following ways:    Your agent’s duty is to see that your wishes are followed.  If your wishes aren’t known, your agent should try to decide what you want.  Your agent’s choices come before anyone else’s wishes for you.  A durable power of  for healthcare doesn't not give your agent control over your money . Your agent also can’t be made to pay your bills.  Find out what your agent can do  Restrictions on what an agent can and can’t do vary by state. Check your state laws. In most states your agent can:   Choose or refuse life-sustaining and other medical treatment on your behalf  Consent to treatment, and then stop treatment if your condition doesn’t improve  Access and release your medical records  Request an autopsy and donate your organs, unless you’ve stated otherwise in your advance directive  Find out whether your state allows your agent to do the following:   Refuse or withdraw life-enhancing care  Refuse or stop tube feeding or other life-sustaining care--even if you haven’t stated on your advance directive that you don’t want these treatments  Order sterilization or   [employer] last reviewed this educational content on 2021 [employer]. All rights reserved. This information is not intended as a substitute for professional medical care. Always follow your healthcare professional's instructions.          Being a Healthcare Proxy  A healthcare proxy is someone who represents a person who can’t speak for themself. The name of this role varies by state. It may be called a Durable Medical Power of . It may be  called a Durable Power of  for Healthcare. It may be called an agent, surrogate, or advocate. Or it may be called a representative or decision maker. It's an official duty that is noted by a legal document. The document also varies by state. The person must name you as his or her proxy on the document.      A healthcare proxy speaks for another person when he or she is not able to do so. The proxy helps make sure the person’s healthcare wishes are known and followed.     What it means to be a healthcare proxy   Your role as healthcare proxy starts when the person can’t make medical decisions. This assessment can only be made by a licensed doctor. You then make the healthcare decisions as needed. You do this by carrying out the person’s wishes. These wishes are noted in his or her advance care planning documents. These declare what kind of treatment the person wishes to have or not have. You may need to put aside your own values and opinions to carry out the person’s wishes. This may include refusing or stopping life-sustaining treatments.   Documenting end-of-life wishes   As a healthcare proxy, encourage the person to discuss his or her wishes, while they are able. They can do this with their healthcare provider and then document the wishes as a medical order. The provider can help the person complete the form. The forms are known by different names depending on the state. The form may be called one of these:     MOLST (medical orders for life-sustaining treatment)  POLST (physician orders for life-sustaining treatment)  MOST (medical orders for scope of treatment)  POST (physician orders for scope of treatment)  TPOPP (transportable physician orders for patient preferences)    The form documents the person’s wishes at the end of life. It's not tied to a certain healthcare provider or facility. It's different than a living will. The form is an order written according to state regulations by a healthcare  provider. To complete one, the person must express his or her wishes to an advanced healthcare provider. If the person can’t make his or her own decisions, then this is done by the person’s healthcare proxy.   Carrying out your role  Your duties depend on what the person’s advance care planning documents say. Your duties may also depend on state law. In general:   Before accepting a role as a proxy, talk with the person. Be sure you know his or her wishes. Ask questions. This will help you be his or her voice if and when it is needed.  Be sure that the person’s healthcare team knows that you are his or her proxy. Carry a copy of the document and proof of your identity.  Make sure the healthcare team has a copy of the advance care planning documents.  Talk to the healthcare team. Ask questions as often as you need. Stay informed about the person’s condition.  Ask for any help you need to understand the medical situation. Ask about the person’s condition and prognosis. Ask about risks and benefits of tests and treatments. Find out all the facts and options.  Speak on the person’s behalf with the healthcare team when needed.  Talk with family members and keep them informed.  Know your rights. You have the right to ask for information. You can ask for consultations and second opinions. You have the right to request or refuse treatment for the person. You may be able to review his or her medical chart. You can authorize the person’s transfer to another facility. You can also request a new healthcare provider for him or her. If you are not sure what your rights are at any time, ask a .  When it’s time to make decisions  If the person’s wishes are clear in the advance care plan documents, ask for them to be carried out as noted. If they are not clear, talk with the healthcare team. Listen to the team’s recommendations. Talk with a  or counselor. It may be hard for you to make a decision at  times. You may feel sad or upset about a decision. Being a healthcare proxy is not an easy role. But it's an important one. Remember that the person trusts you to carry out his or her wishes.   If you need help  Ask the healthcare team if you have trouble with a decision. The healthcare team will help you.  Encourage the person you are helping to have a conversation with their provider about their end-of-life wishes. The provider can help them fill out the form.  You may need help in resolving family conflicts. Ask the hospital or clinic , ethics consultant, or a  for help.  If you are having trouble talking with the healthcare team while the person is in the hospital, reach out to the patient relations department. Or ask to speak to the hospital ombudsman or ethics committee.    [employer] last reviewed this educational content on 9/1/2019    © 9266-9506 [employer]. All rights reserved. This information is not intended as a substitute for professional medical care. Always follow your healthcare professional's instructions.          Life Support  If you understand how specific treatments may affect your quality of life, you can decide which ones you’d choose or refuse. You may want to talk to your healthcare provider about the possible benefits and risks of treatments. The chance of good results from these therapies varies based on each individual clinical situation and can be very hard to predict. Medical treatment, if your life is in danger, falls into 3 main categories.     Life supporting  This care keeps your heart and lungs going when they can no longer work on their own.  CPR restarts your heart and lungs if they stop working.  A respirator (or ventilator) keeps you breathing. Air is pumped into your lungs through a tube that’s put into your windpipe.    Life sustaining  This care keeps you alive longer when you have an illness that can’t be cured.  Tube feeding or  TPN (total parenteral nutrition) provides food and fluids through a tube or IV (intravenous). It is given if you can’t chew or swallow on your own.  Dialysis is a kidney machine that cleans your blood when your kidneys can no longer work on their own.    Life enhancing  This care controls pain and discomfort, such as nausea or trouble breathing. This type of care is not designed to prolong your life, but to enhance comfort and quality of life. Nothing is done to keep you alive longer.  Hospice care is comfort care. It might provide food and fluids by mouth or help with bathing. Hospice care is given during the last stages of a terminal illness.  Strong pain medicine can be given to help keep you comfortable.    Do Not Resuscitate (DNR)  Would you want CPR if your heart stops while you’re a patient in a hospital or nursing home? If not, talk to your healthcare provider about issuing a DNR (Do-Not-Resuscitate) order.   DNRs and advance directives may not apply during anesthesia, in emergency rooms, or when emergency medical teams respond to a 911 call. Ask your healthcare provider how you can make sure your wishes will be followed. Also, a DNR will not prevent you from getting other kinds of needed medical care such as treatment for pain, or bleeding.   [employer] last reviewed this educational content on 8/1/2021 © 2000-2021 [employer]. All rights reserved. This information is not intended as a substitute for professional medical care. Always follow your healthcare professional's instructions.          Stopping Life-Sustaining Treatments  Certain treatments can help sustain life when you have a serious illness. But as your illness progresses, there may come a time when these treatments are no longer a benefit. Decisions must then be made whether to continue or stop these treatments. This can be a hard task for you and your loved ones, but know that you’re not alone. Your healthcare provider and  healthcare team will guide you through these treatment decisions. They will also help answer any questions you may have.     What are life-sustaining treatments?  Life-sustaining treatments help keep you alive if a vital body function fails. These treatments can include CPR and the use of machines to help with heart, lung, or kidney function. They can also include the use of tubes to deliver food, fluids, blood, and medicines to the body. Blood transfusions and antibiotics are also types of life-sustaining treatments.   What happens if life-sustaining treatments are continued?  These treatments can help extend your life. But they will not cure your illness. If you are near the end of your life, you may find it hard to handle the side effects and problems that can occur with these treatments. In this case, the treatments may be too much of a burden on your body. They may cause more harm than good. They may also disrupt the natural dying process and prolong suffering.   What happens if life-sustaining treatments are stopped?  If these treatments are stopped, the focus of treatment will shift to comfort care. This involves measures to control pain and other symptoms you may have. These measures are not meant to cure your illness or help you live longer. Instead, they are meant to improve your quality of life during the time you have left. If you are in the end stage of your illness, you may be referred to hospice by your healthcare provider. Hospice provides end-of-life care. This includes emotional, spiritual, and social support for you and your loved ones.   How do I decide whether to stop life-sustaining treatments?  Your healthcare provider and healthcare team will talk with you about the specific treatments that are part of your care plan. If you want, you may include family and friends in these meetings. Here are some questions to think about or ask your healthcare team:   Is there is a chance that my illness  will improve? Or will it continue to get worse?  What are my goals of care? Do I want to extend the time I have left? Or do I want to focus my care on comfort and managing symptoms?  How will stopping or continuing these treatments affect my health? Will they enhance my comfort and quality of life? Or will they cause more problems?  Consider your own values or destiny. Also ask for advice from those who share your values.  How do I state my decisions about life-sustaining treatments?  Once you’ve made your decision to continue or stop specific treatments, you can tell your healthcare provider directly. It's best to also put your treatment wishes in writing with advance directives. These are legal forms related to healthcare decisions. Laws about advance directives vary from state to state. Ask your healthcare provider about what forms are needed to make sure your wishes will be followed. Some common forms include:   A durable power of  for healthcare or a healthcare proxy form.  This form lets you name a person to make treatment decisions for you when you can’t. This person is often called a healthcare proxy, medical or healthcare power of , or agent.  A living will.  This form tells others the kinds of treatment you want or don’t want if you become too ill or injured to speak for yourself.  Orders for life-sustaining treatments.  These are actual healthcare provider's orders that must be followed by other medical providers. The form belongs to you, not to the healthcare provider or hospital.). These are legal forms obtained from your healthcare provider or hospital that document your wishes. The forms are known by different names depending on the state. Common names include:  MOLST (medical orders for life-sustaining treatment)  POLST (physician orders for life-sustaining treatment)  MOST (medical orders for scope of treatment)  POST (physician orders for scope of treatment)  TPOPP (transportable  physician orders for patient preferences)     Keep in mind that you can change or cancel an advance directive at any time. Make it a practice to review your decisions each time there is a change in your health or goals of care. Also be sure to tell your healthcare proxy and loved ones of any changes in your decisions.   Deciding whether to stop life-sustaining treatments for a loved one   The decision to stop treatment ideally is made with the person’s consent. If the person is not capable of making decisions and has no advance directive, the decision falls to the person’s healthcare proxy or other adult. If you need to make a decision about stopping treatment for a loved one, start by talking to his or her healthcare provider. Review the goals of care and the benefits and burdens of specific treatments on your loved one’s health. Also think about your loved one’s wishes and values. If needed, seek advice from other healthcare team members, like a  or .   [employer] last reviewed this educational content on 12/1/2019 © 2000-2021 [employer]. All rights reserved. This information is not intended as a substitute for professional medical care. Always follow your healthcare professional's instructions.

## 2025-07-25 NOTE — PATIENT INSTRUCTIONS
ASSESSMENT/PLAN:     Encounter Diagnoses   Name Primary?    Vitreomacular adhesion of right eye Stable.  No new vision changes.  Follow-up with ophthalmology.   Yes    Vitamin D deficiency Stable.        Vaccine counseling Recommend shingles vaccine.  There is 2 doses of the vaccine  by 2 months 6 months apart.  Check on insurance coverage on shingles vaccine.  May be covered better at the pharmacy.  Separate shingles vaccine from all of the vaccines by at least 1 to 2 weeks.  Discussed about side effects of vaccine. Recc. Covid booster.  Get closer to September the local pharmacy. PCV 20 today.        Type 2 diabetes mellitus with hyperglycemia, without long-term current use of insulin (HCC) Slowly improving.  Check blood in 3 months..  Hold medication changes for now.Careful with diet and excercise at least 30 minutes 3-4 times a week. Check sugars at different times on different dates. Careful with low sugars. Carry something with you and check sugar if can. Can carry adams cracker, etc. Decrease carbohydrates. But also, careful with fruits and natural sugars.One serving a day and no more than 1 handful every day. Check feet  every AM and careful with sores and ulcers on feet bilaterally. Check eyes every year with dilated eye exam.  Check sugars.  2-hour postmeal should be less than 140s.  Pre-meal should be 's.  Both equally affected A1c.  Discussed importance of glycemic control to prevent complications of diabetes  -Discussed complications of diabetes include retinopathy, neuropathy, nephropathy and cardiovascular disease  -Discussed ABCs of DM  -Discussed importance of SBGM  -Discussed importance of low CHO diet, recommend 45gm per meal or 135gm per day  -Normal foot exam. Wear shoes or sandals in the house at all times.  Good comfortable shoes or sandals that have a strap so stay on the feet.  Check feet every morning watch for signs and symptoms of infection i.e. sores drainage redness pain  etc.  If lotion feet, lotion on the top and bottom and not between the toes.   -UTD with optho  -Normotensive        Tortuous aorta No sx.        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  No new vision changes.  Follow-up with ophthalmology.       Screening mammogram for breast cancer Check mammogram. Continue self breast exam every month.         S/P total knee arthroplasty, right     Primary osteoarthritis of right knee Stable.        Pulmonary nodule CT scan of the chest is stable.  The tiny pulmonary nodules have not grown.  She does not need further scans.          Pseudophakia of both eyes Stable.  No new vision changes.  Follow-up with ophthalmology.        Age-related osteoporosis without current pathological fracture. Take calcium 600 every 12 hrs. With vitamin D 400 IU every  12 hrs. Excerise at least 30 minutes 3-4 times a week. May use calcium citrate as opposed to calcium carbonate which may be better absorbed in the setting of PPI use.  The preferred calcium supplement for people at risk of stone formation is calcium citrate because it helps to increase urinary citrate excretion. We recommend a dose of 200-400 mg if dietary calcium cannot be increased.   lifelong physical activity at all ages is strongly endorsed by the National Osteoporosis Foundation. Exercise recommendations generally should include weight-bearing, muscle-strengthening, and balance training exercises for 30 minutes 5 days per week or 75 minutes twice weekly, often consistent with other general health recommendations.   Weight Bearing  There are two types of osteoporosis exercises that are important for building and maintaining bone density: weight-bearing and muscle-strengthening exercises.  Weight-bearing Exercises  These exercises include activities that make you move against gravity while staying upright. Weight-bearing exercises can be high-impact or low-impact.  High-impact weight-bearing exercises help build bones and keep them  strong. If you have broken a bone due to osteoporosis or are at risk of breaking a bone, you may need to avoid high-impact exercises. If you’re not sure, you should check with your healthcare provider.  Examples of high-impact weight-bearing exercises are:  Dancing   Doing high-impact aerobics   Hiking   Jogging/running   Jumping Rope   Stair climbing   Tennis  Low-impact weight-bearing exercises can also help keep bones strong and are a safe alternative if you cannot do high-impact exercises. Examples of low-impact weight-bearing exercises are:  Using elliptical training machines   Doing low-impact aerobics   Using stair-step machines   Fast walking on a treadmill or outside            Optic atrophy Stable.  No new vision changes.  Follow-up with ophthalmology.        Murmur        Multiple allergies Stable.        Mixed hyperlipidemia Stable.        Other iron deficiency anemia Check blood 10-8-25.        Acquired hypothyroidism Check blood 10-8-25.        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           Asthma with COPD (chronic obstructive pulmonary disease) (HCC) Stable.        Coronary artery disease due to calcified coronary lesion no symptoms.  No symptoms.  On statin and Plavix and aspirin.       Essential hypertension stable.Careful with diet and excercise at least 30 minutes 3-4 times a week. Check blood pressures at different times on different days. Can purchase own blood pressure monitor. If not, check at local pharmacy. Bake foods more and grill occasionally. Avoid fried foods. No salt. Use other seasonings.         Hypercalcemia Improved.        Encounter for annual health examination Check urine.         Orders Placed This Encounter   Procedures    URINALYSIS, AUTO, W/O SCOPE    Prevnar 20 (PCV20) [98952]       Meds This Visit:  Requested Prescriptions     Signed Prescriptions Disp Refills    Lancet Devices (ONETOUCH DELICA PLUS LANCING) Does not apply Misc 200 each 3     Si Lancet by Finger stick route 3  (three) times daily.    Lancets (ONETOUCH DELICA PLUS [id number]) Does not apply Misc 300 each 3     Si Lancet by Finger stick route 3 (three) times daily.       Imaging & Referrals:  PCV20 VACCINE FOR INTRAMUSCULAR USE  [facility] JAX 2D+3D SCREENING BILPATEL (CPT=77067/35225)      RTC 6 months for FU.

## 2025-07-25 NOTE — ASSESSMENT & PLAN NOTE
CT scan of the chest is stable.  The tiny pulmonary nodules have not grown.  She does not need further scans.

## 2025-07-28 ENCOUNTER — HOSPITAL ENCOUNTER (OUTPATIENT)
Dept: MRI IMAGING | Facility: HOSPITAL | Age: 80
Discharge: HOME | End: 2025-07-28
Attending: INTERNAL MEDICINE
Payer: MEDICARE

## 2025-07-28 DIAGNOSIS — R93.5 ABNORMAL CT OF THE ABDOMEN: ICD-10-CM

## 2025-07-28 PROCEDURE — A9575 INJ GADOTERATE MEGLUMI 0.1ML: HCPCS | Performed by: INTERNAL MEDICINE

## 2025-07-28 PROCEDURE — 74183 MRI ABD W/O CNTR FLWD CNTR: CPT | Performed by: INTERNAL MEDICINE

## 2025-07-28 RX ORDER — DIPHENHYDRAMINE HYDROCHLORIDE 50 MG/ML
10 INJECTION, SOLUTION INTRAMUSCULAR; INTRAVENOUS
Status: COMPLETED | OUTPATIENT
Start: 2025-07-28 | End: 2025-07-28

## 2025-07-28 RX ADMIN — DIPHENHYDRAMINE HYDROCHLORIDE 10 ML: 50 INJECTION, SOLUTION INTRAMUSCULAR; INTRAVENOUS at 15:11:00

## 2025-07-30 ENCOUNTER — HOSPITAL ENCOUNTER (OUTPATIENT)
Dept: MAMMOGRAPHY | Age: 80
Discharge: HOME | End: 2025-07-30
Attending: INTERNAL MEDICINE

## 2025-07-30 DIAGNOSIS — Z12.31 SCREENING MAMMOGRAM FOR BREAST CANCER: ICD-10-CM

## 2025-07-30 PROCEDURE — 77067 SCR MAMMO BI INCL CAD: CPT | Performed by: INTERNAL MEDICINE

## 2025-07-30 PROCEDURE — 77063 BREAST TOMOSYNTHESIS BI: CPT | Performed by: INTERNAL MEDICINE

## 2025-08-23 DIAGNOSIS — E03.9 ACQUIRED HYPOTHYROIDISM: ICD-10-CM

## 2025-08-25 ENCOUNTER — MED REC SCAN ONLY (OUTPATIENT)
Dept: INTERNAL MEDICINE CLINIC | Facility: CLINIC | Age: 80
End: 2025-08-25

## 2025-08-27 RX ORDER — LEVOTHYROXINE SODIUM 88 UG/1
88 TABLET ORAL NIGHTLY
Qty: 90 TABLET | Refills: 3 | Status: SHIPPED | OUTPATIENT
Start: 2025-08-27

## (undated) DEVICE — GAMMEX® PI HYBRID SIZE 8, STERILE POWDER-FREE SURGICAL GLOVE, POLYISOPRENE AND NEOPRENE BLEND: Brand: GAMMEX

## (undated) DEVICE — DISPOSABLE TOURNIQUET CUFF SINGLE BLADDER, DUAL PORT AND QUICK CONNECT CONNECTOR: Brand: COLOR CUFF

## (undated) DEVICE — DRAPE SHEET LARGE 76X55

## (undated) DEVICE — GAUZE TRAY STERILE 4X4 12PLY

## (undated) DEVICE — COTTON UNDERCAST PADDING,REGULAR FINISH: Brand: WEBRIL

## (undated) DEVICE — 2DE14 2-0 PDO 14X14: Brand: 2DE14 2-0 PDO 14X14

## (undated) DEVICE — DRAPE SRG 70X60IN SPLT U IMPRV

## (undated) DEVICE — HOOD: Brand: FLYTE

## (undated) DEVICE — SOL NACL IRRIG 0.9% 1000ML BTL

## (undated) DEVICE — SCRW BONE PERSONA 3.5 MMXL48MM

## (undated) DEVICE — 60 ML SYRINGE LUER-LOCK TIP: Brand: MONOJECT

## (undated) DEVICE — NEEDLE HPO 18GA 1.5IN ECLPS

## (undated) DEVICE — PREVENA PEEL & PLACE SYSTEM KIT- 13 CM: Brand: PREVENA™ PEEL & PLACE™

## (undated) DEVICE — BNDG COHESIVE W4INXL5YD TAN E

## (undated) DEVICE — WRAP COOLING KNEE W/ICE PILLOW

## (undated) DEVICE — GOWN SURG AERO BLUE PERF XLG

## (undated) DEVICE — HOOD, PEEL-AWAY: Brand: FLYTE

## (undated) DEVICE — GAMMEX® PI HYBRID SIZE 6.5, STERILE POWDER-FREE SURGICAL GLOVE, POLYISOPRENE AND NEOPRENE BLEND: Brand: GAMMEX

## (undated) DEVICE — APPLICATOR CHLORAPREP 26ML

## (undated) DEVICE — SOL  0.9% 1000ML

## (undated) DEVICE — PROXIMATE SKIN STAPLERS (35 WIDE) CONTAINS 35 STAINLESS STEEL STAPLES (FIXED HEAD): Brand: PROXIMATE

## (undated) DEVICE — BIPOLAR SEALER 23-112-1 AQM 6.0: Brand: AQUAMANTYS™

## (undated) DEVICE — Device: Brand: STABLECUT®

## (undated) DEVICE — 2T11 #2 PDO 36 X 36: Brand: 2T11 #2 PDO 36 X 36

## (undated) DEVICE — SYRINGE 35ML LL TIP

## (undated) DEVICE — SOLUTION  .9 3000ML

## (undated) DEVICE — PIN DRL 3.2MM 2.5MM 75MM STRL

## (undated) DEVICE — GAMMEX® PI HYBRID SIZE 7.5, STERILE POWDER-FREE SURGICAL GLOVE, POLYISOPRENE AND NEOPRENE BLEND: Brand: GAMMEX

## (undated) DEVICE — TOTAL KNEE: Brand: MEDLINE INDUSTRIES, INC.

## (undated) DEVICE — SUT VICRYL 1 OS-6 J535H

## (undated) DEVICE — CEMENT MIXING SYSTEM WITH FEMORAL BREAKWAY NOZZLE: Brand: REVOLUTION

## (undated) NOTE — LETTER
3/29/2021              Michelet Pratt Struck        [address] [facility] [id number]         Dear Rosalinda Prieto,    [facility] records indicate that the tests ordered for you by Bhavya Truong MD  have not been done.   If you have, in fact, already com

## (undated) NOTE — MR AVS SNAPSHOT
[address] [facility] [id number]  [phone]               Thank you for choosing us for your health care visit with Halie Espinoza.  Lawrence Alford MD.  We are glad to serve you and happy to provide you with this summary of your v you and check sugar if can. Can carry adams cracker, etc. Decrease carbohydrates. But also, careful with fruits and natural sugars. One serving a day and no more than 1 handful every day.  Check feet  every AM and careful with sores and ulcers on feet bilat Citalopram Hydrobromide 20 MG Tabs   Take 1 tablet (20 mg total) by mouth once daily. Commonly known as:  CeleXA           COD LIVER OIL OR   1 DAILY           CoQ10 200 MG Caps   1 cap daily           EPINEPHrine 0.3 MG/0.3ML Soaj   Use as directed. your doctor or other care provider to review them with you.          Where to Get Your Medications      These medications were sent to [facility], [address] [place] [address] BEDROOM:  ? Place light switches within reach of your bed and a night light between the bedroom and bathroom. ? Get up slowly from lying down or sitting if you get dizzy. ? Keep a working flashlight near your bed.   STAIRS:  ? Keep stairwells well lit wit

## (undated) NOTE — LETTER
[address] [facility]    Consent for Coronary CT Angiography    Your doctor has recommended that you, Yonis Lindsey have a Coronary CT Angiography procedure.  Coronary CT Angiography is a diagnostic procedure using computed tomogra patient when taking medication. Allergic reactions to medication can range from very minor to very serious leading to a life threatening situation or even death. Please be sure to communicate any allergy you may have to your caregiver immediately.     The i

## (undated) NOTE — LETTER
Amol Pickett [address], [place], [place]  [address]  INFORMED CONSENT FOR TRANSFUSION OF BLOOD OR BLOOD PRODUCTS  My physician has informed me of the nature, purpose, benefits and risks of transfusion for blood and blood components that he/she may deem necessary during my treatment or hospitalization. He/she has also discussed alternatives to receiving blood from the voluntary blood supply with me, such as self-donation (autologous) and directed donation (blood donated by family or friends to be used specifically for me). I further understand that while the [address] [place] [address] will attempt to supply any autologous or directed donor blood prior to transfusion of blood from the routine blood supply, medical circumstances may require that other or additional blood components may be required for my care. In giving consent, I acknowledge that my physician has also informed me that despite careful screening and testing in accordance with national and regional regulations, there is still a small risk of transmission of infectious agents including hepatitis, HIV-1/2, cytomegalovirus and other viruses or diseases as yet unknown for which licensed definitive screening tests do not currently exist. Additionally, my physician has informed me of the potential for transfusion reactions not related to an infectious agent. [  ]  Check here for Recurring Outpatient Transfusion Therapy (valid for 1 year) In addition to the above, my physician has informed me that I shall receive numerous transfusions over a period of time and that these can lead to other increased risks. I hereby authorize the transfusion of blood and/or blood products to me as deemed necessary and ordered by physicians participating in my care.  My physician has given me the opportunity to ask questions and any questions asked have been answered to my satisfaction  __________________________________________ ______________________________________________  (Signature of Patient)                                                            (Responsible party in case of Minor,                                                                                                 Incompetent, or unconscious Patient)  ___________________________________________       ________ ______________________________________  (Relationship to Patient)                                                       (Signature of Witness)  ______________________________________________________________________________________________   (Date)                                                                           (Time)  REFUSAL OF CONSENT FOR BLOOD TRANSFUSIONS   Sign only if Refusing   [  ] I understand refusal of blood or blood products as deemed necessary by my physician may have serious consequences to my condition to include possible death.  I hereby assume responsibility for my refusal and release the hospital, its personnel, and my physicians from any responsibility for the consequences of my refusal.    ________________________________________________________________________________  (Signature of Patient)                                                         (Responsible Party/Relationship to Patient)    ________________________________________________________________________________  (Signature of Witness)                                                       (Date/Time)     Patient Name: Jimmy Mcgregor     : 1945                 Printed: 2023      Medical Record #: [id number]                                 Page 1 of 1

## (undated) NOTE — LETTER
9/6/2018          To Whom It May Concern:    Gianni Gonzalez is currently under my medical care and may return to cardiac rehab. If you require additional information please contact our office. Sincerely,    Katy West.  Hesham Ward MD          Document

## (undated) NOTE — MR AVS SNAPSHOT
[facility]  [address] [address]  [phone]               Thank you for choosing us for your health care visit with Shelby Peralta.  Jamey Seymour MD.  We are glad to serve you and happy to provide you with this summary of yo Antibiotics decrease effectiveness of birth control pills. Use extra form of protection while on antibiotics. Discussed with patient of side effects and use of these medications.         Follow Up with Our Office     Return in about 6 months (around 8/10/20 Commonly known as:  PRINIVIL,ZESTRIL           Lisinopril-Hydrochlorothiazide 20-25 MG Tabs   Take 1 tablet by mouth once daily.            MetFORMIN HCl 1000 MG Tabs   TAKE 1 TABLET TWICE A DAY  WITH MORNING AND EVENING   MEALS   Commonly known as:  Mike [facility]z MyChart     Visit [email or web address]  You can access your MyChart to more actively manage your health care and view more details from this visit by going to [email or web address].   If you've recently had a stay at the Hospital you can access your discharge ins

## (undated) NOTE — LETTER
[address], [place]  Authorization for Invasive Procedures  1.  I hereby authorize Dr. Branden Montalvo , my physician and whomever may be designated as the doctor's assistant, to perform the following operation and/or procedure:  Cardiac 5. I consent to the photographing of the operations or procedures to be performed for the purposes of advancing medicine, science, and/or education, provided my identity is not revealed.  If the procedure has been videotaped, the physician/surgeon will obta __________ Time: ___________    Statement of Physician  My signature below affirms that prior to the time of the procedure, I have explained to the patient and/or her legal representative, the risks and benefits involved in the proposed treatment and any r